# Patient Record
Sex: MALE | Race: WHITE | Employment: OTHER | URBAN - METROPOLITAN AREA
[De-identification: names, ages, dates, MRNs, and addresses within clinical notes are randomized per-mention and may not be internally consistent; named-entity substitution may affect disease eponyms.]

---

## 2018-03-19 ENCOUNTER — HOSPITAL ENCOUNTER (OUTPATIENT)
Dept: GENERAL RADIOLOGY | Age: 78
Discharge: HOME | End: 2018-03-21
Payer: MEDICARE

## 2018-03-19 ENCOUNTER — HOSPITAL ENCOUNTER (OUTPATIENT)
Age: 78
Discharge: HOME | End: 2018-03-21
Payer: MEDICARE

## 2018-03-19 DIAGNOSIS — R52 PAIN: ICD-10-CM

## 2018-03-19 PROCEDURE — 72220 X-RAY EXAM SACRUM TAILBONE: CPT

## 2018-03-19 PROCEDURE — 72100 X-RAY EXAM L-S SPINE 2/3 VWS: CPT

## 2018-05-19 ENCOUNTER — HOSPITAL ENCOUNTER (EMERGENCY)
Age: 78
Discharge: HOME | End: 2018-05-19
Payer: MEDICARE

## 2018-05-19 VITALS
RESPIRATION RATE: 20 BRPM | DIASTOLIC BLOOD PRESSURE: 73 MMHG | HEART RATE: 98 BPM | BODY MASS INDEX: 23.54 KG/M2 | HEIGHT: 67 IN | WEIGHT: 150 LBS | SYSTOLIC BLOOD PRESSURE: 134 MMHG | TEMPERATURE: 98.3 F | OXYGEN SATURATION: 98 %

## 2018-05-19 DIAGNOSIS — R21 RASH AND OTHER NONSPECIFIC SKIN ERUPTION: Primary | ICD-10-CM

## 2018-05-19 PROCEDURE — 99282 EMERGENCY DEPT VISIT SF MDM: CPT

## 2018-05-19 PROCEDURE — 6360000002 HC RX W HCPCS: Performed by: NURSE PRACTITIONER

## 2018-05-19 PROCEDURE — 6370000000 HC RX 637 (ALT 250 FOR IP): Performed by: NURSE PRACTITIONER

## 2018-05-19 PROCEDURE — 96372 THER/PROPH/DIAG INJ SC/IM: CPT

## 2018-05-19 RX ORDER — TRIAMCINOLONE ACETONIDE 40 MG/ML
40 INJECTION, SUSPENSION INTRA-ARTICULAR; INTRAMUSCULAR ONCE
Status: COMPLETED | OUTPATIENT
Start: 2018-05-19 | End: 2018-05-19

## 2018-05-19 RX ORDER — HYDROCODONE BITARTRATE AND ACETAMINOPHEN 10; 325 MG/1; MG/1
1 TABLET ORAL EVERY 6 HOURS PRN
Status: ON HOLD | COMMUNITY
End: 2018-10-24 | Stop reason: HOSPADM

## 2018-05-19 RX ORDER — LANOLIN ALCOHOL/MO/W.PET/CERES
2500 CREAM (GRAM) TOPICAL DAILY
COMMUNITY
End: 2018-08-30 | Stop reason: DRUGHIGH

## 2018-05-19 RX ORDER — FAMOTIDINE 20 MG/1
20 TABLET, FILM COATED ORAL ONCE
Status: COMPLETED | OUTPATIENT
Start: 2018-05-19 | End: 2018-05-19

## 2018-05-19 RX ORDER — HYDROXYZINE PAMOATE 50 MG/1
50 CAPSULE ORAL 3 TIMES DAILY PRN
Qty: 21 CAPSULE | Refills: 0 | Status: SHIPPED | OUTPATIENT
Start: 2018-05-19 | End: 2018-05-26

## 2018-05-19 RX ORDER — HYDROXYZINE HYDROCHLORIDE 50 MG/ML
50 INJECTION, SOLUTION INTRAMUSCULAR ONCE
Status: COMPLETED | OUTPATIENT
Start: 2018-05-19 | End: 2018-05-19

## 2018-05-19 RX ORDER — FAMOTIDINE 20 MG/1
20 TABLET, FILM COATED ORAL 2 TIMES DAILY
Qty: 14 TABLET | Refills: 0 | Status: SHIPPED | OUTPATIENT
Start: 2018-05-19 | End: 2018-08-30 | Stop reason: ALTCHOICE

## 2018-05-19 RX ORDER — DIPHENHYDRAMINE HCL 25 MG
25 TABLET ORAL ONCE
Status: COMPLETED | OUTPATIENT
Start: 2018-05-19 | End: 2018-05-19

## 2018-05-19 RX ORDER — PREDNISONE 10 MG/1
40 TABLET ORAL DAILY
Qty: 20 TABLET | Refills: 0 | Status: SHIPPED | OUTPATIENT
Start: 2018-05-19 | End: 2018-05-24

## 2018-05-19 RX ADMIN — FAMOTIDINE 20 MG: 20 TABLET, FILM COATED ORAL at 16:51

## 2018-05-19 RX ADMIN — DIPHENHYDRAMINE HCL 25 MG: 25 TABLET ORAL at 16:51

## 2018-05-19 RX ADMIN — TRIAMCINOLONE ACETONIDE 40 MG: 40 INJECTION, SUSPENSION INTRA-ARTICULAR; INTRAMUSCULAR at 16:52

## 2018-05-19 RX ADMIN — HYDROXYZINE HYDROCHLORIDE 50 MG: 50 INJECTION, SOLUTION INTRAMUSCULAR at 17:44

## 2018-05-19 ASSESSMENT — PAIN DESCRIPTION - FREQUENCY: FREQUENCY: CONTINUOUS

## 2018-05-19 ASSESSMENT — PAIN SCALES - GENERAL: PAINLEVEL_OUTOF10: 9

## 2018-05-19 ASSESSMENT — PAIN DESCRIPTION - PAIN TYPE: TYPE: ACUTE PAIN

## 2018-05-19 ASSESSMENT — PAIN DESCRIPTION - DESCRIPTORS: DESCRIPTORS: ACHING;THROBBING

## 2018-05-19 ASSESSMENT — PAIN DESCRIPTION - LOCATION: LOCATION: HAND

## 2018-05-19 ASSESSMENT — PAIN DESCRIPTION - ORIENTATION: ORIENTATION: RIGHT;LEFT

## 2018-06-06 ENCOUNTER — HOSPITAL ENCOUNTER (OUTPATIENT)
Dept: GENERAL RADIOLOGY | Age: 78
Discharge: HOME | End: 2018-06-08
Payer: MEDICARE

## 2018-06-06 ENCOUNTER — HOSPITAL ENCOUNTER (OUTPATIENT)
Dept: GENERAL RADIOLOGY | Age: 78
End: 2018-06-06
Payer: MEDICARE

## 2018-06-06 ENCOUNTER — HOSPITAL ENCOUNTER (OUTPATIENT)
Age: 78
Discharge: HOME | End: 2018-06-08
Payer: MEDICARE

## 2018-06-06 DIAGNOSIS — M54.2 CERVICALGIA: ICD-10-CM

## 2018-06-06 PROCEDURE — 72050 X-RAY EXAM NECK SPINE 4/5VWS: CPT

## 2018-06-13 ENCOUNTER — HOSPITAL ENCOUNTER (OUTPATIENT)
Dept: ULTRASOUND IMAGING | Age: 78
Discharge: HOME | End: 2018-06-13
Payer: MEDICARE

## 2018-06-13 ENCOUNTER — HOSPITAL ENCOUNTER (OUTPATIENT)
Dept: NON INVASIVE DIAGNOSTICS | Age: 78
Discharge: HOME | End: 2018-06-13
Payer: MEDICARE

## 2018-06-13 DIAGNOSIS — R42 DIZZINESS AND GIDDINESS: ICD-10-CM

## 2018-06-13 PROCEDURE — 93880 EXTRACRANIAL BILAT STUDY: CPT

## 2018-06-13 PROCEDURE — 93225 XTRNL ECG REC<48 HRS REC: CPT

## 2018-06-13 PROCEDURE — 93226 XTRNL ECG REC<48 HR SCAN A/R: CPT

## 2018-06-27 ENCOUNTER — HOSPITAL ENCOUNTER (OUTPATIENT)
Dept: MRI IMAGING | Age: 78
Discharge: HOME | End: 2018-06-29
Payer: MEDICARE

## 2018-06-27 DIAGNOSIS — R42 DIZZINESS: ICD-10-CM

## 2018-06-27 DIAGNOSIS — R41.3 MEMORY LOSS: ICD-10-CM

## 2018-06-27 DIAGNOSIS — F44.89 CHRONIC CONFUSIONAL STATE: ICD-10-CM

## 2018-06-27 PROCEDURE — 6360000004 HC RX CONTRAST MEDICATION: Performed by: RADIOLOGY

## 2018-06-27 PROCEDURE — 70553 MRI BRAIN STEM W/O & W/DYE: CPT

## 2018-06-27 PROCEDURE — A9577 INJ MULTIHANCE: HCPCS | Performed by: RADIOLOGY

## 2018-06-27 RX ADMIN — GADOBENATE DIMEGLUMINE 20 ML: 529 INJECTION, SOLUTION INTRAVENOUS at 14:50

## 2018-08-04 ENCOUNTER — APPOINTMENT (OUTPATIENT)
Dept: GENERAL RADIOLOGY | Age: 78
DRG: 163 | End: 2018-08-04
Payer: MEDICARE

## 2018-08-04 ENCOUNTER — HOSPITAL ENCOUNTER (INPATIENT)
Age: 78
LOS: 6 days | Discharge: NURSING, REHAB OR LONG-TERM CARE | DRG: 163 | End: 2018-08-10
Attending: EMERGENCY MEDICINE | Admitting: SURGERY
Payer: MEDICARE

## 2018-08-04 ENCOUNTER — APPOINTMENT (OUTPATIENT)
Dept: CT IMAGING | Age: 78
DRG: 163 | End: 2018-08-04
Payer: MEDICARE

## 2018-08-04 DIAGNOSIS — S01.01XA LACERATION OF SCALP, INITIAL ENCOUNTER: ICD-10-CM

## 2018-08-04 DIAGNOSIS — S27.2XXA TRAUMATIC HEMOPNEUMOTHORAX, INITIAL ENCOUNTER: ICD-10-CM
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
## 2018-08-04 DIAGNOSIS — S22.49XA CLOSED FRACTURE OF MULTIPLE RIBS, UNSPECIFIED LATERALITY, INITIAL ENCOUNTER: ICD-10-CM

## 2018-08-04 PROBLEM — T14.90XA TRAUMA: Status: ACTIVE | Noted: 2018-08-04

## 2018-08-04 LAB
% INHIBITION AA: 70.6 %
% INHIBITION ADP: 23 %
ABO/RH: NORMAL
ACETAMINOPHEN LEVEL: <5 MCG/ML (ref 10–30)
ALBUMIN SERPL-MCNC: 3.4 G/DL (ref 3.5–5.2)
ALP BLD-CCNC: 34 U/L (ref 40–129)
ALT SERPL-CCNC: 45 U/L (ref 0–40)
ANGLE (CLOT STRENGTH): 76.2 DEGREE (ref 59–74)
ANION GAP SERPL CALCULATED.3IONS-SCNC: 12 MMOL/L (ref 7–16)
ANTIBODY SCREEN: NORMAL
APTT: <20 SEC (ref 24.5–35.1)
AST SERPL-CCNC: 65 U/L (ref 0–39)
B.E.: -3.8 MMOL/L (ref -3–3)
BILIRUB SERPL-MCNC: 0.4 MG/DL (ref 0–1.2)
BUN BLDV-MCNC: 20 MG/DL (ref 8–23)
CALCIUM SERPL-MCNC: 8.8 MG/DL (ref 8.6–10.2)
CHLORIDE BLD-SCNC: 107 MMOL/L (ref 98–107)
CO2: 24 MMOL/L (ref 22–29)
COHB: 0.2 % (ref 0–1.5)
CREAT SERPL-MCNC: 1.7 MG/DL (ref 0.7–1.2)
CRITICAL: ABNORMAL
DATE ANALYZED: ABNORMAL
DATE OF COLLECTION: ABNORMAL
EPL-TEG: 0.1 % (ref 0–15)
ETHANOL: <10 MG/DL (ref 0–0.08)
G-TEG: 10.3 K D/SC (ref 4.5–11)
GFR AFRICAN AMERICAN: 47
GFR NON-AFRICAN AMERICAN: 39 ML/MIN/1.73
GLUCOSE BLD-MCNC: 199 MG/DL (ref 74–109)
HCO3: 23 MMOL/L (ref 22–26)
HCT VFR BLD CALC: 31.8 % (ref 37–54)
HEMOGLOBIN: 10.8 G/DL (ref 12.5–16.5)
HHB: 0.9 % (ref 0–5)
INR BLD: 1
K (CLOTTING TIME): 0.9 MIN (ref 1–3)
LAB: ABNORMAL
LACTIC ACID: 3.5 MMOL/L (ref 0.5–2.2)
LY30 (FIBRINOLYSIS): 0.1 % (ref 0–8)
Lab: 1542
MA (MAX AMPLITUDE): 67.3 MM (ref 50–70)
MA-AA: 25.6 MM
MA-ACTIVATED: 8.2 MM
MA-ADP: 53.7 MM
MA-TEG BASELINE: 67.3 MM
MCH RBC QN AUTO: 35.2 PG (ref 26–35)
MCHC RBC AUTO-ENTMCNC: 34 % (ref 32–34.5)
MCV RBC AUTO: 103.6 FL (ref 80–99.9)
METHB: 0.3 % (ref 0–1.5)
MODE: ABNORMAL
O2 CONTENT: 17.1 ML/DL
O2 SATURATION: 99.1 % (ref 92–98.5)
O2HB: 98.6 % (ref 94–97)
OPERATOR ID: 467
PATIENT TEMP: 37 C
PCO2: 48.9 MMHG (ref 35–45)
PDW BLD-RTO: 13.9 FL (ref 11.5–15)
PH BLOOD GAS: 7.29 (ref 7.35–7.45)
PLATELET # BLD: 144 E9/L (ref 130–450)
PMV BLD AUTO: 10.8 FL (ref 7–12)
PO2: 291.9 MMHG (ref 60–100)
POTASSIUM SERPL-SCNC: 5.34 MMOL/L (ref 3.3–5.1)
POTASSIUM SERPL-SCNC: 6 MMOL/L (ref 3.5–5)
PROTHROMBIN TIME: 11.9 SEC (ref 9.3–12.4)
R (REACTION TIME): 2.7 MIN (ref 5–10)
RBC # BLD: 3.07 E12/L (ref 3.8–5.8)
SALICYLATE, SERUM: <0.3 MG/DL (ref 0–30)
SODIUM BLD-SCNC: 143 MMOL/L (ref 132–146)
SOURCE, BLOOD GAS: ABNORMAL
THB: 11.8 G/DL (ref 11.5–16.5)
TIME ANALYZED: 1544
TOTAL PROTEIN: 5.9 G/DL (ref 6.4–8.3)
TRICYCLIC ANTIDEPRESSANTS SCREEN SERUM: NEGATIVE NG/ML
WBC # BLD: 11.3 E9/L (ref 4.5–11.5)

## 2018-08-04 PROCEDURE — 82805 BLOOD GASES W/O2 SATURATION: CPT

## 2018-08-04 PROCEDURE — G0480 DRUG TEST DEF 1-7 CLASSES: HCPCS

## 2018-08-04 PROCEDURE — 2000000000 HC ICU R&B

## 2018-08-04 PROCEDURE — 73560 X-RAY EXAM OF KNEE 1 OR 2: CPT

## 2018-08-04 PROCEDURE — 80048 BASIC METABOLIC PNL TOTAL CA: CPT

## 2018-08-04 PROCEDURE — 99223 1ST HOSP IP/OBS HIGH 75: CPT | Performed by: ORTHOPAEDIC SURGERY

## 2018-08-04 PROCEDURE — 99223 1ST HOSP IP/OBS HIGH 75: CPT | Performed by: SURGERY

## 2018-08-04 PROCEDURE — 36410 VNPNXR 3YR/> PHY/QHP DX/THER: CPT | Performed by: SURGERY

## 2018-08-04 PROCEDURE — 85027 COMPLETE CBC AUTOMATED: CPT

## 2018-08-04 PROCEDURE — 73630 X-RAY EXAM OF FOOT: CPT

## 2018-08-04 PROCEDURE — 83605 ASSAY OF LACTIC ACID: CPT

## 2018-08-04 PROCEDURE — 86901 BLOOD TYPING SEROLOGIC RH(D): CPT

## 2018-08-04 PROCEDURE — 85730 THROMBOPLASTIN TIME PARTIAL: CPT

## 2018-08-04 PROCEDURE — 87081 CULTURE SCREEN ONLY: CPT

## 2018-08-04 PROCEDURE — 99285 EMERGENCY DEPT VISIT HI MDM: CPT

## 2018-08-04 PROCEDURE — 6370000000 HC RX 637 (ALT 250 FOR IP): Performed by: STUDENT IN AN ORGANIZED HEALTH CARE EDUCATION/TRAINING PROGRAM

## 2018-08-04 PROCEDURE — 36600 WITHDRAWAL OF ARTERIAL BLOOD: CPT | Performed by: SURGERY

## 2018-08-04 PROCEDURE — 72170 X-RAY EXAM OF PELVIS: CPT

## 2018-08-04 PROCEDURE — 94150 VITAL CAPACITY TEST: CPT

## 2018-08-04 PROCEDURE — 32551 INSERTION OF CHEST TUBE: CPT

## 2018-08-04 PROCEDURE — 74177 CT ABD & PELVIS W/CONTRAST: CPT

## 2018-08-04 PROCEDURE — 72128 CT CHEST SPINE W/O DYE: CPT

## 2018-08-04 PROCEDURE — 0HQ0XZZ REPAIR SCALP SKIN, EXTERNAL APPROACH: ICD-10-PCS | Performed by: SURGERY

## 2018-08-04 PROCEDURE — 71260 CT THORAX DX C+: CPT

## 2018-08-04 PROCEDURE — 6360000004 HC RX CONTRAST MEDICATION: Performed by: RADIOLOGY

## 2018-08-04 PROCEDURE — 96374 THER/PROPH/DIAG INJ IV PUSH: CPT

## 2018-08-04 PROCEDURE — 71045 X-RAY EXAM CHEST 1 VIEW: CPT

## 2018-08-04 PROCEDURE — 2580000003 HC RX 258: Performed by: STUDENT IN AN ORGANIZED HEALTH CARE EDUCATION/TRAINING PROGRAM

## 2018-08-04 PROCEDURE — 73000 X-RAY EXAM OF COLLAR BONE: CPT

## 2018-08-04 PROCEDURE — 0W9B30Z DRAINAGE OF LEFT PLEURAL CAVITY WITH DRAINAGE DEVICE, PERCUTANEOUS APPROACH: ICD-10-PCS | Performed by: SURGERY

## 2018-08-04 PROCEDURE — 23500 CLTX CLAVICULAR FX W/O MNPJ: CPT | Performed by: ORTHOPAEDIC SURGERY

## 2018-08-04 PROCEDURE — 6360000002 HC RX W HCPCS: Performed by: STUDENT IN AN ORGANIZED HEALTH CARE EDUCATION/TRAINING PROGRAM

## 2018-08-04 PROCEDURE — 86900 BLOOD TYPING SEROLOGIC ABO: CPT

## 2018-08-04 PROCEDURE — 85610 PROTHROMBIN TIME: CPT

## 2018-08-04 PROCEDURE — 80307 DRUG TEST PRSMV CHEM ANLYZR: CPT

## 2018-08-04 PROCEDURE — G0390 TRAUMA RESPONS W/HOSP CRITI: HCPCS

## 2018-08-04 PROCEDURE — 72125 CT NECK SPINE W/O DYE: CPT

## 2018-08-04 PROCEDURE — 84132 ASSAY OF SERUM POTASSIUM: CPT

## 2018-08-04 PROCEDURE — 85576 BLOOD PLATELET AGGREGATION: CPT

## 2018-08-04 PROCEDURE — 80053 COMPREHEN METABOLIC PANEL: CPT

## 2018-08-04 PROCEDURE — 85384 FIBRINOGEN ACTIVITY: CPT

## 2018-08-04 PROCEDURE — 72131 CT LUMBAR SPINE W/O DYE: CPT

## 2018-08-04 PROCEDURE — 99406 BEHAV CHNG SMOKING 3-10 MIN: CPT | Performed by: ORTHOPAEDIC SURGERY

## 2018-08-04 PROCEDURE — 36415 COLL VENOUS BLD VENIPUNCTURE: CPT

## 2018-08-04 PROCEDURE — 70450 CT HEAD/BRAIN W/O DYE: CPT

## 2018-08-04 PROCEDURE — 28470 CLTX METATARSAL FX WO MNP EA: CPT | Performed by: ORTHOPAEDIC SURGERY

## 2018-08-04 PROCEDURE — 85347 COAGULATION TIME ACTIVATED: CPT

## 2018-08-04 PROCEDURE — 86850 RBC ANTIBODY SCREEN: CPT

## 2018-08-04 PROCEDURE — 2500000003 HC RX 250 WO HCPCS

## 2018-08-04 RX ORDER — MORPHINE SULFATE 2 MG/ML
2 INJECTION, SOLUTION INTRAMUSCULAR; INTRAVENOUS
Status: DISCONTINUED | OUTPATIENT
Start: 2018-08-04 | End: 2018-08-04

## 2018-08-04 RX ORDER — PROPOFOL 10 MG/ML
10 INJECTION, EMULSION INTRAVENOUS
Status: DISCONTINUED | OUTPATIENT
Start: 2018-08-04 | End: 2018-08-04

## 2018-08-04 RX ORDER — MORPHINE SULFATE 4 MG/ML
4 INJECTION, SOLUTION INTRAMUSCULAR; INTRAVENOUS
Status: DISCONTINUED | OUTPATIENT
Start: 2018-08-04 | End: 2018-08-04

## 2018-08-04 RX ORDER — SODIUM CHLORIDE 0.9 % (FLUSH) 0.9 %
10 SYRINGE (ML) INJECTION PRN
Status: DISCONTINUED | OUTPATIENT
Start: 2018-08-04 | End: 2018-08-10 | Stop reason: HOSPADM

## 2018-08-04 RX ORDER — OXYCODONE HYDROCHLORIDE 10 MG/1
10 TABLET ORAL EVERY 4 HOURS PRN
Status: DISCONTINUED | OUTPATIENT
Start: 2018-08-04 | End: 2018-08-10 | Stop reason: HOSPADM

## 2018-08-04 RX ORDER — KETOROLAC TROMETHAMINE 30 MG/ML
15 INJECTION, SOLUTION INTRAMUSCULAR; INTRAVENOUS EVERY 6 HOURS PRN
Status: DISCONTINUED | OUTPATIENT
Start: 2018-08-04 | End: 2018-08-04

## 2018-08-04 RX ORDER — LIDOCAINE HYDROCHLORIDE 10 MG/ML
INJECTION, SOLUTION INFILTRATION; PERINEURAL
Status: DISPENSED
Start: 2018-08-04 | End: 2018-08-05

## 2018-08-04 RX ORDER — METHOCARBAMOL 500 MG/1
500 TABLET, FILM COATED ORAL 4 TIMES DAILY
Status: DISCONTINUED | OUTPATIENT
Start: 2018-08-04 | End: 2018-08-10 | Stop reason: HOSPADM

## 2018-08-04 RX ORDER — OXYCODONE HYDROCHLORIDE 5 MG/1
5 TABLET ORAL EVERY 4 HOURS PRN
Status: DISCONTINUED | OUTPATIENT
Start: 2018-08-04 | End: 2018-08-10 | Stop reason: HOSPADM

## 2018-08-04 RX ORDER — LIDOCAINE HYDROCHLORIDE AND EPINEPHRINE 10; 10 MG/ML; UG/ML
INJECTION, SOLUTION INFILTRATION; PERINEURAL
Status: COMPLETED
Start: 2018-08-04 | End: 2018-08-04

## 2018-08-04 RX ORDER — LIDOCAINE 50 MG/G
1 PATCH TOPICAL DAILY
Status: DISCONTINUED | OUTPATIENT
Start: 2018-08-04 | End: 2018-08-10 | Stop reason: HOSPADM

## 2018-08-04 RX ORDER — ACETAMINOPHEN 650 MG
TABLET, EXTENDED RELEASE ORAL
Status: COMPLETED
Start: 2018-08-04 | End: 2018-08-04

## 2018-08-04 RX ORDER — DOCUSATE SODIUM 100 MG/1
100 CAPSULE, LIQUID FILLED ORAL 2 TIMES DAILY
Status: DISCONTINUED | OUTPATIENT
Start: 2018-08-04 | End: 2018-08-08

## 2018-08-04 RX ORDER — DIAPER,BRIEF,INFANT-TODD,DISP
EACH MISCELLANEOUS 2 TIMES DAILY
Status: DISCONTINUED | OUTPATIENT
Start: 2018-08-04 | End: 2018-08-10 | Stop reason: HOSPADM

## 2018-08-04 RX ORDER — FENTANYL CITRATE 50 UG/ML
50 INJECTION, SOLUTION INTRAMUSCULAR; INTRAVENOUS ONCE
Status: COMPLETED | OUTPATIENT
Start: 2018-08-04 | End: 2018-08-04

## 2018-08-04 RX ORDER — SODIUM CHLORIDE 9 MG/ML
INJECTION, SOLUTION INTRAVENOUS CONTINUOUS
Status: DISCONTINUED | OUTPATIENT
Start: 2018-08-04 | End: 2018-08-07

## 2018-08-04 RX ORDER — LIDOCAINE HYDROCHLORIDE AND EPINEPHRINE 10; 10 MG/ML; UG/ML
40 INJECTION, SOLUTION INFILTRATION; PERINEURAL ONCE
Status: COMPLETED | OUTPATIENT
Start: 2018-08-04 | End: 2018-08-04

## 2018-08-04 RX ORDER — SODIUM CHLORIDE 0.9 % (FLUSH) 0.9 %
10 SYRINGE (ML) INJECTION
Status: ACTIVE | OUTPATIENT
Start: 2018-08-04 | End: 2018-08-04

## 2018-08-04 RX ORDER — SODIUM CHLORIDE 0.9 % (FLUSH) 0.9 %
10 SYRINGE (ML) INJECTION EVERY 12 HOURS SCHEDULED
Status: DISCONTINUED | OUTPATIENT
Start: 2018-08-04 | End: 2018-08-10 | Stop reason: HOSPADM

## 2018-08-04 RX ORDER — ACETAMINOPHEN 325 MG/1
650 TABLET ORAL EVERY 4 HOURS
Status: DISCONTINUED | OUTPATIENT
Start: 2018-08-04 | End: 2018-08-10 | Stop reason: HOSPADM

## 2018-08-04 RX ORDER — ONDANSETRON 2 MG/ML
4 INJECTION INTRAMUSCULAR; INTRAVENOUS EVERY 6 HOURS PRN
Status: DISCONTINUED | OUTPATIENT
Start: 2018-08-04 | End: 2018-08-10 | Stop reason: HOSPADM

## 2018-08-04 RX ADMIN — SODIUM CHLORIDE: 9 INJECTION, SOLUTION INTRAVENOUS at 17:51

## 2018-08-04 RX ADMIN — FENTANYL CITRATE 50 MCG: 50 INJECTION INTRAMUSCULAR; INTRAVENOUS at 18:28

## 2018-08-04 RX ADMIN — MORPHINE SULFATE 2 MG: 2 INJECTION, SOLUTION INTRAMUSCULAR; INTRAVENOUS at 20:31

## 2018-08-04 RX ADMIN — METHOCARBAMOL 500 MG: 500 TABLET ORAL at 21:28

## 2018-08-04 RX ADMIN — LIDOCAINE HYDROCHLORIDE AND EPINEPHRINE 40 ML: 10; 10 INJECTION, SOLUTION INFILTRATION; PERINEURAL at 19:10

## 2018-08-04 RX ADMIN — Medication: at 19:11

## 2018-08-04 RX ADMIN — SODIUM CHLORIDE: 9 INJECTION, SOLUTION INTRAVENOUS at 16:43

## 2018-08-04 RX ADMIN — FENTANYL CITRATE 50 MCG: 50 INJECTION, SOLUTION INTRAMUSCULAR; INTRAVENOUS at 16:15

## 2018-08-04 RX ADMIN — ACETAMINOPHEN 650 MG: 325 TABLET, FILM COATED ORAL at 21:46

## 2018-08-04 RX ADMIN — IOPAMIDOL 110 ML: 755 INJECTION, SOLUTION INTRAVENOUS at 15:52

## 2018-08-04 RX ADMIN — MORPHINE SULFATE 4 MG: 4 INJECTION, SOLUTION INTRAMUSCULAR; INTRAVENOUS at 16:52

## 2018-08-04 RX ADMIN — BACITRACIN ZINC: 500 OINTMENT TOPICAL at 21:47

## 2018-08-04 RX ADMIN — OXYCODONE HYDROCHLORIDE 10 MG: 10 TABLET ORAL at 21:46

## 2018-08-04 RX ADMIN — Medication 10 ML: at 21:47

## 2018-08-04 RX ADMIN — LIDOCAINE HYDROCHLORIDE,EPINEPHRINE BITARTRATE 40 ML: 10; .01 INJECTION, SOLUTION INFILTRATION; PERINEURAL at 19:10

## 2018-08-04 ASSESSMENT — PAIN DESCRIPTION - PROGRESSION: CLINICAL_PROGRESSION: NOT CHANGED

## 2018-08-04 ASSESSMENT — PAIN SCALES - GENERAL
PAINLEVEL_OUTOF10: 6
PAINLEVEL_OUTOF10: 10
PAINLEVEL_OUTOF10: 9
PAINLEVEL_OUTOF10: 0
PAINLEVEL_OUTOF10: 10

## 2018-08-04 ASSESSMENT — PAIN DESCRIPTION - FREQUENCY
FREQUENCY: CONTINUOUS
FREQUENCY: CONTINUOUS

## 2018-08-04 ASSESSMENT — PAIN DESCRIPTION - ORIENTATION
ORIENTATION: LEFT
ORIENTATION: LEFT

## 2018-08-04 ASSESSMENT — PAIN DESCRIPTION - DESCRIPTORS
DESCRIPTORS: ACHING;CONSTANT;DISCOMFORT;SHARP
DESCRIPTORS: ACHING;CONSTANT;DISCOMFORT;SHARP

## 2018-08-04 ASSESSMENT — PAIN DESCRIPTION - ONSET
ONSET: ON-GOING
ONSET: ON-GOING

## 2018-08-04 ASSESSMENT — PAIN DESCRIPTION - PAIN TYPE
TYPE: ACUTE PAIN
TYPE: ACUTE PAIN

## 2018-08-04 ASSESSMENT — PAIN DESCRIPTION - LOCATION
LOCATION: CHEST;ARM;SHOULDER
LOCATION: BACK;SHOULDER

## 2018-08-04 NOTE — PROGRESS NOTES
Staple Note:  Left Scalp Laceration 10 cm  The wound was copiously irrigated with sterile saline . Surrounding hair was trimmed with clippers. Patient was anesthetized using local anesthetic 1% lidocaine with epi. With the use of 9 staples , the laceration was approximated. The patient tolerated the procedure well. Wound will be dressed with bacitracin and gauze by nursing.     Electronically signed by Radha Rosen DO on 8/4/2018 at 5:04 PM

## 2018-08-04 NOTE — ED NOTES
, pain on deep inhalation     Tevin Carnes, RN  08/04/18 0436
Abrasions to left shin, left elbow, left hand and forearm     Lon Guevara RN  08/04/18 7178
Admits to taking asa regimen daily, denies any use of other blood thinning medication.       Shell Steel RN  08/04/18 4743
Blood work being obtained by trauma personnel via left femoral.      Carol Lawson RN  08/04/18 1714
Chest and pelvis xr ordered and at bedside.       Nader Hobbs RN  08/04/18 7994
Ecchymosis to right thigh     Lon Guevara RN  08/04/18 9404
Left chest wall tenderness on exam.      Nissa Denise RN  08/04/18 4138
NRB mask removed.       Sada Burkett RN  08/04/18 4005
Pt c/o neck pain and left shoulder and left chest pain.       Fiordaliza Payne RN  08/04/18 7866
Pt transported to SICU accompanied by Trauma Resident and this RN.      Maty Lucas RN  08/04/18 8715
c-collar placed on arrival by trauma personnel.      Adrian Hackett RN  08/04/18 9806
Size Of Lesion In Cm (Optional): 0
Detail Level: Detailed
Body Location Override (Optional - Billing Will Still Be Based On Selected Body Map Location If Applicable): Scalp
Body Location Override (Optional - Billing Will Still Be Based On Selected Body Map Location If Applicable): Nose
Body Location Override (Optional - Billing Will Still Be Based On Selected Body Map Location If Applicable): Eye
Body Location Override (Optional - Billing Will Still Be Based On Selected Body Map Location If Applicable): Left mid superior forehead
Body Location Override (Optional - Billing Will Still Be Based On Selected Body Map Location If Applicable): Right posterior thigh
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
## 2018-08-05 ENCOUNTER — APPOINTMENT (OUTPATIENT)
Dept: GENERAL RADIOLOGY | Age: 78
DRG: 163 | End: 2018-08-05
Payer: MEDICARE

## 2018-08-05 ENCOUNTER — APPOINTMENT (OUTPATIENT)
Dept: CT IMAGING | Age: 78
DRG: 163 | End: 2018-08-05
Payer: MEDICARE

## 2018-08-05 PROBLEM — N28.1 BILATERAL RENAL CYSTS: Status: ACTIVE | Noted: 2018-08-05

## 2018-08-05 PROBLEM — E27.9 ADRENAL NODULE (HCC): Status: ACTIVE | Noted: 2018-08-05

## 2018-08-05 PROBLEM — E27.8 ADRENAL NODULE (HCC): Status: ACTIVE | Noted: 2018-08-05

## 2018-08-05 LAB
ALBUMIN SERPL-MCNC: 3 G/DL (ref 3.5–5.2)
ALP BLD-CCNC: 28 U/L (ref 40–129)
ALT SERPL-CCNC: 40 U/L (ref 0–40)
ANION GAP SERPL CALCULATED.3IONS-SCNC: 10 MMOL/L (ref 7–16)
ANION GAP SERPL CALCULATED.3IONS-SCNC: 12 MMOL/L (ref 7–16)
AST SERPL-CCNC: 63 U/L (ref 0–39)
BASOPHILS ABSOLUTE: 0.01 E9/L (ref 0–0.2)
BASOPHILS RELATIVE PERCENT: 0.1 % (ref 0–2)
BILIRUB SERPL-MCNC: 0.3 MG/DL (ref 0–1.2)
BUN BLDV-MCNC: 22 MG/DL (ref 8–23)
BUN BLDV-MCNC: 23 MG/DL (ref 8–23)
CALCIUM SERPL-MCNC: 8.4 MG/DL (ref 8.6–10.2)
CALCIUM SERPL-MCNC: 8.4 MG/DL (ref 8.6–10.2)
CHLORIDE BLD-SCNC: 108 MMOL/L (ref 98–107)
CHLORIDE BLD-SCNC: 109 MMOL/L (ref 98–107)
CO2: 24 MMOL/L (ref 22–29)
CO2: 25 MMOL/L (ref 22–29)
CREAT SERPL-MCNC: 1.4 MG/DL (ref 0.7–1.2)
CREAT SERPL-MCNC: 1.4 MG/DL (ref 0.7–1.2)
EOSINOPHILS ABSOLUTE: 0 E9/L (ref 0.05–0.5)
EOSINOPHILS RELATIVE PERCENT: 0 % (ref 0–6)
GFR AFRICAN AMERICAN: 59
GFR AFRICAN AMERICAN: 59
GFR NON-AFRICAN AMERICAN: 49 ML/MIN/1.73
GFR NON-AFRICAN AMERICAN: 49 ML/MIN/1.73
GLUCOSE BLD-MCNC: 154 MG/DL (ref 74–109)
GLUCOSE BLD-MCNC: 173 MG/DL (ref 74–109)
HCT VFR BLD CALC: 26.9 % (ref 37–54)
HEMOGLOBIN: 8.9 G/DL (ref 12.5–16.5)
IMMATURE GRANULOCYTES #: 0.04 E9/L
IMMATURE GRANULOCYTES %: 0.4 % (ref 0–5)
LACTIC ACID: 1.4 MMOL/L (ref 0.5–2.2)
LYMPHOCYTES ABSOLUTE: 0.63 E9/L (ref 1.5–4)
LYMPHOCYTES RELATIVE PERCENT: 7 % (ref 20–42)
MAGNESIUM: 1.6 MG/DL (ref 1.6–2.6)
MCH RBC QN AUTO: 34.9 PG (ref 26–35)
MCHC RBC AUTO-ENTMCNC: 33.1 % (ref 32–34.5)
MCV RBC AUTO: 105.5 FL (ref 80–99.9)
METER GLUCOSE: 140 MG/DL (ref 70–110)
METER GLUCOSE: 170 MG/DL (ref 70–110)
METER GLUCOSE: 188 MG/DL (ref 70–110)
METER GLUCOSE: 189 MG/DL (ref 70–110)
METER GLUCOSE: 219 MG/DL (ref 70–110)
MONOCYTES ABSOLUTE: 1.08 E9/L (ref 0.1–0.95)
MONOCYTES RELATIVE PERCENT: 12.1 % (ref 2–12)
NEUTROPHILS ABSOLUTE: 7.2 E9/L (ref 1.8–7.3)
NEUTROPHILS RELATIVE PERCENT: 80.4 % (ref 43–80)
PDW BLD-RTO: 14.1 FL (ref 11.5–15)
PHOSPHORUS: 4.2 MG/DL (ref 2.5–4.5)
PLATELET # BLD: 122 E9/L (ref 130–450)
PMV BLD AUTO: 10.7 FL (ref 7–12)
POTASSIUM REFLEX MAGNESIUM: 4.9 MMOL/L (ref 3.5–5)
POTASSIUM SERPL-SCNC: 5.2 MMOL/L (ref 3.5–5)
RBC # BLD: 2.55 E12/L (ref 3.8–5.8)
SODIUM BLD-SCNC: 143 MMOL/L (ref 132–146)
SODIUM BLD-SCNC: 145 MMOL/L (ref 132–146)
TOTAL PROTEIN: 5.4 G/DL (ref 6.4–8.3)
WBC # BLD: 9 E9/L (ref 4.5–11.5)

## 2018-08-05 PROCEDURE — 6370000000 HC RX 637 (ALT 250 FOR IP): Performed by: SURGERY

## 2018-08-05 PROCEDURE — 85025 COMPLETE CBC W/AUTO DIFF WBC: CPT

## 2018-08-05 PROCEDURE — 84100 ASSAY OF PHOSPHORUS: CPT

## 2018-08-05 PROCEDURE — 2580000003 HC RX 258: Performed by: STUDENT IN AN ORGANIZED HEALTH CARE EDUCATION/TRAINING PROGRAM

## 2018-08-05 PROCEDURE — 73700 CT LOWER EXTREMITY W/O DYE: CPT

## 2018-08-05 PROCEDURE — 94150 VITAL CAPACITY TEST: CPT

## 2018-08-05 PROCEDURE — 71045 X-RAY EXAM CHEST 1 VIEW: CPT

## 2018-08-05 PROCEDURE — 80053 COMPREHEN METABOLIC PANEL: CPT

## 2018-08-05 PROCEDURE — 82962 GLUCOSE BLOOD TEST: CPT

## 2018-08-05 PROCEDURE — 83735 ASSAY OF MAGNESIUM: CPT

## 2018-08-05 PROCEDURE — 6370000000 HC RX 637 (ALT 250 FOR IP): Performed by: STUDENT IN AN ORGANIZED HEALTH CARE EDUCATION/TRAINING PROGRAM

## 2018-08-05 PROCEDURE — 6360000002 HC RX W HCPCS: Performed by: STUDENT IN AN ORGANIZED HEALTH CARE EDUCATION/TRAINING PROGRAM

## 2018-08-05 PROCEDURE — 83605 ASSAY OF LACTIC ACID: CPT

## 2018-08-05 PROCEDURE — 36415 COLL VENOUS BLD VENIPUNCTURE: CPT

## 2018-08-05 PROCEDURE — 99291 CRITICAL CARE FIRST HOUR: CPT | Performed by: SURGERY

## 2018-08-05 PROCEDURE — 94640 AIRWAY INHALATION TREATMENT: CPT

## 2018-08-05 PROCEDURE — 94664 DEMO&/EVAL PT USE INHALER: CPT

## 2018-08-05 PROCEDURE — 2000000000 HC ICU R&B

## 2018-08-05 RX ORDER — FERROUS SULFATE 325(65) MG
325 TABLET ORAL
COMMUNITY
End: 2018-10-17

## 2018-08-05 RX ORDER — LATANOPROST 50 UG/ML
1 SOLUTION/ DROPS OPHTHALMIC NIGHTLY
COMMUNITY
End: 2020-06-01

## 2018-08-05 RX ORDER — SIMVASTATIN 20 MG
20 TABLET ORAL NIGHTLY
COMMUNITY
End: 2019-12-12 | Stop reason: SDUPTHER

## 2018-08-05 RX ORDER — ZOLPIDEM TARTRATE 5 MG/1
5 TABLET ORAL NIGHTLY PRN
COMMUNITY
End: 2019-05-24

## 2018-08-05 RX ORDER — OMEPRAZOLE 20 MG/1
20 CAPSULE, DELAYED RELEASE ORAL DAILY
COMMUNITY
End: 2018-08-30 | Stop reason: SDUPTHER

## 2018-08-05 RX ORDER — APRACLONIDINE HYDROCHLORIDE 5 MG/ML
1 SOLUTION/ DROPS OPHTHALMIC DAILY
COMMUNITY
End: 2018-08-30 | Stop reason: SDUPTHER

## 2018-08-05 RX ORDER — ASPIRIN 81 MG/1
81 TABLET ORAL DAILY
COMMUNITY

## 2018-08-05 RX ORDER — HYDROCODONE BITARTRATE AND ACETAMINOPHEN 5; 325 MG/1; MG/1
1 TABLET ORAL EVERY 6 HOURS PRN
Status: ON HOLD | COMMUNITY
End: 2018-08-10 | Stop reason: HOSPADM

## 2018-08-05 RX ORDER — CITALOPRAM 20 MG/1
40 TABLET ORAL DAILY
Status: DISCONTINUED | OUTPATIENT
Start: 2018-08-05 | End: 2018-08-10 | Stop reason: HOSPADM

## 2018-08-05 RX ORDER — IPRATROPIUM BROMIDE AND ALBUTEROL SULFATE 2.5; .5 MG/3ML; MG/3ML
1 SOLUTION RESPIRATORY (INHALATION)
Status: DISCONTINUED | OUTPATIENT
Start: 2018-08-05 | End: 2018-08-10 | Stop reason: HOSPADM

## 2018-08-05 RX ORDER — DEXTROSE MONOHYDRATE 25 G/50ML
12.5 INJECTION, SOLUTION INTRAVENOUS PRN
Status: DISCONTINUED | OUTPATIENT
Start: 2018-08-05 | End: 2018-08-09

## 2018-08-05 RX ORDER — LEVOTHYROXINE SODIUM 0.03 MG/1
25 TABLET ORAL DAILY
Status: DISCONTINUED | OUTPATIENT
Start: 2018-08-05 | End: 2018-08-10 | Stop reason: HOSPADM

## 2018-08-05 RX ORDER — DEXTROSE MONOHYDRATE 50 MG/ML
100 INJECTION, SOLUTION INTRAVENOUS PRN
Status: DISCONTINUED | OUTPATIENT
Start: 2018-08-05 | End: 2018-08-09

## 2018-08-05 RX ORDER — MELOXICAM 7.5 MG/1
7.5 TABLET ORAL 2 TIMES DAILY
COMMUNITY
End: 2018-08-30 | Stop reason: SDUPTHER

## 2018-08-05 RX ORDER — LEVOTHYROXINE SODIUM 0.03 MG/1
25 TABLET ORAL DAILY
COMMUNITY
End: 2018-08-30 | Stop reason: SDUPTHER

## 2018-08-05 RX ORDER — LATANOPROST 50 UG/ML
1 SOLUTION/ DROPS OPHTHALMIC NIGHTLY
Status: DISCONTINUED | OUTPATIENT
Start: 2018-08-06 | End: 2018-08-10 | Stop reason: HOSPADM

## 2018-08-05 RX ORDER — LANOLIN ALCOHOL/MO/W.PET/CERES
1000 CREAM (GRAM) TOPICAL DAILY
Status: DISCONTINUED | OUTPATIENT
Start: 2018-08-05 | End: 2018-08-10 | Stop reason: HOSPADM

## 2018-08-05 RX ORDER — PANTOPRAZOLE SODIUM 40 MG/1
40 TABLET, DELAYED RELEASE ORAL
Status: DISCONTINUED | OUTPATIENT
Start: 2018-08-05 | End: 2018-08-10 | Stop reason: HOSPADM

## 2018-08-05 RX ORDER — HEPARIN SODIUM 10000 [USP'U]/ML
5000 INJECTION, SOLUTION INTRAVENOUS; SUBCUTANEOUS EVERY 8 HOURS SCHEDULED
Status: DISCONTINUED | OUTPATIENT
Start: 2018-08-05 | End: 2018-08-10 | Stop reason: HOSPADM

## 2018-08-05 RX ORDER — NICOTINE POLACRILEX 4 MG
15 LOZENGE BUCCAL PRN
Status: DISCONTINUED | OUTPATIENT
Start: 2018-08-05 | End: 2018-08-09

## 2018-08-05 RX ORDER — APRACLONIDINE HYDROCHLORIDE 5 MG/ML
1 SOLUTION/ DROPS OPHTHALMIC DAILY
Status: DISCONTINUED | OUTPATIENT
Start: 2018-08-05 | End: 2018-08-09

## 2018-08-05 RX ORDER — SIMVASTATIN 20 MG
20 TABLET ORAL NIGHTLY
Status: DISCONTINUED | OUTPATIENT
Start: 2018-08-05 | End: 2018-08-10 | Stop reason: HOSPADM

## 2018-08-05 RX ORDER — CITALOPRAM 40 MG/1
40 TABLET ORAL DAILY
COMMUNITY
End: 2020-01-16 | Stop reason: SDUPTHER

## 2018-08-05 RX ORDER — LANOLIN ALCOHOL/MO/W.PET/CERES
1000 CREAM (GRAM) TOPICAL DAILY
COMMUNITY
End: 2019-10-16

## 2018-08-05 RX ADMIN — ACETAMINOPHEN 650 MG: 325 TABLET, FILM COATED ORAL at 05:32

## 2018-08-05 RX ADMIN — PANTOPRAZOLE SODIUM 40 MG: 40 TABLET, DELAYED RELEASE ORAL at 05:32

## 2018-08-05 RX ADMIN — DOCUSATE SODIUM 100 MG: 100 CAPSULE, LIQUID FILLED ORAL at 20:48

## 2018-08-05 RX ADMIN — IPRATROPIUM BROMIDE AND ALBUTEROL SULFATE 1 AMPULE: .5; 3 SOLUTION RESPIRATORY (INHALATION) at 11:59

## 2018-08-05 RX ADMIN — SODIUM CHLORIDE: 9 INJECTION, SOLUTION INTRAVENOUS at 05:31

## 2018-08-05 RX ADMIN — IPRATROPIUM BROMIDE AND ALBUTEROL SULFATE 1 AMPULE: .5; 3 SOLUTION RESPIRATORY (INHALATION) at 21:23

## 2018-08-05 RX ADMIN — SODIUM CHLORIDE: 9 INJECTION, SOLUTION INTRAVENOUS at 22:00

## 2018-08-05 RX ADMIN — IPRATROPIUM BROMIDE AND ALBUTEROL SULFATE 1 AMPULE: .5; 3 SOLUTION RESPIRATORY (INHALATION) at 15:42

## 2018-08-05 RX ADMIN — BACITRACIN ZINC: 500 OINTMENT TOPICAL at 08:52

## 2018-08-05 RX ADMIN — INSULIN LISPRO 2 UNITS: 100 INJECTION, SOLUTION INTRAVENOUS; SUBCUTANEOUS at 09:07

## 2018-08-05 RX ADMIN — DOCUSATE SODIUM 100 MG: 100 CAPSULE, LIQUID FILLED ORAL at 08:52

## 2018-08-05 RX ADMIN — ACETAMINOPHEN 650 MG: 325 TABLET, FILM COATED ORAL at 01:46

## 2018-08-05 RX ADMIN — SODIUM CHLORIDE: 9 INJECTION, SOLUTION INTRAVENOUS at 14:56

## 2018-08-05 RX ADMIN — METHOCARBAMOL 500 MG: 500 TABLET ORAL at 16:50

## 2018-08-05 RX ADMIN — ACETAMINOPHEN 650 MG: 325 TABLET, FILM COATED ORAL at 17:01

## 2018-08-05 RX ADMIN — BACITRACIN ZINC: 500 OINTMENT TOPICAL at 20:48

## 2018-08-05 RX ADMIN — INSULIN LISPRO 2 UNITS: 100 INJECTION, SOLUTION INTRAVENOUS; SUBCUTANEOUS at 12:17

## 2018-08-05 RX ADMIN — OXYCODONE HYDROCHLORIDE 10 MG: 10 TABLET ORAL at 01:47

## 2018-08-05 RX ADMIN — INSULIN LISPRO 1 UNITS: 100 INJECTION, SOLUTION INTRAVENOUS; SUBCUTANEOUS at 01:52

## 2018-08-05 RX ADMIN — ACETAMINOPHEN 650 MG: 325 TABLET, FILM COATED ORAL at 20:48

## 2018-08-05 RX ADMIN — ACETAMINOPHEN 650 MG: 325 TABLET, FILM COATED ORAL at 09:03

## 2018-08-05 RX ADMIN — Medication 10 ML: at 08:53

## 2018-08-05 RX ADMIN — SIMVASTATIN 20 MG: 20 TABLET, FILM COATED ORAL at 20:48

## 2018-08-05 RX ADMIN — OXYCODONE HYDROCHLORIDE 10 MG: 10 TABLET ORAL at 20:07

## 2018-08-05 RX ADMIN — LEVOTHYROXINE SODIUM 25 MCG: 25 TABLET ORAL at 05:31

## 2018-08-05 RX ADMIN — INSULIN LISPRO 1 UNITS: 100 INJECTION, SOLUTION INTRAVENOUS; SUBCUTANEOUS at 20:49

## 2018-08-05 RX ADMIN — INSULIN LISPRO 4 UNITS: 100 INJECTION, SOLUTION INTRAVENOUS; SUBCUTANEOUS at 17:01

## 2018-08-05 RX ADMIN — METHOCARBAMOL 500 MG: 500 TABLET ORAL at 08:52

## 2018-08-05 RX ADMIN — HEPARIN SODIUM 5000 UNITS: 10000 INJECTION INTRAVENOUS; SUBCUTANEOUS at 14:48

## 2018-08-05 RX ADMIN — METHOCARBAMOL 500 MG: 500 TABLET ORAL at 20:48

## 2018-08-05 RX ADMIN — VITAMIN D, TAB 1000IU (100/BT) 1000 UNITS: 25 TAB at 08:52

## 2018-08-05 RX ADMIN — Medication 1000 MCG: at 08:52

## 2018-08-05 RX ADMIN — OXYCODONE HYDROCHLORIDE 10 MG: 10 TABLET ORAL at 05:32

## 2018-08-05 RX ADMIN — HEPARIN SODIUM 5000 UNITS: 10000 INJECTION INTRAVENOUS; SUBCUTANEOUS at 20:49

## 2018-08-05 ASSESSMENT — PAIN SCALES - GENERAL
PAINLEVEL_OUTOF10: 0
PAINLEVEL_OUTOF10: 9
PAINLEVEL_OUTOF10: 0
PAINLEVEL_OUTOF10: 8
PAINLEVEL_OUTOF10: 5
PAINLEVEL_OUTOF10: 7
PAINLEVEL_OUTOF10: 9
PAINLEVEL_OUTOF10: 9
PAINLEVEL_OUTOF10: 3

## 2018-08-05 ASSESSMENT — PAIN DESCRIPTION - ONSET
ONSET: ON-GOING

## 2018-08-05 ASSESSMENT — PAIN DESCRIPTION - PAIN TYPE
TYPE: ACUTE PAIN

## 2018-08-05 ASSESSMENT — PAIN DESCRIPTION - FREQUENCY
FREQUENCY: CONTINUOUS

## 2018-08-05 ASSESSMENT — PAIN DESCRIPTION - PROGRESSION
CLINICAL_PROGRESSION: NOT CHANGED

## 2018-08-05 ASSESSMENT — PAIN DESCRIPTION - ORIENTATION
ORIENTATION: LEFT

## 2018-08-05 ASSESSMENT — PAIN DESCRIPTION - LOCATION
LOCATION: CHEST;SHOULDER;RIB CAGE
LOCATION: BACK;SHOULDER
LOCATION: CHEST

## 2018-08-05 ASSESSMENT — PAIN DESCRIPTION - DESCRIPTORS
DESCRIPTORS: ACHING;CONSTANT;DISCOMFORT;SHARP;SORE
DESCRIPTORS: ACHING;DISCOMFORT;TENDER;THROBBING
DESCRIPTORS: ACHING;CONSTANT;DISCOMFORT;SHARP
DESCRIPTORS: ACHING;SHARP;SHOOTING
DESCRIPTORS: DISCOMFORT;ACHING
DESCRIPTORS: ACHING;DISCOMFORT;SHARP;SHOOTING

## 2018-08-05 NOTE — CONSULTS
upper extremities. SKIN:  He does have a left chest ecchymosis. NEUROLOGIC:  Pupils are equal and reactive. Extraocular movements are  full. Vision is full to confrontation. Rest of the neurological  examination unremarkable. IMPRESSION:  1.  Multiple abrasions of the extremities and the chest.  2.  Scalp laceration which had been sutured. 3.  Multiple compression fractures of indeterminate age. RECOMMENDATIONS:  Recommend MRI scan of the thoracic and lumbar spine for  further evaluation if feasible. We will follow along.         Jorge Alberto Silveira MD    D: 08/05/2018 10:48:13       T: 08/05/2018 10:50:46     [email or web address]  Job#: [id number]     Doc#: [id number]    CC:
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
## 2018-08-05 NOTE — PLAN OF CARE
Problem: Falls - Risk of:  Goal: Absence of physical injury  Absence of physical injury   Outcome: Met This Shift      Problem: Pain:  Goal: Control of acute pain  Control of acute pain   Outcome: Ongoing      Problem: Breathing Pattern - Ineffective:  Goal: Ability to achieve and maintain a regular respiratory rate will improve  Ability to achieve and maintain a regular respiratory rate will improve   Outcome: Ongoing      Problem: Skin Integrity:  Goal: Will show no infection signs and symptoms  Will show no infection signs and symptoms   Outcome: Met This Shift

## 2018-08-05 NOTE — PROGRESS NOTES
[facility]  Surgical Intensive Care Unit  Daily Progress Note        MECHANISM OF INJURY:  Merwyn Bombard into [facility]    INJURIES:  Patient Active Problem List   Diagnosis    Trauma    Scalp laceration    Concussion with no loss of consciousness    Multiple closed fractures of ribs of left side    Traumatic hemopneumothorax, initial encounter    Bilateral renal cysts    bilateral adrenal nodules       SURGICAL/INTERVENTIONAL PROCEDURES:   8/4--admitted to ICU, left CT placed  8/5--CT to waterseal      OVERNIGHT EVENTS:   No issues overnight      PHYSICAL EXAM:  CONSTITUTIONAL:  awake, alert, cooperative, no apparent distress, and appears stated age  EYES:  Lids and lashes normal, pupils equal, round and reactive to light, extra ocular muscles intact, sclera clear, conjunctiva normal  LUNGS:  CTAB, CT--no air leak, serosanguinous drainage  CARDIOVASCULAR:  RRR  ABDOMEN:  Soft, NT, ND  MUSCULOSKELETAL:  PERALES x 4  NEUROLOGIC:  GCS 15      PROBLEMS/PLANS:    NEUROLOGIC:  PROBLEMS:  1. Concussion without LOC  2. Thoracic spine fractures  PLAN:  1. NSG c/s  2. MRI T-spine    SEDATION/ANALGESIA:  Robaxin, lidoderm, tylenol  PRN narcotics    CARDIOVASCULAR:  PROBLEMS:  1. No active issues   PLAN:  1. hemodynamic monitoring -  noninvasive -  continue    PULMONARY:  PROBLEMS:  1. pneumothorax  PLAN:  1. CXR  2. bronchopulmonary hygiene -  continue  3. bronchodilators -  continue  4. CT to waterseal  5. Plan for VATS with rib plating 8/6    RENAL/FLUID/ELECTROLYTE:  PROBLEMS:  1. Acute kidney injury -  prerenal  PLAN:  1. woods catheter -  continue  2. avoid nephrotoxins  3. replace electrolytes    GI/NUTRITION:  PROBLEMS:  1. No active issues   PLAN:  1. enteral nutrition -  continue    ID:  PROBLEMS:  1. No active issues   PLAN:  1. No active issues     HEMATOLOGIC:  PROBLEMS:  1. acute blood loss anemia  PLAN:  1. monitor    ENDOCRINE:  PROBLEMS:  1. hypothyroidism  PLAN:  1.  Resume home

## 2018-08-05 NOTE — PROGRESS NOTES
aspect   SKIN/EXTREMITIES: superficial abrasions to left forearm; laceration to left scalp with staples; no rashes; , no edema/clubbing, warm/dry, good capillary refill       ASSESSMENT / PLAN:   · Neuro:  · Acute pain secondary to multiple rib fractures, clavicle fracture - continue tylenol scheduled and PRN oxycodone, lidoderm patch - caution with narcotics given age  · CV:   · No issues  · Resp:   · Pneumothorax s/p Chest tube - continue to monitor CXR and output, airleak - No airleak on waterseal ~140cc output overnight  · O2 as needed  · SMI, Peep valve  · GI:  · No issues, No BMs  · Renal:  · CKD - unsure of baseline - improving  · Hyperkalemia - improved after IVF  · Endo:  · DM - continue SSI  · ID:  · No issues  · MSK:  · Ribs 2-11 fractures on left  · Compression fracture T spine - neurosurg following - MRI ordered of T&L spine  · Left clavicle fracture, 5th metatarsal frx  · Ortho following, sling, NWB  · VATS, rib plating this afternoon  · Heme:  · Macrocytic blood loss anemia - 7.0 down from 10.8 on admission - transfuse x1 unit PRBCs  · Continue to monitor H/H    Pain/Analgesia: Tylenol q4h; oxycodone 10mg q4h PRN; lidoderm  Bowel regimen: Colace  DVT proph: None  GI proph: None  Seizure proph: nNne  Glucose protocol: MD BRITO  Mouth/eye care: None  Ruano: none  CVC sites: none  Ancillary consults: Orthopedics  Family Update: As available  CODE Status: FC    Dispo: Continue SICU monitoring      Electronically signed by Srinivas Hernandez D.O. PGY-3   8/5/2018  5:43 AM

## 2018-08-06 ENCOUNTER — APPOINTMENT (OUTPATIENT)
Dept: GENERAL RADIOLOGY | Age: 78
DRG: 163 | End: 2018-08-06
Payer: MEDICARE

## 2018-08-06 ENCOUNTER — ANESTHESIA (OUTPATIENT)
Dept: OPERATING ROOM | Age: 78
DRG: 163 | End: 2018-08-06
Payer: MEDICARE

## 2018-08-06 ENCOUNTER — ANESTHESIA EVENT (OUTPATIENT)
Dept: OPERATING ROOM | Age: 78
DRG: 163 | End: 2018-08-06
Payer: MEDICARE

## 2018-08-06 VITALS
OXYGEN SATURATION: 98 % | DIASTOLIC BLOOD PRESSURE: 81 MMHG | RESPIRATION RATE: 6 BRPM | SYSTOLIC BLOOD PRESSURE: 136 MMHG

## 2018-08-06 LAB
ALBUMIN SERPL-MCNC: 3 G/DL (ref 3.5–5.2)
ALP BLD-CCNC: 24 U/L (ref 40–129)
ALT SERPL-CCNC: 31 U/L (ref 0–40)
ANION GAP SERPL CALCULATED.3IONS-SCNC: 9 MMOL/L (ref 7–16)
AST SERPL-CCNC: 48 U/L (ref 0–39)
BASOPHILS ABSOLUTE: 0.02 E9/L (ref 0–0.2)
BASOPHILS RELATIVE PERCENT: 0.3 % (ref 0–2)
BILIRUB SERPL-MCNC: 0.3 MG/DL (ref 0–1.2)
BUN BLDV-MCNC: 23 MG/DL (ref 8–23)
CALCIUM SERPL-MCNC: 8 MG/DL (ref 8.6–10.2)
CHLORIDE BLD-SCNC: 107 MMOL/L (ref 98–107)
CO2: 26 MMOL/L (ref 22–29)
CREAT SERPL-MCNC: 1.2 MG/DL (ref 0.7–1.2)
EOSINOPHILS ABSOLUTE: 0.03 E9/L (ref 0.05–0.5)
EOSINOPHILS RELATIVE PERCENT: 0.4 % (ref 0–6)
GFR AFRICAN AMERICAN: >60
GFR NON-AFRICAN AMERICAN: 58 ML/MIN/1.73
GLUCOSE BLD-MCNC: 123 MG/DL (ref 74–109)
HCT VFR BLD CALC: 22 % (ref 37–54)
HEMOGLOBIN: 7 G/DL (ref 12.5–16.5)
IMMATURE GRANULOCYTES #: 0.04 E9/L
IMMATURE GRANULOCYTES %: 0.5 % (ref 0–5)
LYMPHOCYTES ABSOLUTE: 1.31 E9/L (ref 1.5–4)
LYMPHOCYTES RELATIVE PERCENT: 17.6 % (ref 20–42)
MAGNESIUM: 1.6 MG/DL (ref 1.6–2.6)
MCH RBC QN AUTO: 33.8 PG (ref 26–35)
MCHC RBC AUTO-ENTMCNC: 31.8 % (ref 32–34.5)
MCV RBC AUTO: 106.3 FL (ref 80–99.9)
METER GLUCOSE: 122 MG/DL (ref 70–110)
METER GLUCOSE: 140 MG/DL (ref 70–110)
METER GLUCOSE: 143 MG/DL (ref 70–110)
MONOCYTES ABSOLUTE: 0.79 E9/L (ref 0.1–0.95)
MONOCYTES RELATIVE PERCENT: 10.6 % (ref 2–12)
MRSA CULTURE ONLY: NORMAL
NEUTROPHILS ABSOLUTE: 5.27 E9/L (ref 1.8–7.3)
NEUTROPHILS RELATIVE PERCENT: 70.6 % (ref 43–80)
PDW BLD-RTO: 14.1 FL (ref 11.5–15)
PHOSPHORUS: 2.5 MG/DL (ref 2.5–4.5)
PLATELET # BLD: 93 E9/L (ref 130–450)
PLATELET CONFIRMATION: NORMAL
PMV BLD AUTO: 10.7 FL (ref 7–12)
POTASSIUM REFLEX MAGNESIUM: 4.8 MMOL/L (ref 3.5–5)
RBC # BLD: 2.07 E12/L (ref 3.8–5.8)
SODIUM BLD-SCNC: 142 MMOL/L (ref 132–146)
TOTAL PROTEIN: 5 G/DL (ref 6.4–8.3)
WBC # BLD: 7.5 E9/L (ref 4.5–11.5)

## 2018-08-06 PROCEDURE — 3700000000 HC ANESTHESIA ATTENDED CARE: Performed by: SURGERY

## 2018-08-06 PROCEDURE — 2720000010 HC SURG SUPPLY STERILE: Performed by: SURGERY

## 2018-08-06 PROCEDURE — 99291 CRITICAL CARE FIRST HOUR: CPT | Performed by: SURGERY

## 2018-08-06 PROCEDURE — 71045 X-RAY EXAM CHEST 1 VIEW: CPT

## 2018-08-06 PROCEDURE — 74018 RADEX ABDOMEN 1 VIEW: CPT

## 2018-08-06 PROCEDURE — 2709999900 HC NON-CHARGEABLE SUPPLY: Performed by: SURGERY

## 2018-08-06 PROCEDURE — 6370000000 HC RX 637 (ALT 250 FOR IP): Performed by: STUDENT IN AN ORGANIZED HEALTH CARE EDUCATION/TRAINING PROGRAM

## 2018-08-06 PROCEDURE — 6360000002 HC RX W HCPCS: Performed by: STUDENT IN AN ORGANIZED HEALTH CARE EDUCATION/TRAINING PROGRAM

## 2018-08-06 PROCEDURE — 86923 COMPATIBILITY TEST ELECTRIC: CPT

## 2018-08-06 PROCEDURE — 3600000013 HC SURGERY LEVEL 3 ADDTL 15MIN: Performed by: SURGERY

## 2018-08-06 PROCEDURE — 94640 AIRWAY INHALATION TREATMENT: CPT

## 2018-08-06 PROCEDURE — 90715 TDAP VACCINE 7 YRS/> IM: CPT | Performed by: STUDENT IN AN ORGANIZED HEALTH CARE EDUCATION/TRAINING PROGRAM

## 2018-08-06 PROCEDURE — 2000000000 HC ICU R&B

## 2018-08-06 PROCEDURE — 7100000000 HC PACU RECOVERY - FIRST 15 MIN

## 2018-08-06 PROCEDURE — 21812 TREATMENT OF RIB FRACTURE: CPT | Performed by: SURGERY

## 2018-08-06 PROCEDURE — 90471 IMMUNIZATION ADMIN: CPT | Performed by: STUDENT IN AN ORGANIZED HEALTH CARE EDUCATION/TRAINING PROGRAM

## 2018-08-06 PROCEDURE — 39541 REPAIR OF DIAPHRAGM HERNIA: CPT | Performed by: SURGERY

## 2018-08-06 PROCEDURE — 82962 GLUCOSE BLOOD TEST: CPT

## 2018-08-06 PROCEDURE — P9016 RBC LEUKOCYTES REDUCED: HCPCS

## 2018-08-06 PROCEDURE — 6370000000 HC RX 637 (ALT 250 FOR IP): Performed by: SURGERY

## 2018-08-06 PROCEDURE — 85025 COMPLETE CBC W/AUTO DIFF WBC: CPT

## 2018-08-06 PROCEDURE — 2580000003 HC RX 258: Performed by: STUDENT IN AN ORGANIZED HEALTH CARE EDUCATION/TRAINING PROGRAM

## 2018-08-06 PROCEDURE — 36430 TRANSFUSION BLD/BLD COMPNT: CPT

## 2018-08-06 PROCEDURE — 3600000003 HC SURGERY LEVEL 3 BASE: Performed by: SURGERY

## 2018-08-06 PROCEDURE — 2580000003 HC RX 258: Performed by: NURSE ANESTHETIST, CERTIFIED REGISTERED

## 2018-08-06 PROCEDURE — 2500000003 HC RX 250 WO HCPCS: Performed by: NURSE ANESTHETIST, CERTIFIED REGISTERED

## 2018-08-06 PROCEDURE — 80053 COMPREHEN METABOLIC PANEL: CPT

## 2018-08-06 PROCEDURE — 3700000001 HC ADD 15 MINUTES (ANESTHESIA): Performed by: SURGERY

## 2018-08-06 PROCEDURE — 83735 ASSAY OF MAGNESIUM: CPT

## 2018-08-06 PROCEDURE — C1713 ANCHOR/SCREW BN/BN,TIS/BN: HCPCS | Performed by: SURGERY

## 2018-08-06 PROCEDURE — 36415 COLL VENOUS BLD VENIPUNCTURE: CPT

## 2018-08-06 PROCEDURE — 0PH204Z INSERTION OF INTERNAL FIXATION DEVICE INTO 3 OR MORE RIBS, OPEN APPROACH: ICD-10-PCS | Performed by: SURGERY

## 2018-08-06 PROCEDURE — 2580000003 HC RX 258: Performed by: EMERGENCY MEDICINE

## 2018-08-06 PROCEDURE — 84100 ASSAY OF PHOSPHORUS: CPT

## 2018-08-06 PROCEDURE — 6360000002 HC RX W HCPCS: Performed by: NURSE ANESTHETIST, CERTIFIED REGISTERED

## 2018-08-06 PROCEDURE — 36620 INSERTION CATHETER ARTERY: CPT

## 2018-08-06 PROCEDURE — 7100000001 HC PACU RECOVERY - ADDTL 15 MIN

## 2018-08-06 PROCEDURE — 0BQT4ZZ REPAIR DIAPHRAGM, PERCUTANEOUS ENDOSCOPIC APPROACH: ICD-10-PCS | Performed by: SURGERY

## 2018-08-06 PROCEDURE — 2700000000 HC OXYGEN THERAPY PER DAY

## 2018-08-06 DEVICE — IMPLANTABLE DEVICE: Type: IMPLANTABLE DEVICE | Site: CHEST  WALL | Status: FUNCTIONAL

## 2018-08-06 RX ORDER — NEOSTIGMINE METHYLSULFATE 1 MG/ML
INJECTION, SOLUTION INTRAVENOUS PRN
Status: DISCONTINUED | OUTPATIENT
Start: 2018-08-06 | End: 2018-08-06 | Stop reason: SDUPTHER

## 2018-08-06 RX ORDER — 0.9 % SODIUM CHLORIDE 0.9 %
250 INTRAVENOUS SOLUTION INTRAVENOUS ONCE
Status: COMPLETED | OUTPATIENT
Start: 2018-08-06 | End: 2018-08-06

## 2018-08-06 RX ORDER — CEFAZOLIN SODIUM 1 G/3ML
INJECTION, POWDER, FOR SOLUTION INTRAMUSCULAR; INTRAVENOUS PRN
Status: DISCONTINUED | OUTPATIENT
Start: 2018-08-06 | End: 2018-08-06 | Stop reason: SDUPTHER

## 2018-08-06 RX ORDER — FENTANYL CITRATE 50 UG/ML
INJECTION, SOLUTION INTRAMUSCULAR; INTRAVENOUS PRN
Status: DISCONTINUED | OUTPATIENT
Start: 2018-08-06 | End: 2018-08-06 | Stop reason: SDUPTHER

## 2018-08-06 RX ORDER — GLYCOPYRROLATE 1 MG/5 ML
SYRINGE (ML) INTRAVENOUS PRN
Status: DISCONTINUED | OUTPATIENT
Start: 2018-08-06 | End: 2018-08-06 | Stop reason: SDUPTHER

## 2018-08-06 RX ORDER — PROPOFOL 10 MG/ML
INJECTION, EMULSION INTRAVENOUS PRN
Status: DISCONTINUED | OUTPATIENT
Start: 2018-08-06 | End: 2018-08-06 | Stop reason: SDUPTHER

## 2018-08-06 RX ORDER — SODIUM CHLORIDE 9 MG/ML
INJECTION, SOLUTION INTRAVENOUS CONTINUOUS PRN
Status: DISCONTINUED | OUTPATIENT
Start: 2018-08-06 | End: 2018-08-06 | Stop reason: SDUPTHER

## 2018-08-06 RX ORDER — LIDOCAINE HYDROCHLORIDE 20 MG/ML
INJECTION, SOLUTION INFILTRATION; PERINEURAL PRN
Status: DISCONTINUED | OUTPATIENT
Start: 2018-08-06 | End: 2018-08-06 | Stop reason: SDUPTHER

## 2018-08-06 RX ORDER — ROCURONIUM BROMIDE 10 MG/ML
INJECTION, SOLUTION INTRAVENOUS PRN
Status: DISCONTINUED | OUTPATIENT
Start: 2018-08-06 | End: 2018-08-06 | Stop reason: SDUPTHER

## 2018-08-06 RX ORDER — ONDANSETRON 2 MG/ML
INJECTION INTRAMUSCULAR; INTRAVENOUS PRN
Status: DISCONTINUED | OUTPATIENT
Start: 2018-08-06 | End: 2018-08-06 | Stop reason: SDUPTHER

## 2018-08-06 RX ORDER — DEXAMETHASONE SODIUM PHOSPHATE 10 MG/ML
INJECTION, SOLUTION INTRAMUSCULAR; INTRAVENOUS PRN
Status: DISCONTINUED | OUTPATIENT
Start: 2018-08-06 | End: 2018-08-06 | Stop reason: SDUPTHER

## 2018-08-06 RX ADMIN — ACETAMINOPHEN 650 MG: 325 TABLET, FILM COATED ORAL at 09:16

## 2018-08-06 RX ADMIN — FENTANYL CITRATE 50 MCG: 50 INJECTION, SOLUTION INTRAMUSCULAR; INTRAVENOUS at 19:20

## 2018-08-06 RX ADMIN — CEFAZOLIN 2000 MG: 1 INJECTION, POWDER, FOR SOLUTION INTRAVENOUS at 18:34

## 2018-08-06 RX ADMIN — SODIUM CHLORIDE: 9 INJECTION, SOLUTION INTRAVENOUS at 21:52

## 2018-08-06 RX ADMIN — OXYCODONE HYDROCHLORIDE 10 MG: 10 TABLET ORAL at 00:57

## 2018-08-06 RX ADMIN — OXYCODONE HYDROCHLORIDE 10 MG: 10 TABLET ORAL at 04:50

## 2018-08-06 RX ADMIN — Medication 1000 MCG: at 08:06

## 2018-08-06 RX ADMIN — PANTOPRAZOLE SODIUM 40 MG: 40 TABLET, DELAYED RELEASE ORAL at 06:38

## 2018-08-06 RX ADMIN — LEVOTHYROXINE SODIUM 25 MCG: 25 TABLET ORAL at 06:38

## 2018-08-06 RX ADMIN — HYDROMORPHONE HYDROCHLORIDE 0.5 MG: 1 INJECTION, SOLUTION INTRAMUSCULAR; INTRAVENOUS; SUBCUTANEOUS at 13:46

## 2018-08-06 RX ADMIN — ROCURONIUM BROMIDE 10 MG: 10 INJECTION INTRAVENOUS at 20:45

## 2018-08-06 RX ADMIN — LATANOPROST 1 DROP: 50 SOLUTION OPHTHALMIC at 23:22

## 2018-08-06 RX ADMIN — BACITRACIN ZINC: 500 OINTMENT TOPICAL at 23:19

## 2018-08-06 RX ADMIN — IPRATROPIUM BROMIDE AND ALBUTEROL SULFATE 1 AMPULE: .5; 3 SOLUTION RESPIRATORY (INHALATION) at 07:49

## 2018-08-06 RX ADMIN — Medication 10 ML: at 23:21

## 2018-08-06 RX ADMIN — ROCURONIUM BROMIDE 10 MG: 10 INJECTION INTRAVENOUS at 19:56

## 2018-08-06 RX ADMIN — DOCUSATE SODIUM 100 MG: 100 CAPSULE, LIQUID FILLED ORAL at 09:16

## 2018-08-06 RX ADMIN — HYDROMORPHONE HYDROCHLORIDE 0.5 MG: 1 INJECTION, SOLUTION INTRAMUSCULAR; INTRAVENOUS; SUBCUTANEOUS at 23:01

## 2018-08-06 RX ADMIN — METHOCARBAMOL 500 MG: 500 TABLET ORAL at 08:06

## 2018-08-06 RX ADMIN — ROCURONIUM BROMIDE 10 MG: 10 INJECTION INTRAVENOUS at 21:36

## 2018-08-06 RX ADMIN — VITAMIN D, TAB 1000IU (100/BT) 1000 UNITS: 25 TAB at 08:06

## 2018-08-06 RX ADMIN — PROPOFOL 150 MG: 10 INJECTION, EMULSION INTRAVENOUS at 18:07

## 2018-08-06 RX ADMIN — FENTANYL CITRATE 50 MCG: 50 INJECTION, SOLUTION INTRAMUSCULAR; INTRAVENOUS at 22:20

## 2018-08-06 RX ADMIN — LIDOCAINE HYDROCHLORIDE 40 MG: 20 INJECTION, SOLUTION INFILTRATION; PERINEURAL at 18:07

## 2018-08-06 RX ADMIN — FENTANYL CITRATE 50 MCG: 50 INJECTION, SOLUTION INTRAMUSCULAR; INTRAVENOUS at 18:55

## 2018-08-06 RX ADMIN — ONDANSETRON HYDROCHLORIDE 4 MG: 2 INJECTION, SOLUTION INTRAMUSCULAR; INTRAVENOUS at 21:43

## 2018-08-06 RX ADMIN — Medication 10 ML: at 13:46

## 2018-08-06 RX ADMIN — SODIUM CHLORIDE: 9 INJECTION, SOLUTION INTRAVENOUS at 18:46

## 2018-08-06 RX ADMIN — TETANUS TOXOID, REDUCED DIPHTHERIA TOXOID AND ACELLULAR PERTUSSIS VACCINE, ADSORBED 0.5 ML: 5; 2.5; 8; 8; 2.5 SUSPENSION INTRAMUSCULAR at 13:51

## 2018-08-06 RX ADMIN — FENTANYL CITRATE 50 MCG: 50 INJECTION, SOLUTION INTRAMUSCULAR; INTRAVENOUS at 20:26

## 2018-08-06 RX ADMIN — IPRATROPIUM BROMIDE AND ALBUTEROL SULFATE 1 AMPULE: .5; 3 SOLUTION RESPIRATORY (INHALATION) at 11:15

## 2018-08-06 RX ADMIN — Medication 0.6 MG: at 21:59

## 2018-08-06 RX ADMIN — ROCURONIUM BROMIDE 50 MG: 10 INJECTION INTRAVENOUS at 18:07

## 2018-08-06 RX ADMIN — SODIUM CHLORIDE 250 ML: 9 INJECTION, SOLUTION INTRAVENOUS at 10:32

## 2018-08-06 RX ADMIN — FENTANYL CITRATE 100 MCG: 50 INJECTION, SOLUTION INTRAMUSCULAR; INTRAVENOUS at 18:07

## 2018-08-06 RX ADMIN — CITALOPRAM 40 MG: 20 TABLET, FILM COATED ORAL at 08:06

## 2018-08-06 RX ADMIN — ROCURONIUM BROMIDE 20 MG: 10 INJECTION INTRAVENOUS at 18:55

## 2018-08-06 RX ADMIN — Medication 3 MG: at 21:59

## 2018-08-06 RX ADMIN — FENTANYL CITRATE 50 MCG: 50 INJECTION, SOLUTION INTRAMUSCULAR; INTRAVENOUS at 20:03

## 2018-08-06 RX ADMIN — ACETAMINOPHEN 650 MG: 325 TABLET, FILM COATED ORAL at 05:38

## 2018-08-06 RX ADMIN — CEFAZOLIN 2000 MG: 1 INJECTION, POWDER, FOR SOLUTION INTRAVENOUS at 22:27

## 2018-08-06 RX ADMIN — BACITRACIN ZINC: 500 OINTMENT TOPICAL at 08:06

## 2018-08-06 RX ADMIN — SODIUM CHLORIDE: 9 INJECTION, SOLUTION INTRAVENOUS at 18:05

## 2018-08-06 RX ADMIN — DEXAMETHASONE SODIUM PHOSPHATE 10 MG: 10 INJECTION, SOLUTION INTRAMUSCULAR; INTRAVENOUS at 18:45

## 2018-08-06 RX ADMIN — SODIUM CHLORIDE: 9 INJECTION, SOLUTION INTRAVENOUS at 08:05

## 2018-08-06 ASSESSMENT — PAIN DESCRIPTION - DESCRIPTORS
DESCRIPTORS: DISCOMFORT
DESCRIPTORS: DULL
DESCRIPTORS: ACHING;SORE;TENDER;THROBBING
DESCRIPTORS: ACHING;SHARP;SHOOTING
DESCRIPTORS: ACHING;DISCOMFORT

## 2018-08-06 ASSESSMENT — PULMONARY FUNCTION TESTS
PIF_VALUE: 30
PIF_VALUE: 1
PIF_VALUE: 32
PIF_VALUE: 3
PIF_VALUE: 29
PIF_VALUE: 27
PIF_VALUE: 30
PIF_VALUE: 35
PIF_VALUE: 21
PIF_VALUE: 21
PIF_VALUE: 30
PIF_VALUE: 32
PIF_VALUE: 4
PIF_VALUE: 27
PIF_VALUE: 30
PIF_VALUE: 31
PIF_VALUE: 30
PIF_VALUE: 27
PIF_VALUE: 32
PIF_VALUE: 30
PIF_VALUE: 27
PIF_VALUE: 30
PIF_VALUE: 31
PIF_VALUE: 22
PIF_VALUE: 30
PIF_VALUE: 27
PIF_VALUE: 32
PIF_VALUE: 27
PIF_VALUE: 27
PIF_VALUE: 4
PIF_VALUE: 26
PIF_VALUE: 30
PIF_VALUE: 18
PIF_VALUE: 2
PIF_VALUE: 30
PIF_VALUE: 27
PIF_VALUE: 6
PIF_VALUE: 30
PIF_VALUE: 27
PIF_VALUE: 30
PIF_VALUE: 27
PIF_VALUE: 27
PIF_VALUE: 3
PIF_VALUE: 30
PIF_VALUE: 32
PIF_VALUE: 0
PIF_VALUE: 32
PIF_VALUE: 27
PIF_VALUE: 7
PIF_VALUE: 27
PIF_VALUE: 22
PIF_VALUE: 30
PIF_VALUE: 27
PIF_VALUE: 30
PIF_VALUE: 30
PIF_VALUE: 27
PIF_VALUE: 3
PIF_VALUE: 32
PIF_VALUE: 31
PIF_VALUE: 8
PIF_VALUE: 31
PIF_VALUE: 30
PIF_VALUE: 3
PIF_VALUE: 27
PIF_VALUE: 30
PIF_VALUE: 26
PIF_VALUE: 27
PIF_VALUE: 27
PIF_VALUE: 30
PIF_VALUE: 6
PIF_VALUE: 30
PIF_VALUE: 27
PIF_VALUE: 30
PIF_VALUE: 30
PIF_VALUE: 27
PIF_VALUE: 30
PIF_VALUE: 27
PIF_VALUE: 30
PIF_VALUE: 31
PIF_VALUE: 4
PIF_VALUE: 27
PIF_VALUE: 27
PIF_VALUE: 30
PIF_VALUE: 30
PIF_VALUE: 27
PIF_VALUE: 30
PIF_VALUE: 30
PIF_VALUE: 26
PIF_VALUE: 24
PIF_VALUE: 27
PIF_VALUE: 30
PIF_VALUE: 6
PIF_VALUE: 27
PIF_VALUE: 25
PIF_VALUE: 4
PIF_VALUE: 3
PIF_VALUE: 32
PIF_VALUE: 3
PIF_VALUE: 30
PIF_VALUE: 30
PIF_VALUE: 32
PIF_VALUE: 32
PIF_VALUE: 31
PIF_VALUE: 30
PIF_VALUE: 31
PIF_VALUE: 27
PIF_VALUE: 28
PIF_VALUE: 30
PIF_VALUE: 30
PIF_VALUE: 21
PIF_VALUE: 32
PIF_VALUE: 27
PIF_VALUE: 17
PIF_VALUE: 30
PIF_VALUE: 27
PIF_VALUE: 27
PIF_VALUE: 31
PIF_VALUE: 30
PIF_VALUE: 30
PIF_VALUE: 27
PIF_VALUE: 30
PIF_VALUE: 27
PIF_VALUE: 32
PIF_VALUE: 32
PIF_VALUE: 30
PIF_VALUE: 24
PIF_VALUE: 32
PIF_VALUE: 30
PIF_VALUE: 27
PIF_VALUE: 32
PIF_VALUE: 1
PIF_VALUE: 30
PIF_VALUE: 30
PIF_VALUE: 4
PIF_VALUE: 30
PIF_VALUE: 32
PIF_VALUE: 3
PIF_VALUE: 30
PIF_VALUE: 2
PIF_VALUE: 13
PIF_VALUE: 31
PIF_VALUE: 32
PIF_VALUE: 27
PIF_VALUE: 27
PIF_VALUE: 30
PIF_VALUE: 30
PIF_VALUE: 27
PIF_VALUE: 10
PIF_VALUE: 27
PIF_VALUE: 30
PIF_VALUE: 32
PIF_VALUE: 30
PIF_VALUE: 9
PIF_VALUE: 32
PIF_VALUE: 20
PIF_VALUE: 30
PIF_VALUE: 27
PIF_VALUE: 30
PIF_VALUE: 18
PIF_VALUE: 30
PIF_VALUE: 28
PIF_VALUE: 6
PIF_VALUE: 32
PIF_VALUE: 4
PIF_VALUE: 19
PIF_VALUE: 4
PIF_VALUE: 30
PIF_VALUE: 32
PIF_VALUE: 27
PIF_VALUE: 32
PIF_VALUE: 30
PIF_VALUE: 30
PIF_VALUE: 0
PIF_VALUE: 30
PIF_VALUE: 27
PIF_VALUE: 30
PIF_VALUE: 24
PIF_VALUE: 3
PIF_VALUE: 32
PIF_VALUE: 18
PIF_VALUE: 27
PIF_VALUE: 30
PIF_VALUE: 31
PIF_VALUE: 20
PIF_VALUE: 3
PIF_VALUE: 30
PIF_VALUE: 21
PIF_VALUE: 30
PIF_VALUE: 32
PIF_VALUE: 32
PIF_VALUE: 33
PIF_VALUE: 30
PIF_VALUE: 30
PIF_VALUE: 27
PIF_VALUE: 18
PIF_VALUE: 27
PIF_VALUE: 32
PIF_VALUE: 27
PIF_VALUE: 22
PIF_VALUE: 30
PIF_VALUE: 27
PIF_VALUE: 35
PIF_VALUE: 3
PIF_VALUE: 30
PIF_VALUE: 33
PIF_VALUE: 30
PIF_VALUE: 7
PIF_VALUE: 32
PIF_VALUE: 30
PIF_VALUE: 26
PIF_VALUE: 10
PIF_VALUE: 5
PIF_VALUE: 30
PIF_VALUE: 27
PIF_VALUE: 27
PIF_VALUE: 7
PIF_VALUE: 27
PIF_VALUE: 30
PIF_VALUE: 27
PIF_VALUE: 19
PIF_VALUE: 27
PIF_VALUE: 30
PIF_VALUE: 30
PIF_VALUE: 27
PIF_VALUE: 30

## 2018-08-06 ASSESSMENT — PAIN DESCRIPTION - ORIENTATION
ORIENTATION: LEFT

## 2018-08-06 ASSESSMENT — PAIN DESCRIPTION - PROGRESSION
CLINICAL_PROGRESSION: NOT CHANGED

## 2018-08-06 ASSESSMENT — PAIN SCALES - GENERAL
PAINLEVEL_OUTOF10: 0
PAINLEVEL_OUTOF10: 0
PAINLEVEL_OUTOF10: 9
PAINLEVEL_OUTOF10: 8
PAINLEVEL_OUTOF10: 2
PAINLEVEL_OUTOF10: 2
PAINLEVEL_OUTOF10: 9
PAINLEVEL_OUTOF10: 4
PAINLEVEL_OUTOF10: 9

## 2018-08-06 ASSESSMENT — PAIN DESCRIPTION - FREQUENCY
FREQUENCY: INTERMITTENT
FREQUENCY: CONTINUOUS

## 2018-08-06 ASSESSMENT — PAIN DESCRIPTION - PAIN TYPE
TYPE: ACUTE PAIN

## 2018-08-06 ASSESSMENT — PAIN DESCRIPTION - LOCATION
LOCATION: SHOULDER
LOCATION: CHEST;RIB CAGE
LOCATION: RIB CAGE;SHOULDER
LOCATION: SHOULDER
LOCATION: BACK;RIB CAGE

## 2018-08-06 ASSESSMENT — PAIN DESCRIPTION - ONSET
ONSET: ON-GOING
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
## 2018-08-06 NOTE — ANESTHESIA PROCEDURE NOTES
Arterial Line:    An arterial line was placed using surface landmarks, in the OR for the following indication(s): continuous blood pressure monitoring and blood sampling needed. A 20 gauge (size), 1 and 3/4 inch (length), Angiocath (type) catheter was placed, Seldinger technique not used, into the right radial artery, secured by tape and Tegaderm. Anesthesia type: General    Events:  patient tolerated procedure well with no complications. Anesthesiologist: Taylor Donald  Resident/CRNA: OSWALDO [address] [place] [address]  Other anesthesia staff: Sara Billingsley  Performed:  Other anesthesia staff   Preanesthetic Checklist  Completed: patient identified, site marked, surgical consent, pre-op evaluation, timeout performed, IV checked, risks and benefits discussed, monitors and equipment checked, anesthesia consent given, oxygen available and patient being monitored
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
## 2018-08-06 NOTE — PROGRESS NOTES
Patient transferred off unit with surgical staff via bed. Family in room and told where to wait.    Electronically signed by Dilip Remy RN on 8/6/2018 at 6:05 PM

## 2018-08-06 NOTE — CONSULTS
external solution, , ,   bacitracin zinc ointment, , Topical, BID  propofol 1000 MG/100ML injection, 10 mcg/kg/min, Intravenous, Titrated  lidocaine 1 % injection, , ,   lidocaine-EPINEPHrine 1 percent-1:[id number] injection 40 mL, 40 mL, Intradermal, Once  Allergies:  Sulfa antibiotics     Social History:   TOBACCO:   reports that he has been smoking. His smokeless tobacco use includes Chew. ETOH:   reports that he does not drink alcohol. DRUGS:   reports that he does not use drugs. ACTIVITIES OF DAILY LIVING:    OCCUPATION:    Family History:   History reviewed. No pertinent family history. General ROS: negative  Cardiovascular ROS: no new chest pain or dyspnea on exertion  Respiratory ROS: no new cough, shortness of breath, or wheezing  Gastrointestinal ROS: no abdominal pain, change in bowel habits, or black or bloody stools  Neurological ROS: no TIA or stroke symptoms  Musculoskeletal ROS: Left shoulder, left knee, left foot pain     PHYSICAL EXAM:    VITALS:  BP (!) 79/60   Pulse 75   Temp 97.4 °F (36.3 °C) (Axillary)   Resp 14   Wt 150 lb (68 kg)   SpO2 100%   CONSTITUTIONAL:  awake, alert, cooperative, no apparent distress, and appears stated, age    HEENT: Head is normocephalic, atraumatic. PERRLA.      SKIN: Clean, dry, intact. There is not any cellulitis or cutaneous lesions noted in the lower extremities     PULMONARY: breathing is regular and unlabored, no acute distress     CV: The bilateral lower extremities are warm and well-perfused with brisk capillary refill. 2+ pulses LE bilateral.      PSYCHIATRY: Pleasant mood, appropriate behavior, follows commands     NEURO: Sensation is intact distally with light touch with no alteration. Motor exam of the lower extremities show quadriceps, hamstrings, foot dorsiflexion and plantarflexion grossly intact 5/5.       MUSCULOSKELETAL:  LUE  Skin intact, no tenting at the clavicle  Tenderness palpation of the clavicle  Sensation intact to light touch to narrowing and hypertrophic flaring of the L4-5 articulation. Bowel gas and rectal colonic content are noted in the central pelvis. The bladder appears to be empty. No evidence of acute femoral or pelvic girdle fracture on this limited study. Degenerative changes of the spine and hypertrophic change of the left greater trochanter are documented incidentally. Xr Clavicle Left    Result Date: 2018  Patient MRN:  [id number] : 1940 Age: [de-identified] years Gender: Male Order Date:  2018 6:00 PM EXAM: XR CLAVICLE LEFT NUMBER OF IMAGES:  2 views INDICATION: Left clavicular fracture COMPARISON: None FINDINGS: There is a mid shaft left clavicular fracture, currently maintained in near-axial alignment, with fracture surfaces offset, the lateral fragment being slightly inferior in position to the medial fragment fracture surface by nearly the entire diameter of the clavicle. Margins of fracture surfaces remain in apposition. Medial and lateral articulations are in alignment. Numerous chest wall fractures and subcutaneous emphysema are noted on the left below the clavicle. No focal soft tissue abnormalities are identified. Midshaft clavicular fracture with slight offset of the fracture surfaces, and the lateral fragment being slightly more inferior. Numerous left-sided rib fractures with subcutaneous emphysema are noted at the lower margin of the study. Xr Knee Left (1-2 Views)    Result Date: 2018  Patient MRN:  [id number] : 1940 Age: [de-identified] years Gender: Male Order Date:  2018 6:15 PM EXAM: XR KNEE LEFT (1-2 VIEWS) INDICATION:  pain pain COMPARISON: None NUMBER OF IMAGES:  3 FINDINGS:  3 views of the left knee were obtained. There is no evidence of fracture. The knee joint compartments are normally maintained. There is chondrocalcinosis in the medial and lateral compartments. There are tiny patellofemoral osteophytes. The bones appear well mineralized for age. No significant joint effusion is seen. Arteriosclerotic vascular calcifications are seen. CONCLUSION:  1. No acute osseous abnormality identified. 2. Chondrocalcinosis involving the tibiofemoral joint space compartments. Xr Foot Left (min 3 Views)    Result Date: 2018  Patient MRN:  [id number] : 1940 Age: [address] [place] [address] years Gender: Male Order Date:  2018 6:15 PM EXAM: XR FOOT LEFT (MIN 3 VIEWS) INDICATION:  pain pain COMPARISON: None NUMBER OF IMAGES:  3 FINDINGS:  3 views of the left foot were obtained demonstrating a mildly displaced oblique fracture involving the distal shaft/neck of the fifth metatarsal. There is no significant angulation. No other fractures are seen. There is flattening of the third metatarsal head and broadening of the base of the proximal phalanx with associated narrowing of the joint space and mild articular surface irregularity. This suggests Freiberg's infraction. There is narrowing of the interphalangeal joints of the digits. The bones appear mildly osteopenic. There is a small plantar calcaneal spur. There is small vessel calcification in the lower leg and ankle. CONCLUSION:  1. Mildly displaced oblique fracture of the distal fifth metatarsal without significant angulation. 2. 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 3. Small plantar calcaneal spur. 4. Mild osteopenia. Ct Foot Left Wo Contrast    Result Date: 2018  Patient MRN:  [id number] : 1940 Age: [address] [place] [address] years Gender: Male Order Date:  2018 1:30 AM EXAM: CT FOOT LEFT WO CONTRAST NUMBER OF IMAGES:  7 views INDICATION:  fracture COMPARISON: None There is a calcaneal spur present. There is a fracture of the fifth metatarsal. There is mild displacement. Fracture appears to be of distal aspect fifth metatarsal There are severe degenerative changes noted Otherwise the bones appear to be in anatomic alignment. No foreign body is identified.      Fracture of the fifth metatarsal         IMPRESSION:  left clavicle fracture  Fracture distal 5th metatarsal fracture of Left foot. PLAN:  Nonweightbearing left upper extremity  Maintenance  of sling  Pain control per SICU  Splint immobilization to left foot. No acute orthopedic intervention at this time  Ok to follow-up in office when discharged    Patient and family educated about the healing rates with this fracture. Patient does smoke and does have secondhand smoke exposure. In this discussion of approximately 5 minutes, patient was counseled about decrease in smoking exposure regards to fracture healing and overall good health. Patient seems reluctant but is willing to listen to the discussion in detail. He states that he will try to cut down as much as possible and states that he will try to quit completely by follow up visit.
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
## 2018-08-07 ENCOUNTER — APPOINTMENT (OUTPATIENT)
Dept: GENERAL RADIOLOGY | Age: 78
DRG: 163 | End: 2018-08-07
Payer: MEDICARE

## 2018-08-07 LAB
ALBUMIN SERPL-MCNC: 2.9 G/DL (ref 3.5–5.2)
ALP BLD-CCNC: 29 U/L (ref 40–129)
ALT SERPL-CCNC: 28 U/L (ref 0–40)
ANION GAP SERPL CALCULATED.3IONS-SCNC: 12 MMOL/L (ref 7–16)
ANISOCYTOSIS: ABNORMAL
AST SERPL-CCNC: 50 U/L (ref 0–39)
BASOPHILIC STIPPLING: ABNORMAL
BASOPHILS ABSOLUTE: 0.01 E9/L (ref 0–0.2)
BASOPHILS RELATIVE PERCENT: 0.1 % (ref 0–2)
BILIRUB SERPL-MCNC: 0.3 MG/DL (ref 0–1.2)
BUN BLDV-MCNC: 18 MG/DL (ref 8–23)
CALCIUM SERPL-MCNC: 8 MG/DL (ref 8.6–10.2)
CHLORIDE BLD-SCNC: 101 MMOL/L (ref 98–107)
CO2: 24 MMOL/L (ref 22–29)
CREAT SERPL-MCNC: 0.8 MG/DL (ref 0.7–1.2)
EOSINOPHILS ABSOLUTE: 0 E9/L (ref 0.05–0.5)
EOSINOPHILS RELATIVE PERCENT: 0 % (ref 0–6)
GFR AFRICAN AMERICAN: >60
GFR NON-AFRICAN AMERICAN: >60 ML/MIN/1.73
GLUCOSE BLD-MCNC: 159 MG/DL (ref 74–109)
HCT VFR BLD CALC: 25.5 % (ref 37–54)
HEMOGLOBIN: 8.8 G/DL (ref 12.5–16.5)
IMMATURE GRANULOCYTES #: 0.16 E9/L
IMMATURE GRANULOCYTES %: 1.9 % (ref 0–5)
LYMPHOCYTES ABSOLUTE: 0.49 E9/L (ref 1.5–4)
LYMPHOCYTES RELATIVE PERCENT: 5.8 % (ref 20–42)
MAGNESIUM: 1.5 MG/DL (ref 1.6–2.6)
MCH RBC QN AUTO: 33.8 PG (ref 26–35)
MCHC RBC AUTO-ENTMCNC: 34.5 % (ref 32–34.5)
MCV RBC AUTO: 98.1 FL (ref 80–99.9)
METER GLUCOSE: 139 MG/DL (ref 70–110)
METER GLUCOSE: 169 MG/DL (ref 70–110)
METER GLUCOSE: 172 MG/DL (ref 70–110)
METER GLUCOSE: 180 MG/DL (ref 70–110)
MONOCYTES ABSOLUTE: 0.86 E9/L (ref 0.1–0.95)
MONOCYTES RELATIVE PERCENT: 10.1 % (ref 2–12)
NEUTROPHILS ABSOLUTE: 7 E9/L (ref 1.8–7.3)
NEUTROPHILS RELATIVE PERCENT: 82.1 % (ref 43–80)
PDW BLD-RTO: 16 FL (ref 11.5–15)
PHOSPHORUS: 2.6 MG/DL (ref 2.5–4.5)
PLATELET # BLD: 103 E9/L (ref 130–450)
PMV BLD AUTO: 10.4 FL (ref 7–12)
POTASSIUM REFLEX MAGNESIUM: 4.9 MMOL/L (ref 3.5–5)
RBC # BLD: 2.6 E12/L (ref 3.8–5.8)
SODIUM BLD-SCNC: 137 MMOL/L (ref 132–146)
TOTAL PROTEIN: 5.5 G/DL (ref 6.4–8.3)
WBC # BLD: 8.5 E9/L (ref 4.5–11.5)

## 2018-08-07 PROCEDURE — 2700000000 HC OXYGEN THERAPY PER DAY

## 2018-08-07 PROCEDURE — 94150 VITAL CAPACITY TEST: CPT

## 2018-08-07 PROCEDURE — 2000000000 HC ICU R&B

## 2018-08-07 PROCEDURE — 2580000003 HC RX 258: Performed by: STUDENT IN AN ORGANIZED HEALTH CARE EDUCATION/TRAINING PROGRAM

## 2018-08-07 PROCEDURE — 6360000002 HC RX W HCPCS: Performed by: STUDENT IN AN ORGANIZED HEALTH CARE EDUCATION/TRAINING PROGRAM

## 2018-08-07 PROCEDURE — 71045 X-RAY EXAM CHEST 1 VIEW: CPT

## 2018-08-07 PROCEDURE — 94640 AIRWAY INHALATION TREATMENT: CPT

## 2018-08-07 PROCEDURE — 82962 GLUCOSE BLOOD TEST: CPT

## 2018-08-07 PROCEDURE — 6370000000 HC RX 637 (ALT 250 FOR IP): Performed by: STUDENT IN AN ORGANIZED HEALTH CARE EDUCATION/TRAINING PROGRAM

## 2018-08-07 PROCEDURE — 36592 COLLECT BLOOD FROM PICC: CPT

## 2018-08-07 PROCEDURE — 84100 ASSAY OF PHOSPHORUS: CPT

## 2018-08-07 PROCEDURE — 80053 COMPREHEN METABOLIC PANEL: CPT

## 2018-08-07 PROCEDURE — 85025 COMPLETE CBC W/AUTO DIFF WBC: CPT

## 2018-08-07 PROCEDURE — 99291 CRITICAL CARE FIRST HOUR: CPT | Performed by: SURGERY

## 2018-08-07 PROCEDURE — 6370000000 HC RX 637 (ALT 250 FOR IP): Performed by: SURGERY

## 2018-08-07 PROCEDURE — 36415 COLL VENOUS BLD VENIPUNCTURE: CPT

## 2018-08-07 PROCEDURE — 83735 ASSAY OF MAGNESIUM: CPT

## 2018-08-07 RX ORDER — LANOLIN ALCOHOL/MO/W.PET/CERES
400 CREAM (GRAM) TOPICAL ONCE
Status: COMPLETED | OUTPATIENT
Start: 2018-08-07 | End: 2018-08-07

## 2018-08-07 RX ORDER — TAMSULOSIN HYDROCHLORIDE 0.4 MG/1
0.4 CAPSULE ORAL DAILY
Status: DISCONTINUED | OUTPATIENT
Start: 2018-08-07 | End: 2018-08-10 | Stop reason: HOSPADM

## 2018-08-07 RX ORDER — MAGNESIUM SULFATE IN WATER 40 MG/ML
4 INJECTION, SOLUTION INTRAVENOUS ONCE
Status: COMPLETED | OUTPATIENT
Start: 2018-08-07 | End: 2018-08-07

## 2018-08-07 RX ADMIN — PANTOPRAZOLE SODIUM 40 MG: 40 TABLET, DELAYED RELEASE ORAL at 06:00

## 2018-08-07 RX ADMIN — IPRATROPIUM BROMIDE AND ALBUTEROL SULFATE 1 AMPULE: .5; 3 SOLUTION RESPIRATORY (INHALATION) at 17:20

## 2018-08-07 RX ADMIN — MAGNESIUM SULFATE HEPTAHYDRATE 4 G: 40 INJECTION, SOLUTION INTRAVENOUS at 13:45

## 2018-08-07 RX ADMIN — CEFAZOLIN 2 G: 10 INJECTION, POWDER, FOR SOLUTION INTRAVENOUS; PARENTERAL at 13:39

## 2018-08-07 RX ADMIN — CEFAZOLIN 2 G: 10 INJECTION, POWDER, FOR SOLUTION INTRAVENOUS; PARENTERAL at 06:02

## 2018-08-07 RX ADMIN — Medication 1000 MCG: at 08:50

## 2018-08-07 RX ADMIN — ACETAMINOPHEN 650 MG: 325 TABLET, FILM COATED ORAL at 20:29

## 2018-08-07 RX ADMIN — ACETAMINOPHEN 650 MG: 325 TABLET, FILM COATED ORAL at 08:52

## 2018-08-07 RX ADMIN — Medication 10 ML: at 08:50

## 2018-08-07 RX ADMIN — HYDROMORPHONE HYDROCHLORIDE 0.5 MG: 1 INJECTION, SOLUTION INTRAMUSCULAR; INTRAVENOUS; SUBCUTANEOUS at 04:51

## 2018-08-07 RX ADMIN — SIMVASTATIN 20 MG: 20 TABLET, FILM COATED ORAL at 20:29

## 2018-08-07 RX ADMIN — Medication 400 MG: at 12:03

## 2018-08-07 RX ADMIN — OXYCODONE HYDROCHLORIDE 10 MG: 10 TABLET ORAL at 20:29

## 2018-08-07 RX ADMIN — VITAMIN D, TAB 1000IU (100/BT) 1000 UNITS: 25 TAB at 08:50

## 2018-08-07 RX ADMIN — CITALOPRAM 40 MG: 20 TABLET, FILM COATED ORAL at 08:50

## 2018-08-07 RX ADMIN — TAMSULOSIN HYDROCHLORIDE 0.4 MG: 0.4 CAPSULE ORAL at 12:02

## 2018-08-07 RX ADMIN — IPRATROPIUM BROMIDE AND ALBUTEROL SULFATE 1 AMPULE: .5; 3 SOLUTION RESPIRATORY (INHALATION) at 06:45

## 2018-08-07 RX ADMIN — DOCUSATE SODIUM 100 MG: 100 CAPSULE, LIQUID FILLED ORAL at 20:29

## 2018-08-07 RX ADMIN — INSULIN LISPRO 2 UNITS: 100 INJECTION, SOLUTION INTRAVENOUS; SUBCUTANEOUS at 13:45

## 2018-08-07 RX ADMIN — BACITRACIN ZINC: 500 OINTMENT TOPICAL at 08:50

## 2018-08-07 RX ADMIN — METHOCARBAMOL 500 MG: 500 TABLET ORAL at 20:29

## 2018-08-07 RX ADMIN — OXYCODONE HYDROCHLORIDE 10 MG: 10 TABLET ORAL at 16:17

## 2018-08-07 RX ADMIN — LEVOTHYROXINE SODIUM 25 MCG: 25 TABLET ORAL at 06:01

## 2018-08-07 RX ADMIN — METHOCARBAMOL 500 MG: 500 TABLET ORAL at 08:50

## 2018-08-07 RX ADMIN — IPRATROPIUM BROMIDE AND ALBUTEROL SULFATE 1 AMPULE: .5; 3 SOLUTION RESPIRATORY (INHALATION) at 21:48

## 2018-08-07 RX ADMIN — IPRATROPIUM BROMIDE AND ALBUTEROL SULFATE 1 AMPULE: .5; 3 SOLUTION RESPIRATORY (INHALATION) at 10:45

## 2018-08-07 RX ADMIN — INSULIN LISPRO 1 UNITS: 100 INJECTION, SOLUTION INTRAVENOUS; SUBCUTANEOUS at 20:38

## 2018-08-07 RX ADMIN — ACETAMINOPHEN 650 MG: 325 TABLET, FILM COATED ORAL at 12:07

## 2018-08-07 RX ADMIN — DOCUSATE SODIUM 100 MG: 100 CAPSULE, LIQUID FILLED ORAL at 08:52

## 2018-08-07 RX ADMIN — METHOCARBAMOL 500 MG: 500 TABLET ORAL at 12:07

## 2018-08-07 RX ADMIN — Medication 10 ML: at 20:29

## 2018-08-07 RX ADMIN — HEPARIN SODIUM 5000 UNITS: 10000 INJECTION INTRAVENOUS; SUBCUTANEOUS at 06:02

## 2018-08-07 RX ADMIN — LATANOPROST 1 DROP: 50 SOLUTION OPHTHALMIC at 20:29

## 2018-08-07 RX ADMIN — ACETAMINOPHEN 650 MG: 325 TABLET, FILM COATED ORAL at 17:17

## 2018-08-07 RX ADMIN — METHOCARBAMOL 500 MG: 500 TABLET ORAL at 17:18

## 2018-08-07 RX ADMIN — HEPARIN SODIUM 5000 UNITS: 10000 INJECTION INTRAVENOUS; SUBCUTANEOUS at 21:38

## 2018-08-07 RX ADMIN — BACITRACIN ZINC: 500 OINTMENT TOPICAL at 20:29

## 2018-08-07 RX ADMIN — INSULIN LISPRO 2 UNITS: 100 INJECTION, SOLUTION INTRAVENOUS; SUBCUTANEOUS at 17:45

## 2018-08-07 RX ADMIN — SODIUM CHLORIDE: 9 INJECTION, SOLUTION INTRAVENOUS at 08:21

## 2018-08-07 RX ADMIN — HEPARIN SODIUM 5000 UNITS: 10000 INJECTION INTRAVENOUS; SUBCUTANEOUS at 13:45

## 2018-08-07 ASSESSMENT — PAIN SCALES - GENERAL
PAINLEVEL_OUTOF10: 10
PAINLEVEL_OUTOF10: 0
PAINLEVEL_OUTOF10: 10
PAINLEVEL_OUTOF10: 8
PAINLEVEL_OUTOF10: 6
PAINLEVEL_OUTOF10: 0
PAINLEVEL_OUTOF10: 0

## 2018-08-07 ASSESSMENT — PAIN DESCRIPTION - LOCATION
LOCATION: BACK
LOCATION: BACK

## 2018-08-07 ASSESSMENT — PAIN DESCRIPTION - PAIN TYPE
TYPE: ACUTE PAIN;SURGICAL PAIN
TYPE: ACUTE PAIN;SURGICAL PAIN

## 2018-08-07 ASSESSMENT — PAIN DESCRIPTION - DESCRIPTORS: DESCRIPTORS: ACHING;DISCOMFORT;TENDER;THROBBING

## 2018-08-07 ASSESSMENT — PAIN DESCRIPTION - ONSET: ONSET: ON-GOING

## 2018-08-07 ASSESSMENT — PAIN DESCRIPTION - ORIENTATION
ORIENTATION: LEFT;MID
ORIENTATION: LEFT

## 2018-08-07 ASSESSMENT — PAIN DESCRIPTION - FREQUENCY
FREQUENCY: CONTINUOUS
FREQUENCY: INTERMITTENT

## 2018-08-07 ASSESSMENT — PAIN DESCRIPTION - PROGRESSION: CLINICAL_PROGRESSION: NOT CHANGED

## 2018-08-07 NOTE — PROGRESS NOTES
MRI DELAYED, PT HAD RECENT METAL PUT IN BODY FROM SURGERY, OUR DR'S RECOMMENDATION IS TO WAIT 6 TO 8 WEEKS BEFORE AN MRI CAN BE DONE, NURSE NARGIS WAS NOTIFIED

## 2018-08-07 NOTE — PROGRESS NOTES
MRI called to set up time for scan, unable to give me a time currently and they will call me back.   Electronically signed by Lucille Curtis RN on 8/7/2018 at 11:28 AM
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
## 2018-08-07 NOTE — PROGRESS NOTES
rate, regular rhythm, no murmur/gallop/rub  PULMONARY: ecchymosis left anterior chest; CT left chest with clean dressings and drains; no rhonchi/rales/wheezes, no use of accessory muscles  ABDOMEN: soft, nontender, nondistended, nontympanic, no masses, no organomegaly, normal bowel sounds   MUSCULOSKELETAL: moves all extremities purposefully, 5/5 strength; ecchymosis to left foot, especially on medial and dorsal aspect   SKIN/EXTREMITIES: superficial abrasions to left forearm; laceration to left scalp with staples; no rashes; , no edema/clubbing, warm/dry, good capillary refill; ecchymosis to left foot      ASSESSMENT / PLAN:   · Neuro:  · Acute pain secondary to multiple rib fractures, clavicle fracture - continue tylenol scheduled and PRN oxycodone, dilaudid, lidoderm patch - caution with narcotics given age  · CV:   · No issues  · Arterial line placed 08/06 in right radial  · Resp:   · Pneumothorax s/p VATS - continue to monitor CXR and chest tube drainage  · O2 as needed  · SMI, Peep valve  · GI:  · No issues, No BMs  · Resume diet  · Renal:  · CKD - unsure of baseline - improving  · Hyperkalemia - improved after IVF  · Endo:  · DM - continue SSI medium dose  · ID:  · No issues  · MSK:  · Ribs 2-11 fractures on left - s/p rib plating ribs 5-9 - x2 jolynn drains  · Compression fracture T spine - neurosurg following - MRI ordered of T spine  · PT/OT consult  · Left clavicle fracture, 5th metatarsal frx  · Ortho following, sling, NWB  · Heme:  · Macrocytic blood loss anemia - improved from 7.0 to 8.8  · Continue to monitor H/H - transfuse, as needed    Pain/Analgesia: Tylenol q4h; oxycodone 10mg q4h PRN; dilaudid 0.5mg q4h PRN; lidoderm patch  Bowel regimen: Colace  DVT proph: Heparin  GI proph: Protonix  Seizure proph: none  Glucose protocol: MD BRITO  Mouth/eye care: None  Ruano: none  CVC sites: none  Ancillary consults: Orthopedics  Family Update: As available  CODE Status: FC    Dispo: Continue SICU

## 2018-08-07 NOTE — PROGRESS NOTES
Physical Therapy    Facility/Department: [address] [facility]  Initial Assessment    NAME: Emily Duran  : 1940  MRN: [id number]    Date of Service: 2018  PT order received and chart reviewed. Will hold initial evaluation this morning due to thoracic spine fx. Will await neurosurgery recommendations.     Patito Obregon, PT, DPT  [id number]

## 2018-08-07 NOTE — OP NOTE
Patient's Name/Date of Birth: Salas Erwin / 1940 (82 y.o.)    Date: August 6, 2018     Pre-operative Diagnosis: left rib fractures--multiple with displacement  Post-operative Diagnosis: Diaphragm injury x 2; multiple rib fractures with flail segment    Procedure: left video assisted thoracoscopy, repair of diaphragm injury x 2, rib plating x 5  Anesthesia: DIETER  Surgeon: Dr. Yoli Loya  Assistant: LEONARD Henry MD; CHUCHO Gallardo    Estimated Blood Loss: 644 ml  Complications: none   Specimens: were not obtained   IVF:  1700cc  Drains:  24F anterior--diaphragm, posterior--apex    PROCEDURE: Patient was brought into the OR and sedated on his ICU bed. Once adequate anesthesia was achieved and double lumen ETT placed, the patient was moved onto the OR table. He was then placed in the right lateral decubitus position with all bony prominences well padded. The original chest tube removed. The patient was then prepped and draped in the standard surgical fashion. The previous chest tube site was used to access the chest with 12mm port. The left lung was dropped and insufflation placed in the chest.  An addition 12mm and 5mm ports were placed. Old lung adhesions were noted, but none new. A diaphragm injury was noted. This was closed with the endostitch, using nonabsorbable stitch in an interrupted fashion. The abdomen remained soft. The pleural cavity was then copiously irrigated. Two 24F Felipe drains were placed, one through the 5mm port and an additional through a new stab incision, just anterior. The Felipe drains were secured with 2-0 Nylon. A transverse incision was then made on the chest wall. Skin flaps raised. The latissimus dorsi was then  from the serratus. The serratus was then lifted from the chest wall. Multiple severely displaced rib fractures were then visualized. We started with rib #7, using 75mm plates on ribs 5-9.   A combination of 10,8 and 6mm screws were needed. Ribs 5 and 9 were the most difficult to plate due to positioning. The serratus was then approximated over the ribs using 0 Vicryl in a running fashion. The latissimus dorsi was allowed to fall back into position. The chest wall was then closed in layers using 2-0, 3-0 and 4-0 Vicryl, irrigated after closure of each layer. The previous chest tube site was closed with 2-0 Vicryl in a figure of eight fashion and the skin closed with 4-0 Vicryl. The wounds were covered with Skin Afix and drain sponges placed around the drains. The sponge, instrument and needle counts were correct, however it was noted an endostitch was missing. We noted it was not in the chest as all endostitch was removed after each stitch. A chest x-ray was performed in the OR. Patient was extubated and sent to ICU. Family was updated.           Dena Craven MD

## 2018-08-07 NOTE — PLAN OF CARE
Problem: Falls - Risk of:  Goal: Will remain free from falls  Will remain free from falls   Outcome: Met This Shift      Problem: Pain:  Goal: Control of acute pain  Control of acute pain   Outcome: Met This Shift      Problem: Skin Integrity:  Goal: Will show no infection signs and symptoms  Will show no infection signs and symptoms   Outcome: Met This Shift

## 2018-08-08 ENCOUNTER — APPOINTMENT (OUTPATIENT)
Dept: GENERAL RADIOLOGY | Age: 78
DRG: 163 | End: 2018-08-08
Payer: MEDICARE

## 2018-08-08 LAB
ALBUMIN SERPL-MCNC: 3 G/DL (ref 3.5–5.2)
ALP BLD-CCNC: 30 U/L (ref 40–129)
ALT SERPL-CCNC: 19 U/L (ref 0–40)
ANION GAP SERPL CALCULATED.3IONS-SCNC: 9 MMOL/L (ref 7–16)
AST SERPL-CCNC: 44 U/L (ref 0–39)
BASOPHILS ABSOLUTE: 0.01 E9/L (ref 0–0.2)
BASOPHILS RELATIVE PERCENT: 0.1 % (ref 0–2)
BILIRUB SERPL-MCNC: 0.5 MG/DL (ref 0–1.2)
BLOOD BANK DISPENSE STATUS: NORMAL
BLOOD BANK PRODUCT CODE: NORMAL
BPU ID: NORMAL
BUN BLDV-MCNC: 25 MG/DL (ref 8–23)
CALCIUM SERPL-MCNC: 8.8 MG/DL (ref 8.6–10.2)
CHLORIDE BLD-SCNC: 99 MMOL/L (ref 98–107)
CO2: 27 MMOL/L (ref 22–29)
CREAT SERPL-MCNC: 1.1 MG/DL (ref 0.7–1.2)
DESCRIPTION BLOOD BANK: NORMAL
EOSINOPHILS ABSOLUTE: 0.09 E9/L (ref 0.05–0.5)
EOSINOPHILS RELATIVE PERCENT: 1.1 % (ref 0–6)
GFR AFRICAN AMERICAN: >60
GFR NON-AFRICAN AMERICAN: >60 ML/MIN/1.73
GLUCOSE BLD-MCNC: 118 MG/DL (ref 74–109)
HCT VFR BLD CALC: 24.5 % (ref 37–54)
HEMOGLOBIN: 8.1 G/DL (ref 12.5–16.5)
IMMATURE GRANULOCYTES #: 0.09 E9/L
IMMATURE GRANULOCYTES %: 1.1 % (ref 0–5)
LYMPHOCYTES ABSOLUTE: 1.57 E9/L (ref 1.5–4)
LYMPHOCYTES RELATIVE PERCENT: 19.1 % (ref 20–42)
MAGNESIUM: 2.2 MG/DL (ref 1.6–2.6)
MCH RBC QN AUTO: 33.1 PG (ref 26–35)
MCHC RBC AUTO-ENTMCNC: 33.1 % (ref 32–34.5)
MCV RBC AUTO: 100 FL (ref 80–99.9)
METER GLUCOSE: 122 MG/DL (ref 70–110)
METER GLUCOSE: 130 MG/DL (ref 70–110)
METER GLUCOSE: 149 MG/DL (ref 70–110)
METER GLUCOSE: 152 MG/DL (ref 70–110)
MONOCYTES ABSOLUTE: 0.93 E9/L (ref 0.1–0.95)
MONOCYTES RELATIVE PERCENT: 11.3 % (ref 2–12)
NEUTROPHILS ABSOLUTE: 5.55 E9/L (ref 1.8–7.3)
NEUTROPHILS RELATIVE PERCENT: 67.3 % (ref 43–80)
PDW BLD-RTO: 15.5 FL (ref 11.5–15)
PHOSPHORUS: 2.2 MG/DL (ref 2.5–4.5)
PLATELET # BLD: 120 E9/L (ref 130–450)
PMV BLD AUTO: 10.8 FL (ref 7–12)
POTASSIUM REFLEX MAGNESIUM: 4.5 MMOL/L (ref 3.5–5)
RBC # BLD: 2.45 E12/L (ref 3.8–5.8)
SODIUM BLD-SCNC: 135 MMOL/L (ref 132–146)
TOTAL PROTEIN: 5.8 G/DL (ref 6.4–8.3)
WBC # BLD: 8.2 E9/L (ref 4.5–11.5)

## 2018-08-08 PROCEDURE — 6370000000 HC RX 637 (ALT 250 FOR IP): Performed by: STUDENT IN AN ORGANIZED HEALTH CARE EDUCATION/TRAINING PROGRAM

## 2018-08-08 PROCEDURE — 97166 OT EVAL MOD COMPLEX 45 MIN: CPT

## 2018-08-08 PROCEDURE — 94640 AIRWAY INHALATION TREATMENT: CPT

## 2018-08-08 PROCEDURE — 97530 THERAPEUTIC ACTIVITIES: CPT

## 2018-08-08 PROCEDURE — 2000000000 HC ICU R&B

## 2018-08-08 PROCEDURE — G8988 SELF CARE GOAL STATUS: HCPCS

## 2018-08-08 PROCEDURE — 94150 VITAL CAPACITY TEST: CPT

## 2018-08-08 PROCEDURE — 2700000000 HC OXYGEN THERAPY PER DAY

## 2018-08-08 PROCEDURE — G8987 SELF CARE CURRENT STATUS: HCPCS

## 2018-08-08 PROCEDURE — 99233 SBSQ HOSP IP/OBS HIGH 50: CPT | Performed by: SURGERY

## 2018-08-08 PROCEDURE — 82962 GLUCOSE BLOOD TEST: CPT

## 2018-08-08 PROCEDURE — 85025 COMPLETE CBC W/AUTO DIFF WBC: CPT

## 2018-08-08 PROCEDURE — 80053 COMPREHEN METABOLIC PANEL: CPT

## 2018-08-08 PROCEDURE — 6370000000 HC RX 637 (ALT 250 FOR IP): Performed by: SURGERY

## 2018-08-08 PROCEDURE — 2580000003 HC RX 258: Performed by: STUDENT IN AN ORGANIZED HEALTH CARE EDUCATION/TRAINING PROGRAM

## 2018-08-08 PROCEDURE — G8981 BODY POS CURRENT STATUS: HCPCS

## 2018-08-08 PROCEDURE — 84100 ASSAY OF PHOSPHORUS: CPT

## 2018-08-08 PROCEDURE — 71045 X-RAY EXAM CHEST 1 VIEW: CPT

## 2018-08-08 PROCEDURE — 97162 PT EVAL MOD COMPLEX 30 MIN: CPT

## 2018-08-08 PROCEDURE — 83735 ASSAY OF MAGNESIUM: CPT

## 2018-08-08 PROCEDURE — 36415 COLL VENOUS BLD VENIPUNCTURE: CPT

## 2018-08-08 PROCEDURE — 6360000002 HC RX W HCPCS: Performed by: STUDENT IN AN ORGANIZED HEALTH CARE EDUCATION/TRAINING PROGRAM

## 2018-08-08 PROCEDURE — 97535 SELF CARE MNGMENT TRAINING: CPT

## 2018-08-08 PROCEDURE — G8982 BODY POS GOAL STATUS: HCPCS

## 2018-08-08 RX ORDER — SENNA PLUS 8.6 MG/1
1 TABLET ORAL NIGHTLY
Status: DISCONTINUED | OUTPATIENT
Start: 2018-08-08 | End: 2018-08-09

## 2018-08-08 RX ORDER — DOCUSATE SODIUM 100 MG/1
200 CAPSULE, LIQUID FILLED ORAL 2 TIMES DAILY
Status: DISCONTINUED | OUTPATIENT
Start: 2018-08-08 | End: 2018-08-10 | Stop reason: HOSPADM

## 2018-08-08 RX ORDER — POLYETHYLENE GLYCOL 3350 17 G/17G
17 POWDER, FOR SOLUTION ORAL DAILY
Status: DISCONTINUED | OUTPATIENT
Start: 2018-08-08 | End: 2018-08-09

## 2018-08-08 RX ADMIN — METHOCARBAMOL 500 MG: 500 TABLET ORAL at 16:33

## 2018-08-08 RX ADMIN — DOCUSATE SODIUM 200 MG: 100 CAPSULE, LIQUID FILLED ORAL at 20:05

## 2018-08-08 RX ADMIN — BACITRACIN ZINC: 500 OINTMENT TOPICAL at 20:04

## 2018-08-08 RX ADMIN — BACITRACIN ZINC: 500 OINTMENT TOPICAL at 08:17

## 2018-08-08 RX ADMIN — Medication 10 ML: at 20:05

## 2018-08-08 RX ADMIN — HEPARIN SODIUM 5000 UNITS: 10000 INJECTION INTRAVENOUS; SUBCUTANEOUS at 22:23

## 2018-08-08 RX ADMIN — POTASSIUM & SODIUM PHOSPHATES POWDER PACK 280-160-250 MG 250 MG: 280-160-250 PACK at 11:40

## 2018-08-08 RX ADMIN — METHOCARBAMOL 500 MG: 500 TABLET ORAL at 08:18

## 2018-08-08 RX ADMIN — OXYCODONE HYDROCHLORIDE 10 MG: 10 TABLET ORAL at 00:27

## 2018-08-08 RX ADMIN — SIMVASTATIN 20 MG: 20 TABLET, FILM COATED ORAL at 20:05

## 2018-08-08 RX ADMIN — LATANOPROST 1 DROP: 50 SOLUTION OPHTHALMIC at 20:05

## 2018-08-08 RX ADMIN — ACETAMINOPHEN 650 MG: 325 TABLET, FILM COATED ORAL at 00:27

## 2018-08-08 RX ADMIN — IPRATROPIUM BROMIDE AND ALBUTEROL SULFATE 1 AMPULE: .5; 3 SOLUTION RESPIRATORY (INHALATION) at 05:54

## 2018-08-08 RX ADMIN — ACETAMINOPHEN 650 MG: 325 TABLET, FILM COATED ORAL at 08:17

## 2018-08-08 RX ADMIN — DOCUSATE SODIUM 100 MG: 100 CAPSULE, LIQUID FILLED ORAL at 08:17

## 2018-08-08 RX ADMIN — IPRATROPIUM BROMIDE AND ALBUTEROL SULFATE 1 AMPULE: .5; 3 SOLUTION RESPIRATORY (INHALATION) at 14:54

## 2018-08-08 RX ADMIN — VITAMIN D, TAB 1000IU (100/BT) 1000 UNITS: 25 TAB at 08:18

## 2018-08-08 RX ADMIN — METHOCARBAMOL 500 MG: 500 TABLET ORAL at 13:30

## 2018-08-08 RX ADMIN — LEVOTHYROXINE SODIUM 25 MCG: 25 TABLET ORAL at 05:43

## 2018-08-08 RX ADMIN — METHOCARBAMOL 500 MG: 500 TABLET ORAL at 20:05

## 2018-08-08 RX ADMIN — TAMSULOSIN HYDROCHLORIDE 0.4 MG: 0.4 CAPSULE ORAL at 08:18

## 2018-08-08 RX ADMIN — CITALOPRAM 40 MG: 20 TABLET, FILM COATED ORAL at 08:18

## 2018-08-08 RX ADMIN — POTASSIUM & SODIUM PHOSPHATES POWDER PACK 280-160-250 MG 250 MG: 280-160-250 PACK at 16:44

## 2018-08-08 RX ADMIN — INSULIN LISPRO 3 UNITS: 100 INJECTION, SOLUTION INTRAVENOUS; SUBCUTANEOUS at 16:55

## 2018-08-08 RX ADMIN — HEPARIN SODIUM 5000 UNITS: 10000 INJECTION INTRAVENOUS; SUBCUTANEOUS at 05:44

## 2018-08-08 RX ADMIN — IPRATROPIUM BROMIDE AND ALBUTEROL SULFATE 1 AMPULE: .5; 3 SOLUTION RESPIRATORY (INHALATION) at 10:54

## 2018-08-08 RX ADMIN — POLYETHYLENE GLYCOL 3350 17 G: 17 POWDER, FOR SOLUTION ORAL at 11:40

## 2018-08-08 RX ADMIN — PANTOPRAZOLE SODIUM 40 MG: 40 TABLET, DELAYED RELEASE ORAL at 05:43

## 2018-08-08 RX ADMIN — INSULIN LISPRO 2 UNITS: 100 INJECTION, SOLUTION INTRAVENOUS; SUBCUTANEOUS at 11:41

## 2018-08-08 RX ADMIN — ACETAMINOPHEN 650 MG: 325 TABLET, FILM COATED ORAL at 13:30

## 2018-08-08 RX ADMIN — POTASSIUM & SODIUM PHOSPHATES POWDER PACK 280-160-250 MG 250 MG: 280-160-250 PACK at 20:05

## 2018-08-08 RX ADMIN — ACETAMINOPHEN 650 MG: 325 TABLET, FILM COATED ORAL at 20:05

## 2018-08-08 RX ADMIN — OXYCODONE HYDROCHLORIDE 10 MG: 10 TABLET ORAL at 20:05

## 2018-08-08 RX ADMIN — Medication 1000 MCG: at 08:18

## 2018-08-08 RX ADMIN — HEPARIN SODIUM 5000 UNITS: 10000 INJECTION INTRAVENOUS; SUBCUTANEOUS at 14:27

## 2018-08-08 RX ADMIN — OXYCODONE HYDROCHLORIDE 10 MG: 10 TABLET ORAL at 05:43

## 2018-08-08 RX ADMIN — SENNOSIDES 8.6 MG: 8.6 TABLET, FILM COATED ORAL at 20:05

## 2018-08-08 RX ADMIN — IPRATROPIUM BROMIDE AND ALBUTEROL SULFATE 1 AMPULE: .5; 3 SOLUTION RESPIRATORY (INHALATION) at 19:15

## 2018-08-08 RX ADMIN — ACETAMINOPHEN 650 MG: 325 TABLET, FILM COATED ORAL at 05:44

## 2018-08-08 RX ADMIN — Medication 10 ML: at 08:18

## 2018-08-08 ASSESSMENT — PAIN SCALES - GENERAL
PAINLEVEL_OUTOF10: 8
PAINLEVEL_OUTOF10: 8
PAINLEVEL_OUTOF10: 9
PAINLEVEL_OUTOF10: 0
PAINLEVEL_OUTOF10: 4
PAINLEVEL_OUTOF10: 0

## 2018-08-08 ASSESSMENT — PAIN DESCRIPTION - PROGRESSION: CLINICAL_PROGRESSION: NOT CHANGED

## 2018-08-08 ASSESSMENT — PAIN DESCRIPTION - PAIN TYPE: TYPE: ACUTE PAIN;SURGICAL PAIN

## 2018-08-08 ASSESSMENT — PAIN DESCRIPTION - ORIENTATION: ORIENTATION: LEFT;MID

## 2018-08-08 ASSESSMENT — PAIN DESCRIPTION - ONSET: ONSET: ON-GOING

## 2018-08-08 ASSESSMENT — PAIN DESCRIPTION - DESCRIPTORS: DESCRIPTORS: ACHING;CONSTANT;DISCOMFORT

## 2018-08-08 ASSESSMENT — PAIN DESCRIPTION - FREQUENCY: FREQUENCY: CONTINUOUS

## 2018-08-08 ASSESSMENT — PAIN DESCRIPTION - LOCATION: LOCATION: BACK

## 2018-08-08 NOTE — PROGRESS NOTES
Physical Therapy    Facility/Department: [address]  Initial Assessment    NAME: Annalise Geller  : 1940  MRN: [id number]    Date of Service: 2018  Evaluating Therapist: Raza Hinson, PT, DPT    ROOM #: [id number]  DIAGNOSIS: Trauma  PRECAUTIONS: Falls, NWB LUE s/p L clavicle fx, \"protected WB\" LLE in boot s/p L 5th metatarsal fx, L rib fxs, O2, Chest tube, thoracic compression fxs, Spinal precautions  PROCEDURES:  L VATS, rib plating x 5, repair of diaphragm injury x 2  **VERBAL ORDER PER DR MCFARLANE TO MOBILIZE OOB**    Social:  Pt lives with wife in a 2 floor plan, 1st floor setup with ramped entry. Prior to admission pt walked with no device and was Independent. Pt is a sanchez. Initial Evaluation  Date: 18 Treatment  Date:     Short Term/ Long Term   Goals   AM-PAC 6 Clicks 65/96     Does pt have pain? 5/10 rib pain     Bed Mobility  Rolling: ModA  Supine to sit: ModA  Sit to supine: ModA  Scooting: ModA to EOB, Anastasia along EOB  SBA   Transfers Sit to stand: NT  Stand to sit: NT  Stand pivot: NT  SBA   Ambulation   NT  >100 feet with SBA with AAD if needed   Stair negotiation: ascended and descended NT  >4 steps with 1 rail with SBA   BLE ROM WNL     BLE strength Grossly 4/5  Increase by 1/3 MMT grade   Balance Sitting: Anastasia dynamic  Standing: NT  Sitting: Independent  Standing: SBA with AAD if needed     Vitals:  Heart Rate at rest 88 bpm Heart Rate post session 90 bpm   SpO2 at rest 95% SpO2 post session 96%   Blood Pressure at rest 110/65 mmHg Blood Pressure post session 126/68 mmHg     Pt is alert and oriented x 3  Sensation: WNL  Edema: WNL    ASSESSMENT  Pt displays functional ability as noted in the objective portion of this evaluation. Comments/Treatment:  Pt supine in bed upon arrival, agreeable to initial evaluation with OT collaboration. Pt's wife present for session and provided social history. Pt educated on WB statuses prior to activity.   Boot not present in
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
## 2018-08-08 NOTE — PROGRESS NOTES
DISPOSITION-Continue ICU Care       Updated wife at bedside  Molly Quiroz MD, FACS  8/8/2018  11:17 AM
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
## 2018-08-08 NOTE — CARE COORDINATION
Pod #2. L Ct's to H20 seal. Noted pt having some ICU delirium. Efforts in place to keep pt awake during day hours and sleep at night to help with delirium. Awaiting ok from neurosurgery to advance activity. Will need therapy evals when appropriate to determine transition of care needs.

## 2018-08-09 ENCOUNTER — APPOINTMENT (OUTPATIENT)
Dept: GENERAL RADIOLOGY | Age: 78
DRG: 163 | End: 2018-08-09
Payer: MEDICARE

## 2018-08-09 LAB
ALBUMIN SERPL-MCNC: 2.7 G/DL (ref 3.5–5.2)
ALP BLD-CCNC: 28 U/L (ref 40–129)
ALT SERPL-CCNC: 14 U/L (ref 0–40)
ANION GAP SERPL CALCULATED.3IONS-SCNC: 12 MMOL/L (ref 7–16)
AST SERPL-CCNC: 30 U/L (ref 0–39)
B.E.: 6.5 MMOL/L (ref -3–3)
BASOPHILS ABSOLUTE: 0.03 E9/L (ref 0–0.2)
BASOPHILS RELATIVE PERCENT: 0.4 % (ref 0–2)
BILIRUB SERPL-MCNC: 0.6 MG/DL (ref 0–1.2)
BUN BLDV-MCNC: 27 MG/DL (ref 8–23)
CALCIUM SERPL-MCNC: 8.7 MG/DL (ref 8.6–10.2)
CHLORIDE BLD-SCNC: 97 MMOL/L (ref 98–107)
CO2: 28 MMOL/L (ref 22–29)
COHB: 1.9 % (ref 0–1.5)
CREAT SERPL-MCNC: 1 MG/DL (ref 0.7–1.2)
CRITICAL: ABNORMAL
DATE ANALYZED: ABNORMAL
DATE OF COLLECTION: ABNORMAL
EOSINOPHILS ABSOLUTE: 0.37 E9/L (ref 0.05–0.5)
EOSINOPHILS RELATIVE PERCENT: 5 % (ref 0–6)
GFR AFRICAN AMERICAN: >60
GFR NON-AFRICAN AMERICAN: >60 ML/MIN/1.73
GLUCOSE BLD-MCNC: 144 MG/DL (ref 74–109)
HCO3: 30.8 MMOL/L (ref 22–26)
HCT VFR BLD CALC: 22.9 % (ref 37–54)
HEMOGLOBIN: 7.8 G/DL (ref 12.5–16.5)
HHB: 5.3 % (ref 0–5)
IMMATURE GRANULOCYTES #: 0.16 E9/L
IMMATURE GRANULOCYTES %: 2.1 % (ref 0–5)
LAB: ABNORMAL
LYMPHOCYTES ABSOLUTE: 1.67 E9/L (ref 1.5–4)
LYMPHOCYTES RELATIVE PERCENT: 22.4 % (ref 20–42)
Lab: 1631
MAGNESIUM: 1.8 MG/DL (ref 1.6–2.6)
MCH RBC QN AUTO: 34.1 PG (ref 26–35)
MCHC RBC AUTO-ENTMCNC: 34.1 % (ref 32–34.5)
MCV RBC AUTO: 100 FL (ref 80–99.9)
METER GLUCOSE: 195 MG/DL (ref 70–110)
METHB: 0.3 % (ref 0–1.5)
MODE: ABNORMAL
MONOCYTES ABSOLUTE: 0.96 E9/L (ref 0.1–0.95)
MONOCYTES RELATIVE PERCENT: 12.9 % (ref 2–12)
NEUTROPHILS ABSOLUTE: 4.28 E9/L (ref 1.8–7.3)
NEUTROPHILS RELATIVE PERCENT: 57.2 % (ref 43–80)
O2 SATURATION: 94.6 % (ref 92–98.5)
O2HB: 92.5 % (ref 94–97)
OPERATOR ID: ABNORMAL
PATIENT TEMP: 37 C
PCO2: 43.5 MMHG (ref 35–45)
PDW BLD-RTO: 15.6 FL (ref 11.5–15)
PH BLOOD GAS: 7.47 (ref 7.35–7.45)
PHOSPHORUS: 3.3 MG/DL (ref 2.5–4.5)
PLATELET # BLD: 136 E9/L (ref 130–450)
PMV BLD AUTO: 10.2 FL (ref 7–12)
PO2: 73.1 MMHG (ref 60–100)
POTASSIUM REFLEX MAGNESIUM: 4.6 MMOL/L (ref 3.5–5)
RBC # BLD: 2.29 E12/L (ref 3.8–5.8)
SODIUM BLD-SCNC: 137 MMOL/L (ref 132–146)
SOURCE, BLOOD GAS: ABNORMAL
THB: 8.1 G/DL (ref 11.5–16.5)
TIME ANALYZED: 1634
TOTAL PROTEIN: 5.5 G/DL (ref 6.4–8.3)
WBC # BLD: 7.5 E9/L (ref 4.5–11.5)

## 2018-08-09 PROCEDURE — 6370000000 HC RX 637 (ALT 250 FOR IP): Performed by: STUDENT IN AN ORGANIZED HEALTH CARE EDUCATION/TRAINING PROGRAM

## 2018-08-09 PROCEDURE — 2580000003 HC RX 258: Performed by: STUDENT IN AN ORGANIZED HEALTH CARE EDUCATION/TRAINING PROGRAM

## 2018-08-09 PROCEDURE — 2060000000 HC ICU INTERMEDIATE R&B

## 2018-08-09 PROCEDURE — 71045 X-RAY EXAM CHEST 1 VIEW: CPT

## 2018-08-09 PROCEDURE — 84100 ASSAY OF PHOSPHORUS: CPT

## 2018-08-09 PROCEDURE — 6370000000 HC RX 637 (ALT 250 FOR IP): Performed by: SURGERY

## 2018-08-09 PROCEDURE — 97530 THERAPEUTIC ACTIVITIES: CPT

## 2018-08-09 PROCEDURE — 94150 VITAL CAPACITY TEST: CPT

## 2018-08-09 PROCEDURE — 6360000002 HC RX W HCPCS: Performed by: STUDENT IN AN ORGANIZED HEALTH CARE EDUCATION/TRAINING PROGRAM

## 2018-08-09 PROCEDURE — 82805 BLOOD GASES W/O2 SATURATION: CPT

## 2018-08-09 PROCEDURE — 2700000000 HC OXYGEN THERAPY PER DAY

## 2018-08-09 PROCEDURE — 36415 COLL VENOUS BLD VENIPUNCTURE: CPT

## 2018-08-09 PROCEDURE — 82962 GLUCOSE BLOOD TEST: CPT

## 2018-08-09 PROCEDURE — 97112 NEUROMUSCULAR REEDUCATION: CPT

## 2018-08-09 PROCEDURE — 83735 ASSAY OF MAGNESIUM: CPT

## 2018-08-09 PROCEDURE — 85025 COMPLETE CBC W/AUTO DIFF WBC: CPT

## 2018-08-09 PROCEDURE — 97535 SELF CARE MNGMENT TRAINING: CPT

## 2018-08-09 PROCEDURE — 99233 SBSQ HOSP IP/OBS HIGH 50: CPT | Performed by: SURGERY

## 2018-08-09 PROCEDURE — 94640 AIRWAY INHALATION TREATMENT: CPT

## 2018-08-09 PROCEDURE — 80053 COMPREHEN METABOLIC PANEL: CPT

## 2018-08-09 RX ORDER — 0.9 % SODIUM CHLORIDE 0.9 %
1000 INTRAVENOUS SOLUTION INTRAVENOUS ONCE
Status: COMPLETED | OUTPATIENT
Start: 2018-08-09 | End: 2018-08-09

## 2018-08-09 RX ORDER — POLYETHYLENE GLYCOL 3350 17 G/17G
34 POWDER, FOR SOLUTION ORAL DAILY
Status: DISCONTINUED | OUTPATIENT
Start: 2018-08-09 | End: 2018-08-10 | Stop reason: HOSPADM

## 2018-08-09 RX ORDER — LANOLIN ALCOHOL/MO/W.PET/CERES
3 CREAM (GRAM) TOPICAL NIGHTLY PRN
Status: DISCONTINUED | OUTPATIENT
Start: 2018-08-09 | End: 2018-08-10 | Stop reason: HOSPADM

## 2018-08-09 RX ORDER — SENNA PLUS 8.6 MG/1
1 TABLET ORAL 2 TIMES DAILY
Status: DISCONTINUED | OUTPATIENT
Start: 2018-08-09 | End: 2018-08-10 | Stop reason: HOSPADM

## 2018-08-09 RX ORDER — 0.9 % SODIUM CHLORIDE 0.9 %
500 INTRAVENOUS SOLUTION INTRAVENOUS ONCE
Status: DISCONTINUED | OUTPATIENT
Start: 2018-08-09 | End: 2018-08-09

## 2018-08-09 RX ADMIN — ACETAMINOPHEN 650 MG: 325 TABLET, FILM COATED ORAL at 08:41

## 2018-08-09 RX ADMIN — OXYCODONE HYDROCHLORIDE 5 MG: 10 TABLET ORAL at 21:14

## 2018-08-09 RX ADMIN — POTASSIUM & SODIUM PHOSPHATES POWDER PACK 280-160-250 MG 250 MG: 280-160-250 PACK at 08:22

## 2018-08-09 RX ADMIN — Medication 10 ML: at 08:44

## 2018-08-09 RX ADMIN — ACETAMINOPHEN 650 MG: 325 TABLET, FILM COATED ORAL at 00:20

## 2018-08-09 RX ADMIN — Medication 10 ML: at 21:20

## 2018-08-09 RX ADMIN — IPRATROPIUM BROMIDE AND ALBUTEROL SULFATE 1 AMPULE: .5; 3 SOLUTION RESPIRATORY (INHALATION) at 15:52

## 2018-08-09 RX ADMIN — METHOCARBAMOL 500 MG: 500 TABLET ORAL at 12:25

## 2018-08-09 RX ADMIN — HEPARIN SODIUM 5000 UNITS: 10000 INJECTION INTRAVENOUS; SUBCUTANEOUS at 14:30

## 2018-08-09 RX ADMIN — HEPARIN SODIUM 5000 UNITS: 10000 INJECTION INTRAVENOUS; SUBCUTANEOUS at 05:50

## 2018-08-09 RX ADMIN — ACETAMINOPHEN 650 MG: 325 TABLET, FILM COATED ORAL at 12:25

## 2018-08-09 RX ADMIN — SODIUM CHLORIDE 1000 ML: 9 INJECTION, SOLUTION INTRAVENOUS at 16:39

## 2018-08-09 RX ADMIN — POLYETHYLENE GLYCOL 3350 34 G: 17 POWDER, FOR SOLUTION ORAL at 08:22

## 2018-08-09 RX ADMIN — IPRATROPIUM BROMIDE AND ALBUTEROL SULFATE 1 AMPULE: .5; 3 SOLUTION RESPIRATORY (INHALATION) at 09:12

## 2018-08-09 RX ADMIN — DOCUSATE SODIUM 200 MG: 100 CAPSULE, LIQUID FILLED ORAL at 21:15

## 2018-08-09 RX ADMIN — HEPARIN SODIUM 5000 UNITS: 10000 INJECTION INTRAVENOUS; SUBCUTANEOUS at 21:15

## 2018-08-09 RX ADMIN — TAMSULOSIN HYDROCHLORIDE 0.4 MG: 0.4 CAPSULE ORAL at 08:22

## 2018-08-09 RX ADMIN — VITAMIN D, TAB 1000IU (100/BT) 1000 UNITS: 25 TAB at 08:22

## 2018-08-09 RX ADMIN — IPRATROPIUM BROMIDE AND ALBUTEROL SULFATE 1 AMPULE: .5; 3 SOLUTION RESPIRATORY (INHALATION) at 11:20

## 2018-08-09 RX ADMIN — Medication 1000 MCG: at 08:21

## 2018-08-09 RX ADMIN — BACITRACIN ZINC: 500 OINTMENT TOPICAL at 08:21

## 2018-08-09 RX ADMIN — METHOCARBAMOL 500 MG: 500 TABLET ORAL at 21:15

## 2018-08-09 RX ADMIN — LATANOPROST 1 DROP: 50 SOLUTION OPHTHALMIC at 22:55

## 2018-08-09 RX ADMIN — ACETAMINOPHEN 650 MG: 325 TABLET, FILM COATED ORAL at 05:49

## 2018-08-09 RX ADMIN — OXYCODONE HYDROCHLORIDE 10 MG: 10 TABLET ORAL at 00:20

## 2018-08-09 RX ADMIN — SENNOSIDES 8.6 MG: 8.6 TABLET, FILM COATED ORAL at 21:15

## 2018-08-09 RX ADMIN — DOCUSATE SODIUM 200 MG: 100 CAPSULE, LIQUID FILLED ORAL at 08:22

## 2018-08-09 RX ADMIN — LEVOTHYROXINE SODIUM 25 MCG: 25 TABLET ORAL at 05:49

## 2018-08-09 RX ADMIN — IPRATROPIUM BROMIDE AND ALBUTEROL SULFATE 1 AMPULE: .5; 3 SOLUTION RESPIRATORY (INHALATION) at 22:18

## 2018-08-09 RX ADMIN — CITALOPRAM 40 MG: 20 TABLET, FILM COATED ORAL at 08:22

## 2018-08-09 RX ADMIN — SIMVASTATIN 20 MG: 20 TABLET, FILM COATED ORAL at 21:15

## 2018-08-09 RX ADMIN — METHOCARBAMOL 500 MG: 500 TABLET ORAL at 08:22

## 2018-08-09 RX ADMIN — ACETAMINOPHEN 650 MG: 325 TABLET, FILM COATED ORAL at 18:03

## 2018-08-09 RX ADMIN — OXYCODONE HYDROCHLORIDE 10 MG: 10 TABLET ORAL at 05:48

## 2018-08-09 RX ADMIN — SENNOSIDES 8.6 MG: 8.6 TABLET, FILM COATED ORAL at 08:22

## 2018-08-09 RX ADMIN — BACITRACIN ZINC: 500 OINTMENT TOPICAL at 21:15

## 2018-08-09 RX ADMIN — PANTOPRAZOLE SODIUM 40 MG: 40 TABLET, DELAYED RELEASE ORAL at 05:48

## 2018-08-09 ASSESSMENT — PAIN SCALES - GENERAL
PAINLEVEL_OUTOF10: 9
PAINLEVEL_OUTOF10: 0
PAINLEVEL_OUTOF10: 10
PAINLEVEL_OUTOF10: 5
PAINLEVEL_OUTOF10: 0

## 2018-08-09 ASSESSMENT — PAIN DESCRIPTION - LOCATION
LOCATION: BACK;RIB CAGE
LOCATION: BACK;RIB CAGE
LOCATION: BACK;RIB CAGE;SHOULDER

## 2018-08-09 ASSESSMENT — PAIN DESCRIPTION - PAIN TYPE
TYPE: ACUTE PAIN;SURGICAL PAIN
TYPE: ACUTE PAIN;SURGICAL PAIN
TYPE: ACUTE PAIN

## 2018-08-09 ASSESSMENT — PAIN DESCRIPTION - FREQUENCY
FREQUENCY: CONTINUOUS

## 2018-08-09 ASSESSMENT — PAIN DESCRIPTION - ONSET
ONSET: ON-GOING

## 2018-08-09 ASSESSMENT — PAIN DESCRIPTION - ORIENTATION
ORIENTATION: LEFT;MID
ORIENTATION: LEFT
ORIENTATION: LEFT

## 2018-08-09 ASSESSMENT — PAIN DESCRIPTION - PROGRESSION
CLINICAL_PROGRESSION: NOT CHANGED

## 2018-08-09 ASSESSMENT — PAIN DESCRIPTION - DESCRIPTORS
DESCRIPTORS: ACHING;CONSTANT;DISCOMFORT
DESCRIPTORS: ACHING;CONSTANT;DISCOMFORT
DESCRIPTORS: ACHING;DISCOMFORT
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
## 2018-08-09 NOTE — PLAN OF CARE
Problem: Falls - Risk of:  Goal: Will remain free from falls  Will remain free from falls   Outcome: Met This Shift    Goal: Absence of physical injury  Absence of physical injury   Outcome: Met This Shift      Problem: Risk for Impaired Skin Integrity  Goal: Tissue integrity - skin and mucous membranes  Structural intactness and normal physiological function of skin and  mucous membranes.    Outcome: Met This Shift      Problem: Pain:  Goal: Pain level will decrease  Pain level will decrease   Outcome: Met This Shift    Goal: Control of acute pain  Control of acute pain   Outcome: Not Met This Shift      Problem: Breathing Pattern - Ineffective:  Goal: Ability to achieve and maintain a regular respiratory rate will improve  Ability to achieve and maintain a regular respiratory rate will improve   Outcome: Not Met This Shift      Problem: Skin Integrity:  Goal: Will show no infection signs and symptoms  Will show no infection signs and symptoms   Outcome: Met This Shift    Goal: Absence of new skin breakdown  Absence of new skin breakdown   Outcome: Met This Shift

## 2018-08-09 NOTE — PROGRESS NOTES
proph: Heparin  GI proph: Protonix  Glucose protocol: MD BRITO  Mouth/eye care: None  Ancillary consults: Orthopedics  Family Update: As available  CODE Status: FC    Dispo: Potential transfer to [facility] floor     Electronically signed by Edson Yoo MD on 8/9/2018 at 6:12 AM

## 2018-08-09 NOTE — CARE COORDINATION
Met with wife and son in pt's room. Pt will likely need rehab prior to returning home. For acute rehab they would like [facility], for RASHAD they would like [facility] in [place] as 1 st choice , [facility] on [address] As 2nd choice.

## 2018-08-09 NOTE — CARE COORDINATION
SOCIAL WORK AND DISCHARGE PLANNING: the cm , talib, from [facility], spoke with wife and she wants [facility] for aru and [facility]lyn hdez and [facility] of the [facility] for wilner. I made a referral to all of them. Pt still in sicu. precert is not needed. n17 is in [facility].  Adolfo Pop  8/9/2018

## 2018-08-09 NOTE — PROGRESS NOTES
Left jolynn drain removed at beside after maximal inspiration with breath held. No drainage. No evidence of infection. No audible air leak. No complications. Pt stable without any changes in vital signs. Xerform over gauze dressing placed.     Rosales Cardoso MD  8/9/2018  2:47 PM

## 2018-08-10 ENCOUNTER — APPOINTMENT (OUTPATIENT)
Dept: GENERAL RADIOLOGY | Age: 78
DRG: 163 | End: 2018-08-10
Payer: MEDICARE

## 2018-08-10 VITALS
OXYGEN SATURATION: 93 % | DIASTOLIC BLOOD PRESSURE: 59 MMHG | SYSTOLIC BLOOD PRESSURE: 126 MMHG | TEMPERATURE: 98.2 F | RESPIRATION RATE: 21 BRPM | HEIGHT: 68 IN | HEART RATE: 95 BPM | BODY MASS INDEX: 24.02 KG/M2 | WEIGHT: 158.5 LBS

## 2018-08-10 LAB
ALBUMIN SERPL-MCNC: 2.9 G/DL (ref 3.5–5.2)
ALP BLD-CCNC: 28 U/L (ref 40–129)
ALT SERPL-CCNC: 12 U/L (ref 0–40)
ANION GAP SERPL CALCULATED.3IONS-SCNC: 13 MMOL/L (ref 7–16)
AST SERPL-CCNC: 21 U/L (ref 0–39)
BASOPHILS ABSOLUTE: 0.04 E9/L (ref 0–0.2)
BASOPHILS RELATIVE PERCENT: 0.6 % (ref 0–2)
BILIRUB SERPL-MCNC: 0.6 MG/DL (ref 0–1.2)
BUN BLDV-MCNC: 31 MG/DL (ref 8–23)
CALCIUM SERPL-MCNC: 9 MG/DL (ref 8.6–10.2)
CHLORIDE BLD-SCNC: 99 MMOL/L (ref 98–107)
CO2: 27 MMOL/L (ref 22–29)
CREAT SERPL-MCNC: 1 MG/DL (ref 0.7–1.2)
EOSINOPHILS ABSOLUTE: 0.38 E9/L (ref 0.05–0.5)
EOSINOPHILS RELATIVE PERCENT: 5.7 % (ref 0–6)
GFR AFRICAN AMERICAN: >60
GFR NON-AFRICAN AMERICAN: >60 ML/MIN/1.73
GLUCOSE BLD-MCNC: 138 MG/DL (ref 74–109)
HCT VFR BLD CALC: 23.8 % (ref 37–54)
HEMOGLOBIN: 8 G/DL (ref 12.5–16.5)
IMMATURE GRANULOCYTES #: 0.26 E9/L
IMMATURE GRANULOCYTES %: 3.9 % (ref 0–5)
LYMPHOCYTES ABSOLUTE: 1.17 E9/L (ref 1.5–4)
LYMPHOCYTES RELATIVE PERCENT: 17.5 % (ref 20–42)
MAGNESIUM: 1.7 MG/DL (ref 1.6–2.6)
MCH RBC QN AUTO: 34.2 PG (ref 26–35)
MCHC RBC AUTO-ENTMCNC: 33.6 % (ref 32–34.5)
MCV RBC AUTO: 101.7 FL (ref 80–99.9)
MONOCYTES ABSOLUTE: 0.84 E9/L (ref 0.1–0.95)
MONOCYTES RELATIVE PERCENT: 12.6 % (ref 2–12)
NEUTROPHILS ABSOLUTE: 3.99 E9/L (ref 1.8–7.3)
NEUTROPHILS RELATIVE PERCENT: 59.7 % (ref 43–80)
PDW BLD-RTO: 15.7 FL (ref 11.5–15)
PHOSPHORUS: 3.9 MG/DL (ref 2.5–4.5)
PLATELET # BLD: 165 E9/L (ref 130–450)
PMV BLD AUTO: 10.4 FL (ref 7–12)
POTASSIUM REFLEX MAGNESIUM: 4.5 MMOL/L (ref 3.5–5)
RBC # BLD: 2.34 E12/L (ref 3.8–5.8)
SODIUM BLD-SCNC: 139 MMOL/L (ref 132–146)
TOTAL PROTEIN: 5.5 G/DL (ref 6.4–8.3)
WBC # BLD: 6.7 E9/L (ref 4.5–11.5)

## 2018-08-10 PROCEDURE — 94640 AIRWAY INHALATION TREATMENT: CPT

## 2018-08-10 PROCEDURE — 97535 SELF CARE MNGMENT TRAINING: CPT

## 2018-08-10 PROCEDURE — 84100 ASSAY OF PHOSPHORUS: CPT

## 2018-08-10 PROCEDURE — 6370000000 HC RX 637 (ALT 250 FOR IP): Performed by: STUDENT IN AN ORGANIZED HEALTH CARE EDUCATION/TRAINING PROGRAM

## 2018-08-10 PROCEDURE — 2700000000 HC OXYGEN THERAPY PER DAY

## 2018-08-10 PROCEDURE — 83735 ASSAY OF MAGNESIUM: CPT

## 2018-08-10 PROCEDURE — 99024 POSTOP FOLLOW-UP VISIT: CPT | Performed by: SURGERY

## 2018-08-10 PROCEDURE — 6360000002 HC RX W HCPCS: Performed by: STUDENT IN AN ORGANIZED HEALTH CARE EDUCATION/TRAINING PROGRAM

## 2018-08-10 PROCEDURE — 71045 X-RAY EXAM CHEST 1 VIEW: CPT

## 2018-08-10 PROCEDURE — 36415 COLL VENOUS BLD VENIPUNCTURE: CPT

## 2018-08-10 PROCEDURE — 2580000003 HC RX 258: Performed by: STUDENT IN AN ORGANIZED HEALTH CARE EDUCATION/TRAINING PROGRAM

## 2018-08-10 PROCEDURE — 85025 COMPLETE CBC W/AUTO DIFF WBC: CPT

## 2018-08-10 PROCEDURE — 97112 NEUROMUSCULAR REEDUCATION: CPT

## 2018-08-10 PROCEDURE — 6370000000 HC RX 637 (ALT 250 FOR IP): Performed by: SURGERY

## 2018-08-10 PROCEDURE — 80053 COMPREHEN METABOLIC PANEL: CPT

## 2018-08-10 RX ORDER — SENNA PLUS 8.6 MG/1
1 TABLET ORAL 2 TIMES DAILY
Qty: 60 TABLET | Refills: 0 | DISCHARGE
Start: 2018-08-10 | End: 2018-08-30 | Stop reason: ALTCHOICE

## 2018-08-10 RX ORDER — BISACODYL 10 MG
10 SUPPOSITORY, RECTAL RECTAL DAILY
Qty: 30 SUPPOSITORY | Refills: 0 | DISCHARGE
Start: 2018-08-10 | End: 2018-08-30 | Stop reason: ALTCHOICE

## 2018-08-10 RX ORDER — ACETAMINOPHEN 325 MG/1
650 TABLET ORAL EVERY 4 HOURS
Qty: 120 TABLET | Refills: 3 | Status: ON HOLD | DISCHARGE
Start: 2018-08-10 | End: 2018-10-24 | Stop reason: HOSPADM

## 2018-08-10 RX ORDER — OXYCODONE HYDROCHLORIDE 10 MG/1
10 TABLET ORAL EVERY 6 HOURS PRN
Qty: 35 TABLET | Refills: 0 | Status: SHIPPED | OUTPATIENT
Start: 2018-08-10 | End: 2018-08-17

## 2018-08-10 RX ORDER — TAMSULOSIN HYDROCHLORIDE 0.4 MG/1
0.4 CAPSULE ORAL DAILY
Qty: 30 CAPSULE | Refills: 3 | DISCHARGE
Start: 2018-08-11 | End: 2018-08-30 | Stop reason: ALTCHOICE

## 2018-08-10 RX ORDER — PSEUDOEPHEDRINE HCL 30 MG
200 TABLET ORAL 2 TIMES DAILY
DISCHARGE
Start: 2018-08-10 | End: 2018-08-30 | Stop reason: ALTCHOICE

## 2018-08-10 RX ORDER — LIDOCAINE 50 MG/G
1 PATCH TOPICAL DAILY
Qty: 30 PATCH | Refills: 0 | DISCHARGE
Start: 2018-08-11 | End: 2018-08-30 | Stop reason: ALTCHOICE

## 2018-08-10 RX ORDER — BISACODYL 10 MG
10 SUPPOSITORY, RECTAL RECTAL DAILY
Status: DISCONTINUED | OUTPATIENT
Start: 2018-08-10 | End: 2018-08-10 | Stop reason: HOSPADM

## 2018-08-10 RX ORDER — METHOCARBAMOL 500 MG/1
500 TABLET, FILM COATED ORAL 4 TIMES DAILY
Qty: 40 TABLET | Refills: 0 | DISCHARGE
Start: 2018-08-10 | End: 2018-08-20

## 2018-08-10 RX ORDER — LANOLIN ALCOHOL/MO/W.PET/CERES
3 CREAM (GRAM) TOPICAL NIGHTLY PRN
Refills: 3 | DISCHARGE
Start: 2018-08-10 | End: 2018-10-17

## 2018-08-10 RX ORDER — IPRATROPIUM BROMIDE AND ALBUTEROL SULFATE 2.5; .5 MG/3ML; MG/3ML
3 SOLUTION RESPIRATORY (INHALATION)
Qty: 360 ML | DISCHARGE
Start: 2018-08-10 | End: 2018-08-30 | Stop reason: ALTCHOICE

## 2018-08-10 RX ORDER — POLYETHYLENE GLYCOL 3350 17 G/17G
34 POWDER, FOR SOLUTION ORAL DAILY
Qty: 527 G | Refills: 1 | DISCHARGE
Start: 2018-08-11 | End: 2018-08-30 | Stop reason: ALTCHOICE

## 2018-08-10 RX ADMIN — DOCUSATE SODIUM 200 MG: 100 CAPSULE, LIQUID FILLED ORAL at 09:56

## 2018-08-10 RX ADMIN — ACETAMINOPHEN 650 MG: 325 TABLET, FILM COATED ORAL at 09:56

## 2018-08-10 RX ADMIN — HEPARIN SODIUM 5000 UNITS: 10000 INJECTION INTRAVENOUS; SUBCUTANEOUS at 14:10

## 2018-08-10 RX ADMIN — POLYETHYLENE GLYCOL 3350 34 G: 17 POWDER, FOR SOLUTION ORAL at 09:55

## 2018-08-10 RX ADMIN — Medication 10 ML: at 11:28

## 2018-08-10 RX ADMIN — VITAMIN D, TAB 1000IU (100/BT) 1000 UNITS: 25 TAB at 11:27

## 2018-08-10 RX ADMIN — HEPARIN SODIUM 5000 UNITS: 10000 INJECTION INTRAVENOUS; SUBCUTANEOUS at 05:40

## 2018-08-10 RX ADMIN — BACITRACIN ZINC: 500 OINTMENT TOPICAL at 09:55

## 2018-08-10 RX ADMIN — SENNOSIDES 8.6 MG: 8.6 TABLET, FILM COATED ORAL at 09:56

## 2018-08-10 RX ADMIN — Medication 1000 MCG: at 11:27

## 2018-08-10 RX ADMIN — CITALOPRAM 40 MG: 20 TABLET, FILM COATED ORAL at 11:27

## 2018-08-10 RX ADMIN — OXYCODONE HYDROCHLORIDE 10 MG: 10 TABLET ORAL at 05:40

## 2018-08-10 RX ADMIN — PANTOPRAZOLE SODIUM 40 MG: 40 TABLET, DELAYED RELEASE ORAL at 05:58

## 2018-08-10 RX ADMIN — IPRATROPIUM BROMIDE AND ALBUTEROL SULFATE 1 AMPULE: .5; 3 SOLUTION RESPIRATORY (INHALATION) at 13:40

## 2018-08-10 RX ADMIN — IPRATROPIUM BROMIDE AND ALBUTEROL SULFATE 1 AMPULE: .5; 3 SOLUTION RESPIRATORY (INHALATION) at 10:25

## 2018-08-10 RX ADMIN — TAMSULOSIN HYDROCHLORIDE 0.4 MG: 0.4 CAPSULE ORAL at 11:27

## 2018-08-10 RX ADMIN — IPRATROPIUM BROMIDE AND ALBUTEROL SULFATE 1 AMPULE: .5; 3 SOLUTION RESPIRATORY (INHALATION) at 16:08

## 2018-08-10 RX ADMIN — LEVOTHYROXINE SODIUM 25 MCG: 25 TABLET ORAL at 05:59

## 2018-08-10 RX ADMIN — METHOCARBAMOL 500 MG: 500 TABLET ORAL at 11:28

## 2018-08-10 ASSESSMENT — PAIN SCALES - GENERAL
PAINLEVEL_OUTOF10: 7
PAINLEVEL_OUTOF10: 2
PAINLEVEL_OUTOF10: 0
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
## 2018-08-10 NOTE — PROGRESS NOTES
(5) Increase functional transfers/bathroom mobility to Min A using AD/DME as needed for balance/energy conservation & G safety  GOAL (6) Increase L UE ROM/strength/coordination skills to [facility] for ADLS within ortho precautions    Functional Assessment:    Initial Status 8/8 Current Status 8/10   Feeding  Min A  Setup; Pt able to complete self feeding while sitting up in the chair with setup. Grooming  Min A SBA; Min verbal prompting to wash face and brush dentures while sitting up in the chair with setup. Upper Body Dressing Mod A  Mod A; To salbador/doff gown with increased time and verbal prompting for proper completion due to L scapular fx.      Lower Body Dressing Max A  Max A; To salbador/doff socks while in supine. Bathing Max A  N/T     Toileting  Dep Dep;    Per last treatment.      Bed Mobility  Rolling: Mod A  Supine to Sit: Mod A  Sit to Supine: Mod A  Sit/Scoot to HOB: Min A with min cues to maintain precautions  Sup <> Sit: Mod A  Rolling: Mod A;    Pt requires Mod A to log roll to Right side and Mod vebal to perform transfer from supine to sitting position. Pt sat up on the EOB for 2 minutes to increase tolerance and balance for maximize safety. Functional Transfers NT; awaiting boot   Sit <> Stand: Mod A;    Mod A with Mod verbal prompting to increase knowledge of proper WB status to transfer from sit to standing position with body assist.     Functional Mobility NT  SPT: Mod A;    Mod A with Mod verbal prompting for proper hand placement to pivot from EOB to chair.          Sit balance: Sup; supported, SBA; unsupported. Stand balance: Mod A with body assist  Endurance/Activity tolerance: fair+ with light to moderate activity; pt left sitting up in the chair. Comments: Upon arrival pt in supine with HOB elevated. At end of session pt left in chair with pillow to support RUE, all lines/ tubes intact, tray table in front of him, and call light within reach.      · Pt has made
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
## 2018-08-29 ENCOUNTER — TELEPHONE (OUTPATIENT)
Dept: SURGERY | Age: 78
End: 2018-08-29

## 2018-08-30 ENCOUNTER — OFFICE VISIT (OUTPATIENT)
Dept: SURGERY | Age: 78
End: 2018-08-30

## 2018-08-30 VITALS
RESPIRATION RATE: 16 BRPM | BODY MASS INDEX: 23.54 KG/M2 | WEIGHT: 150 LBS | DIASTOLIC BLOOD PRESSURE: 62 MMHG | HEART RATE: 90 BPM | TEMPERATURE: 98.3 F | SYSTOLIC BLOOD PRESSURE: 124 MMHG | OXYGEN SATURATION: 97 % | HEIGHT: 67 IN

## 2018-08-30 DIAGNOSIS — J94.2 HEMOTHORAX ON LEFT: ICD-10-CM

## 2018-08-30 DIAGNOSIS — S06.0X1A CONCUSSION WITH LOSS OF CONSCIOUSNESS OF 30 MINUTES OR LESS, INITIAL ENCOUNTER: Primary | ICD-10-CM

## 2018-08-30 DIAGNOSIS — S27.809A INJURY OF DIAPHRAGM, INITIAL ENCOUNTER: ICD-10-CM

## 2018-08-30 DIAGNOSIS — S22.42XA CLOSED FRACTURE OF MULTIPLE RIBS OF LEFT SIDE, INITIAL ENCOUNTER: ICD-10-CM

## 2018-08-30 PROCEDURE — 99024 POSTOP FOLLOW-UP VISIT: CPT | Performed by: SURGERY
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
## 2018-09-20 ENCOUNTER — OFFICE VISIT (OUTPATIENT)
Dept: SURGERY | Age: 78
End: 2018-09-20
Payer: MEDICARE

## 2018-09-20 VITALS
TEMPERATURE: 98.1 F | BODY MASS INDEX: 23.96 KG/M2 | SYSTOLIC BLOOD PRESSURE: 138 MMHG | DIASTOLIC BLOOD PRESSURE: 76 MMHG | WEIGHT: 153 LBS | RESPIRATION RATE: 16 BRPM | OXYGEN SATURATION: 96 % | HEART RATE: 85 BPM

## 2018-09-20 DIAGNOSIS — H91.92 HEARING LOSS OF LEFT EAR, UNSPECIFIED HEARING LOSS TYPE: Primary | ICD-10-CM

## 2018-09-20 PROCEDURE — 1123F ACP DISCUSS/DSCN MKR DOCD: CPT | Performed by: SURGERY

## 2018-09-20 PROCEDURE — 99213 OFFICE O/P EST LOW 20 MIN: CPT | Performed by: SURGERY

## 2018-09-20 PROCEDURE — 4040F PNEUMOC VAC/ADMIN/RCVD: CPT | Performed by: SURGERY

## 2018-09-20 PROCEDURE — 1101F PT FALLS ASSESS-DOCD LE1/YR: CPT | Performed by: SURGERY

## 2018-09-20 PROCEDURE — G8420 CALC BMI NORM PARAMETERS: HCPCS | Performed by: SURGERY

## 2018-09-20 PROCEDURE — G8427 DOCREV CUR MEDS BY ELIG CLIN: HCPCS | Performed by: SURGERY

## 2018-09-20 PROCEDURE — 4004F PT TOBACCO SCREEN RCVD TLK: CPT | Performed by: SURGERY
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
## 2018-10-02 ENCOUNTER — HOSPITAL ENCOUNTER (OUTPATIENT)
Age: 78
Discharge: HOME | End: 2018-10-04
Payer: MEDICARE

## 2018-10-02 ENCOUNTER — HOSPITAL ENCOUNTER (OUTPATIENT)
Dept: GENERAL RADIOLOGY | Age: 78
Discharge: HOME | End: 2018-10-04
Payer: MEDICARE

## 2018-10-02 ENCOUNTER — HOSPITAL ENCOUNTER (OUTPATIENT)
Dept: GENERAL RADIOLOGY | Age: 78
End: 2018-10-02
Payer: MEDICARE

## 2018-10-02 DIAGNOSIS — S22.49XS CLOSED FRACTURE OF MULTIPLE RIBS, UNSPECIFIED LATERALITY, SEQUELA: ICD-10-CM

## 2018-10-02 PROCEDURE — 71111 X-RAY EXAM RIBS/CHEST4/> VWS: CPT

## 2018-10-15 RX ORDER — SODIUM CHLORIDE 9 MG/ML
INJECTION, SOLUTION INTRAVENOUS CONTINUOUS
Status: CANCELLED | OUTPATIENT
Start: 2018-10-23

## 2018-10-17 ENCOUNTER — HOSPITAL ENCOUNTER (OUTPATIENT)
Dept: PREADMISSION TESTING | Age: 78
Discharge: HOME | End: 2018-10-17
Payer: MEDICARE

## 2018-10-17 VITALS
RESPIRATION RATE: 20 BRPM | HEART RATE: 89 BPM | WEIGHT: 152.56 LBS | HEIGHT: 67 IN | TEMPERATURE: 98.1 F | DIASTOLIC BLOOD PRESSURE: 82 MMHG | BODY MASS INDEX: 23.94 KG/M2 | OXYGEN SATURATION: 96 % | SYSTOLIC BLOOD PRESSURE: 146 MMHG

## 2018-10-17 DIAGNOSIS — S42.022A DISPLACED FRACTURE OF SHAFT OF LEFT CLAVICLE, INITIAL ENCOUNTER FOR CLOSED FRACTURE: Primary | ICD-10-CM

## 2018-10-17 LAB
ANION GAP SERPL CALCULATED.3IONS-SCNC: 15 MMOL/L (ref 7–16)
BUN BLDV-MCNC: 16 MG/DL (ref 8–23)
CALCIUM SERPL-MCNC: 9.7 MG/DL (ref 8.6–10.2)
CHLORIDE BLD-SCNC: 99 MMOL/L (ref 98–107)
CO2: 24 MMOL/L (ref 22–29)
CREAT SERPL-MCNC: 0.9 MG/DL (ref 0.7–1.2)
EKG ATRIAL RATE: 84 BPM
EKG P AXIS: 59 DEGREES
EKG P-R INTERVAL: 178 MS
EKG Q-T INTERVAL: 380 MS
EKG QRS DURATION: 88 MS
EKG QTC CALCULATION (BAZETT): 449 MS
EKG R AXIS: 15 DEGREES
EKG T AXIS: 86 DEGREES
EKG VENTRICULAR RATE: 84 BPM
GFR AFRICAN AMERICAN: >60
GFR NON-AFRICAN AMERICAN: >60 ML/MIN/1.73
GLUCOSE BLD-MCNC: 78 MG/DL (ref 74–109)
HCT VFR BLD CALC: 36.5 % (ref 37–54)
HEMOGLOBIN: 12.2 G/DL (ref 12.5–16.5)
MCH RBC QN AUTO: 33.6 PG (ref 26–35)
MCHC RBC AUTO-ENTMCNC: 33.4 % (ref 32–34.5)
MCV RBC AUTO: 100.6 FL (ref 80–99.9)
PDW BLD-RTO: 13.4 FL (ref 11.5–15)
PLATELET # BLD: 196 E9/L (ref 130–450)
PMV BLD AUTO: 10.3 FL (ref 7–12)
POTASSIUM REFLEX MAGNESIUM: 4.5 MMOL/L (ref 3.5–5)
RBC # BLD: 3.63 E12/L (ref 3.8–5.8)
SODIUM BLD-SCNC: 138 MMOL/L (ref 132–146)
WBC # BLD: 7.4 E9/L (ref 4.5–11.5)

## 2018-10-17 PROCEDURE — 36415 COLL VENOUS BLD VENIPUNCTURE: CPT

## 2018-10-17 PROCEDURE — 93005 ELECTROCARDIOGRAM TRACING: CPT | Performed by: ANESTHESIOLOGY

## 2018-10-17 PROCEDURE — 85027 COMPLETE CBC AUTOMATED: CPT

## 2018-10-17 PROCEDURE — 80048 BASIC METABOLIC PNL TOTAL CA: CPT

## 2018-10-17 RX ORDER — MECLIZINE HYDROCHLORIDE 25 MG/1
25 TABLET ORAL 3 TIMES DAILY PRN
COMMUNITY
End: 2021-10-28 | Stop reason: SDUPTHER

## 2018-10-17 ASSESSMENT — PAIN DESCRIPTION - ONSET: ONSET: ON-GOING

## 2018-10-17 ASSESSMENT — PAIN DESCRIPTION - LOCATION: LOCATION: SHOULDER;RIB CAGE

## 2018-10-17 ASSESSMENT — PAIN DESCRIPTION - PAIN TYPE: TYPE: ACUTE PAIN

## 2018-10-17 ASSESSMENT — PAIN DESCRIPTION - DESCRIPTORS: DESCRIPTORS: STABBING;SHARP

## 2018-10-17 ASSESSMENT — PAIN DESCRIPTION - ORIENTATION: ORIENTATION: LEFT

## 2018-10-17 ASSESSMENT — PAIN SCALES - GENERAL: PAINLEVEL_OUTOF10: 8

## 2018-10-22 ENCOUNTER — OFFICE VISIT (OUTPATIENT)
Dept: ENT CLINIC | Age: 78
End: 2018-10-22
Payer: MEDICARE

## 2018-10-22 ENCOUNTER — PROCEDURE VISIT (OUTPATIENT)
Dept: AUDIOLOGY | Age: 78
End: 2018-10-22
Payer: MEDICARE

## 2018-10-22 VITALS
WEIGHT: 157.5 LBS | SYSTOLIC BLOOD PRESSURE: 132 MMHG | HEART RATE: 76 BPM | DIASTOLIC BLOOD PRESSURE: 82 MMHG | BODY MASS INDEX: 24.67 KG/M2

## 2018-10-22 DIAGNOSIS — H90.3 SENSORINEURAL HEARING LOSS (SNHL) OF BOTH EARS: ICD-10-CM

## 2018-10-22 DIAGNOSIS — H69.83 DYSFUNCTION OF BOTH EUSTACHIAN TUBES: Primary | ICD-10-CM

## 2018-10-22 DIAGNOSIS — H90.A32 MIXED CONDUCTIVE AND SENSORINEURAL HEARING LOSS OF LEFT EAR WITH RESTRICTED HEARING OF RIGHT EAR: ICD-10-CM

## 2018-10-22 DIAGNOSIS — H90.A21 SENSORINEURAL HEARING LOSS (SNHL) OF RIGHT EAR WITH RESTRICTED HEARING OF LEFT EAR: ICD-10-CM

## 2018-10-22 PROCEDURE — 99213 OFFICE O/P EST LOW 20 MIN: CPT | Performed by: OTOLARYNGOLOGY

## 2018-10-22 PROCEDURE — 1101F PT FALLS ASSESS-DOCD LE1/YR: CPT | Performed by: OTOLARYNGOLOGY

## 2018-10-22 PROCEDURE — 4004F PT TOBACCO SCREEN RCVD TLK: CPT | Performed by: OTOLARYNGOLOGY

## 2018-10-22 PROCEDURE — 92557 COMPREHENSIVE HEARING TEST: CPT | Performed by: AUDIOLOGIST

## 2018-10-22 PROCEDURE — G8428 CUR MEDS NOT DOCUMENT: HCPCS | Performed by: OTOLARYNGOLOGY

## 2018-10-22 PROCEDURE — 1123F ACP DISCUSS/DSCN MKR DOCD: CPT | Performed by: OTOLARYNGOLOGY

## 2018-10-22 PROCEDURE — 4040F PNEUMOC VAC/ADMIN/RCVD: CPT | Performed by: OTOLARYNGOLOGY

## 2018-10-22 PROCEDURE — G8484 FLU IMMUNIZE NO ADMIN: HCPCS | Performed by: OTOLARYNGOLOGY

## 2018-10-22 PROCEDURE — G8420 CALC BMI NORM PARAMETERS: HCPCS | Performed by: OTOLARYNGOLOGY

## 2018-10-22 PROCEDURE — 92567 TYMPANOMETRY: CPT | Performed by: AUDIOLOGIST

## 2018-10-22 RX ORDER — FERROUS SULFATE 325(65) MG
325 TABLET ORAL
COMMUNITY
End: 2021-10-28

## 2018-10-22 RX ORDER — FLUTICASONE PROPIONATE 50 MCG
2 SPRAY, SUSPENSION (ML) NASAL DAILY
Qty: 1 BOTTLE | Refills: 1 | Status: SHIPPED | OUTPATIENT
Start: 2018-10-22 | End: 2019-08-27

## 2018-10-22 ASSESSMENT — ENCOUNTER SYMPTOMS
NAUSEA: 0
EYES NEGATIVE: 1
COUGH: 0
GASTROINTESTINAL NEGATIVE: 1
VOMITING: 0
RHINORRHEA: 0
RESPIRATORY NEGATIVE: 1
SHORTNESS OF BREATH: 0

## 2018-10-22 NOTE — PROGRESS NOTES
normal. Thought content normal.   Vitals reviewed. Hearing Eval            DIAGNOSIS:    ICD-10-CM    1. Dysfunction of both eustachian tubes H69.83 fluticasone (FLONASE) 50 MCG/ACT nasal spray   2. Mixed conductive and sensorineural hearing loss of left ear with restricted hearing of right ear H90. A32 fluticasone (FLONASE) 50 MCG/ACT nasal spray   3. Sensorineural hearing loss (SNHL) of right ear with restricted hearing of left ear H90. A21      IMPRESSION/PLAN:  Start flonase daily to help with left ear pressure. Would recommend hearing aids- patient to discuss with audiology. Recommend hearing protection with all loud noise exposure. The plan was explained and patient is without any further questions. Follow up prn, or if any new or worsening symptoms call the office to be seen sooner or report to the emergency department. If any new or worsening symptoms call the office to be seen sooner, or report to the emergency department, if needed. This note was done using voice recognition software. There may be accidental errors in grammar or spelling. DOUG [address] Dr., D.O., Ms..  Otolaryngology Facial Plastic Surgery  : [facility] Otolaryngology/Facial Plastic Surgery Residency    Associate Clinical Professor: WILLSI Chauhan  [facility]

## 2018-10-22 NOTE — PATIENT INSTRUCTIONS
If any new or worsening symptoms call the office to be seen sooner, or report to the emergency department, if needed.      Hearing protection with all loud noises

## 2018-10-23 ENCOUNTER — ANESTHESIA EVENT (OUTPATIENT)
Dept: OPERATING ROOM | Age: 78
End: 2018-10-23
Payer: MEDICARE

## 2018-10-23 ENCOUNTER — ANESTHESIA (OUTPATIENT)
Dept: OPERATING ROOM | Age: 78
End: 2018-10-23
Payer: MEDICARE

## 2018-10-23 ENCOUNTER — HOSPITAL ENCOUNTER (OUTPATIENT)
Dept: GENERAL RADIOLOGY | Age: 78
Discharge: HOME | End: 2018-10-25
Payer: MEDICARE

## 2018-10-23 ENCOUNTER — APPOINTMENT (OUTPATIENT)
Dept: GENERAL RADIOLOGY | Age: 78
End: 2018-10-23
Attending: ORTHOPAEDIC SURGERY
Payer: MEDICARE

## 2018-10-23 ENCOUNTER — HOSPITAL ENCOUNTER (OUTPATIENT)
Age: 78
Discharge: HOME | End: 2018-10-24
Attending: ORTHOPAEDIC SURGERY | Admitting: ORTHOPAEDIC SURGERY
Payer: MEDICARE

## 2018-10-23 VITALS
RESPIRATION RATE: 1 BRPM | DIASTOLIC BLOOD PRESSURE: 89 MMHG | SYSTOLIC BLOOD PRESSURE: 158 MMHG | OXYGEN SATURATION: 93 %

## 2018-10-23 DIAGNOSIS — S42.002A CLOSED NONDISPLACED FRACTURE OF LEFT CLAVICLE, UNSPECIFIED PART OF CLAVICLE, INITIAL ENCOUNTER: ICD-10-CM

## 2018-10-23 DIAGNOSIS — Z98.890 HISTORY OF SURGERY: Primary | ICD-10-CM

## 2018-10-23 LAB
METER GLUCOSE: 85 MG/DL (ref 70–110)
METER GLUCOSE: 90 MG/DL (ref 70–110)

## 2018-10-23 PROCEDURE — 3600000014 HC SURGERY LEVEL 4 ADDTL 15MIN: Performed by: ORTHOPAEDIC SURGERY

## 2018-10-23 PROCEDURE — 96360 HYDRATION IV INFUSION INIT: CPT

## 2018-10-23 PROCEDURE — 6370000000 HC RX 637 (ALT 250 FOR IP): Performed by: ORTHOPAEDIC SURGERY

## 2018-10-23 PROCEDURE — 2500000003 HC RX 250 WO HCPCS: Performed by: ORTHOPAEDIC SURGERY

## 2018-10-23 PROCEDURE — 96374 THER/PROPH/DIAG INJ IV PUSH: CPT

## 2018-10-23 PROCEDURE — 2500000003 HC RX 250 WO HCPCS: Performed by: NURSE ANESTHETIST, CERTIFIED REGISTERED

## 2018-10-23 PROCEDURE — 6360000002 HC RX W HCPCS: Performed by: ORTHOPAEDIC SURGERY

## 2018-10-23 PROCEDURE — 71045 X-RAY EXAM CHEST 1 VIEW: CPT

## 2018-10-23 PROCEDURE — 6360000002 HC RX W HCPCS: Performed by: STUDENT IN AN ORGANIZED HEALTH CARE EDUCATION/TRAINING PROGRAM

## 2018-10-23 PROCEDURE — 6360000002 HC RX W HCPCS: Performed by: ANESTHESIOLOGY

## 2018-10-23 PROCEDURE — 6370000000 HC RX 637 (ALT 250 FOR IP): Performed by: STUDENT IN AN ORGANIZED HEALTH CARE EDUCATION/TRAINING PROGRAM

## 2018-10-23 PROCEDURE — 3209999900 FLUORO FOR SURGICAL PROCEDURES

## 2018-10-23 PROCEDURE — 2720000010 HC SURG SUPPLY STERILE: Performed by: ORTHOPAEDIC SURGERY

## 2018-10-23 PROCEDURE — 3700000001 HC ADD 15 MINUTES (ANESTHESIA): Performed by: ORTHOPAEDIC SURGERY

## 2018-10-23 PROCEDURE — 3700000000 HC ANESTHESIA ATTENDED CARE: Performed by: ORTHOPAEDIC SURGERY

## 2018-10-23 PROCEDURE — 6360000002 HC RX W HCPCS: Performed by: NURSE ANESTHETIST, CERTIFIED REGISTERED

## 2018-10-23 PROCEDURE — 2580000003 HC RX 258: Performed by: NURSE ANESTHETIST, CERTIFIED REGISTERED

## 2018-10-23 PROCEDURE — 2580000003 HC RX 258: Performed by: ORTHOPAEDIC SURGERY

## 2018-10-23 PROCEDURE — 82962 GLUCOSE BLOOD TEST: CPT

## 2018-10-23 PROCEDURE — 2580000003 HC RX 258: Performed by: ANESTHESIOLOGY

## 2018-10-23 PROCEDURE — 96361 HYDRATE IV INFUSION ADD-ON: CPT

## 2018-10-23 PROCEDURE — C1713 ANCHOR/SCREW BN/BN,TIS/BN: HCPCS | Performed by: ORTHOPAEDIC SURGERY

## 2018-10-23 PROCEDURE — 3600000004 HC SURGERY LEVEL 4 BASE: Performed by: ORTHOPAEDIC SURGERY

## 2018-10-23 PROCEDURE — 2709999900 HC NON-CHARGEABLE SUPPLY: Performed by: ORTHOPAEDIC SURGERY

## 2018-10-23 PROCEDURE — 7100000001 HC PACU RECOVERY - ADDTL 15 MIN: Performed by: ORTHOPAEDIC SURGERY

## 2018-10-23 PROCEDURE — 7100000000 HC PACU RECOVERY - FIRST 15 MIN: Performed by: ORTHOPAEDIC SURGERY

## 2018-10-23 DEVICE — SCREW BNE L14MM DIA3.5MM CORT S STL ST NONCANNULATED LOK: Type: IMPLANTABLE DEVICE | Site: CLAVICLE | Status: FUNCTIONAL

## 2018-10-23 DEVICE — IMPLANTABLE DEVICE: Type: IMPLANTABLE DEVICE | Site: CLAVICLE | Status: FUNCTIONAL

## 2018-10-23 DEVICE — SCREW BNE L16MM DIA3.5MM CORT S STL ST NONCANNULATED LOK: Type: IMPLANTABLE DEVICE | Status: FUNCTIONAL

## 2018-10-23 RX ORDER — ROCURONIUM BROMIDE 10 MG/ML
INJECTION, SOLUTION INTRAVENOUS PRN
Status: DISCONTINUED | OUTPATIENT
Start: 2018-10-23 | End: 2018-10-23 | Stop reason: SDUPTHER

## 2018-10-23 RX ORDER — SODIUM CHLORIDE 9 MG/ML
INJECTION, SOLUTION INTRAVENOUS CONTINUOUS
Status: DISCONTINUED | OUTPATIENT
Start: 2018-10-23 | End: 2018-10-23

## 2018-10-23 RX ORDER — FERROUS SULFATE 325(65) MG
325 TABLET ORAL
Status: DISCONTINUED | OUTPATIENT
Start: 2018-10-24 | End: 2018-10-24 | Stop reason: HOSPADM

## 2018-10-23 RX ORDER — BUPIVACAINE HYDROCHLORIDE 5 MG/ML
INJECTION, SOLUTION PERINEURAL PRN
Status: DISCONTINUED | OUTPATIENT
Start: 2018-10-23 | End: 2018-10-23 | Stop reason: HOSPADM

## 2018-10-23 RX ORDER — ONDANSETRON 2 MG/ML
INJECTION INTRAMUSCULAR; INTRAVENOUS PRN
Status: DISCONTINUED | OUTPATIENT
Start: 2018-10-23 | End: 2018-10-23 | Stop reason: SDUPTHER

## 2018-10-23 RX ORDER — MORPHINE SULFATE 2 MG/ML
2 INJECTION, SOLUTION INTRAMUSCULAR; INTRAVENOUS
Status: DISCONTINUED | OUTPATIENT
Start: 2018-10-23 | End: 2018-10-24 | Stop reason: HOSPADM

## 2018-10-23 RX ORDER — GLYCOPYRROLATE 0.2 MG/ML
INJECTION INTRAMUSCULAR; INTRAVENOUS PRN
Status: DISCONTINUED | OUTPATIENT
Start: 2018-10-23 | End: 2018-10-23 | Stop reason: SDUPTHER

## 2018-10-23 RX ORDER — FLUTICASONE PROPIONATE 50 MCG
2 SPRAY, SUSPENSION (ML) NASAL DAILY
Status: DISCONTINUED | OUTPATIENT
Start: 2018-10-23 | End: 2018-10-24 | Stop reason: HOSPADM

## 2018-10-23 RX ORDER — SIMVASTATIN 20 MG
20 TABLET ORAL NIGHTLY
Status: DISCONTINUED | OUTPATIENT
Start: 2018-10-23 | End: 2018-10-24 | Stop reason: HOSPADM

## 2018-10-23 RX ORDER — FENTANYL CITRATE 50 UG/ML
INJECTION, SOLUTION INTRAMUSCULAR; INTRAVENOUS
Status: DISPENSED
Start: 2018-10-23 | End: 2018-10-24

## 2018-10-23 RX ORDER — HYDROCODONE BITARTRATE AND ACETAMINOPHEN 5; 325 MG/1; MG/1
1 TABLET ORAL EVERY 4 HOURS PRN
Status: DISCONTINUED | OUTPATIENT
Start: 2018-10-23 | End: 2018-10-24 | Stop reason: HOSPADM

## 2018-10-23 RX ORDER — LEVOTHYROXINE SODIUM 0.03 MG/1
25 TABLET ORAL DAILY
Status: DISCONTINUED | OUTPATIENT
Start: 2018-10-23 | End: 2018-10-24 | Stop reason: HOSPADM

## 2018-10-23 RX ORDER — FENTANYL CITRATE 50 UG/ML
50 INJECTION, SOLUTION INTRAMUSCULAR; INTRAVENOUS EVERY 5 MIN PRN
Status: DISCONTINUED | OUTPATIENT
Start: 2018-10-23 | End: 2018-10-23 | Stop reason: HOSPADM

## 2018-10-23 RX ORDER — SODIUM CHLORIDE 0.9 % (FLUSH) 0.9 %
10 SYRINGE (ML) INJECTION EVERY 12 HOURS SCHEDULED
Status: DISCONTINUED | OUTPATIENT
Start: 2018-10-23 | End: 2018-10-24 | Stop reason: HOSPADM

## 2018-10-23 RX ORDER — SODIUM CHLORIDE 450 MG/100ML
INJECTION, SOLUTION INTRAVENOUS CONTINUOUS
Status: DISCONTINUED | OUTPATIENT
Start: 2018-10-23 | End: 2018-10-24 | Stop reason: HOSPADM

## 2018-10-23 RX ORDER — MORPHINE SULFATE 2 MG/ML
1 INJECTION, SOLUTION INTRAMUSCULAR; INTRAVENOUS EVERY 5 MIN PRN
Status: DISCONTINUED | OUTPATIENT
Start: 2018-10-23 | End: 2018-10-23 | Stop reason: HOSPADM

## 2018-10-23 RX ORDER — CITALOPRAM 20 MG/1
40 TABLET ORAL DAILY
Status: DISCONTINUED | OUTPATIENT
Start: 2018-10-23 | End: 2018-10-24 | Stop reason: HOSPADM

## 2018-10-23 RX ORDER — ZOLPIDEM TARTRATE 5 MG/1
5 TABLET ORAL NIGHTLY PRN
Status: DISCONTINUED | OUTPATIENT
Start: 2018-10-23 | End: 2018-10-24 | Stop reason: HOSPADM

## 2018-10-23 RX ORDER — DOCUSATE SODIUM 100 MG/1
100 CAPSULE, LIQUID FILLED ORAL 2 TIMES DAILY
Status: DISCONTINUED | OUTPATIENT
Start: 2018-10-23 | End: 2018-10-24 | Stop reason: HOSPADM

## 2018-10-23 RX ORDER — MELOXICAM 7.5 MG/1
7.5 TABLET ORAL DAILY
Status: DISCONTINUED | OUTPATIENT
Start: 2018-10-23 | End: 2018-10-23 | Stop reason: SDUPTHER

## 2018-10-23 RX ORDER — SODIUM CHLORIDE 0.9 % (FLUSH) 0.9 %
10 SYRINGE (ML) INJECTION PRN
Status: DISCONTINUED | OUTPATIENT
Start: 2018-10-23 | End: 2018-10-24 | Stop reason: HOSPADM

## 2018-10-23 RX ORDER — EPHEDRINE SULFATE 50 MG/ML
INJECTION INTRAVENOUS PRN
Status: DISCONTINUED | OUTPATIENT
Start: 2018-10-23 | End: 2018-10-23 | Stop reason: SDUPTHER

## 2018-10-23 RX ORDER — PROPOFOL 10 MG/ML
INJECTION, EMULSION INTRAVENOUS PRN
Status: DISCONTINUED | OUTPATIENT
Start: 2018-10-23 | End: 2018-10-23 | Stop reason: SDUPTHER

## 2018-10-23 RX ORDER — ALBUTEROL SULFATE 2.5 MG/3ML
2.5 SOLUTION RESPIRATORY (INHALATION) EVERY 6 HOURS PRN
Status: DISCONTINUED | OUTPATIENT
Start: 2018-10-23 | End: 2018-10-24 | Stop reason: HOSPADM

## 2018-10-23 RX ORDER — SODIUM CHLORIDE 9 MG/ML
INJECTION, SOLUTION INTRAVENOUS CONTINUOUS PRN
Status: DISCONTINUED | OUTPATIENT
Start: 2018-10-23 | End: 2018-10-23 | Stop reason: SDUPTHER

## 2018-10-23 RX ORDER — MORPHINE SULFATE 2 MG/ML
2 INJECTION, SOLUTION INTRAMUSCULAR; INTRAVENOUS EVERY 5 MIN PRN
Status: DISCONTINUED | OUTPATIENT
Start: 2018-10-23 | End: 2018-10-23 | Stop reason: HOSPADM

## 2018-10-23 RX ORDER — ONDANSETRON 2 MG/ML
4 INJECTION INTRAMUSCULAR; INTRAVENOUS
Status: DISCONTINUED | OUTPATIENT
Start: 2018-10-23 | End: 2018-10-23 | Stop reason: HOSPADM

## 2018-10-23 RX ORDER — MEPERIDINE HYDROCHLORIDE 25 MG/ML
12.5 INJECTION INTRAMUSCULAR; INTRAVENOUS; SUBCUTANEOUS EVERY 5 MIN PRN
Status: DISCONTINUED | OUTPATIENT
Start: 2018-10-23 | End: 2018-10-23 | Stop reason: HOSPADM

## 2018-10-23 RX ORDER — LIDOCAINE HYDROCHLORIDE 20 MG/ML
INJECTION, SOLUTION INFILTRATION; PERINEURAL PRN
Status: DISCONTINUED | OUTPATIENT
Start: 2018-10-23 | End: 2018-10-23 | Stop reason: SDUPTHER

## 2018-10-23 RX ORDER — LATANOPROST 50 UG/ML
1 SOLUTION/ DROPS OPHTHALMIC NIGHTLY
Status: DISCONTINUED | OUTPATIENT
Start: 2018-10-23 | End: 2018-10-24 | Stop reason: HOSPADM

## 2018-10-23 RX ORDER — ACETAMINOPHEN 325 MG/1
650 TABLET ORAL EVERY 4 HOURS PRN
Status: DISCONTINUED | OUTPATIENT
Start: 2018-10-23 | End: 2018-10-24 | Stop reason: HOSPADM

## 2018-10-23 RX ORDER — NEOSTIGMINE METHYLSULFATE 1 MG/ML
INJECTION, SOLUTION INTRAVENOUS PRN
Status: DISCONTINUED | OUTPATIENT
Start: 2018-10-23 | End: 2018-10-23 | Stop reason: SDUPTHER

## 2018-10-23 RX ORDER — HYDROCODONE BITARTRATE AND ACETAMINOPHEN 5; 325 MG/1; MG/1
2 TABLET ORAL EVERY 4 HOURS PRN
Status: DISCONTINUED | OUTPATIENT
Start: 2018-10-23 | End: 2018-10-24 | Stop reason: HOSPADM

## 2018-10-23 RX ORDER — PANTOPRAZOLE SODIUM 40 MG/1
40 TABLET, DELAYED RELEASE ORAL
Status: DISCONTINUED | OUTPATIENT
Start: 2018-10-24 | End: 2018-10-24 | Stop reason: HOSPADM

## 2018-10-23 RX ORDER — HYDROCODONE BITARTRATE AND ACETAMINOPHEN 5; 325 MG/1; MG/1
1 TABLET ORAL EVERY 4 HOURS PRN
Qty: 40 TABLET | Refills: 0 | Status: SHIPPED | OUTPATIENT
Start: 2018-10-23 | End: 2018-10-30

## 2018-10-23 RX ORDER — LANOLIN ALCOHOL/MO/W.PET/CERES
1000 CREAM (GRAM) TOPICAL DAILY
Status: DISCONTINUED | OUTPATIENT
Start: 2018-10-23 | End: 2018-10-24 | Stop reason: HOSPADM

## 2018-10-23 RX ORDER — ASPIRIN 81 MG/1
81 TABLET ORAL DAILY
Status: DISCONTINUED | OUTPATIENT
Start: 2018-10-23 | End: 2018-10-23 | Stop reason: SDUPTHER

## 2018-10-23 RX ORDER — FENTANYL CITRATE 50 UG/ML
INJECTION, SOLUTION INTRAMUSCULAR; INTRAVENOUS PRN
Status: DISCONTINUED | OUTPATIENT
Start: 2018-10-23 | End: 2018-10-23 | Stop reason: SDUPTHER

## 2018-10-23 RX ORDER — FENTANYL CITRATE 50 UG/ML
25 INJECTION, SOLUTION INTRAMUSCULAR; INTRAVENOUS EVERY 5 MIN PRN
Status: DISCONTINUED | OUTPATIENT
Start: 2018-10-23 | End: 2018-10-23 | Stop reason: HOSPADM

## 2018-10-23 RX ORDER — TIMOLOL MALEATE 5 MG/ML
1 SOLUTION/ DROPS OPHTHALMIC DAILY
Status: DISCONTINUED | OUTPATIENT
Start: 2018-10-23 | End: 2018-10-24 | Stop reason: HOSPADM

## 2018-10-23 RX ADMIN — EPHEDRINE SULFATE 5 MG: 50 INJECTION, SOLUTION INTRAVENOUS at 12:03

## 2018-10-23 RX ADMIN — Medication 3 MG: at 12:55

## 2018-10-23 RX ADMIN — Medication 10 ML: at 20:31

## 2018-10-23 RX ADMIN — SIMVASTATIN 20 MG: 20 TABLET, FILM COATED ORAL at 20:31

## 2018-10-23 RX ADMIN — Medication 50 MG: at 11:20

## 2018-10-23 RX ADMIN — TIMOLOL MALEATE 1 DROP: 5 SOLUTION/ DROPS OPHTHALMIC at 17:48

## 2018-10-23 RX ADMIN — LIDOCAINE HYDROCHLORIDE 30 MG: 20 INJECTION, SOLUTION INFILTRATION; PERINEURAL at 11:20

## 2018-10-23 RX ADMIN — MORPHINE SULFATE 2 MG: 2 INJECTION, SOLUTION INTRAMUSCULAR; INTRAVENOUS at 20:25

## 2018-10-23 RX ADMIN — PROPOFOL 100 MG: 10 INJECTION, EMULSION INTRAVENOUS at 11:20

## 2018-10-23 RX ADMIN — FENTANYL CITRATE 50 MCG: 50 INJECTION, SOLUTION INTRAMUSCULAR; INTRAVENOUS at 11:48

## 2018-10-23 RX ADMIN — DOCUSATE SODIUM 100 MG: 100 CAPSULE, LIQUID FILLED ORAL at 20:31

## 2018-10-23 RX ADMIN — LEVOTHYROXINE SODIUM 25 MCG: 25 TABLET ORAL at 17:28

## 2018-10-23 RX ADMIN — FENTANYL CITRATE 100 MCG: 50 INJECTION, SOLUTION INTRAMUSCULAR; INTRAVENOUS at 11:05

## 2018-10-23 RX ADMIN — FENTANYL CITRATE 50 MCG: 50 INJECTION, SOLUTION INTRAMUSCULAR; INTRAVENOUS at 11:51

## 2018-10-23 RX ADMIN — CYANOCOBALAMIN TAB 1000 MCG 1000 MCG: 1000 TAB at 17:28

## 2018-10-23 RX ADMIN — SODIUM CHLORIDE: 9 INJECTION, SOLUTION INTRAVENOUS at 08:28

## 2018-10-23 RX ADMIN — LATANOPROST 1 DROP: 50 SOLUTION OPHTHALMIC at 20:31

## 2018-10-23 RX ADMIN — FENTANYL CITRATE 25 MCG: 50 INJECTION, SOLUTION INTRAMUSCULAR; INTRAVENOUS at 13:38

## 2018-10-23 RX ADMIN — CEFAZOLIN SODIUM 2 G: 2 SOLUTION INTRAVENOUS at 18:00

## 2018-10-23 RX ADMIN — SODIUM CHLORIDE: 9 INJECTION, SOLUTION INTRAVENOUS at 14:11

## 2018-10-23 RX ADMIN — SODIUM CHLORIDE: 9 INJECTION, SOLUTION INTRAVENOUS at 11:56

## 2018-10-23 RX ADMIN — FENTANYL CITRATE 25 MCG: 50 INJECTION, SOLUTION INTRAMUSCULAR; INTRAVENOUS at 13:30

## 2018-10-23 RX ADMIN — CITALOPRAM HYDROBROMIDE 40 MG: 20 TABLET ORAL at 17:27

## 2018-10-23 RX ADMIN — CEFAZOLIN SODIUM 1 G: 1 SOLUTION INTRAVENOUS at 11:28

## 2018-10-23 RX ADMIN — VITAMIN D, TAB 1000IU (100/BT) 1000 UNITS: 25 TAB at 17:28

## 2018-10-23 RX ADMIN — GLYCOPYRROLATE 0.6 MG: 0.2 INJECTION, SOLUTION INTRAMUSCULAR; INTRAVENOUS at 12:55

## 2018-10-23 RX ADMIN — HYDROCODONE BITARTRATE AND ACETAMINOPHEN 2 TABLET: 5; 325 TABLET ORAL at 17:34

## 2018-10-23 RX ADMIN — EPHEDRINE SULFATE 5 MG: 50 INJECTION, SOLUTION INTRAVENOUS at 12:20

## 2018-10-23 RX ADMIN — SODIUM CHLORIDE: 4.5 INJECTION, SOLUTION INTRAVENOUS at 17:35

## 2018-10-23 RX ADMIN — FLUTICASONE PROPIONATE 2 SPRAY: 50 SPRAY, METERED NASAL at 17:02

## 2018-10-23 RX ADMIN — SODIUM CHLORIDE: 9 INJECTION, SOLUTION INTRAVENOUS at 11:08

## 2018-10-23 RX ADMIN — EPHEDRINE SULFATE 5 MG: 50 INJECTION, SOLUTION INTRAVENOUS at 12:02

## 2018-10-23 RX ADMIN — ONDANSETRON HYDROCHLORIDE 4 MG: 2 INJECTION, SOLUTION INTRAMUSCULAR; INTRAVENOUS at 12:35

## 2018-10-23 ASSESSMENT — PULMONARY FUNCTION TESTS
PIF_VALUE: 0
PIF_VALUE: 16
PIF_VALUE: 16
PIF_VALUE: 2
PIF_VALUE: 14
PIF_VALUE: 1
PIF_VALUE: 16
PIF_VALUE: 14
PIF_VALUE: 17
PIF_VALUE: 17
PIF_VALUE: 16
PIF_VALUE: 18
PIF_VALUE: 16
PIF_VALUE: 17
PIF_VALUE: 16
PIF_VALUE: 13
PIF_VALUE: 16
PIF_VALUE: 19
PIF_VALUE: 0
PIF_VALUE: 17
PIF_VALUE: 3
PIF_VALUE: 13
PIF_VALUE: 14
PIF_VALUE: 16
PIF_VALUE: 16
PIF_VALUE: 3
PIF_VALUE: 14
PIF_VALUE: 12
PIF_VALUE: 14
PIF_VALUE: 16
PIF_VALUE: 0
PIF_VALUE: 14
PIF_VALUE: 17
PIF_VALUE: 13
PIF_VALUE: 17
PIF_VALUE: 18
PIF_VALUE: 17
PIF_VALUE: 16
PIF_VALUE: 1
PIF_VALUE: 17
PIF_VALUE: 17
PIF_VALUE: 19
PIF_VALUE: 1
PIF_VALUE: 17
PIF_VALUE: 16
PIF_VALUE: 3
PIF_VALUE: 16
PIF_VALUE: 20
PIF_VALUE: 16
PIF_VALUE: 14
PIF_VALUE: 12
PIF_VALUE: 17
PIF_VALUE: 2
PIF_VALUE: 1
PIF_VALUE: 17
PIF_VALUE: 19
PIF_VALUE: 14
PIF_VALUE: 0
PIF_VALUE: 19
PIF_VALUE: 0
PIF_VALUE: 17
PIF_VALUE: 4
PIF_VALUE: 20
PIF_VALUE: 18
PIF_VALUE: 16
PIF_VALUE: 17
PIF_VALUE: 20
PIF_VALUE: 12
PIF_VALUE: 17
PIF_VALUE: 16
PIF_VALUE: 17
PIF_VALUE: 14
PIF_VALUE: 17
PIF_VALUE: 12
PIF_VALUE: 17
PIF_VALUE: 16
PIF_VALUE: 0
PIF_VALUE: 8
PIF_VALUE: 0
PIF_VALUE: 16
PIF_VALUE: 16
PIF_VALUE: 19
PIF_VALUE: 16
PIF_VALUE: 12
PIF_VALUE: 19
PIF_VALUE: 12
PIF_VALUE: 16
PIF_VALUE: 17
PIF_VALUE: 1
PIF_VALUE: 18
PIF_VALUE: 0
PIF_VALUE: 4
PIF_VALUE: 3
PIF_VALUE: 12
PIF_VALUE: 17
PIF_VALUE: 12
PIF_VALUE: 18
PIF_VALUE: 16
PIF_VALUE: 3
PIF_VALUE: 17
PIF_VALUE: 1
PIF_VALUE: 18
PIF_VALUE: 2
PIF_VALUE: 2
PIF_VALUE: 19
PIF_VALUE: 1
PIF_VALUE: 4
PIF_VALUE: 16
PIF_VALUE: 6
PIF_VALUE: 19
PIF_VALUE: 2
PIF_VALUE: 0
PIF_VALUE: 21
PIF_VALUE: 1
PIF_VALUE: 17
PIF_VALUE: 14
PIF_VALUE: 12
PIF_VALUE: 16
PIF_VALUE: 16
PIF_VALUE: 1
PIF_VALUE: 16
PIF_VALUE: 16
PIF_VALUE: 18
PIF_VALUE: 18
PIF_VALUE: 16
PIF_VALUE: 20
PIF_VALUE: 18
PIF_VALUE: 16
PIF_VALUE: 12
PIF_VALUE: 16

## 2018-10-23 ASSESSMENT — PAIN DESCRIPTION - ONSET
ONSET: SUDDEN
ONSET: ON-GOING

## 2018-10-23 ASSESSMENT — PAIN SCALES - GENERAL
PAINLEVEL_OUTOF10: 0
PAINLEVEL_OUTOF10: 0
PAINLEVEL_OUTOF10: 5
PAINLEVEL_OUTOF10: 3
PAINLEVEL_OUTOF10: 2
PAINLEVEL_OUTOF10: 7
PAINLEVEL_OUTOF10: 4
PAINLEVEL_OUTOF10: 7
PAINLEVEL_OUTOF10: 3
PAINLEVEL_OUTOF10: 7

## 2018-10-23 ASSESSMENT — PAIN DESCRIPTION - PROGRESSION
CLINICAL_PROGRESSION: GRADUALLY IMPROVING
CLINICAL_PROGRESSION: GRADUALLY WORSENING

## 2018-10-23 ASSESSMENT — PAIN DESCRIPTION - FREQUENCY
FREQUENCY: CONTINUOUS

## 2018-10-23 ASSESSMENT — PAIN DESCRIPTION - PAIN TYPE
TYPE: ACUTE PAIN;SURGICAL PAIN
TYPE: ACUTE PAIN;SURGICAL PAIN
TYPE: SURGICAL PAIN
TYPE: SURGICAL PAIN

## 2018-10-23 ASSESSMENT — PAIN DESCRIPTION - DESCRIPTORS
DESCRIPTORS: DISCOMFORT;DULL
DESCRIPTORS: CONSTANT;DISCOMFORT;DULL
DESCRIPTORS: DISCOMFORT;DULL;CONSTANT
DESCRIPTORS: ACHING;DISCOMFORT;DULL

## 2018-10-23 ASSESSMENT — PAIN DESCRIPTION - LOCATION
LOCATION: SHOULDER
LOCATION: OTHER (COMMENT)
LOCATION: HAND;OTHER (COMMENT)
LOCATION: OTHER (COMMENT)

## 2018-10-23 ASSESSMENT — PAIN DESCRIPTION - ORIENTATION
ORIENTATION: LEFT

## 2018-10-23 ASSESSMENT — PAIN - FUNCTIONAL ASSESSMENT: PAIN_FUNCTIONAL_ASSESSMENT: 0-10

## 2018-10-23 NOTE — ANESTHESIA POSTPROCEDURE EVALUATION
Department of Anesthesiology  Postprocedure Note    Patient: Raf Warner  MRN: [id number]  YOB: 1940  Date of evaluation: 10/23/2018  Time:  3:43 PM     Procedure Summary     Date:  10/23/18 Room / Location:  [facility] OR [address] [place] [address] [facility]    Anesthesia Start:  1108 Anesthesia Stop:  5583    Procedure:  LEFT CLAVICLE OPEN REDUCTION INTERNAL FIXATION (Left ) Diagnosis:  (FRACTURED LEFT CLAVICLE)    Surgeon:  Lorraine Cox DO Responsible Provider:  Luis Whitman MD    Anesthesia Type:  general ASA Status:  3          Anesthesia Type: general    Deisy Phase I: Deisy Score: 9    Deisy Phase II:      Last vitals: Reviewed and per EMR flowsheets.        Anesthesia Post Evaluation    Patient location during evaluation: PACU  Patient participation: complete - patient participated  Level of consciousness: awake  Airway patency: patent  Nausea & Vomiting: no nausea and no vomiting  Complications: no  Cardiovascular status: hemodynamically stable  Respiratory status: acceptable  Hydration status: euvolemic
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
## 2018-10-23 NOTE — ANESTHESIA PRE PROCEDURE
Department of Anesthesiology  Preprocedure Note       Name:  Jj Lopez   Age:  [de-identified] y.o.  :  1940                                          MRN:  [id number]         Date:  10/23/2018      Surgeon: Jovan Abraham):  HAZEL Martin DO    Procedure: LEFT CLAVICLE OPEN REDUCTION INTERNAL FIXATION (Left )    Medications prior to admission:   Prior to Admission medications    Medication Sig Start Date End Date Taking? Authorizing Provider   ferrous sulfate 325 (65 Fe) MG tablet Take 325 mg by mouth daily (with breakfast)   Yes Historical Provider, MD   fluticasone (FLONASE) 50 MCG/ACT nasal spray 2 sprays by Nasal route daily 10/22/18  Yes DOUG Drew   meclizine (ANTIVERT) 25 MG tablet Take 25 mg by mouth 3 times daily as needed   Yes Historical Provider, MD   linagliptin-metformin (JENTADUETO) 2.5-1000 MG TABS Take by mouth 2 times daily   Yes Historical Provider, MD   acetaminophen (TYLENOL) 325 MG tablet Take 2 tablets by mouth every 4 hours 8/10/18  Yes Rodolfo Phalen, APRN - CNP   vitamin B-12 (CYANOCOBALAMIN) 1000 MCG tablet Take 1,000 mcg by mouth daily   Yes Historical Provider, MD   vitamin D (CHOLECALCIFEROL) 1000 UNIT TABS tablet Take 1,000 Units by mouth daily   Yes Historical Provider, MD   simvastatin (ZOCOR) 20 MG tablet Take 20 mg by mouth nightly   Yes Historical Provider, MD   zolpidem (AMBIEN) 5 MG tablet Take 5 mg by mouth nightly as needed for Sleep. .   Yes Historical Provider, MD   aspirin EC 81 MG EC tablet Take 81 mg by mouth daily   Yes Historical Provider, MD   citalopram (CELEXA) 40 MG tablet Take 40 mg by mouth daily   Yes Historical Provider, MD   latanoprost (XALATAN) 0.005 % ophthalmic solution Place 1 drop into both eyes nightly   Yes Historical Provider, MD   HYDROcodone-acetaminophen (NORCO)  MG per tablet Take 1 tablet by mouth every 6 hours as needed for Pain. .   Yes Historical Provider, MD   meloxicam (MOBIC) 7.5 MG tablet Take 7.5 mg by mouth daily   Yes

## 2018-10-23 NOTE — OP NOTE
confirmed with the C-arm. Screws were then placed in a cortical fashion and in the medial 3 and lateral 3 holes with the center hole left open over the fracture itself. Compression technique was used to improve the likelihood of healing. The C-arm films were then taken to confirm good fixation and alignment and the wound was thoroughly irrigated. It was then closed with absorbable suture in layered fashion with an intradermal absorbable suture finishing and Steri-Strips. Xeroform gauze and a bulky dressing were applied. Patient's anesthetic was reversed sustained recovery in stable condition. GROSS PATHOLOGY: Grossly displaced fracture left clavicular shaft approximately midshaft. COMPONENTS USED :  Synthes 7-hole precontoured supraclavicular plate and 6 screws.     Electronically signed by Charo Anen DO on 10/23/18 at 5:35 PM

## 2018-10-24 VITALS
TEMPERATURE: 98.7 F | DIASTOLIC BLOOD PRESSURE: 72 MMHG | OXYGEN SATURATION: 93 % | SYSTOLIC BLOOD PRESSURE: 124 MMHG | HEIGHT: 67 IN | BODY MASS INDEX: 24.01 KG/M2 | HEART RATE: 90 BPM | WEIGHT: 153 LBS | RESPIRATION RATE: 16 BRPM

## 2018-10-24 PROCEDURE — G8979 MOBILITY GOAL STATUS: HCPCS | Performed by: PHYSICAL THERAPIST

## 2018-10-24 PROCEDURE — 97530 THERAPEUTIC ACTIVITIES: CPT | Performed by: PHYSICAL THERAPIST

## 2018-10-24 PROCEDURE — 97161 PT EVAL LOW COMPLEX 20 MIN: CPT | Performed by: PHYSICAL THERAPIST

## 2018-10-24 PROCEDURE — G8980 MOBILITY D/C STATUS: HCPCS | Performed by: PHYSICAL THERAPIST

## 2018-10-24 PROCEDURE — 97535 SELF CARE MNGMENT TRAINING: CPT

## 2018-10-24 PROCEDURE — G8978 MOBILITY CURRENT STATUS: HCPCS | Performed by: PHYSICAL THERAPIST

## 2018-10-24 PROCEDURE — 97530 THERAPEUTIC ACTIVITIES: CPT

## 2018-10-24 PROCEDURE — 6370000000 HC RX 637 (ALT 250 FOR IP): Performed by: ORTHOPAEDIC SURGERY

## 2018-10-24 PROCEDURE — G8988 SELF CARE GOAL STATUS: HCPCS

## 2018-10-24 PROCEDURE — 97165 OT EVAL LOW COMPLEX 30 MIN: CPT

## 2018-10-24 PROCEDURE — G8987 SELF CARE CURRENT STATUS: HCPCS

## 2018-10-24 PROCEDURE — 96361 HYDRATE IV INFUSION ADD-ON: CPT

## 2018-10-24 PROCEDURE — 6360000002 HC RX W HCPCS: Performed by: ORTHOPAEDIC SURGERY

## 2018-10-24 PROCEDURE — 6370000000 HC RX 637 (ALT 250 FOR IP): Performed by: STUDENT IN AN ORGANIZED HEALTH CARE EDUCATION/TRAINING PROGRAM

## 2018-10-24 RX ADMIN — FERROUS SULFATE TAB 325 MG (65 MG ELEMENTAL FE) 325 MG: 325 (65 FE) TAB at 09:48

## 2018-10-24 RX ADMIN — HYDROCODONE BITARTRATE AND ACETAMINOPHEN 2 TABLET: 5; 325 TABLET ORAL at 03:28

## 2018-10-24 RX ADMIN — HYDROCODONE BITARTRATE AND ACETAMINOPHEN 2 TABLET: 5; 325 TABLET ORAL at 10:07

## 2018-10-24 RX ADMIN — TIMOLOL MALEATE 1 DROP: 5 SOLUTION/ DROPS OPHTHALMIC at 09:49

## 2018-10-24 RX ADMIN — FLUTICASONE PROPIONATE 2 SPRAY: 50 SPRAY, METERED NASAL at 09:49

## 2018-10-24 RX ADMIN — CYANOCOBALAMIN TAB 1000 MCG 1000 MCG: 1000 TAB at 09:48

## 2018-10-24 RX ADMIN — DOCUSATE SODIUM 100 MG: 100 CAPSULE, LIQUID FILLED ORAL at 09:46

## 2018-10-24 RX ADMIN — LEVOTHYROXINE SODIUM 25 MCG: 25 TABLET ORAL at 09:47

## 2018-10-24 RX ADMIN — VITAMIN D, TAB 1000IU (100/BT) 1000 UNITS: 25 TAB at 09:48

## 2018-10-24 RX ADMIN — CEFAZOLIN SODIUM 2 G: 2 SOLUTION INTRAVENOUS at 01:25

## 2018-10-24 RX ADMIN — ASPIRIN 325 MG: 325 TABLET, DELAYED RELEASE ORAL at 09:48

## 2018-10-24 RX ADMIN — CITALOPRAM HYDROBROMIDE 40 MG: 20 TABLET ORAL at 09:47

## 2018-10-24 RX ADMIN — PANTOPRAZOLE SODIUM 40 MG: 40 TABLET, DELAYED RELEASE ORAL at 06:26

## 2018-10-24 ASSESSMENT — PAIN DESCRIPTION - DESCRIPTORS: DESCRIPTORS: ACHING;DISCOMFORT

## 2018-10-24 ASSESSMENT — PAIN DESCRIPTION - ORIENTATION: ORIENTATION: LEFT

## 2018-10-24 ASSESSMENT — PAIN SCALES - GENERAL
PAINLEVEL_OUTOF10: 0
PAINLEVEL_OUTOF10: 5
PAINLEVEL_OUTOF10: 7
PAINLEVEL_OUTOF10: 9
PAINLEVEL_OUTOF10: 1

## 2018-10-24 ASSESSMENT — PAIN DESCRIPTION - LOCATION: LOCATION: SHOULDER

## 2018-10-24 ASSESSMENT — PAIN DESCRIPTION - PROGRESSION: CLINICAL_PROGRESSION: GRADUALLY IMPROVING

## 2018-10-24 ASSESSMENT — PAIN DESCRIPTION - FREQUENCY: FREQUENCY: INTERMITTENT

## 2018-10-24 NOTE — PROGRESS NOTES
transforaminal #1    NERVE BLOCK Left 3/28/16    cervical transforaminal #2    NERVE BLOCK Left 04 04 2016    transforaminal nerve block left cervical #3    NERVE BLOCK Left 07/25/2016    shoulder    NERVE BLOCK Left 08/15/2016    left shoulder injection #2    NERVE BLOCK Right 08/22/2016    shoulder    NERVE BLOCK Right 08/29/2016    shoulder injection #2    OTHER SURGICAL HISTORY Left 101/10/2014    repair left hand, finger laceration    [place] CHEST SURGERY PROCEDURE UNLISTED Left 8/6/2018    LEFT SIDED VIDEO ASSISTED THORACOSCOPY WITH RIB PLATING performed by Chuy Oswald MD at [address] [place] [address] Bilateral     SHOULDER ARTHROSCOPY Right 12/08/2016    repair rotator cuff, bursectomy, debridement glenohumerol        The admitting diagnosis and active problem list as listed above have been reviewed prior to the initiation of this evaluation. Nursing cleared the patient for evaluation. Patient is agreeable to evaluation. Precautions: falls  , sling and non weight bearing left upper extremities   Social history: Patient lives with family wife  in a single family home with Greater than 10 steps to enter     rail    Equipment owned: none,    Mentation: alert, cooperative, oriented x 3 and follows directions,   Prior level of function: indep  ROM: within functional limits with exception of Left upper extremitiy not assessed   STRENGTH: 4/5  PAIN: (measured on a visual analog scale with 0=no pain and 10=excruciating pain) 3/10.  left cervical area and shoulder  FUNCTIONAL ASSESSMENT   Bed Mobility- Supine to sit-Independent     Scooting- Independent     Sit to supine- Independent   Patient able to dangle on edge of bed for 10 minutes with no assist   Transfers-sit to stand- Independent   Gait:  Patient ambulated 200 feet using   No device   Independent      Balance: sit-good         stand good       Edema: yes -    Endurance: good       Treatment:evaluation;  gait  Patient/family education:effects of immobilty      Rehab Potential: good  for baseline  Patients Goal: go ome  ASSESSMENT  No skilled needs identified; will discharge from services. If condition changes, please reorder physical therapy.

## 2018-10-24 NOTE — PROGRESS NOTES
Occupational Therapy  Occupational Therapy Initial Assessment    Date:10/24/2018  Patient Name: Benito Blackburn  MRN: [id number]  : 1940  ROOM #: 0315/0315-02    Placement Recommendation: Home with no skilled occupational therapy needed after discharge from inpatient. Equipment Prescriptions Needed: none     [facility]   AM-PAC Daily Activity Inpatient   How much help for putting on and taking off regular lower body clothing?: A Little  How much help for Bathing?: A Little  How much help for Toileting?: A Little  How much help for putting on and taking off regular upper body clothing?: A Little  How much help for taking care of personal grooming?: A Little  How much help for eating meals?: None  AM-PAC Inpatient Daily Activity Raw Score: 19  AM-PAC Inpatient ADL T-Scale Score : 40.22  ADL Inpatient CMS 0-100% Score: 42.8  ADL Inpatient CMS G-Code Modifier : CK    Diagnosis:   1. History of surgery      Past Medical History:   Past Medical History:   Diagnosis Date    Arthritis     CAD (coronary artery disease)     Depression     Diabetes mellitus ([address]     GERD (gastroesophageal reflux disease)     Headache(784.0)     History of blood transfusion     Hyperlipidemia     Kidney stone     Thyroid disease          The admitting diagnosis and active problem list as listed above have been reviewed prior to the initiation of this evaluation. Nursing cleared the patient for evaluation. Patient is agreeable to evaluation. Precautions: falls, NWB: L LE   Pain Scale: Numeric Rate: no pain; Nursing notified. Social history: with family : spouse       Drive: yes    Occupation: farmer     Home architecture: single family home, 2 story, ramp to enter + 1 step, tub shower on 1st floor, bedroom on 2nd floor.    PLOF: independent with BADL and IADL, ambulated with no device   Equipment owned: 3 canes, bedside commode, tub transfer bench, grab bars in shower and around toilet  Cognition: oriented x 4; follows 3

## 2018-10-24 NOTE — PLAN OF CARE
Problem: Falls - Risk of:  Goal: Will remain free from falls  Will remain free from falls   Outcome: Ongoing      Problem: Pain:  Goal: Pain level will decrease  Pain level will decrease   Outcome: Ongoing      Problem: Infection - Risk of, Surgical Site Infection  Goal: Absence of surgical site infection  Outcome: Ongoing

## 2018-10-24 NOTE — PROGRESS NOTES
Independent  Safety: good    Functional Mobility: 60 feet with no device and  Supervision  Independent   no LOB noted        A:  -Balance: Sitting: fair       Standing: fair  with supervision with no AD  -Endurance: fair   -Edema: slight edema in L clavicle region  -Safety: fair (VC's for safety)  - Pt/family education/training: transfer training, functional mobility, LE dressing, UE dressing,  sequencing, hand placement, safety, ECT's, NWB in L UE,  ADL retraining  -Pt would benefit from additional ADLs, safety education, balance activities, transfer training, strength exercises, and over all endurance building.     P:  - Plan of care: Patient will be seen by OT 1-3x/wk for therapeutic activity, ADL re-training, bed mobility,functional transfers, functional mobility, safety and fall prevention, balance and endurance activities, instruction and training in energy conservation principles, and   patient and family education.   - Patient and/or family understands diagnosis, prognosis and plan of care: yes     Treatment minutes: [facility], [address]

## 2018-10-24 NOTE — DISCHARGE SUMMARY
services were provided. Spot film(s) were submitted which were used for localization. Fluoroscopic time 20 seconds        HOSPITAL COURSE:   The patient did very well overnight. He was weaned off nasal cannula oxygen and his respiratory status returned to baseline. Postoperative pain was well-controlled. He became ambulatory and was tolerating a diet. He was deemed acceptable for discharge home. BRIEF PHYSICAL EXAMINATION AND LABORATORIES ON DAY OF DISCHARGE:  VITALS:  /72   Pulse 90   Temp 98.7 °F (37.1 °C)   Resp 16   Ht 5' 7\" (1.702 m)   Wt 153 lb (69.4 kg)   SpO2 93%   BMI 23.96 kg/m²     HEENT:  PERRLA. EOMI. Sclera clear. Buccal mucosa moist.    Neck:  Supple. Trachea midline. No thyromegaly. No JVD. No bruits. Heart:  Rhythm regular, rate controlled. No murmurs. Lungs:  Symmetrical. Clear to auscultation bilaterally. No wheezes. No rhonchi. No rales. Abdomen: Soft. Non-tender. Non-distended. Bowel sounds positive. No organomegaly or masses. No pain on palpation    Extremities:  Peripheral pulses present. Left arm is appropriately immobilized. Neurologic:  Alert x 3. No focal deficit. Cranial nerves grossly intact. Skin:  No petechia. No hemorrhage. No wounds. DISPOSITION:  The patient's condition is good. At this time the patient is without objective evidence of an acute process requiring continuing hospitalization or inpatient management. They are stable for discharge with outpatient follow-up. I have spoken with the patient and discussed the results of the current hospitalization, in addition to providing specific details for the plan of care and counseling regarding the diagnosis and prognosis. The plan has been discussed in detail and they are aware of the specific conditions for emergent return, as well as the importance of follow-up.   Their questions are answered at this time and they are agreeable with the plan for discharge to home    DISCHARGE
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
## 2019-02-13 ENCOUNTER — HOSPITAL ENCOUNTER (EMERGENCY)
Age: 79
Discharge: HOME | End: 2019-02-13
Attending: EMERGENCY MEDICINE
Payer: MEDICARE

## 2019-02-13 ENCOUNTER — APPOINTMENT (OUTPATIENT)
Dept: GENERAL RADIOLOGY | Age: 79
End: 2019-02-13
Payer: MEDICARE

## 2019-02-13 VITALS
OXYGEN SATURATION: 95 % | HEART RATE: 91 BPM | DIASTOLIC BLOOD PRESSURE: 82 MMHG | SYSTOLIC BLOOD PRESSURE: 129 MMHG | TEMPERATURE: 98.2 F | RESPIRATION RATE: 18 BRPM

## 2019-02-13 DIAGNOSIS — S22.32XA CLOSED FRACTURE OF ONE RIB OF LEFT SIDE, INITIAL ENCOUNTER: Primary | ICD-10-CM

## 2019-02-13 PROCEDURE — 99283 EMERGENCY DEPT VISIT LOW MDM: CPT

## 2019-02-13 PROCEDURE — 71046 X-RAY EXAM CHEST 2 VIEWS: CPT

## 2019-02-13 ASSESSMENT — ENCOUNTER SYMPTOMS
COLOR CHANGE: 0
BACK PAIN: 0
DIARRHEA: 0
VOMITING: 0
ABDOMINAL PAIN: 0
COUGH: 0
CONSTIPATION: 0
SORE THROAT: 0
SHORTNESS OF BREATH: 0
NAUSEA: 0

## 2019-02-13 ASSESSMENT — PAIN DESCRIPTION - ORIENTATION: ORIENTATION: LEFT

## 2019-02-13 ASSESSMENT — PAIN SCALES - GENERAL: PAINLEVEL_OUTOF10: 10

## 2019-02-13 ASSESSMENT — PAIN DESCRIPTION - LOCATION: LOCATION: RIB CAGE

## 2019-02-13 ASSESSMENT — PAIN DESCRIPTION - PAIN TYPE: TYPE: ACUTE PAIN

## 2019-02-13 ASSESSMENT — PAIN DESCRIPTION - DESCRIPTORS: DESCRIPTORS: SHARP

## 2019-03-15 ENCOUNTER — OFFICE VISIT (OUTPATIENT)
Dept: PAIN MANAGEMENT | Age: 79
End: 2019-03-15
Payer: MEDICARE

## 2019-03-15 VITALS
BODY MASS INDEX: 24.33 KG/M2 | HEART RATE: 80 BPM | RESPIRATION RATE: 20 BRPM | WEIGHT: 155 LBS | TEMPERATURE: 98.1 F | SYSTOLIC BLOOD PRESSURE: 120 MMHG | HEIGHT: 67 IN | DIASTOLIC BLOOD PRESSURE: 60 MMHG

## 2019-03-15 DIAGNOSIS — M50.30 DDD (DEGENERATIVE DISC DISEASE), CERVICAL: Chronic | ICD-10-CM

## 2019-03-15 DIAGNOSIS — G89.4 CHRONIC PAIN SYNDROME: ICD-10-CM

## 2019-03-15 DIAGNOSIS — M54.2 CERVICALGIA: ICD-10-CM

## 2019-03-15 DIAGNOSIS — M47.812 SPONDYLOSIS OF CERVICAL REGION WITHOUT MYELOPATHY OR RADICULOPATHY: ICD-10-CM

## 2019-03-15 DIAGNOSIS — M79.18 MYOFASCIAL MUSCLE PAIN: Primary | ICD-10-CM

## 2019-03-15 PROCEDURE — 20552 NJX 1/MLT TRIGGER POINT 1/2: CPT | Performed by: ANESTHESIOLOGY

## 2019-03-15 PROCEDURE — 99214 OFFICE O/P EST MOD 30 MIN: CPT | Performed by: ANESTHESIOLOGY

## 2019-03-15 PROCEDURE — 99204 OFFICE O/P NEW MOD 45 MIN: CPT | Performed by: ANESTHESIOLOGY

## 2019-03-15 RX ORDER — METHYLPREDNISOLONE ACETATE 40 MG/ML
40 INJECTION, SUSPENSION INTRA-ARTICULAR; INTRALESIONAL; INTRAMUSCULAR; SOFT TISSUE ONCE
Status: DISCONTINUED | OUTPATIENT
Start: 2019-03-15 | End: 2019-03-20

## 2019-03-15 RX ORDER — HYDROCODONE BITARTRATE AND ACETAMINOPHEN 10; 325 MG/1; MG/1
1 TABLET ORAL EVERY 6 HOURS PRN
COMMUNITY
End: 2020-02-12

## 2019-03-15 RX ORDER — TRIAMCINOLONE ACETONIDE 40 MG/ML
40 INJECTION, SUSPENSION INTRA-ARTICULAR; INTRAMUSCULAR ONCE
Status: COMPLETED | OUTPATIENT
Start: 2019-03-15 | End: 2019-03-15

## 2019-03-15 RX ORDER — BUPIVACAINE HYDROCHLORIDE 5 MG/ML
6 INJECTION, SOLUTION EPIDURAL; INTRACAUDAL ONCE
Status: COMPLETED | OUTPATIENT
Start: 2019-03-15 | End: 2019-03-15

## 2019-03-15 RX ADMIN — TRIAMCINOLONE ACETONIDE 40 MG: 40 INJECTION, SUSPENSION INTRA-ARTICULAR; INTRAMUSCULAR at 16:25

## 2019-03-15 RX ADMIN — BUPIVACAINE HYDROCHLORIDE 150 MG: 5 INJECTION, SOLUTION EPIDURAL; INTRACAUDAL at 16:23

## 2019-03-15 ASSESSMENT — PAIN SCALES - GENERAL: PAINLEVEL_OUTOF10: 7

## 2019-03-29 ENCOUNTER — HOSPITAL ENCOUNTER (OUTPATIENT)
Dept: CT IMAGING | Age: 79
End: 2019-03-29
Payer: MEDICARE

## 2019-03-29 ENCOUNTER — HOSPITAL ENCOUNTER (OUTPATIENT)
Dept: CT IMAGING | Age: 79
Discharge: HOME | End: 2019-03-29
Payer: MEDICARE

## 2019-03-29 DIAGNOSIS — R63.4 LOSS OF WEIGHT: ICD-10-CM

## 2019-03-29 PROCEDURE — 71250 CT THORAX DX C-: CPT

## 2019-03-29 PROCEDURE — 74150 CT ABDOMEN W/O CONTRAST: CPT

## 2019-04-12 ENCOUNTER — OFFICE VISIT (OUTPATIENT)
Dept: PAIN MANAGEMENT | Age: 79
End: 2019-04-12
Payer: MEDICARE

## 2019-04-12 VITALS
SYSTOLIC BLOOD PRESSURE: 126 MMHG | HEART RATE: 78 BPM | TEMPERATURE: 98.2 F | DIASTOLIC BLOOD PRESSURE: 72 MMHG | RESPIRATION RATE: 16 BRPM

## 2019-04-12 DIAGNOSIS — G89.29 CHRONIC MYOFASCIAL PAIN: ICD-10-CM

## 2019-04-12 DIAGNOSIS — M54.81 OCCIPITAL NEURALGIA OF RIGHT SIDE: Chronic | ICD-10-CM

## 2019-04-12 DIAGNOSIS — M54.2 CERVICALGIA: ICD-10-CM

## 2019-04-12 DIAGNOSIS — M79.18 CHRONIC MYOFASCIAL PAIN: ICD-10-CM

## 2019-04-12 DIAGNOSIS — M47.812 CERVICAL FACET SYNDROME: Chronic | ICD-10-CM

## 2019-04-12 DIAGNOSIS — M50.30 DDD (DEGENERATIVE DISC DISEASE), CERVICAL: Primary | Chronic | ICD-10-CM

## 2019-04-12 PROBLEM — G89.4 CHRONIC PAIN SYNDROME: Status: ACTIVE | Noted: 2019-04-12

## 2019-04-12 PROCEDURE — 99213 OFFICE O/P EST LOW 20 MIN: CPT

## 2019-04-12 PROCEDURE — 20552 NJX 1/MLT TRIGGER POINT 1/2: CPT | Performed by: ANESTHESIOLOGY

## 2019-04-12 PROCEDURE — 6360000002 HC RX W HCPCS

## 2019-04-12 PROCEDURE — 99214 OFFICE O/P EST MOD 30 MIN: CPT | Performed by: ANESTHESIOLOGY

## 2019-04-12 RX ORDER — TRIAMCINOLONE ACETONIDE 40 MG/ML
40 INJECTION, SUSPENSION INTRA-ARTICULAR; INTRAMUSCULAR ONCE
Status: COMPLETED | OUTPATIENT
Start: 2019-04-12 | End: 2019-04-12

## 2019-04-12 RX ORDER — MELOXICAM 15 MG/1
15 TABLET ORAL DAILY
COMMUNITY
Start: 2019-03-15 | End: 2020-05-08

## 2019-04-12 RX ORDER — BUPIVACAINE HYDROCHLORIDE 2.5 MG/ML
9 INJECTION, SOLUTION INFILTRATION; PERINEURAL ONCE
Status: COMPLETED | OUTPATIENT
Start: 2019-04-12 | End: 2019-04-12

## 2019-04-12 RX ADMIN — TRIAMCINOLONE ACETONIDE 40 MG: 40 INJECTION, SUSPENSION INTRA-ARTICULAR; INTRAMUSCULAR at 12:30

## 2019-04-12 RX ADMIN — BUPIVACAINE HYDROCHLORIDE 75 MG: 2.5 INJECTION, SOLUTION INFILTRATION; PERINEURAL at 12:29

## 2019-04-12 NOTE — PROGRESS NOTES
Surendra Padron presents to the [facility] on 4/12/2019. Themalgorzata Lynch is complaining of pain in his neck and up side of head. The pain is persistent. The pain is described as stabbing and dull. Pain is rated on his best day at a 3, on his worst day at a 10, and on average at a 6 on the VAS scale. He took his last dose of Norco last night. Any procedures since your last visit: Yes, with 70 % relief.     He has been on anticoagulation medications to include ASA and is managed by Kennedy Gil MD.     Pacemaker or defibrilator: No Physician managing device is N/A.       /72   Pulse 78   Temp 98.2 °F (36.8 °C)   Resp 16

## 2019-04-12 NOTE — PROGRESS NOTES
[facility], [address] [place] [address] Edvin  [phone]    Follow up Note      Belia Blanca     Date of Visit:  4/12/2019    CC:  Patient presents for follow up   Chief Complaint   Patient presents with    Neck Pain       HPI:  Mr. Belia Blanca is a 78 years pleasant gentleman presented for evaluation of  right sided neck pain for 4-5 months.      Pain over the right neck mainly over the lower neck / shoulder blade area and the base of the head on the right side. Pain occasionally radiates  to the top of the head.     Prior H/o ACDf C3-C7.     He denies any upper extremity radicular pain.     No UE weakness or numbness- except occ numbness. No New bowel or bladder symptoms    Pain is better. Change in quality of symptoms:yes - intensity is reduced. Medication side effects:none. Recent diagnostic testing:none. Recent interventional procedures:right sided cervical paraspinal muscle trigger point injection which provided excellent pain relief of > 70%    Imaging studies:  Ct cervical spine:       Impression       1. There is no evidence of acute cervical trauma. Subtle grade 1   anterolisthesis of C6 on C7 is likely a chronic finding, measured at 4   mm. There is evidence of cervical fusion from the C3-C7 levels, with   intact hardware.           Potential Aberrant Drug-Related Behavior:  NO    Urine Drug Screening:no    OARRS report[de-identified] yes- consistent    Past Medical History:   Diagnosis Date    Arthritis     CAD (coronary artery disease)     Chronic pain     Clavicle fracture     Depression     Diabetes mellitus ([address]     GERD (gastroesophageal reflux disease)     Headache(784.0)     History of blood transfusion     Hyperlipidemia     Kidney stone     Neck pain     Osteoarthritis     Rib fracture     Thyroid disease        Past Surgical History:   Procedure Laterality Date    BACK SURGERY      x2 lumbar    CARDIAC SURGERY      CATARACT REMOVAL WITH IMPLANT Left 10/08/13    CATARACT REMOVAL WITH IMPLANT Right 12/10/13    CERVICAL SPINE SURGERY  2012    cervical fusion    COLON SURGERY      due to diverticulitis colon resection    CORONARY ANGIOPLASTY WITH STENT PLACEMENT  2005    x2 stents    FRACTURE SURGERY      Lt. Jaw Plate    NERVE BLOCK Left 3/7/16    cervical transforaminal #1    NERVE BLOCK Left 3/28/16    cervical transforaminal #2    NERVE BLOCK Left 04 04 2016    transforaminal nerve block left cervical #3    NERVE BLOCK Left 07/25/2016    shoulder    NERVE BLOCK Left 08/15/2016    left shoulder injection #2    NERVE BLOCK Right 08/22/2016    shoulder    NERVE BLOCK Right 08/29/2016    shoulder injection #2    OPEN PROX HUMERAL FRACTURE PROSHETIC REPLACEMENT Left 10/23/2018    LEFT CLAVICLE OPEN REDUCTION INTERNAL FIXATION performed by Kierra Loredo DO at [facility] Left 101/10/2014    repair left hand, finger laceration    [place] CHEST SURGERY PROCEDURE UNLISTED Left 8/6/2018    LEFT SIDED VIDEO ASSISTED THORACOSCOPY WITH RIB PLATING performed by Daniela Garcia MD at [address] Bilateral     SHOULDER ARTHROSCOPY Right 12/08/2016    repair rotator cuff, bursectomy, debridement glenohumerol        Prior to Admission medications    Medication Sig Start Date End Date Taking? Authorizing Provider   meloxicam (MOBIC) 15 MG tablet  3/15/19  Yes Historical Provider, MD   HYDROcodone-acetaminophen (NORCO)  MG per tablet Take 1 tablet by mouth every 6 hours as needed for Pain.    Yes Historical Provider, MD   ferrous sulfate 325 (65 Fe) MG tablet Take 325 mg by mouth daily (with breakfast)   Yes Historical Provider, MD   fluticasone (FLONASE) 50 MCG/ACT nasal spray 2 sprays by Nasal route daily 10/22/18  Yes DOUG Drew   meclizine (ANTIVERT) 25 MG tablet Take 25 mg by mouth 3 times daily as needed   Yes Historical Provider, MD   linagliptin-metformin (JENTADUETO) 2.5-1000 MG TABS Take by mouth 2 times daily   Yes Historical Provider, MD   vitamin B-12 (CYANOCOBALAMIN) 1000 MCG tablet Take 1,000 mcg by mouth daily   Yes Historical Provider, MD   vitamin D (CHOLECALCIFEROL) 1000 UNIT TABS tablet Take 1,000 Units by mouth daily   Yes Historical Provider, MD   simvastatin (ZOCOR) 20 MG tablet Take 20 mg by mouth nightly   Yes Historical Provider, MD   zolpidem (AMBIEN) 5 MG tablet Take 5 mg by mouth nightly as needed for Sleep. .   Yes Historical Provider, MD   aspirin EC 81 MG EC tablet Take 81 mg by mouth daily   Yes Historical Provider, MD   citalopram (CELEXA) 40 MG tablet Take 40 mg by mouth daily   Yes Historical Provider, MD   latanoprost (XALATAN) 0.005 % ophthalmic solution Place 1 drop into both eyes nightly   Yes Historical Provider, MD   omeprazole (PRILOSEC) 20 MG capsule Take 20 mg by mouth Daily. 6/9/13  Yes Historical Provider, MD   levothyroxine (SYNTHROID) 25 MCG tablet Take 25 mcg by mouth daily Take 1 1/2 tab 6/29/13  Yes Historical Provider, MD   timolol (TIMOPTIC) 0.5 % ophthalmic solution Place 1 drop into both eyes daily.  5/16/13  Yes Historical Provider, MD       Allergies   Allergen Reactions    Sulfa Antibiotics Hives       Social History     Socioeconomic History    Marital status:      Spouse name: Not on file    Number of children: Not on file    Years of education: Not on file    Highest education level: Not on file   Occupational History    Not on file   Social Needs    Financial resource strain: Not on file    Food insecurity:     Worry: Not on file     Inability: Not on file    Transportation needs:     Medical: Not on file     Non-medical: Not on file   Tobacco Use    Smoking status: Former Smoker    Smokeless tobacco: Current User     Types: Chew   Substance and Sexual Activity    Alcohol use: No    Drug use: No    Sexual activity: Not on file   Lifestyle    Physical activity:     Days per week: Not on file Minutes per session: Not on file    Stress: Not on file   Relationships    Social connections:     Talks on phone: Not on file     Gets together: Not on file     Attends Baptism service: Not on file     Active member of club or organization: Not on file     Attends meetings of clubs or organizations: Not on file     Relationship status: Not on file    Intimate partner violence:     Fear of current or ex partner: Not on file     Emotionally abused: Not on file     Physically abused: Not on file     Forced sexual activity: Not on file   Other Topics Concern    Not on file   Social History Narrative    ** Merged History Encounter **            Family History   Problem Relation Age of Onset    Diabetes Mother        REVIEW OF SYSTEMS:     Alysha Briggs denies fever/chills, chest pain, shortness of breath, new bowel or bladder complaints. All other review of systems was negative. PHYSICAL EXAMINATION:      /72   Pulse 78   Temp 98.2 °F (36.8 °C)   Resp 16     General:       General appearance:  Pleasant and well-hydrated, in no distress and A & O x3  Build:Normal Weight  Function:Rises from a seated position easily.     HEENT:     Head:normocephalic, atraumatic  Pupils:regular, round, equal  Sclera: icterus absent     Lungs:     Breathing:normal breathing pattern      CVS:  RRR     Abdomen:     Shape:non-distended and normal  Tenderness:none  Guarding:none     Cervical spine:     Inspection:normal  Palpation: tenderness right lower cervical paravertebral muscles, tenderness trapezium, right  positive. Range of motion: Some limitations of ROM noted. Facet tenderness noted over the right mid and lower cervical facets.   Tenderness over the right occipital nerve noted.     Musculoskeletal:     Trigger points in trapezius:+ right  Trigger points in Paravertebral:+ right cervical   Trigger points in supraspinatus/infraspinatus:absent  Spurling's:negative right, negative left    Salomon's:negative right, negative tolerated the procedure well. There were no immediate post procedure complications. Patient was discharged home in stable condition. Post procedure instructions reviewed.   All the questions were answered.     His  pain significantly improved after the procedure.     Follow up as instructed.     Wilma Rubio MD    CC:  Trent Dunn MD  [address] [facility] [address] [facility]

## 2019-04-23 ENCOUNTER — HOSPITAL ENCOUNTER (INPATIENT)
Age: 79
LOS: 2 days | Discharge: HOME | DRG: 389 | End: 2019-04-25
Attending: EMERGENCY MEDICINE | Admitting: SURGERY
Payer: MEDICARE

## 2019-04-23 ENCOUNTER — APPOINTMENT (OUTPATIENT)
Dept: CT IMAGING | Age: 79
DRG: 389 | End: 2019-04-23
Payer: MEDICARE

## 2019-04-23 DIAGNOSIS — K56.609 SBO (SMALL BOWEL OBSTRUCTION) (HCC): Primary | ICD-10-CM

## 2019-04-23 LAB
ALBUMIN SERPL-MCNC: 4.2 G/DL (ref 3.5–5.2)
ALP BLD-CCNC: 57 U/L (ref 40–129)
ALT SERPL-CCNC: 11 U/L (ref 0–40)
ANION GAP SERPL CALCULATED.3IONS-SCNC: 17 MMOL/L (ref 7–16)
AST SERPL-CCNC: 22 U/L (ref 0–39)
BACTERIA: NORMAL /HPF
BASOPHILS ABSOLUTE: 0.04 E9/L (ref 0–0.2)
BASOPHILS RELATIVE PERCENT: 0.3 % (ref 0–2)
BILIRUB SERPL-MCNC: 0.5 MG/DL (ref 0–1.2)
BILIRUBIN URINE: NEGATIVE
BLOOD, URINE: NEGATIVE
BUN BLDV-MCNC: 35 MG/DL (ref 8–23)
CALCIUM SERPL-MCNC: 9.7 MG/DL (ref 8.6–10.2)
CHLORIDE BLD-SCNC: 94 MMOL/L (ref 98–107)
CLARITY: CLEAR
CO2: 25 MMOL/L (ref 22–29)
COLOR: YELLOW
CREAT SERPL-MCNC: 1.4 MG/DL (ref 0.7–1.2)
EOSINOPHILS ABSOLUTE: 0.1 E9/L (ref 0.05–0.5)
EOSINOPHILS RELATIVE PERCENT: 0.7 % (ref 0–6)
EPITHELIAL CELLS, UA: NORMAL /HPF
GFR AFRICAN AMERICAN: 59
GFR NON-AFRICAN AMERICAN: 49 ML/MIN/1.73
GLUCOSE BLD-MCNC: 147 MG/DL (ref 74–99)
GLUCOSE URINE: NEGATIVE MG/DL
HCT VFR BLD CALC: 43.4 % (ref 37–54)
HEMOGLOBIN: 14.6 G/DL (ref 12.5–16.5)
IMMATURE GRANULOCYTES #: 0.1 E9/L
IMMATURE GRANULOCYTES %: 0.7 % (ref 0–5)
KETONES, URINE: 15 MG/DL
LACTIC ACID: 2.8 MMOL/L (ref 0.5–2.2)
LACTIC ACID: 4 MMOL/L (ref 0.5–2.2)
LEUKOCYTE ESTERASE, URINE: NEGATIVE
LIPASE: 117 U/L (ref 13–60)
LYMPHOCYTES ABSOLUTE: 1.52 E9/L (ref 1.5–4)
LYMPHOCYTES RELATIVE PERCENT: 10.5 % (ref 20–42)
MCH RBC QN AUTO: 33.6 PG (ref 26–35)
MCHC RBC AUTO-ENTMCNC: 33.6 % (ref 32–34.5)
MCV RBC AUTO: 100 FL (ref 80–99.9)
MONOCYTES ABSOLUTE: 1.45 E9/L (ref 0.1–0.95)
MONOCYTES RELATIVE PERCENT: 10.1 % (ref 2–12)
NEUTROPHILS ABSOLUTE: 11.21 E9/L (ref 1.8–7.3)
NEUTROPHILS RELATIVE PERCENT: 77.7 % (ref 43–80)
NITRITE, URINE: NEGATIVE
PDW BLD-RTO: 13.1 FL (ref 11.5–15)
PH UA: 5.5 (ref 5–9)
PLATELET # BLD: 223 E9/L (ref 130–450)
PMV BLD AUTO: 10.4 FL (ref 7–12)
POTASSIUM SERPL-SCNC: 5 MMOL/L (ref 3.5–5)
PROTEIN UA: ABNORMAL MG/DL
RBC # BLD: 4.34 E12/L (ref 3.8–5.8)
RBC UA: NORMAL /HPF (ref 0–2)
SODIUM BLD-SCNC: 136 MMOL/L (ref 132–146)
SPECIFIC GRAVITY UA: >=1.03 (ref 1–1.03)
TOTAL PROTEIN: 7.1 G/DL (ref 6.4–8.3)
TROPONIN: <0.01 NG/ML (ref 0–0.03)
UROBILINOGEN, URINE: 0.2 E.U./DL
WBC # BLD: 14.4 E9/L (ref 4.5–11.5)
WBC UA: NORMAL /HPF (ref 0–5)

## 2019-04-23 PROCEDURE — 6360000002 HC RX W HCPCS: Performed by: SURGERY

## 2019-04-23 PROCEDURE — 1200000000 HC SEMI PRIVATE

## 2019-04-23 PROCEDURE — 83690 ASSAY OF LIPASE: CPT

## 2019-04-23 PROCEDURE — 2500000003 HC RX 250 WO HCPCS: Performed by: NURSE PRACTITIONER

## 2019-04-23 PROCEDURE — 99285 EMERGENCY DEPT VISIT HI MDM: CPT

## 2019-04-23 PROCEDURE — 2580000003 HC RX 258: Performed by: SURGERY

## 2019-04-23 PROCEDURE — 84484 ASSAY OF TROPONIN QUANT: CPT

## 2019-04-23 PROCEDURE — 6360000002 HC RX W HCPCS: Performed by: NURSE PRACTITIONER

## 2019-04-23 PROCEDURE — 81001 URINALYSIS AUTO W/SCOPE: CPT

## 2019-04-23 PROCEDURE — 96375 TX/PRO/DX INJ NEW DRUG ADDON: CPT

## 2019-04-23 PROCEDURE — 93005 ELECTROCARDIOGRAM TRACING: CPT

## 2019-04-23 PROCEDURE — 36415 COLL VENOUS BLD VENIPUNCTURE: CPT

## 2019-04-23 PROCEDURE — 85025 COMPLETE CBC W/AUTO DIFF WBC: CPT

## 2019-04-23 PROCEDURE — 80053 COMPREHEN METABOLIC PANEL: CPT

## 2019-04-23 PROCEDURE — 96374 THER/PROPH/DIAG INJ IV PUSH: CPT

## 2019-04-23 PROCEDURE — 83605 ASSAY OF LACTIC ACID: CPT

## 2019-04-23 PROCEDURE — 6360000004 HC RX CONTRAST MEDICATION: Performed by: RADIOLOGY

## 2019-04-23 PROCEDURE — 74177 CT ABD & PELVIS W/CONTRAST: CPT

## 2019-04-23 PROCEDURE — 2580000003 HC RX 258: Performed by: NURSE PRACTITIONER

## 2019-04-23 RX ORDER — ONDANSETRON 2 MG/ML
4 INJECTION INTRAMUSCULAR; INTRAVENOUS ONCE
Status: COMPLETED | OUTPATIENT
Start: 2019-04-23 | End: 2019-04-23

## 2019-04-23 RX ORDER — ONDANSETRON 2 MG/ML
4 INJECTION INTRAMUSCULAR; INTRAVENOUS EVERY 6 HOURS PRN
Status: DISCONTINUED | OUTPATIENT
Start: 2019-04-23 | End: 2019-04-25 | Stop reason: HOSPADM

## 2019-04-23 RX ORDER — MORPHINE SULFATE 2 MG/ML
2 INJECTION, SOLUTION INTRAMUSCULAR; INTRAVENOUS
Status: DISCONTINUED | OUTPATIENT
Start: 2019-04-23 | End: 2019-04-25 | Stop reason: HOSPADM

## 2019-04-23 RX ORDER — 0.9 % SODIUM CHLORIDE 0.9 %
1000 INTRAVENOUS SOLUTION INTRAVENOUS ONCE
Status: COMPLETED | OUTPATIENT
Start: 2019-04-23 | End: 2019-04-23

## 2019-04-23 RX ORDER — ACETAMINOPHEN 325 MG/1
650 TABLET ORAL EVERY 4 HOURS PRN
Status: DISCONTINUED | OUTPATIENT
Start: 2019-04-23 | End: 2019-04-25 | Stop reason: HOSPADM

## 2019-04-23 RX ORDER — SODIUM CHLORIDE 0.9 % (FLUSH) 0.9 %
10 SYRINGE (ML) INJECTION PRN
Status: DISCONTINUED | OUTPATIENT
Start: 2019-04-23 | End: 2019-04-25 | Stop reason: HOSPADM

## 2019-04-23 RX ORDER — SODIUM CHLORIDE 0.9 % (FLUSH) 0.9 %
10 SYRINGE (ML) INJECTION EVERY 12 HOURS SCHEDULED
Status: DISCONTINUED | OUTPATIENT
Start: 2019-04-23 | End: 2019-04-25 | Stop reason: HOSPADM

## 2019-04-23 RX ORDER — MORPHINE SULFATE 4 MG/ML
4 INJECTION, SOLUTION INTRAMUSCULAR; INTRAVENOUS
Status: DISCONTINUED | OUTPATIENT
Start: 2019-04-23 | End: 2019-04-25 | Stop reason: HOSPADM

## 2019-04-23 RX ORDER — PROCHLORPERAZINE MALEATE 5 MG/1
10 TABLET ORAL ONCE
Status: DISCONTINUED | OUTPATIENT
Start: 2019-04-23 | End: 2019-04-23

## 2019-04-23 RX ORDER — SODIUM CHLORIDE, SODIUM LACTATE, POTASSIUM CHLORIDE, CALCIUM CHLORIDE 600; 310; 30; 20 MG/100ML; MG/100ML; MG/100ML; MG/100ML
INJECTION, SOLUTION INTRAVENOUS CONTINUOUS
Status: DISCONTINUED | OUTPATIENT
Start: 2019-04-23 | End: 2019-04-25 | Stop reason: HOSPADM

## 2019-04-23 RX ORDER — IBUPROFEN 400 MG/1
400 TABLET ORAL ONCE
Status: DISCONTINUED | OUTPATIENT
Start: 2019-04-23 | End: 2019-04-23

## 2019-04-23 RX ADMIN — SODIUM CHLORIDE, POTASSIUM CHLORIDE, SODIUM LACTATE AND CALCIUM CHLORIDE: 600; 310; 30; 20 INJECTION, SOLUTION INTRAVENOUS at 21:34

## 2019-04-23 RX ADMIN — ONDANSETRON 4 MG: 2 INJECTION INTRAMUSCULAR; INTRAVENOUS at 16:56

## 2019-04-23 RX ADMIN — FAMOTIDINE 20 MG: 10 INJECTION, SOLUTION INTRAVENOUS at 16:57

## 2019-04-23 RX ADMIN — IOPAMIDOL 80 ML: 755 INJECTION, SOLUTION INTRAVENOUS at 18:21

## 2019-04-23 RX ADMIN — IOHEXOL 50 ML: 240 INJECTION, SOLUTION INTRATHECAL; INTRAVASCULAR; INTRAVENOUS; ORAL at 18:20

## 2019-04-23 RX ADMIN — SODIUM CHLORIDE 1000 ML: 9 INJECTION, SOLUTION INTRAVENOUS at 16:55

## 2019-04-23 RX ADMIN — ONDANSETRON 4 MG: 2 INJECTION INTRAMUSCULAR; INTRAVENOUS at 21:33

## 2019-04-23 ASSESSMENT — PAIN DESCRIPTION - PAIN TYPE
TYPE: ACUTE PAIN
TYPE: CHRONIC PAIN

## 2019-04-23 ASSESSMENT — PAIN DESCRIPTION - DESCRIPTORS
DESCRIPTORS: ACHING;DISCOMFORT
DESCRIPTORS: ACHING;DISCOMFORT

## 2019-04-23 ASSESSMENT — PAIN SCALES - GENERAL
PAINLEVEL_OUTOF10: 8
PAINLEVEL_OUTOF10: 3
PAINLEVEL_OUTOF10: 7

## 2019-04-23 ASSESSMENT — PAIN DESCRIPTION - LOCATION
LOCATION: BACK
LOCATION: ABDOMEN;LEG

## 2019-04-23 ASSESSMENT — PAIN - FUNCTIONAL ASSESSMENT: PAIN_FUNCTIONAL_ASSESSMENT: PREVENTS OR INTERFERES WITH MANY ACTIVE NOT PASSIVE ACTIVITIES

## 2019-04-23 ASSESSMENT — PAIN DESCRIPTION - ONSET
ONSET: ON-GOING
ONSET: ON-GOING

## 2019-04-23 ASSESSMENT — PAIN DESCRIPTION - FREQUENCY
FREQUENCY: CONTINUOUS
FREQUENCY: INTERMITTENT

## 2019-04-23 ASSESSMENT — PAIN DESCRIPTION - PROGRESSION: CLINICAL_PROGRESSION: GRADUALLY WORSENING

## 2019-04-23 NOTE — ED PROVIDER NOTES
transforaminal nerve block left cervical #3    NERVE BLOCK Left 07/25/2016    shoulder    NERVE BLOCK Left 08/15/2016    left shoulder injection #2    NERVE BLOCK Right 08/22/2016    shoulder    NERVE BLOCK Right 08/29/2016    shoulder injection #2    OPEN PROX HUMERAL FRACTURE PROSHETIC REPLACEMENT Left 10/23/2018    LEFT CLAVICLE OPEN REDUCTION INTERNAL FIXATION performed by Nicole Barnett DO at [facility] Left 101/10/2014    repair left hand, finger laceration    [place] CHEST SURGERY PROCEDURE UNLISTED Left 8/6/2018    LEFT SIDED VIDEO ASSISTED THORACOSCOPY WITH RIB PLATING performed by Doris Bonds MD at [address] Bilateral     SHOULDER ARTHROSCOPY Right 12/08/2016    repair rotator cuff, bursectomy, debridement glenohumerol    Social History:  reports that he has quit smoking. His smokeless tobacco use includes chew. He reports that he does not drink alcohol or use drugs. Family History: family history includes Diabetes in his mother. Allergies: Sulfa antibiotics    Physical Exam           ED Triage Vitals [04/23/19 1620]   BP Temp Temp Source Pulse Resp SpO2 Height Weight   122/70 98 °F (36.7 °C) Oral 112 18 97 % 5' 7\" (1.702 m) 144 lb (65.3 kg)      Oxygen Saturation Interpretation: Normal.    · General Appearance/Constitutional:  Alert, development consistent with age. · HEENT:  NC/NT. PERRLA. Airway patent. · Neck:  Supple. No lymphadenopathy. · Respiratory: Lungs Clear to auscultation and breath sounds equal.  · CV:  Regular rate and rhythm. · GI:  General Appearance: normal.         Bowel sounds: normal bowel sounds. Distension:  None. Tenderness: No abdominal tenderness. Liver: non-tender. Spleen:  non-palpable and non-tender. Pulsatile Mass: absent. Hernia:  no inguinal or femoral hernias noted. · Back: CVA Tenderness: No.  · Integument:  Normal turgor. Warm, dry, without visible rash, unless noted elsewhere. · Neurological:  Orientation age-appropriate. Motor functions intact.     Lab / Imaging Results   (All laboratory and radiology results have been personally reviewed by myself)  Labs:  Results for orders placed or performed during the hospital encounter of 04/23/19   Comprehensive Metabolic Panel   Result Value Ref Range    Sodium 136 132 - 146 mmol/L    Potassium 5.0 3.5 - 5.0 mmol/L    Chloride 94 (L) 98 - 107 mmol/L    CO2 25 22 - 29 mmol/L    Anion Gap 17 (H) 7 - 16 mmol/L    Glucose 147 (H) 74 - 99 mg/dL    BUN 35 (H) 8 - 23 mg/dL    CREATININE 1.4 (H) 0.7 - 1.2 mg/dL    GFR Non-African American 49 >=60 mL/min/1.73    GFR African American 59     Calcium 9.7 8.6 - 10.2 mg/dL    Total Protein 7.1 6.4 - 8.3 g/dL    Alb 4.2 3.5 - 5.2 g/dL    Total Bilirubin 0.5 0.0 - 1.2 mg/dL    Alkaline Phosphatase 57 40 - 129 U/L    ALT 11 0 - 40 U/L    AST 22 0 - 39 U/L   CBC Auto Differential   Result Value Ref Range    WBC 14.4 (H) 4.5 - 11.5 E9/L    RBC 4.34 3.80 - 5.80 E12/L    Hemoglobin 14.6 12.5 - 16.5 g/dL    Hematocrit 43.4 37.0 - 54.0 %    .0 (H) 80.0 - 99.9 fL    MCH 33.6 26.0 - 35.0 pg    MCHC 33.6 32.0 - 34.5 %    RDW 13.1 11.5 - 15.0 fL    Platelets 100 595 - 444 E9/L    MPV 10.4 7.0 - 12.0 fL    Neutrophils % 77.7 43.0 - 80.0 %    Immature Granulocytes % 0.7 0.0 - 5.0 %    Lymphocytes % 10.5 (L) 20.0 - 42.0 %    Monocytes % 10.1 2.0 - 12.0 %    Eosinophils % 0.7 0.0 - 6.0 %    Basophils % 0.3 0.0 - 2.0 %    Neutrophils # 11.21 (H) 1.80 - 7.30 E9/L    Immature Granulocytes # 0.10 E9/L    Lymphocytes # 1.52 1.50 - 4.00 E9/L    Monocytes # 1.45 (H) 0.10 - 0.95 E9/L    Eosinophils # 0.10 0.05 - 0.50 E9/L    Basophils # 0.04 0.00 - 0.20 E9/L   Lipase   Result Value Ref Range    Lipase 117 (H) 13 - 60 U/L   Urinalysis   Result Value Ref Range    Color, UA Yellow Straw/Yellow    Clarity, UA Clear Clear    Glucose, Ur Negative Negative mg/dL    Bilirubin Urine Negative Negative    Ketones, Urine 15 (A) Negative mg/dL    Specific Gravity, UA >=1.030 1.005 - 1.030    Blood, Urine Negative Negative    pH, UA 5.5 5.0 - 9.0    Protein, UA TRACE Negative mg/dL    Urobilinogen, Urine 0.2 <2.0 E.U./dL    Nitrite, Urine Negative Negative    Leukocyte Esterase, Urine Negative Negative   Troponin   Result Value Ref Range    Troponin <0.01 0.00 - 0.03 ng/mL   Lactic acid, plasma   Result Value Ref Range    Lactic Acid 4.0 (H) 0.5 - 2.2 mmol/L   Microscopic Urinalysis   Result Value Ref Range    WBC, UA 1-3 0 - 5 /HPF    RBC, UA 0-1 0 - 2 /HPF    Epi Cells RARE /HPF    Bacteria, UA NONE /HPF   EKG 12 Lead   Result Value Ref Range    Ventricular Rate 100 BPM    Atrial Rate 100 BPM    P-R Interval 162 ms    QRS Duration 82 ms    Q-T Interval 322 ms    QTc Calculation (Bazett) 415 ms    P Axis 48 degrees    R Axis -4 degrees    T Axis 127 degrees     Imaging: All Radiology results interpreted by Radiologist unless otherwise noted. CT ABDOMEN PELVIS W IV CONTRAST Additional Contrast? Oral   Final Result   1. Dilated loops of bowel noted with transitional narrowing in the   pelvis consistent with a small bowel obstruction. EKG #1:  Interpreted by emergency department physician unless otherwise noted. Time:  1651    Rate: 100  Rhythm: Sinus. Interpretation: normal sinus rhythm, unchanged from previous tracings. ED Course / Medical Decision Making     Medications   0.9 % sodium chloride bolus (1,000 mLs Intravenous New Bag 4/23/19 1655)   ondansetron (ZOFRAN) injection 4 mg (4 mg Intravenous Given 4/23/19 1656)   famotidine (PEPCID) injection 20 mg (20 mg Intravenous Given 4/23/19 1657)   iopamidol (ISOVUE-370) 76 % injection 80 mL (80 mLs Intravenous Given 4/23/19 1821)   iohexol (OMNIPAQUE 240) injection 50 mL (50 mLs Oral Given 4/23/19 1820)        Re-Evaluations:  4/23/19      Time: 4594    Patients symptoms are improving.     Consultations:             IP CONSULT TO GENERAL SURGERY  1900: Spoke with Dr Deandre Mehta and he will admit the patient for further treatment and observation. Procedures:   none    MDM:  Patient presented with abdominal pain, nausea, vomiting. He is found to have a small bowel obstruction. Gen. surgery was consulted and the patient be admitted for further treatment and observation. Counseling:   I have spoken with the spouse and patient and discussed todays results, in addition to providing specific details for the plan of care and counseling regarding the diagnosis and prognosis and are agreeable with the plan. Assessment      1. SBO (small bowel obstruction) ([facility] [address]      This patient's ED course included: a personal history and physicial examination  This patient has remained hemodynamically stable during their ED course. Plan   Admit to med/surg floor. Patient condition is stable. New Medications     New Prescriptions    No medications on file     Electronically signed by VIKRAM Dumont Chi, CNP   DD: 4/23/19  **This report was transcribed using voice recognition software. Every effort was made to ensure accuracy; however, inadvertent computerized transcription errors may be present.   END OF PROVIDER NOTE     VIKRAM Erickson CNP  04/23/19 7022

## 2019-04-23 NOTE — ED NOTES
Pt. C/o abdominal pain since Sunday, pt. also c/o hiccups since last night.      Bryant Hernandez, MICA  04/23/19 5444

## 2019-04-24 LAB
ALBUMIN SERPL-MCNC: 3.5 G/DL (ref 3.5–5.2)
ALP BLD-CCNC: 49 U/L (ref 40–129)
ALT SERPL-CCNC: 9 U/L (ref 0–40)
ANION GAP SERPL CALCULATED.3IONS-SCNC: 12 MMOL/L (ref 7–16)
AST SERPL-CCNC: 14 U/L (ref 0–39)
BILIRUB SERPL-MCNC: 0.5 MG/DL (ref 0–1.2)
BUN BLDV-MCNC: 28 MG/DL (ref 8–23)
CALCIUM SERPL-MCNC: 8.9 MG/DL (ref 8.6–10.2)
CHLORIDE BLD-SCNC: 101 MMOL/L (ref 98–107)
CO2: 27 MMOL/L (ref 22–29)
CREAT SERPL-MCNC: 1.2 MG/DL (ref 0.7–1.2)
GFR AFRICAN AMERICAN: >60
GFR NON-AFRICAN AMERICAN: 58 ML/MIN/1.73
GLUCOSE BLD-MCNC: 97 MG/DL (ref 74–99)
HCT VFR BLD CALC: 37.7 % (ref 37–54)
HEMOGLOBIN: 12.6 G/DL (ref 12.5–16.5)
MCH RBC QN AUTO: 33.5 PG (ref 26–35)
MCHC RBC AUTO-ENTMCNC: 33.4 % (ref 32–34.5)
MCV RBC AUTO: 100.3 FL (ref 80–99.9)
PDW BLD-RTO: 13.2 FL (ref 11.5–15)
PLATELET # BLD: 175 E9/L (ref 130–450)
PMV BLD AUTO: 10.1 FL (ref 7–12)
POTASSIUM REFLEX MAGNESIUM: 4.2 MMOL/L (ref 3.5–5)
RBC # BLD: 3.76 E12/L (ref 3.8–5.8)
SODIUM BLD-SCNC: 140 MMOL/L (ref 132–146)
TOTAL PROTEIN: 6 G/DL (ref 6.4–8.3)
WBC # BLD: 8.9 E9/L (ref 4.5–11.5)

## 2019-04-24 PROCEDURE — 1200000000 HC SEMI PRIVATE

## 2019-04-24 PROCEDURE — 85027 COMPLETE CBC AUTOMATED: CPT

## 2019-04-24 PROCEDURE — 97530 THERAPEUTIC ACTIVITIES: CPT

## 2019-04-24 PROCEDURE — 6370000000 HC RX 637 (ALT 250 FOR IP): Performed by: FAMILY MEDICINE

## 2019-04-24 PROCEDURE — 99223 1ST HOSP IP/OBS HIGH 75: CPT | Performed by: SURGERY

## 2019-04-24 PROCEDURE — 36415 COLL VENOUS BLD VENIPUNCTURE: CPT

## 2019-04-24 PROCEDURE — 80053 COMPREHEN METABOLIC PANEL: CPT

## 2019-04-24 PROCEDURE — 6360000002 HC RX W HCPCS: Performed by: SURGERY

## 2019-04-24 PROCEDURE — 97165 OT EVAL LOW COMPLEX 30 MIN: CPT

## 2019-04-24 PROCEDURE — 97116 GAIT TRAINING THERAPY: CPT | Performed by: PHYSICAL THERAPIST

## 2019-04-24 PROCEDURE — 2580000003 HC RX 258: Performed by: SURGERY

## 2019-04-24 PROCEDURE — 97161 PT EVAL LOW COMPLEX 20 MIN: CPT | Performed by: PHYSICAL THERAPIST

## 2019-04-24 RX ORDER — ASPIRIN 81 MG/1
81 TABLET ORAL DAILY
Status: DISCONTINUED | OUTPATIENT
Start: 2019-04-24 | End: 2019-04-25 | Stop reason: HOSPADM

## 2019-04-24 RX ORDER — TIMOLOL MALEATE 5 MG/ML
1 SOLUTION/ DROPS OPHTHALMIC DAILY
Status: DISCONTINUED | OUTPATIENT
Start: 2019-04-24 | End: 2019-04-25 | Stop reason: HOSPADM

## 2019-04-24 RX ORDER — FLUTICASONE PROPIONATE 50 MCG
2 SPRAY, SUSPENSION (ML) NASAL DAILY
Status: DISCONTINUED | OUTPATIENT
Start: 2019-04-24 | End: 2019-04-25 | Stop reason: HOSPADM

## 2019-04-24 RX ORDER — LATANOPROST 50 UG/ML
1 SOLUTION/ DROPS OPHTHALMIC NIGHTLY
Status: DISCONTINUED | OUTPATIENT
Start: 2019-04-24 | End: 2019-04-25 | Stop reason: HOSPADM

## 2019-04-24 RX ORDER — LEVOTHYROXINE SODIUM 0.03 MG/1
25 TABLET ORAL DAILY
Status: DISCONTINUED | OUTPATIENT
Start: 2019-04-24 | End: 2019-04-25 | Stop reason: HOSPADM

## 2019-04-24 RX ORDER — CITALOPRAM 20 MG/1
40 TABLET ORAL DAILY
Status: DISCONTINUED | OUTPATIENT
Start: 2019-04-24 | End: 2019-04-25 | Stop reason: HOSPADM

## 2019-04-24 RX ADMIN — LEVOTHYROXINE SODIUM 25 MCG: 25 TABLET ORAL at 10:25

## 2019-04-24 RX ADMIN — FLUTICASONE PROPIONATE 2 SPRAY: 50 SPRAY, METERED NASAL at 14:35

## 2019-04-24 RX ADMIN — CITALOPRAM HYDROBROMIDE 40 MG: 20 TABLET ORAL at 10:25

## 2019-04-24 RX ADMIN — ENOXAPARIN SODIUM 40 MG: 40 INJECTION SUBCUTANEOUS at 10:26

## 2019-04-24 RX ADMIN — LATANOPROST 1 DROP: 50 SOLUTION OPHTHALMIC at 20:18

## 2019-04-24 RX ADMIN — SODIUM CHLORIDE, POTASSIUM CHLORIDE, SODIUM LACTATE AND CALCIUM CHLORIDE: 600; 310; 30; 20 INJECTION, SOLUTION INTRAVENOUS at 09:18

## 2019-04-24 RX ADMIN — ASPIRIN 81 MG: 81 TABLET, COATED ORAL at 10:26

## 2019-04-24 ASSESSMENT — PAIN SCALES - GENERAL
PAINLEVEL_OUTOF10: 0

## 2019-04-24 NOTE — PROGRESS NOTES
HPI:  Kristan Ruiz presented through [facility] emergency department yesterday for the evaluation of intractable abdominal pain. Workup in the emergency department revealed partial small bowel obstruction. Since presenting to the 3rd floor, he has been passing flatus and had a bowel movement. He states that his abdominal pain has improved. The surgical team will evaluate in diet will be advanced at their discretion. Otherwise, the patient believes he has returned to his baseline. Patient voiced no new complaints since hospital admission. Questions, answers, and tests reviewed. ROS:  Cardiovascular:   Denies any chest pain, irregular heartbeats, or palpitations. Respiratory:   Denies shortness of breath, coughing, sputum production, hemoptysis, or wheezing. Gastrointestinal:   No further abdominal pain. No nausea or vomiting. He is now passing flatus and had a bowel movement. Extremities:   Denies any lower extremity swelling or edema. Neurology:    Denies any headache or focal neurological deficits. No weakness or paresthesia. Derm:    Denies any rashes, ulcers, or excoriations. Denies bruising. Genitourinary:    Denies any urgency, frequency, hematuria. Voiding without difficulty. Physical Exam:    Vitals:    04/24/19 0823   BP: (!) 94/55   Pulse: 84   Resp: 16   Temp: 99.1 °F (37.3 °C)   SpO2: 94%       HEENT:  PERRLA. EOMI. Sclera clear. Buccal mucosa moist.    Neck:  Supple. Trachea midline. No thyromegaly. No JVD. No bruits. Heart:  Rhythm regular, rate controlled. Systolic murmur. Lungs:  Symmetrical. Clear to auscultation bilaterally. No wheezes. No rhonchi. No rales. Abdomen: Soft. Mildly tender to palpation. Bowel sounds are active. Extremities:  Peripheral pulses present. No peripheral edema. No ulcers. Neurologic:  Alert x 3. No focal deficit. Cranial nerves grossly intact. Skin:  No petechia. No hemorrhage. No wounds.     CBC with Differential:    Lab Results Component Value Date    WBC 8.9 04/24/2019    RBC 3.76 04/24/2019    HGB 12.6 04/24/2019    HCT 37.7 04/24/2019     04/24/2019    .3 04/24/2019    MCH 33.5 04/24/2019    MCHC 33.4 04/24/2019    RDW 13.2 04/24/2019    LYMPHOPCT 10.5 04/23/2019    MONOPCT 10.1 04/23/2019    BASOPCT 0.3 04/23/2019    MONOSABS 1.45 04/23/2019    LYMPHSABS 1.52 04/23/2019    EOSABS 0.10 04/23/2019    BASOSABS 0.04 04/23/2019     BMP:    Lab Results   Component Value Date     04/24/2019    K 4.2 04/24/2019     04/24/2019    CO2 27 04/24/2019    BUN 28 04/24/2019    LABALBU 3.5 04/24/2019    CREATININE 1.2 04/24/2019    CALCIUM 8.9 04/24/2019    GFRAA >60 04/24/2019    LABGLOM 58 04/24/2019    GLUCOSE 97 04/24/2019       Other Data:      Intake/Output Summary (Last 24 hours) at 4/24/2019 0939  Last data filed at 4/24/2019 0728  Gross per 24 hour   Intake 0 ml   Output 875 ml   Net -875 ml         Scheduled Medications:   aspirin EC  81 mg Oral Daily    citalopram  40 mg Oral Daily    fluticasone  2 spray Each Nare Daily    latanoprost  1 drop Both Eyes Nightly    levothyroxine  25 mcg Oral Daily    timolol  1 drop Both Eyes Daily    sodium chloride flush  10 mL Intravenous 2 times per day    enoxaparin  40 mg Subcutaneous Daily         Infusion Medications:   lactated ringers 75 mL/hr at 04/24/19 0918       Assessment:   1. Partial small bowel obstruction with resolution  2. Chronic kidney disease stage III  3. Hypothyroidism  4. Hyperlipidemia  5. Gastroesophageal reflux disease  6. Non-insulin-dependent diabetes mellitus type II  7. Depression  8. Chronic pain  9. Coronary artery disease    Plan:   The patient is now passing flatus and did have a bowel movement. Diet will be advanced at the discretion of the surgical team. We will maintain IV fluid resuscitation. Lab work and vital signs are otherwise approaching his baseline. I have encouraged him to become more ambulatory.  With ongoing improvement

## 2019-04-24 NOTE — PROGRESS NOTES
wheeled walker, cane, tub transfer bench, grab bars   Cognition: oriented x 4; follows 3 step directions. good  Problem solving skills   good  Memory    good  Sequencing  Communication: intact   Visual perceptual skills: intact     Glasses: no   Edema: no     Sensation: intact   Hand Dominance:  Left     X  Right     Left Right Comment   Passive range of motion [facility]     Active range of motion [facility]     Muscle Grade 4/5 4/5    /pinch Strength Intact  Intact     [] Malnutrition indicators have been identified and nursing has been notified to ensure a dietitian consult is ordered. Function Assessment    Status  Goal Comment   Feeding:  Independent   Independent      Grooming:  Independent Independent      UE dressing:  Supervision  Independent     LE dressing:  Supervision  Independent     Bathing:  Supervision  Independent     Bed Mobility:     Supervision  for supine to sit,   Supervision  for scooting,  Supervision  for sit to supine  Independent     Functional Transfers:    Supervision for sit to stand transfer from EOB  Independent  Safety: good    Functional Mobility: 350 feet with no device and Supervision Independent       Pt/family participated in establishment of goals. Balance:   Sitting: good   Standing: good with no device    Endurance: good       Assessment of Current Deficits:   [x]Functional mobility  []ROM  [x]Strength   []Cognition   []Safety Awareness    [x]ADLs [x]IADLs   [x]Endurance  []Balance    []Vision  []Sensation     []Gross Motor Coordination       []Fine Motor Coordination     · Low Complexity  · History: Brief history including review of medical records relating to the problem  · Exam: 1-3 performance Deficits  · Assistance/Modification: No assistance or modifications required to perform tasks. No comorbities affecting occupational performance.     Patients Goal: return home   Treatment: OT eval, bed mobility, sitting balance at EOB for 5 minutes with no assist, transfer training with verbal cues for hand placement, static standing balance with no device, functional mobility with no device, pt ascended and descended steps with PT, OT present for line management, pt returned to room and was returned to supine at end of eval. All needs within reach. Spouse present. Rehab Potential: good   Plan of care: Patient will be seen by OT 1-3 times a week for therapeutic activity, ADL re-training, bed mobility, functional transfers, functional mobility, safety and fall prevention, balance and endurance activities, instruction in energy conservation principles, and patient/family education. Patient and/or family understands diagnosis, prognosis, education and plan of care. Pt/family verbalized understanding.      Time Code treatment minutes: Maay OTR/L #[id number]

## 2019-04-24 NOTE — PROGRESS NOTES
Physical Therapy    [id number]/0338-02    PHYSICAL THERAPY INITIAL EVALUATION  Tentative placement recommendation: home   Equipment prescriptions needed:   none  Plan of care: Patient will be seen  2-3 / week for therapeutic exercise, functional retraining, endurance activities, balance exercises, family and patient education. AM-PAC Basic Mobility        AM-[facility] Mobility Inpatient   How much difficulty turning over in bed?: None  How much difficulty sitting down on / standing up from a chair with arms?: None  How much difficulty moving from lying on back to sitting on side of bed?: None  How much help from another person moving to and from a bed to a chair?: None  How much help from another person needed to walk in hospital room?: None  How much help from another person for climbing 3-5 steps with a railing?: A Little  AM-[facility] Inpatient Mobility Raw Score : 23  AM-[facility]AC Inpatient T-Scale Score : 56.93  Mobility Inpatient CMS 0-100% Score: 11.2  Mobility Inpatient CMS G-Code Modifier : CI    Order: evaluate and treat  Diagnosis:    1.  SBO (small bowel obstruction) (HCC)       Past medical history:       Diagnosis Date    Arthritis     CAD (coronary artery disease)     Cancer ([address]     Chronic pain     Clavicle fracture     Depression     Diabetes mellitus ([address]     GERD (gastroesophageal reflux disease)     Headache(784.0)     History of blood transfusion     Hyperlipidemia     Kidney stone     Neck pain     Osteoarthritis     Rib fracture     Thyroid disease    ;      Procedure Laterality Date    ABDOMEN SURGERY      BACK SURGERY      x2 lumbar    CARDIAC SURGERY      CATARACT REMOVAL WITH IMPLANT Left 10/08/13    CATARACT REMOVAL WITH IMPLANT Right 12/10/13    CERVICAL SPINE SURGERY  2012    cervical fusion    COLON SURGERY      due to diverticulitis colon resection    CORONARY ANGIOPLASTY WITH STENT PLACEMENT  2005    x2 stents    DILATATION, ESOPHAGUS      EYE SURGERY      FRACTURE SURGERY      Lt. Jaw Plate    JOINT REPLACEMENT      NERVE BLOCK Left 3/7/16    cervical transforaminal #1    NERVE BLOCK Left 3/28/16    cervical transforaminal #2    NERVE BLOCK Left 04 04 2016    transforaminal nerve block left cervical #3    NERVE BLOCK Left 07/25/2016    shoulder    NERVE BLOCK Left 08/15/2016    left shoulder injection #2    NERVE BLOCK Right 08/22/2016    shoulder    NERVE BLOCK Right 08/29/2016    shoulder injection #2    OPEN PROX HUMERAL FRACTURE PROSHETIC REPLACEMENT Left 10/23/2018    LEFT CLAVICLE OPEN REDUCTION INTERNAL FIXATION performed by Lashell Sarabia DO at [facility] Left 101/10/2014    repair left hand, finger laceration    [place] CHEST SURGERY PROCEDURE UNLISTED Left 8/6/2018    LEFT SIDED VIDEO ASSISTED THORACOSCOPY WITH RIB PLATING performed by Amanda Sunshine MD at [address] Bilateral     SHOULDER ARTHROSCOPY Right 12/08/2016    repair rotator cuff, bursectomy, debridement glenohumerol        The admitting diagnosis and active problem list as listed above have been reviewed prior to the initiation of this evaluation. Nursing cleared the patient for evaluation. Patient is agreeable to evaluation.     Precautions: falls  ,   Social history: Patient lives with family wife  in a single family home with 3 step to enter    10 steps to bedroom     Equipment owned: grab bars , tub bench ,  wheeled walker, cane  Mentation: alert, cooperative, oriented x 3 and follows directions,   Prior level of function: independent   ROM: within functional limits    STRENGTH: 4-/5  PAIN: (measured on a visual analog scale with 0=no pain and 10=excruciating pain) 3/10. abdomen  FUNCTIONAL ASSESSMENT   Bed Mobility- Supine to sit-Independent     Scooting- Independent     Sit to supine- Independent   Patient able to dangle on edge of bed for 10 minutes with no assist   Transfers-sit to stand- Independent   Gait:  Patient ambulated 250 feet using   No device     Independent    Up and down 5 steps with one rail independent   Balance: sit-good         stand good       Edema: no  Endurance: good       Treatment:evaluation;  Gait/steps  Patient/family education:effects of immobilty      Rehab Potential: good  for baseline  Patients Goal: None stated   ASSESSMENT  Patient exhibits decreased   strength, endurance,  balance, coordination impairing functional mobility. Goals to be met in 2 days. Bed mobility-Independent  Transfers-Independent  Ambulation-Independent for 100 feet without device  Stairs-up and down 10  step(s) using 1 rail(s) with Supervision   Comments:       Increase strength in affected muscle groups by 1/3 grade  Increase balance to allow for improvement towards functional goals. Increase endurance to allow for improvement towards functional goals.

## 2019-04-24 NOTE — PROGRESS NOTES
P Quality Flow/Interdisciplinary Rounds Progress Note        Quality Flow Rounds held on April 24, 2019    Disciplines Attending:  Bedside Nurse, ,  and Nursing Unit Leadership    Paula Hall was admitted on 4/23/2019  4:27 PM    Anticipated Discharge Date:  Expected Discharge Date: 04/27/19    Disposition:    Ozzy Score:  Ozzy Scale Score: 20    Readmission Risk              Risk of Unplanned Readmission:        10           Discussed patient goal for the day, patient clinical progression, and barriers to discharge.   The following Goal(s) of the Day/Commitment(s) have been identified:  Possible chance of starting on liquids today since he has moved his bowels      Ericka Nielson  April 24, 2019

## 2019-04-24 NOTE — PLAN OF CARE
Problem: Falls - Risk of:  Goal: Will remain free from falls  Outcome: Met This Shift  Goal: Absence of physical injury  Outcome: Met This Shift     Problem: Pain:  Goal: Pain level will decrease  Outcome: Met This Shift  Goal: Control of acute pain  Outcome: Met This Shift  Goal: Control of chronic pain  Outcome: Met This Shift

## 2019-04-24 NOTE — H&P
General Surgery History and Physical    Patient's Name/Date of Birth: Yokasta Johns / 1940    Date: April 24, 2019     Surgeon: Balbir Mendoza M.D.    PCP: Teresa Rosario MD     Chief Complaint: diffuse abdominal pain    HPI:   Yokasta Johns is a [de-identified] y.o. male who presents for evaluation of diffuse abdominal pain for weeks and worse yesterday and then had several episodes of emesis yesterday which then prompted him to come to ED. Timing is constant, radiation to mid abdomen, alleviated by nothing and started months ago and severity is 3-7/10      Past Medical History:   Diagnosis Date    Arthritis     CAD (coronary artery disease)     Cancer ([address]     Chronic pain     Clavicle fracture     Depression     Diabetes mellitus ([address]     GERD (gastroesophageal reflux disease)     Headache(784.0)     History of blood transfusion     Hyperlipidemia     Kidney stone     Neck pain     Osteoarthritis     Rib fracture     Thyroid disease        Past Surgical History:   Procedure Laterality Date    ABDOMEN SURGERY      BACK SURGERY      x2 lumbar    CARDIAC SURGERY      CATARACT REMOVAL WITH IMPLANT Left 10/08/13    CATARACT REMOVAL WITH IMPLANT Right 12/10/13    CERVICAL SPINE SURGERY  2012    cervical fusion    COLON SURGERY      due to diverticulitis colon resection    CORONARY ANGIOPLASTY WITH STENT PLACEMENT  2005    x2 stents    DILATATION, ESOPHAGUS      EYE SURGERY      FRACTURE SURGERY      Lt. Jaw Plate    JOINT REPLACEMENT      NERVE BLOCK Left 3/7/16    cervical transforaminal #1    NERVE BLOCK Left 3/28/16    cervical transforaminal #2    NERVE BLOCK Left 04 04 2016    transforaminal nerve block left cervical #3    NERVE BLOCK Left 07/25/2016    shoulder    NERVE BLOCK Left 08/15/2016    left shoulder injection #2    NERVE BLOCK Right 08/22/2016    shoulder    NERVE BLOCK Right 08/29/2016    shoulder injection #2    OPEN PROX HUMERAL FRACTURE PROSHETIC REPLACEMENT Left 10/23/2018    LEFT CLAVICLE OPEN REDUCTION INTERNAL FIXATION performed by Jt Martinez DO at [address] Left 101/10/2014    repair left hand, finger laceration    [place] CHEST SURGERY PROCEDURE UNLISTED Left 8/6/2018    LEFT SIDED VIDEO ASSISTED THORACOSCOPY WITH RIB PLATING performed by Den Andesr MD at [facility] Bilateral     SHOULDER ARTHROSCOPY Right 12/08/2016    repair rotator cuff, bursectomy, debridement glenohumerol        Current Facility-Administered Medications   Medication Dose Route Frequency Provider Last Rate Last Dose    aspirin EC tablet 81 mg  81 mg Oral Daily Nikunj Cisneros, DO        citalopram (CELEXA) tablet 40 mg  40 mg Oral Daily Nikunj Cisneros, DO        diclofenac (FLECTOR) 1.3 % patch 1 patch  1 patch Transdermal BID PRN Nikunj Cisneros, DO        fluticasone (FLONASE) 50 MCG/ACT nasal spray 2 spray  2 spray Each Nare Daily Nikunj Cisneros, DO        latanoprost (XALATAN) 0.005 % ophthalmic solution 1 drop  1 drop Both Eyes Nightly Nikunj Cisneros, DO        levothyroxine (SYNTHROID) tablet 25 mcg  25 mcg Oral Daily Nikunj Cisneros, DO        timolol (TIMOPTIC) 0.5 % ophthalmic solution 1 drop  1 drop Both Eyes Daily Nikunj Cisneros, DO        sodium chloride flush 0.9 % injection 10 mL  10 mL Intravenous 2 times per day Justus Estrada MD        sodium chloride flush 0.9 % injection 10 mL  10 mL Intravenous PRN Justus Estrada MD        acetaminophen (TYLENOL) tablet 650 mg  650 mg Oral Q4H PRN Justus Estrada MD        [facility]ansetron [facility]) injection 4 mg  4 mg Intravenous Q6H PRN Justus Estrada MD   4 mg at 04/23/19 2133    enoxaparin (LOVENOX) injection 40 mg  40 mg Subcutaneous Daily Justus Estrada MD        lactated ringers infusion   Intravenous Continuous Justus Estrada MD 75 mL/hr at 04/24/19 0918      morphine (PF) injection 2 mg  2 mg Intravenous Q2H PRN Tillie Habermann, MD        Or    morphine injection 4 mg  4 mg Intravenous Q2H PRN Tillie Habermann, MD           Allergies   Allergen Reactions    Sulfa Antibiotics Hives       The patient has a family history that is negative for severe cardiovascular or respiratory issues, negative for reaction to anesthesia.     Social History     Socioeconomic History    Marital status:      Spouse name: Not on file    Number of children: Not on file    Years of education: Not on file    Highest education level: Not on file   Occupational History    Not on file   Social Needs    Financial resource strain: Not on file    Food insecurity:     Worry: Not on file     Inability: Not on file    Transportation needs:     Medical: Not on file     Non-medical: Not on file   Tobacco Use    Smoking status: Former Smoker    Smokeless tobacco: Current User     Types: Chew   Substance and Sexual Activity    Alcohol use: No    Drug use: No    Sexual activity: Not on file   Lifestyle    Physical activity:     Days per week: Not on file     Minutes per session: Not on file    Stress: Not on file   Relationships    Social connections:     Talks on phone: Not on file     Gets together: Not on file     Attends Hindu service: Not on file     Active member of club or organization: Not on file     Attends meetings of clubs or organizations: Not on file     Relationship status: Not on file    Intimate partner violence:     Fear of current or ex partner: Not on file     Emotionally abused: Not on file     Physically abused: Not on file     Forced sexual activity: Not on file   Other Topics Concern    Not on file   Social History Narrative    ** Merged History Encounter **                Review of Systems  Review of Systems -  General ROS: negative for - chills, fatigue or malaise  ENT ROS: negative for - hearing change, nasal congestion or nasal discharge  Allergy and Immunology ROS: negative for - hives, itchy/watery eyes or nasal congestion  Hematological and Lymphatic ROS: negative for - blood clots, blood transfusions, bruising or fatigue  Endocrine ROS: negative for - malaise/lethargy, mood swings, palpitations or polydipsia/polyuria  Respiratory ROS: negative for - sputum changes, stridor, tachypnea or wheezing  Cardiovascular ROS: negative for - irregular heartbeat, loss of consciousness, murmur or orthopnea  Gastrointestinal ROS: negative for - constipation, diarrhea, gas/bloating, heartburn or hematemesis  Genito-Urinary ROS: negative for -  genital discharge, genital ulcers or hematuria  Musculoskeletal ROS: negative for - gait disturbance, muscle pain or muscular weakness    Physical exam:   BP (!) 94/55   Pulse 84   Temp 99.1 °F (37.3 °C) (Oral)   Resp 16   Ht 5' 7\" (1.702 m)   Wt 144 lb (65.3 kg)   SpO2 94%   BMI 22.55 kg/m²   General appearance:  NAD  Head: NCAT, PERRLA, EOMI, red conjunctiva  Neck: supple, no masses  Lungs: CTAB, equal chest rise bilateral  Heart: Reg rate  Abdomen: soft, nontender, nondistended  Skin; no lesions  Gu: no cva tenderness  Extremities: extremities normal, atraumatic, no cyanosis or edema      Radiology:  CT abdomen/pelvis: sbo    Assessment:  [de-identified] y.o. male with psbo vs enteritis    Plan:  Diet advance and d/c if tolerates.     Bronwyn Blue MD  9:55 AM  4/24/2019

## 2019-04-24 NOTE — CONSULTS
ESOPHAGUS      EYE SURGERY      FRACTURE SURGERY      Lt. Jaw Plate    JOINT REPLACEMENT      NERVE BLOCK Left 3/7/16    cervical transforaminal #1    NERVE BLOCK Left 3/28/16    cervical transforaminal #2    NERVE BLOCK Left 04 04 2016    transforaminal nerve block left cervical #3    NERVE BLOCK Left 07/25/2016    shoulder    NERVE BLOCK Left 08/15/2016    left shoulder injection #2    NERVE BLOCK Right 08/22/2016    shoulder    NERVE BLOCK Right 08/29/2016    shoulder injection #2    OPEN PROX HUMERAL FRACTURE PROSHETIC REPLACEMENT Left 10/23/2018    LEFT CLAVICLE OPEN REDUCTION INTERNAL FIXATION performed by Ramakrishna Peace DO at [address] Left 101/10/2014    repair left hand, finger laceration    [place] CHEST SURGERY PROCEDURE UNLISTED Left 8/6/2018    LEFT SIDED VIDEO ASSISTED THORACOSCOPY WITH RIB PLATING performed by Kavon Chambers MD at [facility] Bilateral     SHOULDER ARTHROSCOPY Right 12/08/2016    repair rotator cuff, bursectomy, debridement glenohumerol        Medications Prior to Admission:    Prior to Admission medications    Medication Sig Start Date End Date Taking? Authorizing Provider   meloxicam (MOBIC) 15 MG tablet  3/15/19  Yes Historical Provider, MD   diclofenac (FLECTOR) 1.3 % patch Place 1 patch onto the skin 2 times daily as needed for Pain (as needed for pain) 4/12/19  Yes Marybeth Callahan MD   HYDROcodone-acetaminophen (NORCO)  MG per tablet Take 1 tablet by mouth every 6 hours as needed for Pain.    Yes Historical Provider, MD   ferrous sulfate 325 (65 Fe) MG tablet Take 325 mg by mouth daily (with breakfast)   Yes Historical Provider, MD   fluticasone (FLONASE) 50 MCG/ACT nasal spray 2 sprays by Nasal route daily 10/22/18  Yes DOUG Drew   meclizine (ANTIVERT) 25 MG tablet Take 25 mg by mouth 3 times daily as needed   Yes Historical Provider, MD   linagliptin-metformin (JENTADUETO) 2.5-1000 MG TABS Take color, texture, turgor normal. No rashes or lesions. Head/face:  NCAT  Eyes:  No gross abnormalities. and PERRL  Lungs:  Normal expansion. Clear to auscultation. No rales, rhonchi, or wheezing. Heart:  Heart sounds are normal.  Regular rate and rhythm without murmur, gallop or rub. Abdomen:  Soft, mild midline tenderness, no guarding  Extremities: Extremities warm to touch, pink, with no edema. LABS:  CBC  Recent Labs     04/23/19  1649   WBC 14.4*   HGB 14.6   HCT 43.4        BMP  Recent Labs     04/23/19  1649      K 5.0   CL 94*   CO2 25   BUN 35*   CREATININE 1.4*   CALCIUM 9.7     Liver Function  Recent Labs     04/23/19  1649   LIPASE 117*   BILITOT 0.5   AST 22   ALT 11   ALKPHOS 57   PROT 7.1   LABALBU 4.2     No results for input(s): LACTATE in the last 72 hours. No results for input(s): INR, PTT in the last 72 hours. Invalid input(s): PT      RADIOLOGY  Ct Abdomen Wo Contrast Additional Contrast? None    Result Date: 3/29/2019  Reading location:  [facility][note 3] HISTORY: 59-year-old male with history of weight loss. TECHNIQUE: Serial axial images of the abdomen were obtained without the use of IV or oral contrast. Reformatted sagittal and coronal images were obtained. One or more of the following dose reduction techniques were used: Automated dose exposure. Adjustment of the mA and/or kV according to patient size Use of iterative reconstruction techniques FINDINGS: The liver is normal in attenuation. The spleen, pancreas and adrenal glands are unremarkable. Bilateral renal cortical thinning is noted. There is no suspicious solid renal mass. Note is made of a 3.2 x 3.6 cm simple left renal cyst. The visualized ureters are unremarkable. There is no renal calculus. The gallbladder and stomach are normally distended. There is no large or small bowel obstruction. There is no intra-abdominal abscess, free air or inflammatory process. The visualized abdominal aorta is normal in caliber.  There is no also obtained. Automated dose control was used for this exam. CONTRAST: 80 mL Isovue-370 intravenous contrast was administered. Oral contrast administered. FINDINGS: SUPPORT DEVICES: None LOWER THORAX: Limited evaluation of the lower chest demonstrates clear lung bases bilaterally. SOLID ORGANS: The liver, spleen, pancreas, and adrenal glands are normal. BILIARY SYSTEM: No evidence of biliary ductal dilatation. GENITOURINARY: Kidneys are atrophic without hydronephrosis. No stones are appreciated. The bladder is unremarkable. Pelvic structures are unremarkable. GASTROINTESTINAL: Dilated loops of small bowel identified in the lower abdomen with transitional narrowing to decompress loops in the pelvis. There is contrast filling the loops proximal to the area of narrowing with fecalized stool compatible with obstruction. No fluid seen in the pelvis or mesentery. APPENDIX: Not well-visualized. FLUID COLLECTIONS: None. LYMPH NODES: No adenopathy by size criteria. VASCULAR STRUCTURES: Visualized vascular structures appear normal. ABDOMINAL WALL: Normal with no herniations. OSSEOUS AND SOFT TISSUE STRUCTURES: Bone windows demonstrate no suspicious osteolytic or osteoblastic lesions. No fracture identified. 1. Dilated loops of bowel noted with transitional narrowing in the pelvis consistent with a small bowel obstruction.          ASSESSMENT/PLAN:  [de-identified] y.o. male acute abdominal pain with nausea and vomiting and PSBO  Thyroid disease  Hyperlipidemia  GERD  Diabetes mellitus type II  Depression  Chronic pain  CAD  LALITO  Lactic acidosis        Admitted by general surgery  Morning lab work  Home medications reviewed and  Activity as tolerated  Diet and pain management per surgery  Nausea control  Electronically signed by Barbra Garcia DO on 4/24/2019 at 12:09 AM

## 2019-04-25 VITALS
TEMPERATURE: 98.3 F | WEIGHT: 144 LBS | SYSTOLIC BLOOD PRESSURE: 119 MMHG | BODY MASS INDEX: 22.6 KG/M2 | DIASTOLIC BLOOD PRESSURE: 71 MMHG | HEIGHT: 67 IN | OXYGEN SATURATION: 93 % | RESPIRATION RATE: 16 BRPM | HEART RATE: 76 BPM

## 2019-04-25 LAB
ANION GAP SERPL CALCULATED.3IONS-SCNC: 7 MMOL/L (ref 7–16)
BASOPHILS ABSOLUTE: 0.04 E9/L (ref 0–0.2)
BASOPHILS RELATIVE PERCENT: 0.6 % (ref 0–2)
BUN BLDV-MCNC: 26 MG/DL (ref 8–23)
CALCIUM SERPL-MCNC: 9.2 MG/DL (ref 8.6–10.2)
CHLORIDE BLD-SCNC: 102 MMOL/L (ref 98–107)
CO2: 29 MMOL/L (ref 22–29)
CREAT SERPL-MCNC: 1.1 MG/DL (ref 0.7–1.2)
EOSINOPHILS ABSOLUTE: 0.2 E9/L (ref 0.05–0.5)
EOSINOPHILS RELATIVE PERCENT: 3.2 % (ref 0–6)
GFR AFRICAN AMERICAN: >60
GFR NON-AFRICAN AMERICAN: >60 ML/MIN/1.73
GLUCOSE BLD-MCNC: 119 MG/DL (ref 74–99)
HCT VFR BLD CALC: 35.4 % (ref 37–54)
HEMOGLOBIN: 12 G/DL (ref 12.5–16.5)
IMMATURE GRANULOCYTES #: 0.04 E9/L
IMMATURE GRANULOCYTES %: 0.6 % (ref 0–5)
LYMPHOCYTES ABSOLUTE: 2.25 E9/L (ref 1.5–4)
LYMPHOCYTES RELATIVE PERCENT: 35.8 % (ref 20–42)
MCH RBC QN AUTO: 34.5 PG (ref 26–35)
MCHC RBC AUTO-ENTMCNC: 33.9 % (ref 32–34.5)
MCV RBC AUTO: 101.7 FL (ref 80–99.9)
MONOCYTES ABSOLUTE: 0.74 E9/L (ref 0.1–0.95)
MONOCYTES RELATIVE PERCENT: 11.8 % (ref 2–12)
NEUTROPHILS ABSOLUTE: 3.02 E9/L (ref 1.8–7.3)
NEUTROPHILS RELATIVE PERCENT: 48 % (ref 43–80)
PDW BLD-RTO: 13.3 FL (ref 11.5–15)
PLATELET # BLD: 155 E9/L (ref 130–450)
PMV BLD AUTO: 10.4 FL (ref 7–12)
POTASSIUM SERPL-SCNC: 4.1 MMOL/L (ref 3.5–5)
RBC # BLD: 3.48 E12/L (ref 3.8–5.8)
SODIUM BLD-SCNC: 138 MMOL/L (ref 132–146)
TSH SERPL DL<=0.05 MIU/L-ACNC: 1 UIU/ML (ref 0.27–4.2)
WBC # BLD: 6.3 E9/L (ref 4.5–11.5)

## 2019-04-25 PROCEDURE — 6360000002 HC RX W HCPCS: Performed by: SURGERY

## 2019-04-25 PROCEDURE — 80048 BASIC METABOLIC PNL TOTAL CA: CPT

## 2019-04-25 PROCEDURE — 84443 ASSAY THYROID STIM HORMONE: CPT

## 2019-04-25 PROCEDURE — 85025 COMPLETE CBC W/AUTO DIFF WBC: CPT

## 2019-04-25 PROCEDURE — 99239 HOSP IP/OBS DSCHRG MGMT >30: CPT | Performed by: SURGERY

## 2019-04-25 PROCEDURE — 6370000000 HC RX 637 (ALT 250 FOR IP): Performed by: FAMILY MEDICINE

## 2019-04-25 PROCEDURE — 36415 COLL VENOUS BLD VENIPUNCTURE: CPT

## 2019-04-25 RX ADMIN — CITALOPRAM HYDROBROMIDE 40 MG: 20 TABLET ORAL at 09:19

## 2019-04-25 RX ADMIN — FLUTICASONE PROPIONATE 2 SPRAY: 50 SPRAY, METERED NASAL at 09:23

## 2019-04-25 RX ADMIN — ASPIRIN 81 MG: 81 TABLET, COATED ORAL at 09:19

## 2019-04-25 RX ADMIN — ENOXAPARIN SODIUM 40 MG: 40 INJECTION SUBCUTANEOUS at 09:19

## 2019-04-25 RX ADMIN — LEVOTHYROXINE SODIUM 25 MCG: 25 TABLET ORAL at 06:14

## 2019-04-25 RX ADMIN — TIMOLOL MALEATE 1 DROP: 5 SOLUTION OPHTHALMIC at 09:24

## 2019-04-25 NOTE — PROGRESS NOTES
HPI:  Dominique Litten is now having adequate bowel movements and is tolerating a diet without complication. His abdominal pain has entirely resolved at this point. He has essentially returned to his baseline and will be acceptable for discharge home today. Patient voiced no new complaints since hospital admission. Questions, answers, and tests reviewed. ROS:  Cardiovascular:   Denies any chest pain, irregular heartbeats, or palpitations. Respiratory:   Denies shortness of breath, coughing, sputum production, hemoptysis, or wheezing. Gastrointestinal:   Abdominal pain has resolved. He is now passing flatus and having adequate bowel movements. Extremities:   Denies any lower extremity swelling or edema. Neurology:    Denies any headache or focal neurological deficits. No weakness or paresthesia. Derm:    Denies any rashes, ulcers, or excoriations. Denies bruising. Genitourinary:    Denies any urgency, frequency, hematuria. Voiding without difficulty. Physical Exam:    Vitals:    04/25/19 0812   BP: 119/71   Pulse: 76   Resp: 16   Temp: 98.3 °F (36.8 °C)   SpO2: 93%       HEENT:  PERRLA. EOMI. Sclera clear. Buccal mucosa moist.    Neck:  Supple. Trachea midline. No thyromegaly. No JVD. No bruits. Heart:  Rhythm regular, rate controlled. Systolic murmur. Lungs:  Symmetrical. Clear to auscultation bilaterally. No wheezes. No rhonchi. No rales. Abdomen: Soft. Mildly tender to palpation. Bowel sounds are active. Extremities:  Peripheral pulses present. No peripheral edema. No ulcers. Neurologic:  Alert x 3. No focal deficit. Cranial nerves grossly intact. Skin:  No petechia. No hemorrhage. No wounds.     CBC with Differential:    Lab Results   Component Value Date    WBC 6.3 04/25/2019    RBC 3.48 04/25/2019    HGB 12.0 04/25/2019    HCT 35.4 04/25/2019     04/25/2019    .7 04/25/2019    MCH 34.5 04/25/2019    MCHC 33.9 04/25/2019    RDW 13.3 04/25/2019    LYMPHOPCT 35.8 04/25/2019    MONOPCT 11.8 04/25/2019    BASOPCT 0.6 04/25/2019    MONOSABS 0.74 04/25/2019    LYMPHSABS 2.25 04/25/2019    EOSABS 0.20 04/25/2019    BASOSABS 0.04 04/25/2019     BMP:    Lab Results   Component Value Date     04/25/2019    K 4.1 04/25/2019    K 4.2 04/24/2019     04/25/2019    CO2 29 04/25/2019    BUN 26 04/25/2019    LABALBU 3.5 04/24/2019    CREATININE 1.1 04/25/2019    CALCIUM 9.2 04/25/2019    GFRAA >60 04/25/2019    LABGLOM >60 04/25/2019    GLUCOSE 119 04/25/2019       Other Data:      Intake/Output Summary (Last 24 hours) at 4/25/2019 0818  Last data filed at 4/25/2019 6794  Gross per 24 hour   Intake 2880 ml   Output 2800 ml   Net 80 ml         Scheduled Medications:   aspirin EC  81 mg Oral Daily    citalopram  40 mg Oral Daily    fluticasone  2 spray Each Nare Daily    latanoprost  1 drop Both Eyes Nightly    levothyroxine  25 mcg Oral Daily    timolol  1 drop Both Eyes Daily    sodium chloride flush  10 mL Intravenous 2 times per day    enoxaparin  40 mg Subcutaneous Daily         Infusion Medications:   lactated ringers 75 mL/hr at 04/24/19 0918       Assessment:   1. Partial small bowel obstruction with resolution  2. Chronic kidney disease stage III  3. Hypothyroidism  4. Hyperlipidemia  5. Gastroesophageal reflux disease  6. Non-insulin-dependent diabetes mellitus type II  7. Depression  8. Chronic pain  9. Coronary artery disease    Plan:   The patient has tolerated advancement in his diet. He is ambulatory and having adequate bowel movements. Her medications will be resumed and the patient is acceptable for discharge home from an internal medicine standpoint.     Marbin Perez D.O.  8:18 AM  4/25/2019

## 2019-04-25 NOTE — DISCHARGE SUMMARY
[facility]. [facility] Course: Had uncomplicated treatment of sbo vs ileus that was treated nonoperatively  and was discharged tolerating a general diet, having good bowel function, ambulating independently and with adequate analgesia. Discharge Exam: /71   Pulse 76   Temp 98.3 °F (36.8 °C) (Oral)   Resp 16   Ht 5' 7\" (1.702 m)   Wt 144 lb (65.3 kg)   SpO2 93%   BMI 22.55 kg/m²     General appearance: alert, appears stated age and cooperative  Head: Normocephalic, without obvious abnormality, atraumatic  Neck: no adenopathy, no carotid bruit, no JVD, supple, symmetrical, trachea midline and thyroid not enlarged, symmetric, no tenderness/mass/nodules  Lungs: clear to auscultation bilaterally  Heart: regular rate and rhythm, S1, S2 normal, no murmur, click, rub or gallop  Abdomen: soft, non-tender; bowel sounds normal; no masses,  no organomegaly and incisions clean and dry  Extremities: extremities normal, atraumatic, no cyanosis or edema      Disposition: home    Patient Instructions: Activity: activity as tolerated  Diet: regular diet  Wound Care: keep wound clean and dry    Follow-up with Dr. Andria Milligan as needed.     Signed:  Cherise Garg  4/25/2019  11:24 AM

## 2019-04-25 NOTE — PLAN OF CARE
Problem: Falls - Risk of:  Goal: Will remain free from falls  Outcome: Completed  Goal: Absence of physical injury  Outcome: Completed     Problem: Pain:  Goal: Pain level will decrease  Outcome: Completed  Goal: Control of acute pain  Outcome: Completed  Goal: Control of chronic pain  Outcome: Completed

## 2019-04-27 LAB
EKG ATRIAL RATE: 100 BPM
EKG P AXIS: 48 DEGREES
EKG P-R INTERVAL: 162 MS
EKG Q-T INTERVAL: 322 MS
EKG QRS DURATION: 82 MS
EKG QTC CALCULATION (BAZETT): 415 MS
EKG R AXIS: -4 DEGREES
EKG T AXIS: 127 DEGREES
EKG VENTRICULAR RATE: 100 BPM

## 2019-05-24 ENCOUNTER — OFFICE VISIT (OUTPATIENT)
Dept: PAIN MANAGEMENT | Age: 79
End: 2019-05-24
Payer: MEDICARE

## 2019-05-24 VITALS
WEIGHT: 158 LBS | HEART RATE: 78 BPM | SYSTOLIC BLOOD PRESSURE: 120 MMHG | OXYGEN SATURATION: 95 % | HEIGHT: 67 IN | TEMPERATURE: 98.5 F | DIASTOLIC BLOOD PRESSURE: 60 MMHG | RESPIRATION RATE: 18 BRPM | BODY MASS INDEX: 24.8 KG/M2

## 2019-05-24 DIAGNOSIS — M54.2 CERVICALGIA: Primary | ICD-10-CM

## 2019-05-24 DIAGNOSIS — M79.18 CHRONIC MYOFASCIAL PAIN: ICD-10-CM

## 2019-05-24 DIAGNOSIS — G89.29 CHRONIC MYOFASCIAL PAIN: ICD-10-CM

## 2019-05-24 DIAGNOSIS — M50.30 DDD (DEGENERATIVE DISC DISEASE), CERVICAL: Chronic | ICD-10-CM

## 2019-05-24 DIAGNOSIS — M43.02 CERVICAL SPONDYLOLYSIS: ICD-10-CM

## 2019-05-24 DIAGNOSIS — M54.81 OCCIPITAL NEURALGIA OF RIGHT SIDE: Chronic | ICD-10-CM

## 2019-05-24 PROCEDURE — 20552 NJX 1/MLT TRIGGER POINT 1/2: CPT | Performed by: ANESTHESIOLOGY

## 2019-05-24 PROCEDURE — 64405 NJX AA&/STRD GR OCPL NRV: CPT | Performed by: ANESTHESIOLOGY

## 2019-05-24 PROCEDURE — 6360000002 HC RX W HCPCS

## 2019-05-24 PROCEDURE — 99213 OFFICE O/P EST LOW 20 MIN: CPT | Performed by: ANESTHESIOLOGY

## 2019-05-24 PROCEDURE — 99214 OFFICE O/P EST MOD 30 MIN: CPT | Performed by: ANESTHESIOLOGY

## 2019-05-24 RX ORDER — TRIAMCINOLONE ACETONIDE 40 MG/ML
20 INJECTION, SUSPENSION INTRA-ARTICULAR; INTRAMUSCULAR ONCE
Status: COMPLETED | OUTPATIENT
Start: 2019-05-24 | End: 2019-05-24

## 2019-05-24 RX ORDER — BUPIVACAINE HYDROCHLORIDE 2.5 MG/ML
3 INJECTION, SOLUTION EPIDURAL; INFILTRATION; INTRACAUDAL ONCE
Status: COMPLETED | OUTPATIENT
Start: 2019-05-24 | End: 2019-05-24

## 2019-05-24 RX ADMIN — BUPIVACAINE HYDROCHLORIDE 75 MG: 2.5 INJECTION, SOLUTION EPIDURAL; INFILTRATION; INTRACAUDAL at 14:55

## 2019-05-24 RX ADMIN — TRIAMCINOLONE ACETONIDE 40 MG: 40 INJECTION, SUSPENSION INTRA-ARTICULAR; INTRAMUSCULAR at 14:56

## 2019-05-24 NOTE — PROGRESS NOTES
stone     Neck pain     Osteoarthritis     Rib fracture     Thyroid disease        Past Surgical History:   Procedure Laterality Date    ABDOMEN SURGERY      BACK SURGERY      x2 lumbar    CARDIAC SURGERY      CATARACT REMOVAL WITH IMPLANT Left 10/08/13    CATARACT REMOVAL WITH IMPLANT Right 12/10/13    CERVICAL SPINE SURGERY  2012    cervical fusion    COLON SURGERY      due to diverticulitis colon resection    CORONARY ANGIOPLASTY WITH STENT PLACEMENT  2005    x2 stents    DILATATION, ESOPHAGUS      EYE SURGERY      FRACTURE SURGERY      Lt. Jaw Plate    JOINT REPLACEMENT      NERVE BLOCK Left 3/7/16    cervical transforaminal #1    NERVE BLOCK Left 3/28/16    cervical transforaminal #2    NERVE BLOCK Left 04 04 2016    transforaminal nerve block left cervical #3    NERVE BLOCK Left 07/25/2016    shoulder    NERVE BLOCK Left 08/15/2016    left shoulder injection #2    NERVE BLOCK Right 08/22/2016    shoulder    NERVE BLOCK Right 08/29/2016    shoulder injection #2    OPEN PROX HUMERAL FRACTURE PROSHETIC REPLACEMENT Left 10/23/2018    LEFT CLAVICLE OPEN REDUCTION INTERNAL FIXATION performed by Karin Morgan DO at [address] Left 101/10/2014    repair left hand, finger laceration    AR CHEST SURGERY PROCEDURE UNLISTED Left 8/6/2018    LEFT SIDED VIDEO ASSISTED THORACOSCOPY WITH RIB PLATING performed by Mickey Hodge MD at [address] Bilateral     SHOULDER ARTHROSCOPY Right 12/08/2016    repair rotator cuff, bursectomy, debridement glenohumerol        Prior to Admission medications    Medication Sig Start Date End Date Taking?  Authorizing Provider   meloxicam (MOBIC) 15 MG tablet  3/15/19  Yes Historical Provider, MD   diclofenac (FLECTOR) 1.3 % patch Place 1 patch onto the skin 2 times daily as needed for Pain (as needed for pain) 4/12/19  Yes Adwoa Ibarra MD   HYDROcodone-acetaminophen (NORCO)  MG per tablet Take 1 tablet by mouth every 6 hours as needed for Pain. Yes Historical Provider, MD   ferrous sulfate 325 (65 Fe) MG tablet Take 325 mg by mouth daily (with breakfast)   Yes Historical Provider, MD   fluticasone (FLONASE) 50 MCG/ACT nasal spray 2 sprays by Nasal route daily 10/22/18  Yes DOUG Drew   meclizine (ANTIVERT) 25 MG tablet Take 25 mg by mouth 3 times daily as needed   Yes Historical Provider, MD   linagliptin-metformin (JENTADUETO) 2.5-1000 MG TABS Take by mouth 2 times daily   Yes Historical Provider, MD   vitamin B-12 (CYANOCOBALAMIN) 1000 MCG tablet Take 1,000 mcg by mouth daily   Yes Historical Provider, MD   vitamin D (CHOLECALCIFEROL) 1000 UNIT TABS tablet Take 1,000 Units by mouth daily   Yes Historical Provider, MD   simvastatin (ZOCOR) 20 MG tablet Take 20 mg by mouth nightly   Yes Historical Provider, MD   aspirin EC 81 MG EC tablet Take 81 mg by mouth daily   Yes Historical Provider, MD   citalopram (CELEXA) 40 MG tablet Take 40 mg by mouth daily   Yes Historical Provider, MD   latanoprost (XALATAN) 0.005 % ophthalmic solution Place 1 drop into both eyes nightly   Yes Historical Provider, MD   omeprazole (PRILOSEC) 20 MG capsule Take 20 mg by mouth Daily.  6/9/13  Yes Historical Provider, MD   levothyroxine (SYNTHROID) 25 MCG tablet Take 25 mcg by mouth daily Take 1 1/2 tab 6/29/13  Yes Historical Provider, MD       Allergies   Allergen Reactions    Sulfa Antibiotics Hives       Social History     Socioeconomic History    Marital status:      Spouse name: Not on file    Number of children: Not on file    Years of education: Not on file    Highest education level: Not on file   Occupational History    Not on file   Social Needs    Financial resource strain: Not on file    Food insecurity:     Worry: Not on file     Inability: Not on file    Transportation needs:     Medical: Not on file     Non-medical: Not on file   Tobacco Use    Smoking status: Former Smoker    Smokeless tobacco: Current User     Types: Chew   Substance and Sexual Activity    Alcohol use: No    Drug use: No    Sexual activity: Not on file   Lifestyle    Physical activity:     Days per week: Not on file     Minutes per session: Not on file    Stress: Not on file   Relationships    Social connections:     Talks on phone: Not on file     Gets together: Not on file     Attends [employer] witness service: Not on file     Active member of club or organization: Not on file     Attends meetings of clubs or organizations: Not on file     Relationship status: Not on file    Intimate partner violence:     Fear of current or ex partner: Not on file     Emotionally abused: Not on file     Physically abused: Not on file     Forced sexual activity: Not on file   Other Topics Concern    Not on file   Social History Narrative    ** Merged History Encounter **            Family History   Problem Relation Age of Onset    Diabetes Mother        REVIEW OF SYSTEMS:     Trace Brown denies fever/chills, chest pain, shortness of breath, new bowel or bladder complaints. All other review of systems was negative. PHYSICAL EXAMINATION:      /60   Pulse 78   Temp 98.5 °F (36.9 °C)   Resp 18   Ht 5' 7\" (1.702 m)   Wt 158 lb (71.7 kg)   SpO2 95%   BMI 24.75 kg/m²     General:       General appearance:  Pleasant and well-hydrated, in no distress and A & O x3  Build:Normal Weight  Function:Rises from a seated position easily.     HEENT:     Head:normocephalic, atraumatic  Pupils:regular, round, equal  Sclera: icterus absent     Lungs:     Breathing:normal breathing pattern      CVS:  RRR     Abdomen:     Shape:non-distended and normal  Tenderness:none  Guarding:none     Cervical spine:     Inspection:normal  Palpation: tenderness right lower cervical paravertebral muscles, tenderness trapezium, right  positive. Range of motion: Some limitations of ROM noted. Facet tenderness noted over the right mid and lower cervical facets.   Tenderness over the right occipital nerve noted.     Musculoskeletal:     Trigger points in trapezius:+ right  Trigger points in Paravertebral:+ right lower cervical   Trigger points in supraspinatus/infraspinatus:absent  Spurling's:negative right, negative left    Salomon's:negative right, negative left   Right occipital nerve tenderness +     Extremities:     Tremors:None bilaterally upper and lower  Range of motion:Generally normal shoulders  Intact:Yes  Varicose veins:absent   Pulses:present Lt radial  Cyanosis:none  Edema:none x all 4 extremities     Neurological:     Sensory: normal to light touch      Motor:   No focal deficits, generalized weakness noted. Reflexes:       B/l equal     Gait:normal     Dermatology:     Skin:no rashes or lesions noted      Assessment/Plan:   Diagnosis Orders   1. Cervicalgia     2. Chronic myofascial pain     3. DDD (degenerative disc disease), cervical     4. Occipital neuralgia of right side     5. Cervical spondylolysis         78 yrs pleasant gentleman with proro H/o Cervical ACDF C3-7 having right sided neck pain with features of myofacial pain and also facet pain. He also has tenderness over the occipital nerve.     Excellent pain relief from the trigger point injection during the last visit. Will repeat Right cervical paraspinal / trapezius trigger points. He also has features of right occipital neuralgia- will do right occipital nerve block today. He has reduced his medication use. He has finished PT and continues HEP. If neck pain persists, consider upper cervical facet MBNB. Discussed in  Brief    He will reduce the Norco as pain improved. He apparently has reduced it significantly and takes only prn for severe pain. Counseling regarding the importance of regular exercise program and appropriate medication usage done. He was instructed to reduce the medication to the lowest minimal and to take only on as necessary basis for severe pain.     F/u in 4-6 weeks. Alisia Perez MD       PROCEDURE NOTES:     PROCEDURE No 1: Trigger point injection of the right lower cervical paraspinal muscles and trapezius muscles     Informed consent: Risks, benefits, and alternatives of the procedure explained and patient agreed to proceed.     Time out done     Procedure details:  Sitting position  Skin was prepped and draped in the usual sterile manner. Using a #25 G 1.5 inch needle, 4 trigger point were injected and a solution of  0.25 % Bupivacaine 4 cc and 20 mg of kenalog was injected . PROCEDURE #2:  Right OCCIPITAL NERVE BLOCK:  The skin over the right occipital area was prepped with a sterile manner using ChloraPrep. The occipital protuberance was identified and the occipital location lateral on the right side was identified. A 25-gauge 1.5 inch needle was introduced towards the right occipital nerve area and a solution of 0.25% bupivacaine 3 mL and 20 mg of Kenalog was injected after negative aspiration. Patient tolerated the procedure well. There were no immediate post procedure complications. Patient was discharged home in stable condition. Post procedure instructions reviewed.   All the questions were answered.     His  pain significantly improved after the procedure.     Follow up in 3 months.     Alisia Perez MD    CC:  Buster Saunders MD  [facility] [address] [address]

## 2019-05-24 NOTE — PROGRESS NOTES
Camryn Lou presents to the [address] [address] on 5/24/2019. Mando Anaya is complaining of pain in the right side of his neck that travels into his right scapula and up into his skull. The pain is constant. The pain is described as aching, stabbing, dull and sharp. Pain is rated on his best day at a 3, on his worst day at a 9, and on average at a 6 on the VAS scale. He took his last dose of Norco last night he took 1/2 tab. Any procedures since your last visit: Yes, with 95 % relief that lasted a few days but has returned again    He has been on anticoagulation medications to include ASA and is managed by PCP and Dr. Armen Keenan.      Pacemaker or defibrilator: No Physician managing device is NA.       /60   Pulse 78   Temp 98.5 °F (36.9 °C)   Resp 18   Ht 5' 7\" (1.702 m)   Wt 158 lb (71.7 kg)   SpO2 95%   BMI 24.75 kg/m²

## 2019-08-27 ENCOUNTER — OFFICE VISIT (OUTPATIENT)
Dept: PAIN MANAGEMENT | Age: 79
End: 2019-08-27
Payer: MEDICARE

## 2019-08-27 ENCOUNTER — PREP FOR PROCEDURE (OUTPATIENT)
Dept: PAIN MANAGEMENT | Age: 79
End: 2019-08-27

## 2019-08-27 VITALS
HEIGHT: 67 IN | RESPIRATION RATE: 18 BRPM | OXYGEN SATURATION: 95 % | WEIGHT: 155 LBS | HEART RATE: 80 BPM | BODY MASS INDEX: 24.33 KG/M2 | TEMPERATURE: 98.9 F | DIASTOLIC BLOOD PRESSURE: 72 MMHG | SYSTOLIC BLOOD PRESSURE: 108 MMHG

## 2019-08-27 DIAGNOSIS — G89.29 CHRONIC MYOFASCIAL PAIN: Primary | ICD-10-CM

## 2019-08-27 DIAGNOSIS — M47.817 LUMBOSACRAL SPONDYLOSIS WITHOUT MYELOPATHY: ICD-10-CM

## 2019-08-27 DIAGNOSIS — M79.18 CHRONIC MYOFASCIAL PAIN: Primary | ICD-10-CM

## 2019-08-27 DIAGNOSIS — M96.1 LUMBAR POST-LAMINECTOMY SYNDROME: Chronic | ICD-10-CM

## 2019-08-27 DIAGNOSIS — M47.816 FACET SYNDROME, LUMBAR: Chronic | ICD-10-CM

## 2019-08-27 DIAGNOSIS — M47.817 LUMBOSACRAL SPONDYLOSIS WITHOUT MYELOPATHY: Primary | ICD-10-CM

## 2019-08-27 DIAGNOSIS — M43.02 CERVICAL SPONDYLOLYSIS: Chronic | ICD-10-CM

## 2019-08-27 DIAGNOSIS — M54.2 CERVICALGIA: ICD-10-CM

## 2019-08-27 DIAGNOSIS — M48.061 SPINAL STENOSIS OF LUMBAR REGION, UNSPECIFIED WHETHER NEUROGENIC CLAUDICATION PRESENT: ICD-10-CM

## 2019-08-27 DIAGNOSIS — M50.30 DDD (DEGENERATIVE DISC DISEASE), CERVICAL: ICD-10-CM

## 2019-08-27 PROCEDURE — 99215 OFFICE O/P EST HI 40 MIN: CPT | Performed by: ANESTHESIOLOGY

## 2019-08-27 PROCEDURE — 6360000002 HC RX W HCPCS

## 2019-08-27 PROCEDURE — 76942 ECHO GUIDE FOR BIOPSY: CPT | Performed by: ANESTHESIOLOGY

## 2019-08-27 PROCEDURE — 20552 NJX 1/MLT TRIGGER POINT 1/2: CPT | Performed by: ANESTHESIOLOGY

## 2019-08-27 PROCEDURE — 99213 OFFICE O/P EST LOW 20 MIN: CPT | Performed by: ANESTHESIOLOGY

## 2019-08-27 RX ORDER — TRIAMCINOLONE ACETONIDE 40 MG/ML
40 INJECTION, SUSPENSION INTRA-ARTICULAR; INTRAMUSCULAR ONCE
Status: COMPLETED | OUTPATIENT
Start: 2019-08-27 | End: 2019-08-27

## 2019-08-27 RX ORDER — BUPIVACAINE HYDROCHLORIDE 2.5 MG/ML
4 INJECTION, SOLUTION EPIDURAL; INFILTRATION; INTRACAUDAL ONCE
Status: COMPLETED | OUTPATIENT
Start: 2019-08-27 | End: 2019-08-27

## 2019-08-27 RX ADMIN — BUPIVACAINE HYDROCHLORIDE 25 MG: 2.5 INJECTION, SOLUTION EPIDURAL; INFILTRATION; INTRACAUDAL at 12:45

## 2019-08-27 RX ADMIN — TRIAMCINOLONE ACETONIDE 40 MG: 40 INJECTION, SUSPENSION INTRA-ARTICULAR; INTRAMUSCULAR at 12:46

## 2019-08-27 NOTE — PROGRESS NOTES
Elke Villalobos presents to the [facility] on 8/27/2019. Wilmot Heimlich is complaining of pain in his low back and still some pain in his neck, right more than the left. The pain is intermittent. The pain is described as aching and dull. Pain is rated on his best day at a 4, on his worst day at a 8, and on average at a 5 on the VAS scale. He took his last dose of Norco yesterday evening. Any procedures since your last visit: No, with  % relief. States that he continues to feel the benefits of the    PROCEDURE #2:  Right OCCIPITAL NERVE BLOCK:    He has been on anticoagulation medications to include ASA and is managed by PCP. Pacemaker or defibrilator: No Physician managing device is . /72   Pulse 80   Temp 98.9 °F (37.2 °C)   Resp 18   Ht 5' 7\" (1.702 m)   Wt 155 lb (70.3 kg)   SpO2 95%   BMI 24.28 kg/m²      No LMP for male patient.

## 2019-08-27 NOTE — PROGRESS NOTES
levoconvex curvature with normal lumbar lordosis. Vertebral bodies maintain normal height. There is trace retrolisthesis   of L2 on L3 and L3 on L4. There is trace anterolisthesis of L4 on L5. There is multilevel intervertebral disc space narrowing with   hypertrophic endplate changes, most pronounced at L4-L5. No acute   fractures identified. There is lower lumbar facet arthrosis.       Sacrum and coccyx:   Overlying stool obscures the and coccyx on the frontal projections. No   acute displaced sacral or coccygeal fractures identified. Bilateral   sacroiliac joints and the pubic symphysis are maintained. There is   lower lumbar spondylosis.         Impression       Lumbar spine: Moderate multilevel lumbar spondylosis.       Sacrum and coccyx:   Unremarkable sacrum and coccyx radiographs. CT Lumbar Spine: 8/2018: Findings: There are bilateral pars defects noted at L4. There is a   spondylolisthesis of L4 on L5. There is wedging of T12 which may be physiologic   Changes seen throughout the discs are abnormal. There are findings to   suggest  degenerative disc disease.       Changes seen throughout the bone marrow are abnormal. Findings are   compatible with degenerative disc disease       Changes within the facets are abnormal. Findings are compatible with   degenerative facet disease.           At L5-S1: There is a mild broad-based disc herniation or bony   spurring. There is a laminectomy defect. There is facet hypertrophy.    There is mild narrowing of the neural foramina       At L4-5: There is a moderate broad-based disc herniation there is   moderate stenosis       At L3-4: There is a large broad-based disc herniation, there is severe   canal stenosis.       At L2-3: There is a mild broad-based disc herniation, there is mild   canal stenosis       At L1-2: There is a mild broad-based disc herniation, there is mild   canal stenosis               Impression   Findings compatible with Marquita James MD at [address] Bilateral     SHOULDER ARTHROSCOPY Right 12/08/2016    repair rotator cuff, bursectomy, debridement glenohumerol        Prior to Admission medications    Medication Sig Start Date End Date Taking? Authorizing Provider   meloxicam (MOBIC) 15 MG tablet  3/15/19  Yes Historical Provider, MD   diclofenac (FLECTOR) 1.3 % patch Place 1 patch onto the skin 2 times daily as needed for Pain (as needed for pain) 4/12/19  Yes Raymond Castano MD   HYDROcodone-acetaminophen (NORCO)  MG per tablet Take 1 tablet by mouth every 6 hours as needed for Pain. 1/2 tab bid prn is how he is taking it   Yes Historical Provider, MD   ferrous sulfate 325 (65 Fe) MG tablet Take 325 mg by mouth daily (with breakfast)   Yes Historical Provider, MD   meclizine (ANTIVERT) 25 MG tablet Take 25 mg by mouth 3 times daily as needed   Yes Historical Provider, MD   vitamin B-12 (CYANOCOBALAMIN) 1000 MCG tablet Take 1,000 mcg by mouth daily   Yes Historical Provider, MD   vitamin D (CHOLECALCIFEROL) 1000 UNIT TABS tablet Take 1,000 Units by mouth daily   Yes Historical Provider, MD   simvastatin (ZOCOR) 20 MG tablet Take 20 mg by mouth nightly   Yes Historical Provider, MD   aspirin EC 81 MG EC tablet Take 81 mg by mouth daily   Yes Historical Provider, MD   citalopram (CELEXA) 40 MG tablet Take 40 mg by mouth daily   Yes Historical Provider, MD   latanoprost (XALATAN) 0.005 % ophthalmic solution Place 1 drop into both eyes nightly   Yes Historical Provider, MD   omeprazole (PRILOSEC) 20 MG capsule Take 20 mg by mouth Daily.  6/9/13  Yes Historical Provider, MD   levothyroxine (SYNTHROID) 25 MCG tablet Take 25 mcg by mouth daily Take 1 1/2 tab 6/29/13  Yes Historical Provider, MD       Allergies   Allergen Reactions    Sulfa Antibiotics Hives       Social History     Socioeconomic History    Marital status:      Spouse name: Not on file    Number of children: Not on file    which had helped significantly.     Excellent pain relief from the trigger point injection during the prior visit. Will repeat Right cervical paraspinal / trapezius trigger points. He has reduced his pain medication use. He has finished PT and continues HEP. If neck pain persists, consider upper cervical facet MBNB. Discussed in  Brief. He also informed about pain over the low back. Has h/o prior lumbar spine surgery in the 1980's. Pain is mainly axial in nature with clinical features fo facet pain. Lumbar facet loading + bilaterally. X ray and CT scan findings of the Lumbar spine reviewed and discussed. He has failed conservative treatment with meds/ PT. Will do bilateral lumbar facet MBNB at L3, L4 MB & L5 DR. GONZALEZ discussed and patient agreed. If short term relief, will do RFA. Her is on ASA-81 mg. H/o CAD s/p stent. Considering this, he can continue ASA for the lumbar facet procedure. We will revisit this if we plan to do caudal LUCILA in future. He will reduce the Norco as pain improved. He apparently has reduced it significantly and takes only prn for severe pain. Discussed in details about the issues with chronic opioid use. [place] reviewed today - 8/27/2019- consistent. Counseling regarding the importance of regular exercise program and appropriate medication usage done. He was instructed to reduce the medication to the lowest minimal and to take only on as necessary basis for severe pain. F/u in 4-6 weeks. Isabelle Gutierrez MD       PROCEDURE NOTES:     PROCEDURE : ultrasound guided Trigger point injection of the right lower cervical paraspinal muscles and trapezius muscles.   Will use Ultrasound to prevent subcutaneous needle placement or advancing the needle too far in to avoid injury to the pleura.     Informed consent: Risks, benefits, and alternatives of the procedure explained and patient agreed to proceed.     Time out done     Procedure details:  Sitting position  Skin was prepped and draped in the usual sterile manner. Using a #25 G 1.5 inch needle, 4 trigger point were injected and a solution of  0.25 % Bupivacaine 4 cc and 20 mg of kenalog was injected . Patient tolerated the procedure well. There were no immediate post procedure complications. Patient was discharged home in stable condition. Post procedure instructions reviewed.   All the questions were answered.     His  pain significantly improved after the procedure.     Follow up in 3 months.     Eddi Orona MD    CC:  Wolm Hodgkin, MD  [address] [place] [address] [address] [address]

## 2019-09-10 ENCOUNTER — HOSPITAL ENCOUNTER (OUTPATIENT)
Age: 79
Setting detail: OUTPATIENT SURGERY
Discharge: HOME | End: 2019-09-10
Attending: ANESTHESIOLOGY | Admitting: ANESTHESIOLOGY
Payer: MEDICARE

## 2019-09-10 ENCOUNTER — HOSPITAL ENCOUNTER (OUTPATIENT)
Dept: OPERATING ROOM | Age: 79
Setting detail: OUTPATIENT SURGERY
Discharge: HOME | End: 2019-09-10
Attending: ANESTHESIOLOGY
Payer: MEDICARE

## 2019-09-10 VITALS
DIASTOLIC BLOOD PRESSURE: 76 MMHG | RESPIRATION RATE: 14 BRPM | HEART RATE: 71 BPM | OXYGEN SATURATION: 97 % | SYSTOLIC BLOOD PRESSURE: 154 MMHG

## 2019-09-10 DIAGNOSIS — M47.896 OTHER OSTEOARTHRITIS OF SPINE, LUMBAR REGION: ICD-10-CM

## 2019-09-10 PROCEDURE — 2500000003 HC RX 250 WO HCPCS: Performed by: ANESTHESIOLOGY

## 2019-09-10 PROCEDURE — 2709999900 HC NON-CHARGEABLE SUPPLY: Performed by: ANESTHESIOLOGY

## 2019-09-10 PROCEDURE — 3600000005 HC SURGERY LEVEL 5 BASE: Performed by: ANESTHESIOLOGY

## 2019-09-10 PROCEDURE — 7100000010 HC PHASE II RECOVERY - FIRST 15 MIN: Performed by: ANESTHESIOLOGY

## 2019-09-10 PROCEDURE — 7100000011 HC PHASE II RECOVERY - ADDTL 15 MIN: Performed by: ANESTHESIOLOGY

## 2019-09-10 PROCEDURE — 6360000002 HC RX W HCPCS: Performed by: ANESTHESIOLOGY

## 2019-09-10 PROCEDURE — 64493 INJ PARAVERT F JNT L/S 1 LEV: CPT | Performed by: ANESTHESIOLOGY

## 2019-09-10 PROCEDURE — 64494 INJ PARAVERT F JNT L/S 2 LEV: CPT | Performed by: ANESTHESIOLOGY

## 2019-09-10 PROCEDURE — 3209999900 FLUORO FOR SURGICAL PROCEDURES

## 2019-09-10 RX ORDER — LIDOCAINE HYDROCHLORIDE 5 MG/ML
INJECTION, SOLUTION INFILTRATION; INTRAVENOUS PRN
Status: DISCONTINUED | OUTPATIENT
Start: 2019-09-10 | End: 2019-09-10 | Stop reason: ALTCHOICE

## 2019-09-10 ASSESSMENT — PAIN DESCRIPTION - DESCRIPTORS: DESCRIPTORS: ACHING;DISCOMFORT

## 2019-09-10 ASSESSMENT — PAIN - FUNCTIONAL ASSESSMENT: PAIN_FUNCTIONAL_ASSESSMENT: 0-10

## 2019-09-10 ASSESSMENT — PAIN SCALES - GENERAL
PAINLEVEL_OUTOF10: 0
PAINLEVEL_OUTOF10: 0

## 2019-09-10 NOTE — H&P
[facility]  [address] [employer], [address] [place] [address]  [phone]    Procedure History & Physical      Belen Couch     HPI:    Patient  is here for Lumbar facte MBNB for low back pain. Labs/imaging studies reviewed   All question and concerns addressed including R/B/A associated with the procedure    Past Medical History:   Diagnosis Date    Arthritis     CAD (coronary artery disease)     Cancer ([facility] [facility]     Chronic pain     Clavicle fracture     Depression     Diabetes mellitus ([facility]     GERD (gastroesophageal reflux disease)     Headache(784.0)     History of blood transfusion     Hyperlipidemia     Kidney stone     Neck pain     Osteoarthritis     Rib fracture     Thyroid disease        Past Surgical History:   Procedure Laterality Date    ABDOMEN SURGERY      BACK SURGERY      x2 lumbar    CARDIAC SURGERY      CATARACT REMOVAL WITH IMPLANT Left 10/08/13    CATARACT REMOVAL WITH IMPLANT Right 12/10/13    CERVICAL SPINE SURGERY  2012    cervical fusion    COLON SURGERY      due to diverticulitis colon resection    CORONARY ANGIOPLASTY WITH STENT PLACEMENT  2005    x2 stents    DILATATION, ESOPHAGUS      EYE SURGERY      FRACTURE SURGERY      Lt. Jaw Plate    JOINT REPLACEMENT      NERVE BLOCK Left 3/7/16    cervical transforaminal #1    NERVE BLOCK Left 3/28/16    cervical transforaminal #2    NERVE BLOCK Left 04 04 2016    transforaminal nerve block left cervical #3    NERVE BLOCK Left 07/25/2016    shoulder    NERVE BLOCK Left 08/15/2016    left shoulder injection #2    NERVE BLOCK Right 08/22/2016    shoulder    NERVE BLOCK Right 08/29/2016    shoulder injection #2    OPEN PROX HUMERAL FRACTURE PROSHETIC REPLACEMENT Left 10/23/2018    LEFT CLAVICLE OPEN REDUCTION INTERNAL FIXATION performed by Greg Ryder DO at [facility] Left 101/10/2014    repair left hand, finger laceration    DE CHEST SURGERY PROCEDURE UNLISTED Left 8/6/2018    LEFT SIDED VIDEO ASSISTED THORACOSCOPY WITH RIB PLATING performed by Danette Bowen MD at [address] Bilateral     SHOULDER ARTHROSCOPY Right 12/08/2016    repair rotator cuff, bursectomy, debridement glenohumerol        Prior to Admission medications    Medication Sig Start Date End Date Taking? Authorizing Provider   meloxicam (MOBIC) 15 MG tablet  3/15/19  Yes Historical Provider, MD   diclofenac (FLECTOR) 1.3 % patch Place 1 patch onto the skin 2 times daily as needed for Pain (as needed for pain) 4/12/19  Yes Murray Vaughn MD   HYDROcodone-acetaminophen (NORCO)  MG per tablet Take 1 tablet by mouth every 6 hours as needed for Pain. 1/2 tab bid prn is how he is taking it   Yes Historical Provider, MD   meclizine (ANTIVERT) 25 MG tablet Take 25 mg by mouth 3 times daily as needed   Yes Historical Provider, MD   simvastatin (ZOCOR) 20 MG tablet Take 20 mg by mouth nightly   Yes Historical Provider, MD   aspirin EC 81 MG EC tablet Take 81 mg by mouth daily   Yes Historical Provider, MD   citalopram (CELEXA) 40 MG tablet Take 40 mg by mouth daily   Yes Historical Provider, MD   ferrous sulfate 325 (65 Fe) MG tablet Take 325 mg by mouth daily (with breakfast)    Historical Provider, MD   vitamin B-12 (CYANOCOBALAMIN) 1000 MCG tablet Take 1,000 mcg by mouth daily    Historical Provider, MD   vitamin D (CHOLECALCIFEROL) 1000 UNIT TABS tablet Take 1,000 Units by mouth daily    Historical Provider, MD   latanoprost (XALATAN) 0.005 % ophthalmic solution Place 1 drop into both eyes nightly    Historical Provider, MD   omeprazole (PRILOSEC) 20 MG capsule Take 20 mg by mouth Daily.  6/9/13   Historical Provider, MD   levothyroxine (SYNTHROID) 25 MCG tablet Take 25 mcg by mouth daily Take 1 1/2 tab 6/29/13   Historical Provider, MD       Allergies   Allergen Reactions    Sulfa Antibiotics Hives       Social History     Socioeconomic History    Marital status:      Spouse name: Not on file    Number of children: Not on file    Years of education: Not on file    Highest education level: Not on file   Occupational History    Not on file   Social Needs    Financial resource strain: Not on file    Food insecurity:     Worry: Not on file     Inability: Not on file    Transportation needs:     Medical: Not on file     Non-medical: Not on file   Tobacco Use    Smoking status: Former Smoker    Smokeless tobacco: Current User     Types: Chew   Substance and Sexual Activity    Alcohol use: No    Drug use: No    Sexual activity: Not on file   Lifestyle    Physical activity:     Days per week: Not on file     Minutes per session: Not on file    Stress: Not on file   Relationships    Social connections:     Talks on phone: Not on file     Gets together: Not on file     Attends Yazidi service: Not on file     Active member of club or organization: Not on file     Attends meetings of clubs or organizations: Not on file     Relationship status: Not on file    Intimate partner violence:     Fear of current or ex partner: Not on file     Emotionally abused: Not on file     Physically abused: Not on file     Forced sexual activity: Not on file   Other Topics Concern    Not on file   Social History Narrative    ** Merged History Encounter **            Family History   Problem Relation Age of Onset    Diabetes Mother          REVIEW OF SYSTEMS:    CONSTITUTIONAL:  negative for  fevers, chills, sweats and fatigue    RESPIRATORY:  negative for  dry cough, cough with sputum, dyspnea, wheezing and chest pain    CARDIOVASCULAR:  negative for chest pain, dyspnea, palpitations, syncope    GASTROINTESTINAL:  negative for nausea, vomiting, change in bowel habits, diarrhea, constipation and abdominal pain    MUSCULOSKELETAL: negative for muscle weakness    SKIN: negative for itching or rashes.     BEHAVIOR/PSYCH:  negative for poor appetite, increased appetite,

## 2019-09-10 NOTE — OP NOTE
9/10/2019    Patient: Mahi Christian  :  1940  Age:  78 y.o. Sex:  male     PRE-OPERATIVE DIAGNOSIS: Bilateral Lumbar spondylosis, lumbar facet syndrome. POST-OPERATIVE DIAGNOSIS: Same. PROCEDURE:  # 1 Fluoroscopic guided lumbar medial branch blocks Bilateral at Levels: L3, L4, L5  SURGEON: Francy Mehta MD    ANESTHESIA: Local    ESTIMATED BLOOD LOSS: None.  ______________________________________________________________________  BRIEF HISTORY:  Mahi Christian comes in today for 1 fluoroscopic guided lumbar medial branch blocks  Bilateral  at Levels: L3, L4, L5  The potential complications of this procedure were discussed with him again today. He has elected to undergo the aforementioned procedure. Tuan complete History & Physical examination were reviewed in depth, a copy of which is in the chart. DESCRIPTION OF PROCEDURE:   After confirming written and informed consent, a time-out was performed and Tuan name and date of birth, the procedure to be performed as well as the plan for the location of the needle insertion were confirmed. The patient was brought into the procedure room and placed in the prone position on the fluoroscopy table. Standard monitors were placed and vital signs were observed throughout the procedure. The area of the lumbar spine was prepped with chloraprep and draped in a sterile manner. AP fluoroscopy was used to identify and darryl bartons point at the targeted levels. The skin and subcutaneous tissues in these identified areas were anesthetized with 0.5%Lidocaine. A 22 # gauge 3.5 inch spinal needle was advanced toward the junction of the superior articular process and the transverse process, along the course of the medial branch. Satisfactory needle placement was confirmed by AP and oblique projections.   At the sacral alar level, the sacral alar region was visualized and the needle tip was positioned on the sacral alar at the base of the

## 2019-10-04 ENCOUNTER — OFFICE VISIT (OUTPATIENT)
Dept: FAMILY MEDICINE CLINIC | Age: 79
End: 2019-10-04
Payer: MEDICARE

## 2019-10-04 VITALS
WEIGHT: 148 LBS | TEMPERATURE: 96.9 F | OXYGEN SATURATION: 95 % | HEIGHT: 67 IN | SYSTOLIC BLOOD PRESSURE: 126 MMHG | DIASTOLIC BLOOD PRESSURE: 79 MMHG | HEART RATE: 68 BPM | BODY MASS INDEX: 23.23 KG/M2

## 2019-10-04 DIAGNOSIS — E55.9 VITAMIN D DEFICIENCY: ICD-10-CM

## 2019-10-04 DIAGNOSIS — E03.9 ACQUIRED HYPOTHYROIDISM: ICD-10-CM

## 2019-10-04 DIAGNOSIS — E78.5 DYSLIPIDEMIA: ICD-10-CM

## 2019-10-04 DIAGNOSIS — E53.8 VITAMIN B12 DEFICIENCY: ICD-10-CM

## 2019-10-04 DIAGNOSIS — Z76.89 ENCOUNTER TO ESTABLISH CARE: Primary | ICD-10-CM

## 2019-10-04 DIAGNOSIS — K21.9 GASTROESOPHAGEAL REFLUX DISEASE, ESOPHAGITIS PRESENCE NOT SPECIFIED: ICD-10-CM

## 2019-10-04 DIAGNOSIS — Z86.39 HISTORY OF DIABETES MELLITUS: ICD-10-CM

## 2019-10-04 DIAGNOSIS — D50.8 OTHER IRON DEFICIENCY ANEMIA: ICD-10-CM

## 2019-10-04 PROCEDURE — 99203 OFFICE O/P NEW LOW 30 MIN: CPT | Performed by: FAMILY MEDICINE

## 2019-10-04 PROCEDURE — G8482 FLU IMMUNIZE ORDER/ADMIN: HCPCS | Performed by: FAMILY MEDICINE

## 2019-10-04 PROCEDURE — 4040F PNEUMOC VAC/ADMIN/RCVD: CPT | Performed by: FAMILY MEDICINE

## 2019-10-04 PROCEDURE — 90732 PPSV23 VACC 2 YRS+ SUBQ/IM: CPT | Performed by: FAMILY MEDICINE

## 2019-10-04 PROCEDURE — 4004F PT TOBACCO SCREEN RCVD TLK: CPT | Performed by: FAMILY MEDICINE

## 2019-10-04 PROCEDURE — 1123F ACP DISCUSS/DSCN MKR DOCD: CPT | Performed by: FAMILY MEDICINE

## 2019-10-04 PROCEDURE — G0009 ADMIN PNEUMOCOCCAL VACCINE: HCPCS | Performed by: FAMILY MEDICINE

## 2019-10-04 PROCEDURE — G8427 DOCREV CUR MEDS BY ELIG CLIN: HCPCS | Performed by: FAMILY MEDICINE

## 2019-10-04 PROCEDURE — G8420 CALC BMI NORM PARAMETERS: HCPCS | Performed by: FAMILY MEDICINE

## 2019-10-04 PROCEDURE — 90653 IIV ADJUVANT VACCINE IM: CPT | Performed by: FAMILY MEDICINE

## 2019-10-04 PROCEDURE — G0008 ADMIN INFLUENZA VIRUS VAC: HCPCS | Performed by: FAMILY MEDICINE

## 2019-10-04 ASSESSMENT — ENCOUNTER SYMPTOMS
BLOOD IN STOOL: 0
BACK PAIN: 1
SHORTNESS OF BREATH: 1
ABDOMINAL PAIN: 0

## 2019-10-04 ASSESSMENT — PATIENT HEALTH QUESTIONNAIRE - PHQ9
1. LITTLE INTEREST OR PLEASURE IN DOING THINGS: 0
2. FEELING DOWN, DEPRESSED OR HOPELESS: 0
SUM OF ALL RESPONSES TO PHQ QUESTIONS 1-9: 0
SUM OF ALL RESPONSES TO PHQ9 QUESTIONS 1 & 2: 0
SUM OF ALL RESPONSES TO PHQ QUESTIONS 1-9: 0

## 2019-10-09 ENCOUNTER — OFFICE VISIT (OUTPATIENT)
Dept: PAIN MANAGEMENT | Age: 79
End: 2019-10-09
Payer: MEDICARE

## 2019-10-09 ENCOUNTER — HOSPITAL ENCOUNTER (OUTPATIENT)
Age: 79
Discharge: HOME | End: 2019-10-11
Payer: MEDICARE

## 2019-10-09 ENCOUNTER — PREP FOR PROCEDURE (OUTPATIENT)
Dept: PAIN MANAGEMENT | Age: 79
End: 2019-10-09

## 2019-10-09 VITALS
WEIGHT: 150 LBS | OXYGEN SATURATION: 93 % | BODY MASS INDEX: 23.54 KG/M2 | SYSTOLIC BLOOD PRESSURE: 120 MMHG | DIASTOLIC BLOOD PRESSURE: 68 MMHG | HEIGHT: 67 IN | RESPIRATION RATE: 18 BRPM | TEMPERATURE: 98.9 F | HEART RATE: 80 BPM

## 2019-10-09 DIAGNOSIS — M96.1 POSTLAMINECTOMY SYNDROME, LUMBAR: Primary | ICD-10-CM

## 2019-10-09 DIAGNOSIS — E53.8 VITAMIN B12 DEFICIENCY: ICD-10-CM

## 2019-10-09 DIAGNOSIS — Z86.39 HISTORY OF DIABETES MELLITUS: ICD-10-CM

## 2019-10-09 DIAGNOSIS — M54.81 OCCIPITAL NEURALGIA OF LEFT SIDE: Chronic | ICD-10-CM

## 2019-10-09 DIAGNOSIS — E78.5 DYSLIPIDEMIA: ICD-10-CM

## 2019-10-09 DIAGNOSIS — M47.817 LUMBOSACRAL SPONDYLOSIS WITHOUT MYELOPATHY: Primary | ICD-10-CM

## 2019-10-09 DIAGNOSIS — E03.9 ACQUIRED HYPOTHYROIDISM: ICD-10-CM

## 2019-10-09 DIAGNOSIS — K21.9 GASTROESOPHAGEAL REFLUX DISEASE, ESOPHAGITIS PRESENCE NOT SPECIFIED: ICD-10-CM

## 2019-10-09 DIAGNOSIS — M54.2 CERVICALGIA: ICD-10-CM

## 2019-10-09 DIAGNOSIS — M43.02 CERVICAL SPONDYLOLYSIS: ICD-10-CM

## 2019-10-09 DIAGNOSIS — D50.8 OTHER IRON DEFICIENCY ANEMIA: ICD-10-CM

## 2019-10-09 DIAGNOSIS — M47.817 LUMBOSACRAL SPONDYLOSIS WITHOUT MYELOPATHY: ICD-10-CM

## 2019-10-09 DIAGNOSIS — E55.9 VITAMIN D DEFICIENCY: ICD-10-CM

## 2019-10-09 DIAGNOSIS — G89.29 CHRONIC MYOFASCIAL PAIN: ICD-10-CM

## 2019-10-09 DIAGNOSIS — M79.18 CHRONIC MYOFASCIAL PAIN: ICD-10-CM

## 2019-10-09 LAB
ALBUMIN SERPL-MCNC: 4.3 G/DL (ref 3.5–5.2)
ALP BLD-CCNC: 58 U/L (ref 40–129)
ALT SERPL-CCNC: 14 U/L (ref 0–40)
ANION GAP SERPL CALCULATED.3IONS-SCNC: 12 MMOL/L (ref 7–16)
AST SERPL-CCNC: 18 U/L (ref 0–39)
BASOPHILS ABSOLUTE: 0.06 E9/L (ref 0–0.2)
BASOPHILS RELATIVE PERCENT: 1 % (ref 0–2)
BILIRUB SERPL-MCNC: 0.4 MG/DL (ref 0–1.2)
BUN BLDV-MCNC: 16 MG/DL (ref 8–23)
CALCIUM SERPL-MCNC: 9.8 MG/DL (ref 8.6–10.2)
CHLORIDE BLD-SCNC: 100 MMOL/L (ref 98–107)
CHOLESTEROL, TOTAL: 177 MG/DL (ref 0–199)
CO2: 27 MMOL/L (ref 22–29)
CREAT SERPL-MCNC: 1.5 MG/DL (ref 0.7–1.2)
CREATININE URINE: 130 MG/DL (ref 40–278)
EOSINOPHILS ABSOLUTE: 0.26 E9/L (ref 0.05–0.5)
EOSINOPHILS RELATIVE PERCENT: 4.5 % (ref 0–6)
FERRITIN: 33 NG/ML
GFR AFRICAN AMERICAN: 54
GFR NON-AFRICAN AMERICAN: 45 ML/MIN/1.73
GLUCOSE BLD-MCNC: 159 MG/DL (ref 74–99)
HBA1C MFR BLD: 7.4 % (ref 4–5.6)
HCT VFR BLD CALC: 45 % (ref 37–54)
HDLC SERPL-MCNC: 41 MG/DL
HEMOGLOBIN: 14.5 G/DL (ref 12.5–16.5)
IMMATURE GRANULOCYTES #: 0.04 E9/L
IMMATURE GRANULOCYTES %: 0.7 % (ref 0–5)
IRON SATURATION: 39 % (ref 20–55)
IRON: 127 MCG/DL (ref 59–158)
LDL CHOLESTEROL CALCULATED: 103 MG/DL (ref 0–99)
LYMPHOCYTES ABSOLUTE: 1.93 E9/L (ref 1.5–4)
LYMPHOCYTES RELATIVE PERCENT: 33.3 % (ref 20–42)
MCH RBC QN AUTO: 34.1 PG (ref 26–35)
MCHC RBC AUTO-ENTMCNC: 32.2 % (ref 32–34.5)
MCV RBC AUTO: 105.9 FL (ref 80–99.9)
MICROALBUMIN UR-MCNC: 25 MG/L
MICROALBUMIN/CREAT UR-RTO: 19.2 (ref 0–30)
MONOCYTES ABSOLUTE: 0.55 E9/L (ref 0.1–0.95)
MONOCYTES RELATIVE PERCENT: 9.5 % (ref 2–12)
NEUTROPHILS ABSOLUTE: 2.96 E9/L (ref 1.8–7.3)
NEUTROPHILS RELATIVE PERCENT: 51 % (ref 43–80)
PDW BLD-RTO: 12.9 FL (ref 11.5–15)
PLATELET # BLD: 173 E9/L (ref 130–450)
PMV BLD AUTO: 10.5 FL (ref 7–12)
POTASSIUM SERPL-SCNC: 5.1 MMOL/L (ref 3.5–5)
RBC # BLD: 4.25 E12/L (ref 3.8–5.8)
SODIUM BLD-SCNC: 139 MMOL/L (ref 132–146)
T4 FREE: 1.18 NG/DL (ref 0.93–1.7)
TOTAL IRON BINDING CAPACITY: 324 MCG/DL (ref 250–450)
TOTAL PROTEIN: 7.3 G/DL (ref 6.4–8.3)
TRIGL SERPL-MCNC: 163 MG/DL (ref 0–149)
TSH SERPL DL<=0.05 MIU/L-ACNC: 1.95 UIU/ML (ref 0.27–4.2)
VITAMIN D 25-HYDROXY: 66 NG/ML (ref 30–100)
VLDLC SERPL CALC-MCNC: 33 MG/DL
WBC # BLD: 5.8 E9/L (ref 4.5–11.5)

## 2019-10-09 PROCEDURE — 64405 NJX AA&/STRD GR OCPL NRV: CPT | Performed by: ANESTHESIOLOGY

## 2019-10-09 PROCEDURE — 20552 NJX 1/MLT TRIGGER POINT 1/2: CPT | Performed by: ANESTHESIOLOGY

## 2019-10-09 PROCEDURE — 99213 OFFICE O/P EST LOW 20 MIN: CPT | Performed by: ANESTHESIOLOGY

## 2019-10-09 PROCEDURE — 82570 ASSAY OF URINE CREATININE: CPT

## 2019-10-09 PROCEDURE — 85025 COMPLETE CBC W/AUTO DIFF WBC: CPT

## 2019-10-09 PROCEDURE — G8482 FLU IMMUNIZE ORDER/ADMIN: HCPCS | Performed by: ANESTHESIOLOGY

## 2019-10-09 PROCEDURE — 82728 ASSAY OF FERRITIN: CPT

## 2019-10-09 PROCEDURE — G8420 CALC BMI NORM PARAMETERS: HCPCS | Performed by: ANESTHESIOLOGY

## 2019-10-09 PROCEDURE — 6360000002 HC RX W HCPCS

## 2019-10-09 PROCEDURE — 82044 UR ALBUMIN SEMIQUANTITATIVE: CPT

## 2019-10-09 PROCEDURE — 82746 ASSAY OF FOLIC ACID SERUM: CPT

## 2019-10-09 PROCEDURE — 1123F ACP DISCUSS/DSCN MKR DOCD: CPT | Performed by: ANESTHESIOLOGY

## 2019-10-09 PROCEDURE — 82607 VITAMIN B-12: CPT

## 2019-10-09 PROCEDURE — 80061 LIPID PANEL: CPT

## 2019-10-09 PROCEDURE — 4004F PT TOBACCO SCREEN RCVD TLK: CPT | Performed by: ANESTHESIOLOGY

## 2019-10-09 PROCEDURE — 4040F PNEUMOC VAC/ADMIN/RCVD: CPT | Performed by: ANESTHESIOLOGY

## 2019-10-09 PROCEDURE — 82306 VITAMIN D 25 HYDROXY: CPT

## 2019-10-09 PROCEDURE — G8427 DOCREV CUR MEDS BY ELIG CLIN: HCPCS | Performed by: ANESTHESIOLOGY

## 2019-10-09 PROCEDURE — 83550 IRON BINDING TEST: CPT

## 2019-10-09 PROCEDURE — 84443 ASSAY THYROID STIM HORMONE: CPT

## 2019-10-09 PROCEDURE — 84439 ASSAY OF FREE THYROXINE: CPT

## 2019-10-09 PROCEDURE — 36415 COLL VENOUS BLD VENIPUNCTURE: CPT

## 2019-10-09 PROCEDURE — 83036 HEMOGLOBIN GLYCOSYLATED A1C: CPT

## 2019-10-09 PROCEDURE — 80053 COMPREHEN METABOLIC PANEL: CPT

## 2019-10-09 PROCEDURE — 83540 ASSAY OF IRON: CPT

## 2019-10-09 RX ORDER — BUPIVACAINE HYDROCHLORIDE 2.5 MG/ML
8 INJECTION, SOLUTION EPIDURAL; INFILTRATION; INTRACAUDAL ONCE
Status: COMPLETED | OUTPATIENT
Start: 2019-10-09 | End: 2019-10-09

## 2019-10-09 RX ORDER — TRIAMCINOLONE ACETONIDE 40 MG/ML
30 INJECTION, SUSPENSION INTRA-ARTICULAR; INTRAMUSCULAR ONCE
Status: COMPLETED | OUTPATIENT
Start: 2019-10-09 | End: 2019-10-09

## 2019-10-09 RX ADMIN — BUPIVACAINE HYDROCHLORIDE 25 MG: 2.5 INJECTION, SOLUTION EPIDURAL; INFILTRATION; INTRACAUDAL at 12:50

## 2019-10-09 RX ADMIN — TRIAMCINOLONE ACETONIDE 40 MG: 40 INJECTION, SUSPENSION INTRA-ARTICULAR; INTRAMUSCULAR at 12:51

## 2019-10-10 LAB
FOLATE: 13.8 NG/ML (ref 4.8–24.2)
VITAMIN B-12: >2000 PG/ML (ref 211–946)

## 2019-10-11 ENCOUNTER — TELEPHONE (OUTPATIENT)
Dept: FAMILY MEDICINE CLINIC | Age: 79
End: 2019-10-11

## 2019-10-16 ENCOUNTER — OFFICE VISIT (OUTPATIENT)
Dept: FAMILY MEDICINE CLINIC | Age: 79
End: 2019-10-16
Payer: MEDICARE

## 2019-10-16 VITALS
WEIGHT: 147 LBS | DIASTOLIC BLOOD PRESSURE: 66 MMHG | BODY MASS INDEX: 23.07 KG/M2 | HEIGHT: 67 IN | TEMPERATURE: 97.2 F | SYSTOLIC BLOOD PRESSURE: 122 MMHG | OXYGEN SATURATION: 98 % | HEART RATE: 82 BPM

## 2019-10-16 DIAGNOSIS — E03.9 ACQUIRED HYPOTHYROIDISM: ICD-10-CM

## 2019-10-16 DIAGNOSIS — R79.89 ELEVATED SERUM CREATININE: ICD-10-CM

## 2019-10-16 DIAGNOSIS — E27.8 ADRENAL NODULE (HCC): ICD-10-CM

## 2019-10-16 DIAGNOSIS — E53.8 VITAMIN B12 DEFICIENCY: ICD-10-CM

## 2019-10-16 DIAGNOSIS — F03.A0 MILD DEMENTIA: ICD-10-CM

## 2019-10-16 DIAGNOSIS — E11.9 DIABETES MELLITUS TYPE 2, DIET-CONTROLLED (HCC): ICD-10-CM

## 2019-10-16 DIAGNOSIS — K21.9 GASTROESOPHAGEAL REFLUX DISEASE, ESOPHAGITIS PRESENCE NOT SPECIFIED: ICD-10-CM

## 2019-10-16 DIAGNOSIS — E55.9 VITAMIN D DEFICIENCY: ICD-10-CM

## 2019-10-16 DIAGNOSIS — E78.5 DYSLIPIDEMIA: Primary | ICD-10-CM

## 2019-10-16 PROCEDURE — G8420 CALC BMI NORM PARAMETERS: HCPCS | Performed by: FAMILY MEDICINE

## 2019-10-16 PROCEDURE — G8482 FLU IMMUNIZE ORDER/ADMIN: HCPCS | Performed by: FAMILY MEDICINE

## 2019-10-16 PROCEDURE — G8427 DOCREV CUR MEDS BY ELIG CLIN: HCPCS | Performed by: FAMILY MEDICINE

## 2019-10-16 PROCEDURE — 99214 OFFICE O/P EST MOD 30 MIN: CPT | Performed by: FAMILY MEDICINE

## 2019-10-16 PROCEDURE — 4040F PNEUMOC VAC/ADMIN/RCVD: CPT | Performed by: FAMILY MEDICINE

## 2019-10-16 PROCEDURE — 1123F ACP DISCUSS/DSCN MKR DOCD: CPT | Performed by: FAMILY MEDICINE

## 2019-10-16 PROCEDURE — 4004F PT TOBACCO SCREEN RCVD TLK: CPT | Performed by: FAMILY MEDICINE

## 2019-10-16 RX ORDER — LANOLIN ALCOHOL/MO/W.PET/CERES
500 CREAM (GRAM) TOPICAL DAILY
Qty: 30 TABLET | Refills: 0
Start: 2019-10-16 | End: 2021-07-27

## 2019-10-16 ASSESSMENT — ENCOUNTER SYMPTOMS
ABDOMINAL PAIN: 0
BACK PAIN: 1
BLOOD IN STOOL: 0
SHORTNESS OF BREATH: 1

## 2019-10-28 ENCOUNTER — ANESTHESIA EVENT (OUTPATIENT)
Dept: OPERATING ROOM | Age: 79
End: 2019-10-28
Payer: MEDICARE

## 2019-10-28 ASSESSMENT — LIFESTYLE VARIABLES: SMOKING_STATUS: 0

## 2019-10-29 ENCOUNTER — ANESTHESIA (OUTPATIENT)
Dept: OPERATING ROOM | Age: 79
End: 2019-10-29
Payer: MEDICARE

## 2019-10-29 ENCOUNTER — HOSPITAL ENCOUNTER (OUTPATIENT)
Age: 79
Setting detail: OUTPATIENT SURGERY
Discharge: HOME | End: 2019-10-29
Attending: ANESTHESIOLOGY | Admitting: ANESTHESIOLOGY
Payer: MEDICARE

## 2019-10-29 ENCOUNTER — HOSPITAL ENCOUNTER (OUTPATIENT)
Dept: OPERATING ROOM | Age: 79
Setting detail: OUTPATIENT SURGERY
Discharge: HOME | End: 2019-10-29
Attending: ANESTHESIOLOGY
Payer: MEDICARE

## 2019-10-29 VITALS
DIASTOLIC BLOOD PRESSURE: 108 MMHG | SYSTOLIC BLOOD PRESSURE: 148 MMHG | OXYGEN SATURATION: 99 % | TEMPERATURE: 98.6 F | RESPIRATION RATE: 6 BRPM

## 2019-10-29 VITALS
BODY MASS INDEX: 24.27 KG/M2 | WEIGHT: 154.6 LBS | HEART RATE: 74 BPM | DIASTOLIC BLOOD PRESSURE: 55 MMHG | RESPIRATION RATE: 16 BRPM | OXYGEN SATURATION: 96 % | TEMPERATURE: 97 F | SYSTOLIC BLOOD PRESSURE: 106 MMHG | HEIGHT: 67 IN

## 2019-10-29 DIAGNOSIS — M47.896 OTHER OSTEOARTHRITIS OF SPINE, LUMBAR REGION: ICD-10-CM

## 2019-10-29 PROCEDURE — 2709999900 HC NON-CHARGEABLE SUPPLY: Performed by: ANESTHESIOLOGY

## 2019-10-29 PROCEDURE — 64636 DESTROY L/S FACET JNT ADDL: CPT | Performed by: ANESTHESIOLOGY

## 2019-10-29 PROCEDURE — 6360000002 HC RX W HCPCS: Performed by: ANESTHESIOLOGY

## 2019-10-29 PROCEDURE — 2580000003 HC RX 258: Performed by: ANESTHESIOLOGY

## 2019-10-29 PROCEDURE — 6360000002 HC RX W HCPCS: Performed by: NURSE ANESTHETIST, CERTIFIED REGISTERED

## 2019-10-29 PROCEDURE — 3600000005 HC SURGERY LEVEL 5 BASE: Performed by: ANESTHESIOLOGY

## 2019-10-29 PROCEDURE — 3600000015 HC SURGERY LEVEL 5 ADDTL 15MIN: Performed by: ANESTHESIOLOGY

## 2019-10-29 PROCEDURE — 2500000003 HC RX 250 WO HCPCS: Performed by: ANESTHESIOLOGY

## 2019-10-29 PROCEDURE — 64635 DESTROY LUMB/SAC FACET JNT: CPT | Performed by: ANESTHESIOLOGY

## 2019-10-29 PROCEDURE — C1713 ANCHOR/SCREW BN/BN,TIS/BN: HCPCS | Performed by: ANESTHESIOLOGY

## 2019-10-29 PROCEDURE — 3209999900 FLUORO FOR SURGICAL PROCEDURES

## 2019-10-29 PROCEDURE — 7100000010 HC PHASE II RECOVERY - FIRST 15 MIN: Performed by: ANESTHESIOLOGY

## 2019-10-29 PROCEDURE — 7100000011 HC PHASE II RECOVERY - ADDTL 15 MIN: Performed by: ANESTHESIOLOGY

## 2019-10-29 PROCEDURE — 3700000000 HC ANESTHESIA ATTENDED CARE: Performed by: ANESTHESIOLOGY

## 2019-10-29 PROCEDURE — 3700000001 HC ADD 15 MINUTES (ANESTHESIA): Performed by: ANESTHESIOLOGY

## 2019-10-29 RX ORDER — LIDOCAINE HYDROCHLORIDE 5 MG/ML
INJECTION, SOLUTION INFILTRATION; INTRAVENOUS PRN
Status: DISCONTINUED | OUTPATIENT
Start: 2019-10-29 | End: 2019-10-29 | Stop reason: ALTCHOICE

## 2019-10-29 RX ORDER — BUPIVACAINE HYDROCHLORIDE 2.5 MG/ML
INJECTION, SOLUTION EPIDURAL; INFILTRATION; INTRACAUDAL PRN
Status: DISCONTINUED | OUTPATIENT
Start: 2019-10-29 | End: 2019-10-29 | Stop reason: ALTCHOICE

## 2019-10-29 RX ORDER — LIDOCAINE HYDROCHLORIDE 10 MG/ML
INJECTION, SOLUTION EPIDURAL; INFILTRATION; INTRACAUDAL; PERINEURAL PRN
Status: DISCONTINUED | OUTPATIENT
Start: 2019-10-29 | End: 2019-10-29 | Stop reason: ALTCHOICE

## 2019-10-29 RX ORDER — FENTANYL CITRATE 50 UG/ML
INJECTION, SOLUTION INTRAMUSCULAR; INTRAVENOUS PRN
Status: DISCONTINUED | OUTPATIENT
Start: 2019-10-29 | End: 2019-10-29 | Stop reason: SDUPTHER

## 2019-10-29 RX ORDER — TRIAMCINOLONE ACETONIDE 40 MG/ML
INJECTION, SUSPENSION INTRA-ARTICULAR; INTRAMUSCULAR PRN
Status: DISCONTINUED | OUTPATIENT
Start: 2019-10-29 | End: 2019-10-29 | Stop reason: ALTCHOICE

## 2019-10-29 RX ORDER — SODIUM CHLORIDE, SODIUM LACTATE, POTASSIUM CHLORIDE, CALCIUM CHLORIDE 600; 310; 30; 20 MG/100ML; MG/100ML; MG/100ML; MG/100ML
INJECTION, SOLUTION INTRAVENOUS CONTINUOUS
Status: DISCONTINUED | OUTPATIENT
Start: 2019-10-29 | End: 2019-10-29 | Stop reason: HOSPADM

## 2019-10-29 RX ADMIN — SODIUM CHLORIDE, POTASSIUM CHLORIDE, SODIUM LACTATE AND CALCIUM CHLORIDE: 600; 310; 30; 20 INJECTION, SOLUTION INTRAVENOUS at 09:23

## 2019-10-29 RX ADMIN — FENTANYL CITRATE 50 MCG: 50 INJECTION, SOLUTION INTRAMUSCULAR; INTRAVENOUS at 09:30

## 2019-10-29 RX ADMIN — FENTANYL CITRATE 50 MCG: 50 INJECTION, SOLUTION INTRAMUSCULAR; INTRAVENOUS at 09:31

## 2019-10-29 ASSESSMENT — PULMONARY FUNCTION TESTS
PIF_VALUE: 0

## 2019-10-29 ASSESSMENT — PAIN - FUNCTIONAL ASSESSMENT: PAIN_FUNCTIONAL_ASSESSMENT: 0-10

## 2019-10-29 ASSESSMENT — PAIN SCALES - GENERAL
PAINLEVEL_OUTOF10: 0

## 2019-11-20 ENCOUNTER — OFFICE VISIT (OUTPATIENT)
Dept: PAIN MANAGEMENT | Age: 79
End: 2019-11-20
Payer: MEDICARE

## 2019-11-20 VITALS
BODY MASS INDEX: 23.54 KG/M2 | HEIGHT: 67 IN | DIASTOLIC BLOOD PRESSURE: 62 MMHG | RESPIRATION RATE: 16 BRPM | SYSTOLIC BLOOD PRESSURE: 118 MMHG | TEMPERATURE: 97.9 F | WEIGHT: 150 LBS | OXYGEN SATURATION: 97 % | HEART RATE: 90 BPM

## 2019-11-20 DIAGNOSIS — M50.30 DDD (DEGENERATIVE DISC DISEASE), CERVICAL: ICD-10-CM

## 2019-11-20 DIAGNOSIS — Z98.1 STATUS POST CERVICAL SPINAL FUSION: Chronic | ICD-10-CM

## 2019-11-20 DIAGNOSIS — M79.18 CHRONIC MYOFASCIAL PAIN: ICD-10-CM

## 2019-11-20 DIAGNOSIS — M47.817 LUMBOSACRAL SPONDYLOSIS WITHOUT MYELOPATHY: ICD-10-CM

## 2019-11-20 DIAGNOSIS — M54.2 CERVICALGIA: ICD-10-CM

## 2019-11-20 DIAGNOSIS — M43.02 CERVICAL SPONDYLOLYSIS: ICD-10-CM

## 2019-11-20 DIAGNOSIS — G89.29 CHRONIC MYOFASCIAL PAIN: ICD-10-CM

## 2019-11-20 DIAGNOSIS — M51.36 DDD (DEGENERATIVE DISC DISEASE), LUMBAR: ICD-10-CM

## 2019-11-20 DIAGNOSIS — M96.1 LUMBAR POST-LAMINECTOMY SYNDROME: Primary | Chronic | ICD-10-CM

## 2019-11-20 PROBLEM — M51.369 DDD (DEGENERATIVE DISC DISEASE), LUMBAR: Status: ACTIVE | Noted: 2019-11-20

## 2019-11-20 PROCEDURE — 99213 OFFICE O/P EST LOW 20 MIN: CPT | Performed by: ANESTHESIOLOGY

## 2019-11-20 PROCEDURE — G8482 FLU IMMUNIZE ORDER/ADMIN: HCPCS | Performed by: ANESTHESIOLOGY

## 2019-11-20 PROCEDURE — 4004F PT TOBACCO SCREEN RCVD TLK: CPT | Performed by: ANESTHESIOLOGY

## 2019-11-20 PROCEDURE — 99214 OFFICE O/P EST MOD 30 MIN: CPT | Performed by: ANESTHESIOLOGY

## 2019-11-20 PROCEDURE — 1123F ACP DISCUSS/DSCN MKR DOCD: CPT | Performed by: ANESTHESIOLOGY

## 2019-11-20 PROCEDURE — G8427 DOCREV CUR MEDS BY ELIG CLIN: HCPCS | Performed by: ANESTHESIOLOGY

## 2019-11-20 PROCEDURE — 4040F PNEUMOC VAC/ADMIN/RCVD: CPT | Performed by: ANESTHESIOLOGY

## 2019-11-20 PROCEDURE — G8420 CALC BMI NORM PARAMETERS: HCPCS | Performed by: ANESTHESIOLOGY

## 2019-12-09 ENCOUNTER — NURSE TRIAGE (OUTPATIENT)
Dept: OTHER | Facility: CLINIC | Age: 79
End: 2019-12-09

## 2019-12-12 ENCOUNTER — OFFICE VISIT (OUTPATIENT)
Dept: FAMILY MEDICINE CLINIC | Age: 79
End: 2019-12-12
Payer: MEDICARE

## 2019-12-12 ENCOUNTER — HOSPITAL ENCOUNTER (OUTPATIENT)
Age: 79
Discharge: HOME | End: 2019-12-14
Payer: MEDICARE

## 2019-12-12 VITALS
DIASTOLIC BLOOD PRESSURE: 68 MMHG | TEMPERATURE: 96.5 F | OXYGEN SATURATION: 96 % | HEART RATE: 83 BPM | BODY MASS INDEX: 24.14 KG/M2 | SYSTOLIC BLOOD PRESSURE: 122 MMHG | HEIGHT: 67 IN | WEIGHT: 153.8 LBS

## 2019-12-12 DIAGNOSIS — E11.9 TYPE 2 DIABETES MELLITUS WITHOUT COMPLICATION, WITHOUT LONG-TERM CURRENT USE OF INSULIN (HCC): ICD-10-CM

## 2019-12-12 DIAGNOSIS — E11.9 TYPE 2 DIABETES MELLITUS WITHOUT COMPLICATION, WITHOUT LONG-TERM CURRENT USE OF INSULIN (HCC): Primary | ICD-10-CM

## 2019-12-12 DIAGNOSIS — Z76.0 MEDICATION REFILL: ICD-10-CM

## 2019-12-12 LAB
ANION GAP SERPL CALCULATED.3IONS-SCNC: 10 MMOL/L (ref 7–16)
BUN BLDV-MCNC: 20 MG/DL (ref 8–23)
CALCIUM SERPL-MCNC: 9.6 MG/DL (ref 8.6–10.2)
CHLORIDE BLD-SCNC: 96 MMOL/L (ref 98–107)
CO2: 28 MMOL/L (ref 22–29)
CREAT SERPL-MCNC: 1.2 MG/DL (ref 0.7–1.2)
GFR AFRICAN AMERICAN: >60
GFR NON-AFRICAN AMERICAN: 58 ML/MIN/1.73
GLUCOSE BLD-MCNC: 343 MG/DL (ref 74–99)
POTASSIUM SERPL-SCNC: 4.4 MMOL/L (ref 3.5–5)
SODIUM BLD-SCNC: 134 MMOL/L (ref 132–146)

## 2019-12-12 PROCEDURE — G8420 CALC BMI NORM PARAMETERS: HCPCS | Performed by: FAMILY MEDICINE

## 2019-12-12 PROCEDURE — 99213 OFFICE O/P EST LOW 20 MIN: CPT | Performed by: FAMILY MEDICINE

## 2019-12-12 PROCEDURE — 80048 BASIC METABOLIC PNL TOTAL CA: CPT

## 2019-12-12 PROCEDURE — 36415 COLL VENOUS BLD VENIPUNCTURE: CPT

## 2019-12-12 PROCEDURE — 4040F PNEUMOC VAC/ADMIN/RCVD: CPT | Performed by: FAMILY MEDICINE

## 2019-12-12 PROCEDURE — 1123F ACP DISCUSS/DSCN MKR DOCD: CPT | Performed by: FAMILY MEDICINE

## 2019-12-12 PROCEDURE — G8427 DOCREV CUR MEDS BY ELIG CLIN: HCPCS | Performed by: FAMILY MEDICINE

## 2019-12-12 PROCEDURE — G8482 FLU IMMUNIZE ORDER/ADMIN: HCPCS | Performed by: FAMILY MEDICINE

## 2019-12-12 PROCEDURE — 4004F PT TOBACCO SCREEN RCVD TLK: CPT | Performed by: FAMILY MEDICINE

## 2019-12-12 RX ORDER — SIMVASTATIN 20 MG
20 TABLET ORAL NIGHTLY
Qty: 30 TABLET | Refills: 2 | Status: SHIPPED | OUTPATIENT
Start: 2019-12-12 | End: 2020-01-16 | Stop reason: SDUPTHER

## 2019-12-12 ASSESSMENT — ENCOUNTER SYMPTOMS
BLOOD IN STOOL: 0
ABDOMINAL PAIN: 0
SHORTNESS OF BREATH: 0
BACK PAIN: 1

## 2020-01-08 ENCOUNTER — OFFICE VISIT (OUTPATIENT)
Dept: PAIN MANAGEMENT | Age: 80
End: 2020-01-08
Payer: MEDICARE

## 2020-01-08 VITALS
TEMPERATURE: 98 F | WEIGHT: 155 LBS | HEART RATE: 96 BPM | RESPIRATION RATE: 18 BRPM | SYSTOLIC BLOOD PRESSURE: 128 MMHG | OXYGEN SATURATION: 91 % | DIASTOLIC BLOOD PRESSURE: 78 MMHG | BODY MASS INDEX: 24.33 KG/M2 | HEIGHT: 67 IN

## 2020-01-08 PROCEDURE — 20552 NJX 1/MLT TRIGGER POINT 1/2: CPT | Performed by: ANESTHESIOLOGY

## 2020-01-08 PROCEDURE — 6360000002 HC RX W HCPCS

## 2020-01-08 PROCEDURE — 99213 OFFICE O/P EST LOW 20 MIN: CPT | Performed by: ANESTHESIOLOGY

## 2020-01-08 PROCEDURE — 4004F PT TOBACCO SCREEN RCVD TLK: CPT | Performed by: ANESTHESIOLOGY

## 2020-01-08 PROCEDURE — G8420 CALC BMI NORM PARAMETERS: HCPCS | Performed by: ANESTHESIOLOGY

## 2020-01-08 PROCEDURE — 1123F ACP DISCUSS/DSCN MKR DOCD: CPT | Performed by: ANESTHESIOLOGY

## 2020-01-08 PROCEDURE — G8427 DOCREV CUR MEDS BY ELIG CLIN: HCPCS | Performed by: ANESTHESIOLOGY

## 2020-01-08 PROCEDURE — 4040F PNEUMOC VAC/ADMIN/RCVD: CPT | Performed by: ANESTHESIOLOGY

## 2020-01-08 PROCEDURE — G8482 FLU IMMUNIZE ORDER/ADMIN: HCPCS | Performed by: ANESTHESIOLOGY

## 2020-01-08 RX ORDER — BUPIVACAINE HYDROCHLORIDE 2.5 MG/ML
30 INJECTION, SOLUTION INFILTRATION; PERINEURAL ONCE
Status: COMPLETED | OUTPATIENT
Start: 2020-01-08 | End: 2020-01-08

## 2020-01-08 RX ORDER — TRIAMCINOLONE ACETONIDE 40 MG/ML
40 INJECTION, SUSPENSION INTRA-ARTICULAR; INTRAMUSCULAR ONCE
Status: COMPLETED | OUTPATIENT
Start: 2020-01-08 | End: 2020-01-08

## 2020-01-08 RX ADMIN — TRIAMCINOLONE ACETONIDE 40 MG: 40 INJECTION, SUSPENSION INTRA-ARTICULAR; INTRAMUSCULAR at 16:26

## 2020-01-08 RX ADMIN — BUPIVACAINE HYDROCHLORIDE 25 MG: 2.5 INJECTION, SOLUTION INFILTRATION; PERINEURAL at 16:25

## 2020-01-08 NOTE — PROGRESS NOTES
Right 08/22/2016    shoulder    NERVE BLOCK Right 08/29/2016    shoulder injection #2    NERVE BLOCK Bilateral 09/10/2019    BILATERAL LUMBAR FACET MEDIAL BRANCH NERVE BLOCK AT L3,L4 MEDIAL BRANCH AND L5 DORSAL RAMI    NERVE BLOCK Bilateral 10/29/2019    lumbar facet, meidal branch    OPEN PROX HUMERAL FRACTURE PROSHETIC REPLACEMENT Left 10/23/2018    LEFT CLAVICLE OPEN REDUCTION INTERNAL FIXATION performed by Johnny Joya DO at [facility] Left 101/10/2014    repair left hand, finger laceration    CT CHEST SURGERY PROCEDURE UNLISTED Left 8/6/2018    LEFT SIDED VIDEO ASSISTED THORACOSCOPY WITH RIB PLATING performed by Beau Alcazar MD at [address] [place] d/t a fall    RADIOFREQUENCY ABLATION NERVES Bilateral 10/29/2019    BILATERAL LUMBAR FACET L3 L4 MEDIAL BRANCH  L5 DORSAL RAMI  RADIOFREQUENCY ABLATION SEDATION (CPT G8373045) performed by Saumya Alicea MD at [facility] Bilateral     SHOULDER ARTHROSCOPY Right 12/08/2016    repair rotator cuff, bursectomy, debridement glenohumerol     SKIN BIOPSY         Prior to Admission medications    Medication Sig Start Date End Date Taking? Authorizing Provider   simvastatin (ZOCOR) 20 MG tablet Take 1 tablet by mouth nightly 12/12/19  Yes Jae Denise, DO   linaGLIPtin-metFORMIN HCl ER (JENTADUETO XR) 2.5-1000 MG TB24 Take 1 tablet by mouth 2 times daily 12/12/19  Yes [employer], DO   vitamin B-12 (CYANOCOBALAMIN) 1000 MCG tablet Take 0.5 tablets by mouth daily 10/16/19  Yes Jae Denise DO   meloxicam (MOBIC) 15 MG tablet Take 15 mg by mouth daily  3/15/19  Yes Historical Provider, MD   diclofenac (FLECTOR) 1.3 % patch Place 1 patch onto the skin 2 times daily as needed for Pain (as needed for pain) 4/12/19  Yes Saumya Alicea MD   HYDROcodone-acetaminophen (NORCO)  MG per tablet Take 1 tablet by mouth every 6 hours as needed for Pain.  1/2 tab bid prn is how he is taking it   Yes

## 2020-01-16 ENCOUNTER — HOSPITAL ENCOUNTER (OUTPATIENT)
Age: 80
Discharge: HOME | End: 2020-01-18
Payer: MEDICARE

## 2020-01-16 ENCOUNTER — OFFICE VISIT (OUTPATIENT)
Dept: FAMILY MEDICINE CLINIC | Age: 80
End: 2020-01-16
Payer: MEDICARE

## 2020-01-16 VITALS
TEMPERATURE: 97.8 F | HEART RATE: 80 BPM | WEIGHT: 152.4 LBS | DIASTOLIC BLOOD PRESSURE: 86 MMHG | HEIGHT: 67 IN | SYSTOLIC BLOOD PRESSURE: 128 MMHG | OXYGEN SATURATION: 97 % | BODY MASS INDEX: 23.92 KG/M2

## 2020-01-16 LAB
ALBUMIN SERPL-MCNC: 4.3 G/DL (ref 3.5–5.2)
ALP BLD-CCNC: 46 U/L (ref 40–129)
ALT SERPL-CCNC: 13 U/L (ref 0–40)
ANION GAP SERPL CALCULATED.3IONS-SCNC: 16 MMOL/L (ref 7–16)
AST SERPL-CCNC: 19 U/L (ref 0–39)
BASOPHILS ABSOLUTE: 0.05 E9/L (ref 0–0.2)
BASOPHILS RELATIVE PERCENT: 0.7 % (ref 0–2)
BILIRUB SERPL-MCNC: 0.3 MG/DL (ref 0–1.2)
BUN BLDV-MCNC: 19 MG/DL (ref 8–23)
CALCIUM SERPL-MCNC: 10.2 MG/DL (ref 8.6–10.2)
CHLORIDE BLD-SCNC: 96 MMOL/L (ref 98–107)
CO2: 25 MMOL/L (ref 22–29)
CREAT SERPL-MCNC: 1.3 MG/DL (ref 0.7–1.2)
EOSINOPHILS ABSOLUTE: 0.23 E9/L (ref 0.05–0.5)
EOSINOPHILS RELATIVE PERCENT: 3.2 % (ref 0–6)
GFR AFRICAN AMERICAN: >60
GFR NON-AFRICAN AMERICAN: 53 ML/MIN/1.73
GLUCOSE BLD-MCNC: 129 MG/DL (ref 74–99)
HBA1C MFR BLD: 7.8 % (ref 4–5.6)
HCT VFR BLD CALC: 43.2 % (ref 37–54)
HEMOGLOBIN: 13.7 G/DL (ref 12.5–16.5)
IMMATURE GRANULOCYTES #: 0.05 E9/L
IMMATURE GRANULOCYTES %: 0.7 % (ref 0–5)
LYMPHOCYTES ABSOLUTE: 2.25 E9/L (ref 1.5–4)
LYMPHOCYTES RELATIVE PERCENT: 31.1 % (ref 20–42)
MCH RBC QN AUTO: 34.1 PG (ref 26–35)
MCHC RBC AUTO-ENTMCNC: 31.7 % (ref 32–34.5)
MCV RBC AUTO: 107.5 FL (ref 80–99.9)
MONOCYTES ABSOLUTE: 0.81 E9/L (ref 0.1–0.95)
MONOCYTES RELATIVE PERCENT: 11.2 % (ref 2–12)
NEUTROPHILS ABSOLUTE: 3.84 E9/L (ref 1.8–7.3)
NEUTROPHILS RELATIVE PERCENT: 53.1 % (ref 43–80)
PDW BLD-RTO: 12.8 FL (ref 11.5–15)
PLATELET # BLD: 212 E9/L (ref 130–450)
PMV BLD AUTO: 10.8 FL (ref 7–12)
POTASSIUM SERPL-SCNC: 5.1 MMOL/L (ref 3.5–5)
RBC # BLD: 4.02 E12/L (ref 3.8–5.8)
SODIUM BLD-SCNC: 137 MMOL/L (ref 132–146)
TOTAL PROTEIN: 6.8 G/DL (ref 6.4–8.3)
WBC # BLD: 7.2 E9/L (ref 4.5–11.5)

## 2020-01-16 PROCEDURE — 99213 OFFICE O/P EST LOW 20 MIN: CPT | Performed by: FAMILY MEDICINE

## 2020-01-16 PROCEDURE — 83036 HEMOGLOBIN GLYCOSYLATED A1C: CPT

## 2020-01-16 PROCEDURE — 1123F ACP DISCUSS/DSCN MKR DOCD: CPT | Performed by: FAMILY MEDICINE

## 2020-01-16 PROCEDURE — G8427 DOCREV CUR MEDS BY ELIG CLIN: HCPCS | Performed by: FAMILY MEDICINE

## 2020-01-16 PROCEDURE — 4040F PNEUMOC VAC/ADMIN/RCVD: CPT | Performed by: FAMILY MEDICINE

## 2020-01-16 PROCEDURE — G8420 CALC BMI NORM PARAMETERS: HCPCS | Performed by: FAMILY MEDICINE

## 2020-01-16 PROCEDURE — 36415 COLL VENOUS BLD VENIPUNCTURE: CPT

## 2020-01-16 PROCEDURE — 4004F PT TOBACCO SCREEN RCVD TLK: CPT | Performed by: FAMILY MEDICINE

## 2020-01-16 PROCEDURE — G8482 FLU IMMUNIZE ORDER/ADMIN: HCPCS | Performed by: FAMILY MEDICINE

## 2020-01-16 PROCEDURE — 85025 COMPLETE CBC W/AUTO DIFF WBC: CPT

## 2020-01-16 PROCEDURE — 80053 COMPREHEN METABOLIC PANEL: CPT

## 2020-01-16 RX ORDER — SIMVASTATIN 20 MG
20 TABLET ORAL NIGHTLY
Qty: 30 TABLET | Refills: 5 | Status: SHIPPED
Start: 2020-01-16 | End: 2020-06-01 | Stop reason: SDUPTHER

## 2020-01-16 RX ORDER — PANTOPRAZOLE SODIUM 40 MG/1
40 TABLET, DELAYED RELEASE ORAL
Qty: 30 TABLET | Refills: 5 | Status: SHIPPED
Start: 2020-01-16 | End: 2020-07-15 | Stop reason: SDUPTHER

## 2020-01-16 RX ORDER — CITALOPRAM 40 MG/1
40 TABLET ORAL DAILY
Qty: 30 TABLET | Refills: 5 | Status: SHIPPED
Start: 2020-01-16 | End: 2020-08-12 | Stop reason: SDUPTHER

## 2020-01-16 RX ORDER — LEVOTHYROXINE SODIUM 0.03 MG/1
37.5 TABLET ORAL DAILY
Qty: 45 TABLET | Refills: 5 | Status: SHIPPED
Start: 2020-01-16 | End: 2020-09-01 | Stop reason: SDUPTHER

## 2020-01-16 RX ORDER — OMEPRAZOLE 20 MG/1
20 CAPSULE, DELAYED RELEASE ORAL DAILY
Qty: 30 CAPSULE | Status: CANCELLED | OUTPATIENT
Start: 2020-01-16

## 2020-01-16 ASSESSMENT — PATIENT HEALTH QUESTIONNAIRE - PHQ9
SUM OF ALL RESPONSES TO PHQ QUESTIONS 1-9: 2
1. LITTLE INTEREST OR PLEASURE IN DOING THINGS: 1
SUM OF ALL RESPONSES TO PHQ9 QUESTIONS 1 & 2: 2
SUM OF ALL RESPONSES TO PHQ QUESTIONS 1-9: 2
2. FEELING DOWN, DEPRESSED OR HOPELESS: 1

## 2020-01-16 ASSESSMENT — ENCOUNTER SYMPTOMS
SHORTNESS OF BREATH: 0
ABDOMINAL PAIN: 1
BLOOD IN STOOL: 0
VOMITING: 0
DIARRHEA: 0
NAUSEA: 0

## 2020-01-16 NOTE — PROGRESS NOTES
(CYANOCOBALAMIN) 1000 MCG tablet Take 0.5 tablets by mouth daily 30 tablet 0    meloxicam (MOBIC) 15 MG tablet Take 15 mg by mouth daily       diclofenac (FLECTOR) 1.3 % patch Place 1 patch onto the skin 2 times daily as needed for Pain (as needed for pain) 30 patch 1    HYDROcodone-acetaminophen (NORCO)  MG per tablet Take 1 tablet by mouth every 6 hours as needed for Pain. 1/2 tab bid prn is how he is taking it      ferrous sulfate 325 (65 Fe) MG tablet Take 325 mg by mouth daily (with breakfast)      meclizine (ANTIVERT) 25 MG tablet Take 25 mg by mouth 3 times daily as needed      vitamin D (CHOLECALCIFEROL) 1000 UNIT TABS tablet Take 1,000 Units by mouth daily      aspirin EC 81 MG EC tablet Take 81 mg by mouth daily      latanoprost (XALATAN) 0.005 % ophthalmic solution Place 1 drop into both eyes nightly       No current facility-administered medications for this visit.         History    Past Medical History:   Diagnosis Date    Arthritis     CAD (coronary artery disease)     Cancer ([facility] [address]     skin    Chronic pain     Clavicle fracture     Depression     Diabetes mellitus (HCC)     diet controlled    GERD (gastroesophageal reflux disease)     Headache(784.0)     History of blood transfusion     Hyperlipidemia     Kidney stone     Neck pain     Osteoarthritis     Rib fracture     Thyroid disease        Past Surgical History:   Procedure Laterality Date    ANESTHESIA NERVE BLOCK Bilateral 9/10/2019    BILATERAL LUMBAR FACET MEDIAL BRANCH BLOCK L3 L4 AND L5 DORSAL RAMI (CPT [id number]) performed by Jada Jeter MD at [address]      x2 lumbar    CATARACT REMOVAL WITH IMPLANT Left 10/08/13    CATARACT REMOVAL WITH IMPLANT Right 12/10/13    CERVICAL SPINE SURGERY  2012    cervical fusion    COLON SURGERY      due to diverticulitis colon resection    CORONARY ANGIOPLASTY WITH STENT PLACEMENT  2005    x2 stents    DILATATION, ESOPHAGUS      FRACTURE SURGERY      Lt. Jaw Plate    NERVE BLOCK Left 3/7/16    cervical transforaminal #1    NERVE BLOCK Left 3/28/16    cervical transforaminal #2    NERVE BLOCK Left 04 04 2016    transforaminal nerve block left cervical #3    NERVE BLOCK Left 07/25/2016    shoulder    NERVE BLOCK Left 08/15/2016    left shoulder injection #2    NERVE BLOCK Right 08/22/2016    shoulder    NERVE BLOCK Right 08/29/2016    shoulder injection #2    NERVE BLOCK Bilateral 09/10/2019    BILATERAL LUMBAR FACET MEDIAL BRANCH NERVE BLOCK AT L3,L4 MEDIAL BRANCH AND L5 DORSAL RAMI    NERVE BLOCK Bilateral 10/29/2019    lumbar facet, meidal branch    OPEN PROX HUMERAL FRACTURE PROSHETIC REPLACEMENT Left 10/23/2018    LEFT CLAVICLE OPEN REDUCTION INTERNAL FIXATION performed by Marc Rodarte DO at [facility] Left 101/10/2014    repair left hand, finger laceration    AR CHEST SURGERY PROCEDURE UNLISTED Left 8/6/2018    LEFT SIDED VIDEO ASSISTED THORACOSCOPY WITH RIB PLATING performed by Noris Young MD at [address] [place] d/t a fall    RADIOFREQUENCY ABLATION NERVES Bilateral 10/29/2019    BILATERAL LUMBAR FACET L3 L4 MEDIAL BRANCH  L5 DORSAL RAMI  RADIOFREQUENCY ABLATION SEDATION (CPT 05461) performed by Cooper Malone MD at [facility] Bilateral     SHOULDER ARTHROSCOPY Right 12/08/2016    repair rotator cuff, bursectomy, debridement glenohumerol     SKIN BIOPSY         Allergies   Allergen Reactions    Sulfa Antibiotics Hives       Family History   Problem Relation Age of Onset    Diabetes Mother        Social History     Socioeconomic History    Marital status:      Spouse name: None    Number of children: None    Years of education: None    Highest education level: None   Occupational History    None   Social Needs    Financial resource strain: None    Food insecurity:     Worry: None     Inability: None    Transportation needs:     Medical: None

## 2020-01-30 ENCOUNTER — TELEPHONE (OUTPATIENT)
Dept: FAMILY MEDICINE CLINIC | Age: 80
End: 2020-01-30

## 2020-01-30 ENCOUNTER — OFFICE VISIT (OUTPATIENT)
Dept: FAMILY MEDICINE CLINIC | Age: 80
End: 2020-01-30
Payer: MEDICARE

## 2020-01-30 VITALS
SYSTOLIC BLOOD PRESSURE: 126 MMHG | OXYGEN SATURATION: 98 % | WEIGHT: 156 LBS | DIASTOLIC BLOOD PRESSURE: 68 MMHG | BODY MASS INDEX: 24.48 KG/M2 | HEART RATE: 86 BPM | HEIGHT: 67 IN

## 2020-01-30 PROBLEM — G31.84 MILD COGNITIVE IMPAIRMENT: Status: ACTIVE | Noted: 2020-01-30

## 2020-01-30 PROCEDURE — 4004F PT TOBACCO SCREEN RCVD TLK: CPT | Performed by: FAMILY MEDICINE

## 2020-01-30 PROCEDURE — G8427 DOCREV CUR MEDS BY ELIG CLIN: HCPCS | Performed by: FAMILY MEDICINE

## 2020-01-30 PROCEDURE — 4040F PNEUMOC VAC/ADMIN/RCVD: CPT | Performed by: FAMILY MEDICINE

## 2020-01-30 PROCEDURE — 1123F ACP DISCUSS/DSCN MKR DOCD: CPT | Performed by: FAMILY MEDICINE

## 2020-01-30 PROCEDURE — G8420 CALC BMI NORM PARAMETERS: HCPCS | Performed by: FAMILY MEDICINE

## 2020-01-30 PROCEDURE — 99214 OFFICE O/P EST MOD 30 MIN: CPT | Performed by: FAMILY MEDICINE

## 2020-01-30 PROCEDURE — G8482 FLU IMMUNIZE ORDER/ADMIN: HCPCS | Performed by: FAMILY MEDICINE

## 2020-01-30 RX ORDER — DONEPEZIL HYDROCHLORIDE 5 MG/1
5 TABLET, FILM COATED ORAL NIGHTLY
Qty: 30 TABLET | Refills: 5 | Status: SHIPPED
Start: 2020-01-30 | End: 2020-06-01 | Stop reason: SDUPTHER

## 2020-01-30 ASSESSMENT — ENCOUNTER SYMPTOMS
ABDOMINAL PAIN: 0
BACK PAIN: 1
BLOOD IN STOOL: 0
SHORTNESS OF BREATH: 0

## 2020-01-30 NOTE — PROGRESS NOTES
memory  MRI of the brain with and without contrast was last done 6/2018. MMSE was done today with result 20/30    Review of Systems   Constitutional: Negative for fatigue and fever. Respiratory: Negative for shortness of breath. Cardiovascular: Negative for chest pain and leg swelling. Gastrointestinal: Negative for abdominal pain and blood in stool. Genitourinary: Negative for dysuria and frequency. Musculoskeletal: Positive for arthralgias, back pain and neck pain. Neurological: Positive for numbness (b/l hands, worse at night) and headaches. Negative for weakness. Psychiatric/Behavioral:        Controlled     Health Maintenance Due   Topic Date Due    Shingles Vaccine (1 of 2) 07/21/1988    Annual Wellness Visit (AWV)  06/23/2019       Current Outpatient Medications   Medication Sig Dispense Refill    donepezil (ARICEPT) 5 MG tablet Take 1 tablet by mouth nightly 30 tablet 5    citalopram (CELEXA) 40 MG tablet Take 1 tablet by mouth daily 30 tablet 5    levothyroxine (SYNTHROID) 25 MCG tablet Take 1.5 tablets by mouth daily 45 tablet 5    simvastatin (ZOCOR) 20 MG tablet Take 1 tablet by mouth nightly 30 tablet 5    pantoprazole (PROTONIX) 40 MG tablet Take 1 tablet by mouth every morning (before breakfast) 30 tablet 5    Melatonin 5 MG CAPS Take 5 mg by mouth nightly as needed (insomnia) 30 capsule 5    linaGLIPtin-metFORMIN HCl ER (JENTADUETO XR) 2.5-1000 MG TB24 Take 1 tablet by mouth 2 times daily 60 tablet 2    vitamin B-12 (CYANOCOBALAMIN) 1000 MCG tablet Take 0.5 tablets by mouth daily 30 tablet 0    meloxicam (MOBIC) 15 MG tablet Take 15 mg by mouth daily       diclofenac (FLECTOR) 1.3 % patch Place 1 patch onto the skin 2 times daily as needed for Pain (as needed for pain) 30 patch 1    HYDROcodone-acetaminophen (NORCO)  MG per tablet Take 1 tablet by mouth every 6 hours as needed for Pain.  1/2 tab bid prn is how he is taking it      ferrous sulfate 325 (65 Fe) MG tablet Take 325 mg by mouth daily (with breakfast)      meclizine (ANTIVERT) 25 MG tablet Take 25 mg by mouth 3 times daily as needed      vitamin D (CHOLECALCIFEROL) 1000 UNIT TABS tablet Take 1,000 Units by mouth daily      aspirin EC 81 MG EC tablet Take 81 mg by mouth daily      latanoprost (XALATAN) 0.005 % ophthalmic solution Place 1 drop into both eyes nightly       No current facility-administered medications for this visit. History    Past Medical History:   Diagnosis Date    Arthritis     CAD (coronary artery disease)     Cancer ([facility]     skin    Chronic pain     Clavicle fracture     Depression     Diabetes mellitus (HCC)     diet controlled    GERD (gastroesophageal reflux disease)     Headache(784.0)     History of blood transfusion     Hyperlipidemia     Kidney stone     Neck pain     Osteoarthritis     Rib fracture     Thyroid disease        Past Surgical History:   Procedure Laterality Date    ANESTHESIA NERVE BLOCK Bilateral 9/10/2019    BILATERAL LUMBAR FACET MEDIAL BRANCH BLOCK L3 L4 AND L5 DORSAL RAMI (CPT [id number]) performed by Trey Viera MD at [address]      x2 lumbar    CATARACT REMOVAL WITH IMPLANT Left 10/08/13    CATARACT REMOVAL WITH IMPLANT Right 12/10/13    CERVICAL SPINE SURGERY  2012    cervical fusion    COLON SURGERY      due to diverticulitis colon resection    CORONARY ANGIOPLASTY WITH STENT PLACEMENT  2005    x2 stents    DILATATION, ESOPHAGUS      FRACTURE SURGERY      Lt. Jaw Plate    NERVE BLOCK Left 3/7/16    cervical transforaminal #1    NERVE BLOCK Left 3/28/16    cervical transforaminal #2    NERVE BLOCK Left 04 04 2016    transforaminal nerve block left cervical #3    NERVE BLOCK Left 07/25/2016    shoulder    NERVE BLOCK Left 08/15/2016    left shoulder injection #2    NERVE BLOCK Right 08/22/2016    shoulder    NERVE BLOCK Right 08/29/2016    shoulder injection #2    NERVE BLOCK Bilateral 09/10/2019 Panel; Future  - Hemoglobin A1C; Future    2. Dyslipidemia  Continue current treatment and obtain relevant lab work for review next visit. - Comprehensive Metabolic Panel; Future  - Lipid Panel; Future    3. Acquired hypothyroidism  Clinically stable. Continue current treatment and obtain relevant lab work for review next visit. - TSH without Reflex; Future  - T4, Free; Future    4. Gastroesophageal reflux disease, esophagitis presence not specified  Controlled, continue current treatment. - Comprehensive Metabolic Panel; Future  - CBC Auto Differential; Future    5. Impaired memory  MMSE 20-30, begin donepezil as per #6    6. Mild cognitive impairment  - donepezil (ARICEPT) 5 MG tablet; Take 1 tablet by mouth nightly  Dispense: 30 tablet; Refill: 5    Return in about 3 months (around 4/30/2020) for lab review, or sooner as needed. , Labs are to be done one week prior to next visit. James Jimenez DO  01/30/20  6:45 PM    There are no Patient Instructions on file for this visit.

## 2020-02-12 ENCOUNTER — OFFICE VISIT (OUTPATIENT)
Dept: PAIN MANAGEMENT | Age: 80
End: 2020-02-12
Payer: MEDICARE

## 2020-02-12 VITALS
TEMPERATURE: 97.7 F | RESPIRATION RATE: 16 BRPM | DIASTOLIC BLOOD PRESSURE: 80 MMHG | SYSTOLIC BLOOD PRESSURE: 144 MMHG | HEART RATE: 100 BPM

## 2020-02-12 PROCEDURE — G8427 DOCREV CUR MEDS BY ELIG CLIN: HCPCS | Performed by: ANESTHESIOLOGY

## 2020-02-12 PROCEDURE — G8482 FLU IMMUNIZE ORDER/ADMIN: HCPCS | Performed by: ANESTHESIOLOGY

## 2020-02-12 PROCEDURE — 99213 OFFICE O/P EST LOW 20 MIN: CPT

## 2020-02-12 PROCEDURE — G8420 CALC BMI NORM PARAMETERS: HCPCS | Performed by: ANESTHESIOLOGY

## 2020-02-12 PROCEDURE — 1123F ACP DISCUSS/DSCN MKR DOCD: CPT | Performed by: ANESTHESIOLOGY

## 2020-02-12 PROCEDURE — 99214 OFFICE O/P EST MOD 30 MIN: CPT | Performed by: ANESTHESIOLOGY

## 2020-02-12 PROCEDURE — 4040F PNEUMOC VAC/ADMIN/RCVD: CPT | Performed by: ANESTHESIOLOGY

## 2020-02-12 PROCEDURE — 4004F PT TOBACCO SCREEN RCVD TLK: CPT | Performed by: ANESTHESIOLOGY

## 2020-02-12 RX ORDER — HYDROCODONE BITARTRATE AND ACETAMINOPHEN 5; 325 MG/1; MG/1
1 TABLET ORAL DAILY PRN
Qty: 30 TABLET | Refills: 0 | Status: SHIPPED
Start: 2020-02-12 | End: 2020-03-30 | Stop reason: SDUPTHER

## 2020-02-12 NOTE — PROGRESS NOTES
[address] [facility], [address] [place] [address] Edvin  [phone]    Follow up Note      Aurora Robles     Date of Visit:  2/12/2020    CC:  Patient presents for follow up   Chief Complaint   Patient presents with    Neck Pain    Back Pain       HPI:  Mr. Aurora Robles is a 78 years pleasant gentleman presented for evaluation of neck pain. Prior trigger point injection which had helped him significantly. He has noticed recurrence of similar pain.     Pain mainly over the left side of the neck and shoulder blade. Prior H/o ACDF C3-C7.     He denies any upper extremity radicular pain.     No UE weakness or numbness- except occ numbness. No New bowel or bladder symptoms    Prior right sided cervical paraspinal muscle trigger point injection which provided excellent pain relief of > 90%    He also has history of chronic low back pain in the lumbar area. He has undergone lumbar spine surgery X 2 in the 1980's. Pain is mainly in the low back - predominantly axial in nature. Denies significant LE symptoms. He is s/p bilateral lumbar facet MBNB X 2 with > 75-80% pain relief . And subsequently underwent radio frequency ablation over the bilateral L3, L4 and L5 DR on 10/29/2019 which has given him good pain relief. He states that his low back pain is still better. He is able to stand for long period of time and he is walking is improved since the procedure. He continues to do home exercise program/stretching/strengthening exercise as tolerated. No new weakness or numbness    No bowel or bladder symptoms. He takes Hydrocodone prn- given by his PCP. Takes this only on PRN basis for severe pain. There is no signs of misuse abuse or diversion. Patient states medications help his pain. He has reduced his medication requirement since we had started interventional procedures. Nursing notes and details of the pain history reviewed.  Please see disc herniation there is   moderate stenosis       At L3-4: There is a large broad-based disc herniation, there is severe   canal stenosis.       At L2-3: There is a mild broad-based disc herniation, there is mild   canal stenosis       At L1-2: There is a mild broad-based disc herniation, there is mild   canal stenosis               Impression   Findings compatible with degenerative changes   Bilateral L4 pars defect   Severe canal stenosis at L3-4 and moderate canal stenosis at L4-5          Potential Aberrant Drug-Related Behavior:  No    Urine Drug Screening: no    OARRS report[de-identified]   yes 8/27/2019: consistent  Yes: 10/9/2019: consistent-getting his meds from PCP  11/20/2019: consistent  1/8/2019: consistent   2/12/2020: last script for hydrocodone 10/325 filled on 7/3/2019 for # 90 tabs. Given by Dr. Hafsa Sauceda MD    Past Medical History:   Diagnosis Date    Arthritis     CAD (coronary artery disease)     Cancer ([address]     skin    Chronic pain     Clavicle fracture     Depression     Diabetes mellitus ([address]     diet controlled    GERD (gastroesophageal reflux disease)     Headache(784.0)     History of blood transfusion     Hyperlipidemia     Kidney stone     Neck pain     Osteoarthritis     Rib fracture     Thyroid disease        Past Surgical History:   Procedure Laterality Date    ANESTHESIA NERVE BLOCK Bilateral 9/10/2019    BILATERAL LUMBAR FACET MEDIAL BRANCH BLOCK L3 L4 AND L5 DORSAL RAMI (CPT [id number]) performed by Guillaume Austin MD at [address]      x2 lumbar    CATARACT REMOVAL WITH IMPLANT Left 10/08/13    CATARACT REMOVAL WITH IMPLANT Right 12/10/13    CERVICAL SPINE SURGERY  2012    cervical fusion    COLON SURGERY      due to diverticulitis colon resection    CORONARY ANGIOPLASTY WITH STENT PLACEMENT  2005    x2 stents    DILATATION, ESOPHAGUS      FRACTURE SURGERY      Lt. Jaw Plate    NERVE BLOCK Left 3/7/16    cervical transforaminal #1    NERVE Jae Denise, DO   Melatonin 5 MG CAPS Take 5 mg by mouth nightly as needed (insomnia) 1/16/20  Yes Jae Denise, DO   linaGLIPtin-metFORMIN HCl ER (JENTADUETO XR) 2.5-1000 MG TB24 Take 1 tablet by mouth 2 times daily 12/12/19  Yes Jae Rebolledo, DO   vitamin B-12 (CYANOCOBALAMIN) 1000 MCG tablet Take 0.5 tablets by mouth daily 10/16/19  Yes Jae Denise DO   meloxicam (MOBIC) 15 MG tablet Take 15 mg by mouth daily  3/15/19  Yes Historical Provider, MD   diclofenac (FLECTOR) 1.3 % patch Place 1 patch onto the skin 2 times daily as needed for Pain (as needed for pain) 4/12/19  Yes Sara Hector MD   HYDROcodone-acetaminophen (NORCO)  MG per tablet Take 1 tablet by mouth every 6 hours as needed for Pain.  1/2 tab bid prn is how he is taking it   Yes Historical Provider, MD   ferrous sulfate 325 (65 Fe) MG tablet Take 325 mg by mouth daily (with breakfast)   Yes Historical Provider, MD   meclizine (ANTIVERT) 25 MG tablet Take 25 mg by mouth 3 times daily as needed   Yes Historical Provider, MD   vitamin D (CHOLECALCIFEROL) 1000 UNIT TABS tablet Take 1,000 Units by mouth daily   Yes Historical Provider, MD   aspirin EC 81 MG EC tablet Take 81 mg by mouth daily   Yes Historical Provider, MD   latanoprost (XALATAN) 0.005 % ophthalmic solution Place 1 drop into both eyes nightly   Yes Historical Provider, MD       Allergies   Allergen Reactions    Sulfa Antibiotics Hives       Social History     Socioeconomic History    Marital status:      Spouse name: Not on file    Number of children: Not on file    Years of education: Not on file    Highest education level: Not on file   Occupational History    Not on file   Social Needs    Financial resource strain: Not on file    Food insecurity:     Worry: Not on file     Inability: Not on file    Transportation needs:     Medical: Not on file     Non-medical: Not on file   Tobacco Use    Smoking status: Former Smoker     Years: 40.00 healed well, ROM reduced, Lumbar facet loading + bilaterally, Sensory and motor in b/l LE intact, DTR;'s in b/l LE equal.     Musculoskeletal:     Trigger points noted over the cervical paraspinal muscle, trapezius muscles mainly on the left side. He also has tenderness over the left occipital nerve.     Extremities:     No edema     Neurological:     Sensory: normal to light touch      Motor:   No focal deficits, generalized weakness noted. Reflexes:       B/l equal     Gait:normal     Dermatology:     Skin:no rashes or lesions noted    Assessment/Plan:   Diagnosis Orders   1. Cervical spondylolysis     2. Chronic pain syndrome     3. Cervicalgia     4. Cervical facet syndrome     5. Cervical post-laminectomy syndrome     6. Lumbosacral spondylosis without myelopathy     7. DDD (degenerative disc disease), lumbar     8. Postlaminectomy syndrome, lumbar     9. Sacroiliitis     10. Chronic myofascial pain         78 yrs pleasant gentleman with prior H/o Cervical spine surgery- ACDF C3-7 having neck pain with features of myofacial pain and also facet pain. Previous trigger point injections over the cervical paraspinal muscle, trapezius muscle had helped him significantly. Has also undergone occipital nerve block in the past which had helped him. He has finished PT and continues HEP. He has history of chronic low back pain. Has h/o prior lumbar spine surgery in the 1980's. Pain is mainly axial in nature with clinical features of facet pain. He has failed conservative treatment with meds/ PT. S/P Bilateral lumbar facet MBNB X 2 at L3, L4 MB & L5 DR with 75-80% pain relief. Subsequently underwent Radiofrequency ablation at the same levels on 10/29/2019 with excellent pain relief. He got good pain relief over the lower back. He is able to walk better and stand longer and he has noted significant improvement in pain and functionality over the lower back.     He is on ASA-81 mg. H/o CAD s/p stent. He got a prescription for compound cream with a combination of diclofenac 3%, gabapentin 6%, baclofen 2%, lidocaine 5%, doxepin 5%. He has been using it which helps him moderately. He apparently has reduced the intake of Norco and takes it only prn for severe pain. There is no signs of misuse abuse or diversion. He states that helps of his pain. The ORRS reviewed. Last prescription for 90 tablets noted in on 7/3/2019 by Dr. Miguel Michel. Apparently he is not going to Dr. Miguel Michel any more and is out of meds. He is requesting for refill on Hydrocodone. He takes only prn once in few days - 90 tabs given in July had lasted for over 6 months. We will take over his medication prescription. Will sign opioid agreement. Detailed discussion about the salient features fo opioid agreement done today. Also discussed about the appropriate medication use, side effects of chronic opioid use, phenomenon of tolerance / Dependence. Will prescribe Norco 5/325 1 tab po prn 1 / day # 30 tabs given. Dosing and side effects explained. Counseling regarding the importance of regular exercise program and appropriate medication usage done. He was instructed to reduce the medication to the lowest minimal and to take only on as necessary basis for severe pain. Baseline Drug screen- saliva : today- last dose of hydrocodone 5 days ago. F/u in 3 months.     Mayco Pennington MD     CC:  Maryam Barnard DO

## 2020-02-18 ENCOUNTER — OFFICE VISIT (OUTPATIENT)
Dept: FAMILY MEDICINE CLINIC | Age: 80
End: 2020-02-18
Payer: MEDICARE

## 2020-02-18 VITALS
WEIGHT: 157 LBS | HEIGHT: 67 IN | HEART RATE: 89 BPM | OXYGEN SATURATION: 97 % | BODY MASS INDEX: 24.64 KG/M2 | TEMPERATURE: 97.8 F | DIASTOLIC BLOOD PRESSURE: 62 MMHG | SYSTOLIC BLOOD PRESSURE: 112 MMHG

## 2020-02-18 PROCEDURE — G0438 PPPS, INITIAL VISIT: HCPCS | Performed by: FAMILY MEDICINE

## 2020-02-18 PROCEDURE — 1123F ACP DISCUSS/DSCN MKR DOCD: CPT | Performed by: FAMILY MEDICINE

## 2020-02-18 PROCEDURE — 4040F PNEUMOC VAC/ADMIN/RCVD: CPT | Performed by: FAMILY MEDICINE

## 2020-02-18 PROCEDURE — G8482 FLU IMMUNIZE ORDER/ADMIN: HCPCS | Performed by: FAMILY MEDICINE

## 2020-02-18 ASSESSMENT — PATIENT HEALTH QUESTIONNAIRE - PHQ9
SUM OF ALL RESPONSES TO PHQ QUESTIONS 1-9: 2
SUM OF ALL RESPONSES TO PHQ QUESTIONS 1-9: 2

## 2020-02-18 ASSESSMENT — LIFESTYLE VARIABLES: HOW OFTEN DO YOU HAVE A DRINK CONTAINING ALCOHOL: 0

## 2020-02-18 NOTE — PROGRESS NOTES
shoulder injection #2    NERVE BLOCK Right 08/22/2016    shoulder    NERVE BLOCK Right 08/29/2016    shoulder injection #2    NERVE BLOCK Bilateral 09/10/2019    BILATERAL LUMBAR FACET MEDIAL BRANCH NERVE BLOCK AT L3,L4 MEDIAL BRANCH AND L5 DORSAL RAMI    NERVE BLOCK Bilateral 10/29/2019    lumbar facet, meidal branch    OPEN PROX HUMERAL FRACTURE PROSHETIC REPLACEMENT Left 10/23/2018    LEFT CLAVICLE OPEN REDUCTION INTERNAL FIXATION performed by Damián Smalls DO at [facility] Left 101/10/2014    repair left hand, finger laceration    [place] CHEST SURGERY PROCEDURE UNLISTED Left 8/6/2018    LEFT SIDED VIDEO ASSISTED THORACOSCOPY WITH RIB PLATING performed by Sabas Burt MD at [address] d/t a fall    RADIOFREQUENCY ABLATION NERVES Bilateral 10/29/2019    BILATERAL LUMBAR FACET L3 L4 MEDIAL BRANCH  L5 DORSAL RAMI  RADIOFREQUENCY ABLATION SEDATION (CPT 98114) performed by Melanie Driscoll MD at [facility] Bilateral     SHOULDER ARTHROSCOPY Right 12/08/2016    repair rotator cuff, bursectomy, debridement glenohumerol     SKIN BIOPSY           Family History   Problem Relation Age of Onset    Diabetes Mother        CareTeam (Including outside providers/suppliers regularly involved in providing care):   Patient Care Team:  Jay Garcia DO as PCP - General (Family Medicine)  Jay Garcia DO as PCP - [facility] Empaneled Provider  [place] MD Nanette    Wt Readings from Last 3 Encounters:   02/18/20 157 lb (71.2 kg)   01/30/20 156 lb (70.8 kg)   01/16/20 152 lb 6.4 oz (69.1 kg)     Vitals:    02/18/20 1105   BP: 112/62   Pulse: 89   Temp: 97.8 °F (36.6 °C)   TempSrc: Oral   SpO2: 97%   Weight: 157 lb (71.2 kg)   Height: 5' 7\" (1.702 m)     Body mass index is 24.59 kg/m². Based upon direct observation of the patient, evaluation of cognition reveals global memory impairment noted.         Patient's complete Health Risk Assessment and screening values have been reviewed and are found in Flowsheets. The following problems were reviewed today and where indicated follow up appointments were made and/or referrals ordered. Positive Risk Factor Screenings with Interventions:     Fall Risk:  Timed Up and Go Test > 12 seconds? (Complete if either Fall Risk answers are Yes): no  2 or more falls in past year?: (!) yes(August 2018)  Fall with injury in past year?: no  Fall Risk Interventions:    · Physical therapy referral ordered for strength and balance training    Substance Abuse:  Social History     Tobacco History     Smoking Status  Former Smoker Smoking Frequency  For 40 years Smoking Tobacco Type  Cigarettes    Smokeless Tobacco Use  Current User Smokeless Tobacco Type  Chew          Alcohol History     Alcohol Use Status  No          Drug Use     Drug Use Status  No          Sexual Activity     Sexually Active  Not Currently               Audit Questionnaire: Screen for Alcohol Misuse  How often do you have a drink containing alcohol?: Never  Substance Abuse Interventions:  · none needed    Health Habits/Nutrition:  Health Habits/Nutrition  Do you exercise for at least 20 minutes 2-3 times per week?: Yes  Have you lost any weight without trying in the past 3 months?: No  Do you eat fewer than 2 meals per day?: No  Have you seen a dentist within the past year?: (!) No  Body mass index is 24.59 kg/m².   Health Habits/Nutrition Interventions:  · has dentures    Hearing/Vision:  No exam data present  Hearing/Vision  Do you or your family notice any trouble with your hearing?: (!) Yes  Do you have difficulty driving, watching TV, or doing any of your daily activities because of your eyesight?: No  Have you had an eye exam within the past year?: (!) No  Hearing/Vision Interventions:  · Hearing concerns:  patient declines any further evaluation/treatment for hearing issues, saw ENT in the past, did not get hearing aids d/t cost  · Vision concerns:  patient

## 2020-02-18 NOTE — LETTER
[address] [facility] [address]  Phone: [phone]  Fax: Emmett Locke DO        February 18, 2020     Patient: Cristhian Wilkins   YOB: 1940   Date of Visit: 2/18/2020       To Whom It May Concern: It is my medical opinion that Marlyse Rides requires a disability parking placard for the following reasons:  He cannot walk 200 feet without stopping to rest.  Duration of need: 1 year    If you have any questions or concerns, please don't hesitate to call.     Sincerely,        Joselyn Peck, DO

## 2020-03-03 NOTE — PLAN OF CARE
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 Developmental delay: lives at home but dependent on parents for ADLs including bathing, dressing; attends day program for developmentally challenged\par \par 2. Juvenile RA: stable, last attack >4 years ago as per mother; currently receiving Remicade infusions every 6 weeks; follows closely with Rheum Dr. Canseco; needs TB screening today - will draw Quant Gold with labs (TST previously negative as per mother)\par \par 3. Retinitis pigmentosa: follows closely with Optho Dr. De La Fuente every 6 months; currently on Diamox and Vit A\par \par 4. Overweight: encouraged pt to incorporate more exercise in daily routine; likes to use Elliptical machine at the day program he attends but mom admits that he sits for long periods of time on the computer; will check HbA1c, TSH and lipids with labs today\par \par 5. HCM: Vaccines Tdap up to date as per mother; check baseline CBC, CMP with labs today; HIV screening with labs

## 2020-03-30 RX ORDER — HYDROCODONE BITARTRATE AND ACETAMINOPHEN 5; 325 MG/1; MG/1
1 TABLET ORAL DAILY PRN
Qty: 30 TABLET | Refills: 0 | Status: SHIPPED | OUTPATIENT
Start: 2020-03-30 | End: 2020-04-29

## 2020-05-08 ENCOUNTER — VIRTUAL VISIT (OUTPATIENT)
Dept: PAIN MANAGEMENT | Age: 80
End: 2020-05-08
Payer: MEDICARE

## 2020-05-08 ENCOUNTER — PREP FOR PROCEDURE (OUTPATIENT)
Dept: PAIN MANAGEMENT | Age: 80
End: 2020-05-08

## 2020-05-08 PROBLEM — M96.1 CERVICAL POSTLAMINECTOMY SYNDROME: Status: ACTIVE | Noted: 2020-05-08

## 2020-05-08 PROCEDURE — 99443 PR PHYS/QHP TELEPHONE EVALUATION 21-30 MIN: CPT | Performed by: ANESTHESIOLOGY

## 2020-05-08 RX ORDER — HYDROCODONE BITARTRATE AND ACETAMINOPHEN 5; 325 MG/1; MG/1
1 TABLET ORAL DAILY PRN
Qty: 30 TABLET | Refills: 0 | Status: SHIPPED | OUTPATIENT
Start: 2020-05-08 | End: 2020-06-07

## 2020-05-08 NOTE — PROGRESS NOTES
[address]  [address] [facility], [address] [place] [address] [place]  [phone]  Telephone follow up visit      Date of Visit:  5/8/2020    CC:   Chief Complaint   Patient presents with    Follow-up    Back Pain    Neck Pain       Consent:  Telephone follow up due to [place] pandemic   The patient and/or health care decision maker is aware that he/she may receive a bill for this telephone service, depending on his insurance coverage, and has provided verbal consent to proceed: Yes    I have considered the risks of abuse, dependence, addiction and diversion. My patient is aware that they will need a follow-up visit (in-person or virtually) at the appropriate time indicated for continued medications. Further, my patient is aware that when this acute crisis has lifted, they will be expected to return for an in-person visit and all elements of standard local and hospital guidelines in order to continue this medication. Patient location: Home   Physician Location:Home     HPI:    Dione Boggs a 78 years pleasant gentleman for follow evaluation of low back pain and neck pain    Neck pain. H/o Prior H/o ACDF C3-C7. Predominantly axial in nature.     Pain mainly over the left side of the neck and shoulder blade.       He denies any upper extremity radicular pain.     No UE weakness or numbness- except occ numbness. No New bowel or bladder symptoms     Prior right sided cervical paraspinal muscle trigger point injection which provided excellent pain relief of > 90%    Low back pain:     He also has history of chronic low back pain in the lumbar area.  He has undergone lumbar spine surgery X 2 in the 1980's.      Pain is mainly in the low back with occ LE pain.     He is s/p bilateral lumbar facet MBNB X 2 with > 75-80% pain relief .     And subsequently underwent radio frequency ablation over the bilateral L3, L4 and L5 DR on 10/29/2019 which has given him good pain transfusion     Hyperlipidemia     Kidney stone     Neck pain     Osteoarthritis     Rib fracture     Thyroid disease        Past Surgical History: Reviewed   Past Surgical History:   Procedure Laterality Date    ANESTHESIA NERVE BLOCK Bilateral 9/10/2019    BILATERAL LUMBAR FACET MEDIAL BRANCH BLOCK L3 L4 AND L5 DORSAL RAMI (CPT [id number]) performed by Tana Nazario MD at [address]       x2 lumbar    CATARACT REMOVAL WITH IMPLANT Left 10/08/13    CATARACT REMOVAL WITH IMPLANT Right 12/10/13    CERVICAL SPINE SURGERY  2012    cervical fusion    COLON SURGERY      due to diverticulitis colon resection    CORONARY ANGIOPLASTY WITH STENT PLACEMENT  2005    x2 stents    DILATATION, ESOPHAGUS      FRACTURE SURGERY      Lt. Jaw Plate    NERVE BLOCK Left 3/7/16    cervical transforaminal #1    NERVE BLOCK Left 3/28/16    cervical transforaminal #2    NERVE BLOCK Left 04 04 2016    transforaminal nerve block left cervical #3    NERVE BLOCK Left 07/25/2016    shoulder    NERVE BLOCK Left 08/15/2016    left shoulder injection #2    NERVE BLOCK Right 08/22/2016    shoulder    NERVE BLOCK Right 08/29/2016    shoulder injection #2    NERVE BLOCK Bilateral 09/10/2019    BILATERAL LUMBAR FACET MEDIAL BRANCH NERVE BLOCK AT L3,L4 MEDIAL BRANCH AND L5 DORSAL RAMI    NERVE BLOCK Bilateral 10/29/2019    lumbar facet, meidal branch    OPEN PROX HUMERAL FRACTURE PROSHETIC REPLACEMENT Left 10/23/2018    LEFT CLAVICLE OPEN REDUCTION INTERNAL FIXATION performed by Clarissa Cedeno DO at [facility] Left 101/10/2014    repair left hand, finger laceration    [place] CHEST SURGERY PROCEDURE UNLISTED Left 8/6/2018    LEFT SIDED VIDEO ASSISTED THORACOSCOPY WITH RIB PLATING performed by Misael Pagan MD at [address] [place] d/t a fall    RADIOFREQUENCY ABLATION NERVES Bilateral 10/29/2019    BILATERAL LUMBAR FACET L3 L4 MEDIAL BRANCH  L5 DORSAL RAMI  RADIOFREQUENCY ABLATION SEDATION

## 2020-05-28 ENCOUNTER — HOSPITAL ENCOUNTER (OUTPATIENT)
Age: 80
Discharge: HOME | End: 2020-05-30
Payer: MEDICARE

## 2020-05-28 LAB
ALBUMIN SERPL-MCNC: 4.2 G/DL (ref 3.5–5.2)
ALP BLD-CCNC: 45 U/L (ref 40–129)
ALT SERPL-CCNC: 9 U/L (ref 0–40)
ANION GAP SERPL CALCULATED.3IONS-SCNC: 13 MMOL/L (ref 7–16)
AST SERPL-CCNC: 17 U/L (ref 0–39)
BASOPHILS ABSOLUTE: 0.06 E9/L (ref 0–0.2)
BASOPHILS RELATIVE PERCENT: 1 % (ref 0–2)
BILIRUB SERPL-MCNC: 0.2 MG/DL (ref 0–1.2)
BUN BLDV-MCNC: 20 MG/DL (ref 8–23)
CALCIUM SERPL-MCNC: 9.6 MG/DL (ref 8.6–10.2)
CHLORIDE BLD-SCNC: 101 MMOL/L (ref 98–107)
CHOLESTEROL, TOTAL: 155 MG/DL (ref 0–199)
CO2: 25 MMOL/L (ref 22–29)
CREAT SERPL-MCNC: 1.4 MG/DL (ref 0.7–1.2)
EOSINOPHILS ABSOLUTE: 0.25 E9/L (ref 0.05–0.5)
EOSINOPHILS RELATIVE PERCENT: 4.3 % (ref 0–6)
GFR AFRICAN AMERICAN: 59
GFR NON-AFRICAN AMERICAN: 49 ML/MIN/1.73
GLUCOSE BLD-MCNC: 132 MG/DL (ref 74–99)
HBA1C MFR BLD: 6.3 % (ref 4–5.6)
HCT VFR BLD CALC: 40.5 % (ref 37–54)
HDLC SERPL-MCNC: 34 MG/DL
HEMOGLOBIN: 13 G/DL (ref 12.5–16.5)
IMMATURE GRANULOCYTES #: 0.03 E9/L
IMMATURE GRANULOCYTES %: 0.5 % (ref 0–5)
LDL CHOLESTEROL CALCULATED: 78 MG/DL (ref 0–99)
LYMPHOCYTES ABSOLUTE: 1.7 E9/L (ref 1.5–4)
LYMPHOCYTES RELATIVE PERCENT: 29.5 % (ref 20–42)
MCH RBC QN AUTO: 34.6 PG (ref 26–35)
MCHC RBC AUTO-ENTMCNC: 32.1 % (ref 32–34.5)
MCV RBC AUTO: 107.7 FL (ref 80–99.9)
MONOCYTES ABSOLUTE: 0.7 E9/L (ref 0.1–0.95)
MONOCYTES RELATIVE PERCENT: 12.1 % (ref 2–12)
NEUTROPHILS ABSOLUTE: 3.03 E9/L (ref 1.8–7.3)
NEUTROPHILS RELATIVE PERCENT: 52.6 % (ref 43–80)
PDW BLD-RTO: 12.7 FL (ref 11.5–15)
PLATELET # BLD: 180 E9/L (ref 130–450)
PMV BLD AUTO: 11.2 FL (ref 7–12)
POTASSIUM SERPL-SCNC: 4.5 MMOL/L (ref 3.5–5)
RBC # BLD: 3.76 E12/L (ref 3.8–5.8)
SODIUM BLD-SCNC: 139 MMOL/L (ref 132–146)
T4 FREE: 1.1 NG/DL (ref 0.93–1.7)
TOTAL PROTEIN: 6.9 G/DL (ref 6.4–8.3)
TRIGL SERPL-MCNC: 215 MG/DL (ref 0–149)
TSH SERPL DL<=0.05 MIU/L-ACNC: 1.6 UIU/ML (ref 0.27–4.2)
VLDLC SERPL CALC-MCNC: 43 MG/DL
WBC # BLD: 5.8 E9/L (ref 4.5–11.5)

## 2020-05-28 PROCEDURE — 83036 HEMOGLOBIN GLYCOSYLATED A1C: CPT

## 2020-05-28 PROCEDURE — 80061 LIPID PANEL: CPT

## 2020-05-28 PROCEDURE — 36415 COLL VENOUS BLD VENIPUNCTURE: CPT

## 2020-05-28 PROCEDURE — 84443 ASSAY THYROID STIM HORMONE: CPT

## 2020-05-28 PROCEDURE — 80053 COMPREHEN METABOLIC PANEL: CPT

## 2020-05-28 PROCEDURE — 84439 ASSAY OF FREE THYROXINE: CPT

## 2020-05-28 PROCEDURE — 85025 COMPLETE CBC W/AUTO DIFF WBC: CPT

## 2020-06-01 ENCOUNTER — OFFICE VISIT (OUTPATIENT)
Dept: FAMILY MEDICINE CLINIC | Age: 80
End: 2020-06-01
Payer: MEDICARE

## 2020-06-01 VITALS
OXYGEN SATURATION: 96 % | BODY MASS INDEX: 23.23 KG/M2 | RESPIRATION RATE: 16 BRPM | HEART RATE: 68 BPM | WEIGHT: 148 LBS | SYSTOLIC BLOOD PRESSURE: 124 MMHG | DIASTOLIC BLOOD PRESSURE: 68 MMHG | HEIGHT: 67 IN | TEMPERATURE: 97.6 F

## 2020-06-01 PROBLEM — I25.10 CORONARY ATHEROSCLEROSIS: Status: ACTIVE | Noted: 2020-06-01

## 2020-06-01 PROBLEM — D75.89 MACROCYTOSIS WITHOUT ANEMIA: Status: ACTIVE | Noted: 2020-06-01

## 2020-06-01 PROBLEM — T14.90XA TRAUMA: Status: RESOLVED | Noted: 2018-08-04 | Resolved: 2020-06-01

## 2020-06-01 PROBLEM — G47.00 INSOMNIA: Status: ACTIVE | Noted: 2020-06-01

## 2020-06-01 PROBLEM — K56.609 SBO (SMALL BOWEL OBSTRUCTION) (HCC): Status: RESOLVED | Noted: 2019-04-23 | Resolved: 2020-06-01

## 2020-06-01 PROBLEM — Z95.5 HISTORY OF CORONARY ARTERY STENT PLACEMENT: Status: ACTIVE | Noted: 2020-06-01

## 2020-06-01 PROBLEM — R21 RASH AND OTHER NONSPECIFIC SKIN ERUPTION: Status: RESOLVED | Noted: 2018-05-19 | Resolved: 2020-06-01

## 2020-06-01 PROBLEM — N18.30 STAGE 3 CHRONIC KIDNEY DISEASE (HCC): Status: ACTIVE | Noted: 2020-06-01

## 2020-06-01 PROCEDURE — G8427 DOCREV CUR MEDS BY ELIG CLIN: HCPCS | Performed by: FAMILY MEDICINE

## 2020-06-01 PROCEDURE — G8420 CALC BMI NORM PARAMETERS: HCPCS | Performed by: FAMILY MEDICINE

## 2020-06-01 PROCEDURE — 99214 OFFICE O/P EST MOD 30 MIN: CPT | Performed by: FAMILY MEDICINE

## 2020-06-01 PROCEDURE — 4004F PT TOBACCO SCREEN RCVD TLK: CPT | Performed by: FAMILY MEDICINE

## 2020-06-01 PROCEDURE — 4040F PNEUMOC VAC/ADMIN/RCVD: CPT | Performed by: FAMILY MEDICINE

## 2020-06-01 PROCEDURE — 1123F ACP DISCUSS/DSCN MKR DOCD: CPT | Performed by: FAMILY MEDICINE

## 2020-06-01 RX ORDER — TRAZODONE HYDROCHLORIDE 50 MG/1
50 TABLET ORAL NIGHTLY PRN
Qty: 30 TABLET | Refills: 2 | Status: SHIPPED
Start: 2020-06-01 | End: 2020-09-01 | Stop reason: SDUPTHER

## 2020-06-01 RX ORDER — DONEPEZIL HYDROCHLORIDE 5 MG/1
5 TABLET, FILM COATED ORAL NIGHTLY
Qty: 30 TABLET | Refills: 2 | Status: SHIPPED
Start: 2020-06-01 | End: 2021-03-22 | Stop reason: SDUPTHER

## 2020-06-01 RX ORDER — LINAGLIPTIN AND METFORMIN HYDROCHLORIDE 2.5; 1 MG/1; MG/1
1 TABLET, FILM COATED, EXTENDED RELEASE ORAL 2 TIMES DAILY
Qty: 60 TABLET | Refills: 2 | Status: SHIPPED
Start: 2020-06-01 | End: 2020-09-01 | Stop reason: SDUPTHER

## 2020-06-01 RX ORDER — SIMVASTATIN 20 MG
20 TABLET ORAL NIGHTLY
Qty: 30 TABLET | Refills: 2 | Status: SHIPPED
Start: 2020-06-01 | End: 2020-11-03

## 2020-06-01 RX ORDER — LINAGLIPTIN AND METFORMIN HYDROCHLORIDE 2.5; 1 MG/1; MG/1
TABLET, FILM COATED, EXTENDED RELEASE ORAL
COMMUNITY
End: 2020-06-01

## 2020-06-01 ASSESSMENT — ENCOUNTER SYMPTOMS
BACK PAIN: 1
SHORTNESS OF BREATH: 0
CONSTIPATION: 0
ABDOMINAL PAIN: 0
DIARRHEA: 0
SORE THROAT: 0
SINUS PAIN: 0
COUGH: 0
VOMITING: 0
RHINORRHEA: 0
NAUSEA: 0

## 2020-06-01 NOTE — PROGRESS NOTES
BLOCK Bilateral 09/10/2019    BILATERAL LUMBAR FACET MEDIAL BRANCH NERVE BLOCK AT L3,L4 MEDIAL BRANCH AND L5 DORSAL RAMI    NERVE BLOCK Bilateral 10/29/2019    lumbar facet, meidal branch    OPEN PROX HUMERAL FRACTURE PROSHETIC REPLACEMENT Left 10/23/2018    LEFT CLAVICLE OPEN REDUCTION INTERNAL FIXATION performed by Emile Pereira DO at [address] Left 101/10/2014    repair left hand, finger laceration    [place] CHEST SURGERY PROCEDURE UNLISTED Left 8/6/2018    LEFT SIDED VIDEO ASSISTED THORACOSCOPY WITH RIB PLATING performed by Pretty Santos MD at [address] [place] [address] d/t a fall    RADIOFREQUENCY ABLATION NERVES Bilateral 10/29/2019    BILATERAL LUMBAR FACET L3 L4 MEDIAL BRANCH  L5 DORSAL RAMI  RADIOFREQUENCY ABLATION SEDATION (CPT 25774) performed by Ivy Sawyer MD at [address] [facility] Bilateral     SHOULDER ARTHROSCOPY Right 12/08/2016    repair rotator cuff, bursectomy, debridement glenohumerol     SKIN BIOPSY         Social History     Socioeconomic History    Marital status:      Spouse name: Not on file    Number of children: Not on file    Years of education: Not on file    Highest education level: Not on file   Occupational History    Not on file   Social Needs    Financial resource strain: Not on file    Food insecurity     Worry: Not on file     Inability: Not on file   [employer] needs     Medical: Not on file     Non-medical: Not on file   Tobacco Use    Smoking status: Former Smoker     Years: 40.00     Types: Cigarettes    Smokeless tobacco: Current User     Types: Chew   Substance and Sexual Activity    Alcohol use: No    Drug use: No    Sexual activity: Not Currently   Lifestyle    Physical activity     Days per week: Not on file     Minutes per session: Not on file    Stress: Not on file   Relationships    Social connections     Talks on phone: Not on file     Gets together: Not on file     Attends [employer] service:

## 2020-06-14 ENCOUNTER — APPOINTMENT (OUTPATIENT)
Dept: GENERAL RADIOLOGY | Age: 80
End: 2020-06-14
Payer: MEDICARE

## 2020-06-14 ENCOUNTER — HOSPITAL ENCOUNTER (EMERGENCY)
Age: 80
Discharge: HOME | End: 2020-06-14
Payer: MEDICARE

## 2020-06-14 ENCOUNTER — APPOINTMENT (OUTPATIENT)
Dept: CT IMAGING | Age: 80
End: 2020-06-14
Payer: MEDICARE

## 2020-06-14 VITALS
TEMPERATURE: 97.8 F | DIASTOLIC BLOOD PRESSURE: 84 MMHG | HEART RATE: 80 BPM | OXYGEN SATURATION: 97 % | RESPIRATION RATE: 18 BRPM | SYSTOLIC BLOOD PRESSURE: 156 MMHG

## 2020-06-14 PROCEDURE — 72072 X-RAY EXAM THORAC SPINE 3VWS: CPT

## 2020-06-14 PROCEDURE — 73030 X-RAY EXAM OF SHOULDER: CPT

## 2020-06-14 PROCEDURE — 72100 X-RAY EXAM L-S SPINE 2/3 VWS: CPT

## 2020-06-14 PROCEDURE — 72125 CT NECK SPINE W/O DYE: CPT

## 2020-06-14 PROCEDURE — 70450 CT HEAD/BRAIN W/O DYE: CPT

## 2020-06-14 PROCEDURE — 99284 EMERGENCY DEPT VISIT MOD MDM: CPT

## 2020-06-14 ASSESSMENT — PAIN DESCRIPTION - PAIN TYPE: TYPE: ACUTE PAIN

## 2020-06-14 ASSESSMENT — PAIN SCALES - GENERAL: PAINLEVEL_OUTOF10: 9

## 2020-06-14 NOTE — ED PROVIDER NOTES
[facility]     Department of Emergency Medicine   ED  Provider Note  Admit Date/RoomTime: 6/14/2020 12:48 PM  ED Room: [place] [address]    Chief Complaint   Shoulder Pain (right shoulder pain. ); Neck Pain; Back Pain (lower back pain.); and Fall (fell 5 feet from a step ladder on Friday. )    History of Present Illness   Source of history provided by:  patient. History/Exam Limitations: none. Latricia Rapp is a 78 y.o. old male who has a past medical history of:   Past Medical History:   Diagnosis Date    Accident caused by farm tractor     Arthritis     CAD (coronary artery disease)     Chronic pain     Clavicle fracture     Concussion with no loss of consciousness     Depression     Diabetes mellitus ([address]     GERD (gastroesophageal reflux disease)     Headache(784.0)     History of blood transfusion     Hyperlipidemia     Kidney stone     Laceration of flexor tendon of hand, not in \"no man's land\" 10/10/2014    Multiple closed fractures of ribs of left side     Neck pain     Osteoarthritis     Rib fracture     [address]BO (small bowel obstruction) ([facility] [address] 4/23/2019    Skin cancer     Thyroid disease     Trauma 8/4/2018    Traumatic hemopneumothorax, initial encounter     presents to the emergency department by private vehicle, for a fall which occured 2 day(s) prior to arrival. He was reportedly working on ladder while at home. Patient states that the ladder slid out from under him, falling on his decking. He states that it was a wooden deck. He landed on his right side. He hit the right side of his head, denies any loss of consciousness. Reports an occipital headache since then. He denies being on any blood thinners. No vomiting since then. Denies any visual changes. He complains of neck pain and back pain and shoulder pain. He denies any loss of control of his bowels or bladder, no numbness in the groin area. He states no previous injury to his back or shoulder.     ROS PAVAN OR    ROTATOR CUFF REPAIR Bilateral     SHOULDER ARTHROSCOPY Right 12/08/2016    repair rotator cuff, bursectomy, debridement glenohumerol     SKIN BIOPSY     Social History:  reports that he has quit smoking. His smoking use included cigarettes. He quit after 40.00 years of use. His smokeless tobacco use includes chew. He reports that he does not drink alcohol or use drugs. Family History: family history includes Diabetes in his mother. Allergies: Sulfa antibiotics    Physical Exam   Oxygen Saturation Interpretation: Normal.  ED Triage Vitals [06/14/20 1246]   BP Temp Temp Source Pulse Resp SpO2 Height Weight   (!) 156/84 97.8 °F (36.6 °C) Oral 80 18 97 % -- --       Physical Exam  · Constitutional:  Alert, development consistent with age. · HEENT:  NC/NT. Airway patent. · Neck:  No midline or paravertebral tenderness. Tenderness to palpation of the right paraspinous muscles into the right trapezius. No bruising or swelling is present. Normal ROM. Supple. · Chest:  Symmetrical without visible rash or tenderness. · Respiratory:  Lungs Clear to auscultation and breath sounds equal.  · CV:  Regular rate and rhythm, normal heart sounds, without pathological murmurs, ectopy, gallops, or rubs. · GI:  Abdomen Soft, nontender, good bowel sounds. No firm or pulsatile mass. · Pelvis:  Stable, nontender to palpation. · Back: Patient has moderate tenderness over the mid thoracic midline. He also has diffuse tenderness over the lumbar midline. No bruising or swelling is present. · Extremities: 2+ dorsalis pedis pulses bilaterally. 2+ radial pulses bilaterally. Tenderness to palpation of the right lateral shoulder. Limited range of motion due to pain. No tenderness to palpation over the right hand, wrist, forearm, elbow or upper arm. · Integument:  Normal turgor. Warm, dry, without visible rash, unless noted elsewhere.   · Lymphatic: no lymphadenopathy noted  · Neurological:  Oriented x3, GCS

## 2020-06-17 ENCOUNTER — OFFICE VISIT (OUTPATIENT)
Dept: ORTHOPEDIC SURGERY | Age: 80
End: 2020-06-17
Payer: MEDICARE

## 2020-06-17 ENCOUNTER — OFFICE VISIT (OUTPATIENT)
Dept: PAIN MANAGEMENT | Age: 80
End: 2020-06-17
Payer: MEDICARE

## 2020-06-17 VITALS
HEART RATE: 84 BPM | DIASTOLIC BLOOD PRESSURE: 70 MMHG | TEMPERATURE: 98.5 F | RESPIRATION RATE: 16 BRPM | SYSTOLIC BLOOD PRESSURE: 126 MMHG

## 2020-06-17 VITALS — BODY MASS INDEX: 22.19 KG/M2 | HEIGHT: 70 IN | TEMPERATURE: 98 F | WEIGHT: 155 LBS

## 2020-06-17 PROCEDURE — 99213 OFFICE O/P EST LOW 20 MIN: CPT | Performed by: ORTHOPAEDIC SURGERY

## 2020-06-17 PROCEDURE — 99213 OFFICE O/P EST LOW 20 MIN: CPT | Performed by: ANESTHESIOLOGY

## 2020-06-17 PROCEDURE — 4004F PT TOBACCO SCREEN RCVD TLK: CPT | Performed by: ORTHOPAEDIC SURGERY

## 2020-06-17 PROCEDURE — 99213 OFFICE O/P EST LOW 20 MIN: CPT

## 2020-06-17 PROCEDURE — G8427 DOCREV CUR MEDS BY ELIG CLIN: HCPCS | Performed by: ANESTHESIOLOGY

## 2020-06-17 PROCEDURE — G8420 CALC BMI NORM PARAMETERS: HCPCS | Performed by: ANESTHESIOLOGY

## 2020-06-17 PROCEDURE — 4040F PNEUMOC VAC/ADMIN/RCVD: CPT | Performed by: ORTHOPAEDIC SURGERY

## 2020-06-17 PROCEDURE — 4004F PT TOBACCO SCREEN RCVD TLK: CPT | Performed by: ANESTHESIOLOGY

## 2020-06-17 PROCEDURE — 4040F PNEUMOC VAC/ADMIN/RCVD: CPT | Performed by: ANESTHESIOLOGY

## 2020-06-17 PROCEDURE — 1123F ACP DISCUSS/DSCN MKR DOCD: CPT | Performed by: ORTHOPAEDIC SURGERY

## 2020-06-17 PROCEDURE — 1123F ACP DISCUSS/DSCN MKR DOCD: CPT | Performed by: ANESTHESIOLOGY

## 2020-06-17 PROCEDURE — G8427 DOCREV CUR MEDS BY ELIG CLIN: HCPCS | Performed by: ORTHOPAEDIC SURGERY

## 2020-06-17 PROCEDURE — G8420 CALC BMI NORM PARAMETERS: HCPCS | Performed by: ORTHOPAEDIC SURGERY

## 2020-06-17 RX ORDER — HYDROCODONE BITARTRATE AND ACETAMINOPHEN 5; 325 MG/1; MG/1
1 TABLET ORAL DAILY PRN
Qty: 30 TABLET | Refills: 0 | Status: SHIPPED
Start: 2020-06-17 | End: 2020-07-15 | Stop reason: SDUPTHER

## 2020-06-17 NOTE — PROGRESS NOTES
Rafael Gonzalez is a 78 y.o. male, who presents   Chief Complaint   Patient presents with    Shoulder Injury     new patient right shoulder pain. Patient fell on 6/12/20 and is c/o of more clavicle pain than shoulder pain. Has issues raising his right arm. HPI[de-identified] Injury occurred about 5 days ago. Patient was doing some work around the home and was on barrel or bucket and lost his balance and fell striking his right anterior chest wall against the corner of a cabinet or use of furniture. It was quite painful. He noticed development of bruising and went to emergency room 2 days later. This involved many evaluations including CT scans of the cervical spine and x-rays of the shoulder as well as other studies. He was discharged in stable condition without the need for acute aggressive treatment. Allergies; medications; past medical, surgical, family, and social history; and problem list have been reviewed today and updated as indicated in this encounter - see below following Ortho specifics. Musculoskeletal: The patient is alert and oriented and shows no signs of any head injury at this time. He guards his right shoulder from movement which causes pain in the right clavicle area particularly medial at the sternoclavicular joint. There is bruising in the anterior chest wall. Any palpation of the medial clavicle results in a withdrawal reaction and immediately he contracts he has neck muscles. There is no randee instability of the sternoclavicular joint detected. There is some anterior prominence of the medial end of the sternum. There is no crepitus. Neurovascular function is intact to his right upper extremity. He is breathing without great distress. Radiologic Studies: Imaging of the right shoulder showed presence of rotator cuff anchor. No other injuries were evident. The medial end of the clavicle was not visualized here.     CT of the cervical spine did show the sternoclavicular DDD (degenerative disc disease), lumbar    Type 2 diabetes mellitus without complication, without long-term current use of insulin (HCC)    Mild cognitive impairment    Cervical postlaminectomy syndrome    Stage 3 chronic kidney disease (HCC)    Macrocytosis without anemia    Insomnia    Coronary atherosclerosis    History of coronary artery stent placement       Past Medical History:   Diagnosis Date    Accident caused by farm tractor     Arthritis     CAD (coronary artery disease)     Chronic pain     Clavicle fracture     Concussion with no loss of consciousness     Depression     Diabetes mellitus (HCC)     GERD (gastroesophageal reflux disease)     Headache(784.0)     History of blood transfusion     Hyperlipidemia     Kidney stone     Laceration of flexor tendon of hand, not in \"no man's land\" 10/10/2014    Multiple closed fractures of ribs of left side     Neck pain     Osteoarthritis     Rib fracture     SBO (small bowel obstruction) ([facility] [address] 4/23/2019    Skin cancer     Thyroid disease     Trauma 8/4/2018    Traumatic hemopneumothorax, initial encounter        Past Surgical History:   Procedure Laterality Date    ANESTHESIA NERVE BLOCK Bilateral 9/10/2019    BILATERAL LUMBAR FACET MEDIAL BRANCH BLOCK L3 L4 AND L5 DORSAL RAMI (CPT [id number]) performed by Lida Fletcher MD at [address]       x2 lumbar    CATARACT REMOVAL WITH IMPLANT Left 10/08/13    CATARACT REMOVAL WITH IMPLANT Right 12/10/13    CERVICAL SPINE SURGERY  2012    cervical fusion    COLON SURGERY      due to diverticulitis colon resection    CORONARY ANGIOPLASTY WITH STENT PLACEMENT  2005    x2 stents    DILATATION, ESOPHAGUS      FRACTURE SURGERY      Lt. Jaw Plate    NERVE BLOCK Left 3/7/16    cervical transforaminal #1    NERVE BLOCK Left 3/28/16    cervical transforaminal #2    NERVE BLOCK Left 04 04 2016    transforaminal nerve block left cervical #3    NERVE BLOCK Left 07/25/2016    shoulder    NERVE BLOCK Left 08/15/2016    left shoulder injection #2    NERVE BLOCK Right 08/22/2016    shoulder    NERVE BLOCK Right 08/29/2016    shoulder injection #2    NERVE BLOCK Bilateral 09/10/2019    BILATERAL LUMBAR FACET MEDIAL BRANCH NERVE BLOCK AT L3,L4 MEDIAL BRANCH AND L5 DORSAL RAMI    NERVE BLOCK Bilateral 10/29/2019    lumbar facet, meidal branch    OPEN PROX HUMERAL FRACTURE PROSHETIC REPLACEMENT Left 10/23/2018    LEFT CLAVICLE OPEN REDUCTION INTERNAL FIXATION performed by Ramakrishna Pink DO [place] [address] Left 101/10/2014    repair left hand, finger laceration    DE CHEST SURGERY PROCEDURE UNLISTED Left 8/6/2018    LEFT SIDED VIDEO ASSISTED THORACOSCOPY WITH RIB PLATING performed by Curt Vasquez MD at [address] d/t a fall    RADIOFREQUENCY ABLATION NERVES Bilateral 10/29/2019    BILATERAL LUMBAR FACET L3 L4 MEDIAL BRANCH  L5 DORSAL RAMI  RADIOFREQUENCY ABLATION SEDATION (CPT [id number]) performed by Ama Chisholm MD at [address] Bilateral     SHOULDER ARTHROSCOPY Right 12/08/2016    repair rotator cuff, bursectomy, debridement glenohumerol     SKIN BIOPSY         Current Outpatient Medications   Medication Sig Dispense Refill    linaGLIPtin-metFORMIN HCl ER (JENTADUETO XR) 2.5-1000 MG TB24 Take 1 tablet by mouth 2 times daily 60 tablet 2    simvastatin (ZOCOR) 20 MG tablet Take 1 tablet by mouth nightly 30 tablet 2    donepezil (ARICEPT) 5 MG tablet Take 1 tablet by mouth nightly 30 tablet 2    traZODone (DESYREL) 50 MG tablet Take 1 tablet by mouth nightly as needed for Sleep 30 tablet 2    citalopram (CELEXA) 40 MG tablet Take 1 tablet by mouth daily 30 tablet 5    levothyroxine (SYNTHROID) 25 MCG tablet Take 1.5 tablets by mouth daily 45 tablet 5    pantoprazole (PROTONIX) 40 MG tablet Take 1 tablet by mouth every morning (before breakfast) 30 tablet 5    vitamin B-12 (CYANOCOBALAMIN) 1000 MCG tablet

## 2020-06-17 NOTE — PROGRESS NOTES
was evaluated by Dr. Surinder Price from ortho and was diagnosed with right clavicle contusion/ chest wall contusion. Managed conservatively. NSAID's: Yes prn.     Anticoagulants: Yes ASA-81 mg     Imaging studies:  Left Shoulder: 6/14/2020: Impression   No acute osseous abnormality.       Degenerative changes as above. Xr Lumbar spine: 6/2020: Impression   Diffuse osteopenia with moderate to advanced degenerative changes of the   lumbar spine, as above.  No convincing evidence of an acute fracture. CT cervical spein: 6/14/2020:     FINDINGS:   BONES/ALIGNMENT: No evidence of cervical fracture or traumatic subluxation. Stable T3 wedge compression.  There has been fusion of C3 through C7.  There   is an anterior plate with screws at C3, C4, and C7.  There is a bone block   graft at C3-C4.  There is a block graft extending from C4 through C7.  The   hardware and grafts are intact.       DEGENERATIVE CHANGES: Degenerative change at C1-C.  Degenerative disc disease   C7-T1.  Diffuse facet osteoarthritis with stable several mm anterolisthesis   of C7.       SOFT TISSUES: No prevertebral soft tissue swelling.  Maxillary sinusitis   changes noted           Impression   No acute abnormality of the cervical spine.         Xray thoracic spine: 6/14/2020: Impression   Limited examination, as described above.  Within these limitations, no   convincing evidence of an acute fracture of the thoracic spine.           X ray LS spine: 3/2018:     Lumbar spine: There is slight levoconvex curvature with normal lumbar lordosis. Vertebral bodies maintain normal height. There is trace retrolisthesis   of L2 on L3 and L3 on L4. There is trace anterolisthesis of L4 on L5. There is multilevel intervertebral disc space narrowing with   hypertrophic endplate changes, most pronounced at L4-L5. No acute   fractures identified.  There is lower lumbar facet arthrosis.       Sacrum and coccyx:   Overlying stool obscures the and

## 2020-07-10 ENCOUNTER — TELEPHONE (OUTPATIENT)
Dept: PAIN MANAGEMENT | Age: 80
End: 2020-07-10

## 2020-07-10 NOTE — TELEPHONE ENCOUNTER
7/10/2020-upon review of Rachid's chart, it is noted that he takes ASA . Medical clearance obtained from Dr. Enrique Gamino OK to hold ASA  for 7 days. Call to [facility], advised him to hold his ASA  for 7 days before his 07/21/2020 procedure, last dose to be 07/13/2020. he shows an understanding to not take it before his procedure. Instructed of need for COVID-19 testing and self quarantining. Instructed him that the surgery center should call him a few days before for the pre op call and after 3:00 PM the business day before with the arrival time. [facility] verbalized understanding.      COVID-19 symptom and exposure screening:    Fever: No  Headache:  No  Cough: No  Shortness of breath: No  Exposed to anyone with these symptoms: No     Di Hoyt RN  Pain Management

## 2020-07-15 ENCOUNTER — VIRTUAL VISIT (OUTPATIENT)
Dept: PAIN MANAGEMENT | Age: 80
End: 2020-07-15
Payer: MEDICARE

## 2020-07-15 PROCEDURE — 99442 PR PHYS/QHP TELEPHONE EVALUATION 11-20 MIN: CPT | Performed by: ANESTHESIOLOGY

## 2020-07-15 RX ORDER — HYDROCODONE BITARTRATE AND ACETAMINOPHEN 5; 325 MG/1; MG/1
1 TABLET ORAL 2 TIMES DAILY PRN
Qty: 60 TABLET | Refills: 0 | Status: SHIPPED | OUTPATIENT
Start: 2020-07-15 | End: 2020-08-14

## 2020-07-15 RX ORDER — PANTOPRAZOLE SODIUM 40 MG/1
40 TABLET, DELAYED RELEASE ORAL
Qty: 30 TABLET | Refills: 2 | Status: SHIPPED
Start: 2020-07-15 | End: 2020-10-21 | Stop reason: SDUPTHER

## 2020-07-15 NOTE — PROGRESS NOTES
Les Casillas was read the following message We want to confirm that, for purposes of billing, this is a virtual visit with your provider for which we will submit a claim for reimbursement with your insurance company. You will be responsible for any copays, coinsurance amounts or other amounts not covered by your insurance company. If you do not accept this, unfortunately we will not be able to schedule a virtual visit with the provider. Do you accept? Rene Tomas responded Yes .

## 2020-07-16 ENCOUNTER — HOSPITAL ENCOUNTER (OUTPATIENT)
Age: 80
Discharge: HOME | End: 2020-07-18
Payer: MEDICARE

## 2020-07-16 PROCEDURE — U0003 INFECTIOUS AGENT DETECTION BY NUCLEIC ACID (DNA OR RNA); SEVERE ACUTE RESPIRATORY SYNDROME CORONAVIRUS 2 (SARS-COV-2) (CORONAVIRUS DISEASE [COVID-19]), AMPLIFIED PROBE TECHNIQUE, MAKING USE OF HIGH THROUGHPUT TECHNOLOGIES AS DESCRIBED BY CMS-2020-01-R: HCPCS

## 2020-07-19 LAB
SARS-COV-2: NOT DETECTED
SOURCE: NORMAL

## 2020-07-21 ENCOUNTER — HOSPITAL ENCOUNTER (OUTPATIENT)
Dept: OPERATING ROOM | Age: 80
Setting detail: OUTPATIENT SURGERY
Discharge: HOME | End: 2020-07-21
Attending: ANESTHESIOLOGY
Payer: MEDICARE

## 2020-07-21 ENCOUNTER — HOSPITAL ENCOUNTER (OUTPATIENT)
Age: 80
Setting detail: OUTPATIENT SURGERY
Discharge: HOME | End: 2020-07-21
Attending: ANESTHESIOLOGY | Admitting: ANESTHESIOLOGY
Payer: MEDICARE

## 2020-07-21 VITALS
HEART RATE: 76 BPM | WEIGHT: 154 LBS | BODY MASS INDEX: 24.17 KG/M2 | RESPIRATION RATE: 18 BRPM | SYSTOLIC BLOOD PRESSURE: 115 MMHG | OXYGEN SATURATION: 97 % | HEIGHT: 67 IN | TEMPERATURE: 98.2 F | DIASTOLIC BLOOD PRESSURE: 62 MMHG

## 2020-07-21 PROCEDURE — 7100000011 HC PHASE II RECOVERY - ADDTL 15 MIN: Performed by: ANESTHESIOLOGY

## 2020-07-21 PROCEDURE — 6360000002 HC RX W HCPCS: Performed by: ANESTHESIOLOGY

## 2020-07-21 PROCEDURE — 3209999900 FLUORO FOR SURGICAL PROCEDURES

## 2020-07-21 PROCEDURE — 2709999900 HC NON-CHARGEABLE SUPPLY: Performed by: ANESTHESIOLOGY

## 2020-07-21 PROCEDURE — 62323 NJX INTERLAMINAR LMBR/SAC: CPT | Performed by: ANESTHESIOLOGY

## 2020-07-21 PROCEDURE — 6360000004 HC RX CONTRAST MEDICATION: Performed by: ANESTHESIOLOGY

## 2020-07-21 PROCEDURE — 7100000010 HC PHASE II RECOVERY - FIRST 15 MIN: Performed by: ANESTHESIOLOGY

## 2020-07-21 PROCEDURE — 3600000005 HC SURGERY LEVEL 5 BASE: Performed by: ANESTHESIOLOGY

## 2020-07-21 PROCEDURE — 2500000003 HC RX 250 WO HCPCS: Performed by: ANESTHESIOLOGY

## 2020-07-21 RX ORDER — LIDOCAINE HYDROCHLORIDE 5 MG/ML
INJECTION, SOLUTION INFILTRATION; INTRAVENOUS PRN
Status: DISCONTINUED | OUTPATIENT
Start: 2020-07-21 | End: 2020-07-21 | Stop reason: ALTCHOICE

## 2020-07-21 ASSESSMENT — PAIN DESCRIPTION - PAIN TYPE: TYPE: CHRONIC PAIN

## 2020-07-21 ASSESSMENT — PAIN SCALES - GENERAL: PAINLEVEL_OUTOF10: 2

## 2020-07-21 ASSESSMENT — PAIN DESCRIPTION - LOCATION: LOCATION: BACK

## 2020-07-21 ASSESSMENT — PAIN - FUNCTIONAL ASSESSMENT: PAIN_FUNCTIONAL_ASSESSMENT: 0-10

## 2020-07-21 NOTE — H&P
08/15/2016    left shoulder injection #2    NERVE BLOCK Right 08/22/2016    shoulder    NERVE BLOCK Right 08/29/2016    shoulder injection #2    NERVE BLOCK Bilateral 09/10/2019    BILATERAL LUMBAR FACET MEDIAL BRANCH NERVE BLOCK AT L3,L4 MEDIAL BRANCH AND L5 DORSAL RAMI    NERVE BLOCK Bilateral 10/29/2019    lumbar facet, meidal branch    OPEN PROX HUMERAL FRACTURE PROSHETIC REPLACEMENT Left 10/23/2018    LEFT CLAVICLE OPEN REDUCTION INTERNAL FIXATION [facility] [address] [facility]2014    repair left hand, finger laceration    VT CHEST SURGERY PROCEDURE UNLISTED Left 8/6/2018    LEFT SIDED VIDEO ASSISTED THORACOSCOPY WITH RIB PLATING performed by Cesilia Allen MD at [address] d/t a fall    RADIOFREQUENCY ABLATION NERVES Bilateral 10/29/2019    BILATERAL LUMBAR FACET L3 L4 MEDIAL BRANCH  L5 DORSAL RAMI  RADIOFREQUENCY ABLATION SEDATION (CPT [id number]) performed by Darius Alberts MD at [facility] Bilateral     SHOULDER ARTHROSCOPY Right 12/08/2016    repair rotator cuff, bursectomy, debridement glenohumerol     SKIN BIOPSY         Prior to Admission medications    Medication Sig Start Date End Date Taking? Authorizing Provider   aspirin EC 81 MG EC tablet Take 81 mg by mouth daily   Yes Historical Provider, MD   pantoprazole (PROTONIX) 40 MG tablet Take 1 tablet by mouth every morning (before breakfast) 7/15/20   [facility]   HYDROcodone-acetaminophen (NORCO) 5-325 MG per tablet Take 1 tablet by mouth 2 times daily as needed for Pain (once or twice a day as needed for severe pain) for up to 30 days.  Take lowest dose possible to manage pain 7/15/20 8/14/20  Darius Alberts MD   linaGLIPtin-metFORMIN HCl ER (JENTADUETO XR) 2.5-1000 MG TB24 Take 1 tablet by mouth 2 times daily 6/1/20   Lolly , DO   simvastatin (ZOCOR) 20 MG tablet Take 1 tablet by mouth nightly 6/1/20   Lolly , DO   donepezil (ARICEPT) 5 MG tablet Take 1 tablet by mouth nightly 6/1/20   Aurora Cardoso DO   traZODone (DESYREL) 50 MG tablet Take 1 tablet by mouth nightly as needed for Sleep 6/1/20   Aurora Cardoso DO   citalopram (CELEXA) 40 MG tablet Take 1 tablet by mouth daily 1/16/20   Jae Denise DO   levothyroxine (SYNTHROID) 25 MCG tablet Take 1.5 tablets by mouth daily 1/16/20   Jae Denise DO   vitamin B-12 (CYANOCOBALAMIN) 1000 MCG tablet Take 0.5 tablets by mouth daily 10/16/19   Jae Denise DO   ferrous sulfate 325 (65 Fe) MG tablet Take 325 mg by mouth daily (with breakfast)    Historical Provider, MD   meclizine (ANTIVERT) 25 MG tablet Take 25 mg by mouth 3 times daily as needed    Historical Provider, MD   vitamin D (CHOLECALCIFEROL) 1000 UNIT TABS tablet Take 1,000 Units by mouth daily    Historical Provider, MD       Allergies   Allergen Reactions    Sulfa Antibiotics Hives       Social History     Socioeconomic History    Marital status:      Spouse name: Not on file    Number of children: Not on file    Years of education: Not on file    Highest education level: Not on file   Occupational History    Not on file   Social Needs    Financial resource strain: Not on file    Food insecurity     Worry: Not on file     Inability: Not on file    Transportation needs     Medical: Not on file     Non-medical: Not on file   Tobacco Use    Smoking status: Former Smoker     Years: 40.00     Types: Cigarettes    Smokeless tobacco: Current User     Types: Chew   Substance and Sexual Activity    Alcohol use: No    Drug use: No    Sexual activity: Not Currently   Lifestyle    Physical activity     Days per week: Not on file     Minutes per session: Not on file    Stress: Not on file   Relationships    Social connections     Talks on phone: Not on file     Gets together: Not on file     Attends Yazidism service: Not on file     Active member of club or organization: Not on file     Attends meetings of clubs or organizations: Not on file     Relationship status: Not on file    Intimate partner violence     Fear of current or ex partner: Not on file     Emotionally abused: Not on file     Physically abused: Not on file     Forced sexual activity: Not on file   Other Topics Concern    Not on file   Social History Narrative    ** Merged History Encounter **            Family History   Problem Relation Age of Onset    Diabetes Mother          REVIEW OF SYSTEMS:    CONSTITUTIONAL:  negative for  fevers, chills, sweats and fatigue    RESPIRATORY:  negative for  dry cough, cough with sputum, dyspnea, wheezing and chest pain    CARDIOVASCULAR:  negative for chest pain, dyspnea, palpitations, syncope    GASTROINTESTINAL:  negative for nausea, vomiting, change in bowel habits, diarrhea, constipation and abdominal pain    MUSCULOSKELETAL: negative for muscle weakness    SKIN: negative for itching or rashes. BEHAVIOR/PSYCH:  negative for poor appetite, increased appetite, decreased sleep and poor concentration    All other systems negative      PHYSICAL EXAM:    VITALS:  /65   Pulse 76   Temp 98.2 °F (36.8 °C) (Skin)   Resp 14   Ht 5' 7\" (1.702 m)   Wt 154 lb (69.9 kg)   SpO2 98%   BMI 24.12 kg/m²     CONSTITUTIONAL:  awake, alert, cooperative, no apparent distress, and appears stated age    EYES: PERRLA, EOMI    LUNGS:  No increased work of breathing, no audible wheezing    CARDIOVASCULAR:  regular rate and rhythm    ABDOMEN:  Soft non tender non distended     EXTREMITIES: no signs of clubbing or cyanosis. MUSCULOSKELETAL: negative for flaccid muscle tone or spastic movements. SKIN: gross examination reveals no signs of rashes, or diaphoresis. NEURO: Cranial nerves II-XII grossly intact. No signs of agitated mood. Assessment/Plan:    Patient  is here for [facility] for low back pain.   The patient was counseled at length about the risks of osmani Covid-19 during their perioperative period and any recovery window from their procedure. The patient was made aware that osmani Covid-19  may worsen their prognosis for recovering from their procedure  and lend to a higher morbidity and/or mortality risk. All material risks, benefits, and reasonable alternatives including postponing the procedure were discussed. The patient does wish to proceed with the procedure at this time.     Bess Whatley MD

## 2020-08-12 RX ORDER — CITALOPRAM 40 MG/1
40 TABLET ORAL DAILY
Qty: 30 TABLET | Refills: 5 | Status: SHIPPED
Start: 2020-08-12 | End: 2021-02-04

## 2020-08-20 ENCOUNTER — NURSE TRIAGE (OUTPATIENT)
Dept: OTHER | Facility: CLINIC | Age: 80
End: 2020-08-20

## 2020-08-20 ENCOUNTER — HOSPITAL ENCOUNTER (EMERGENCY)
Age: 80
Discharge: HOME | End: 2020-08-20
Attending: EMERGENCY MEDICINE
Payer: MEDICARE

## 2020-08-20 ENCOUNTER — TELEPHONE (OUTPATIENT)
Dept: ADMINISTRATIVE | Age: 80
End: 2020-08-20

## 2020-08-20 ENCOUNTER — APPOINTMENT (OUTPATIENT)
Dept: CT IMAGING | Age: 80
End: 2020-08-20
Payer: MEDICARE

## 2020-08-20 VITALS
SYSTOLIC BLOOD PRESSURE: 123 MMHG | HEIGHT: 67 IN | BODY MASS INDEX: 23.71 KG/M2 | TEMPERATURE: 98 F | WEIGHT: 151.06 LBS | HEART RATE: 74 BPM | DIASTOLIC BLOOD PRESSURE: 75 MMHG | OXYGEN SATURATION: 97 % | RESPIRATION RATE: 16 BRPM

## 2020-08-20 LAB
ALBUMIN SERPL-MCNC: 3.8 G/DL (ref 3.5–5.2)
ALP BLD-CCNC: 64 U/L (ref 40–129)
ALT SERPL-CCNC: 12 U/L (ref 0–40)
ANION GAP SERPL CALCULATED.3IONS-SCNC: 11 MMOL/L (ref 7–16)
AST SERPL-CCNC: 20 U/L (ref 0–39)
BASOPHILS ABSOLUTE: 0.13 E9/L (ref 0–0.2)
BASOPHILS RELATIVE PERCENT: 2.6 % (ref 0–2)
BILIRUB SERPL-MCNC: <0.2 MG/DL (ref 0–1.2)
BILIRUBIN URINE: NEGATIVE
BLOOD, URINE: NEGATIVE
BUN BLDV-MCNC: 16 MG/DL (ref 8–23)
CALCIUM SERPL-MCNC: 9.1 MG/DL (ref 8.6–10.2)
CHLORIDE BLD-SCNC: 103 MMOL/L (ref 98–107)
CLARITY: CLEAR
CO2: 25 MMOL/L (ref 22–29)
COLOR: YELLOW
CREAT SERPL-MCNC: 1.2 MG/DL (ref 0.7–1.2)
EOSINOPHILS ABSOLUTE: 0.4 E9/L (ref 0.05–0.5)
EOSINOPHILS RELATIVE PERCENT: 7.8 % (ref 0–6)
GFR AFRICAN AMERICAN: >60
GFR NON-AFRICAN AMERICAN: 58 ML/MIN/1.73
GLUCOSE BLD-MCNC: 110 MG/DL (ref 74–99)
GLUCOSE URINE: 250 MG/DL
HCT VFR BLD CALC: 36.8 % (ref 37–54)
HEMOGLOBIN: 12 G/DL (ref 12.5–16.5)
KETONES, URINE: ABNORMAL MG/DL
LACTIC ACID: 2.5 MMOL/L (ref 0.5–2.2)
LEUKOCYTE ESTERASE, URINE: NEGATIVE
LIPASE: 162 U/L (ref 13–60)
LYMPHOCYTES ABSOLUTE: 0.97 E9/L (ref 1.5–4)
LYMPHOCYTES RELATIVE PERCENT: 19.1 % (ref 20–42)
MCH RBC QN AUTO: 34.8 PG (ref 26–35)
MCHC RBC AUTO-ENTMCNC: 32.6 % (ref 32–34.5)
MCV RBC AUTO: 106.7 FL (ref 80–99.9)
MONOCYTES ABSOLUTE: 0.41 E9/L (ref 0.1–0.95)
MONOCYTES RELATIVE PERCENT: 7.8 % (ref 2–12)
MYELOCYTE PERCENT: 0.9 % (ref 0–0)
NEUTROPHILS ABSOLUTE: 3.21 E9/L (ref 1.8–7.3)
NEUTROPHILS RELATIVE PERCENT: 61.7 % (ref 43–80)
NITRITE, URINE: NEGATIVE
PDW BLD-RTO: 13 FL (ref 11.5–15)
PH UA: 5 (ref 5–9)
PLATELET # BLD: 166 E9/L (ref 130–450)
PMV BLD AUTO: 9.8 FL (ref 7–12)
POTASSIUM SERPL-SCNC: 4.5 MMOL/L (ref 3.5–5)
PROTEIN UA: NEGATIVE MG/DL
RBC # BLD: 3.45 E12/L (ref 3.8–5.8)
SODIUM BLD-SCNC: 139 MMOL/L (ref 132–146)
SPECIFIC GRAVITY UA: 1.02 (ref 1–1.03)
TOTAL PROTEIN: 7 G/DL (ref 6.4–8.3)
UROBILINOGEN, URINE: 0.2 E.U./DL
WBC # BLD: 5.1 E9/L (ref 4.5–11.5)

## 2020-08-20 PROCEDURE — 83690 ASSAY OF LIPASE: CPT

## 2020-08-20 PROCEDURE — 83605 ASSAY OF LACTIC ACID: CPT

## 2020-08-20 PROCEDURE — 96374 THER/PROPH/DIAG INJ IV PUSH: CPT

## 2020-08-20 PROCEDURE — 85025 COMPLETE CBC W/AUTO DIFF WBC: CPT

## 2020-08-20 PROCEDURE — 74176 CT ABD & PELVIS W/O CONTRAST: CPT

## 2020-08-20 PROCEDURE — 99284 EMERGENCY DEPT VISIT MOD MDM: CPT

## 2020-08-20 PROCEDURE — 99283 EMERGENCY DEPT VISIT LOW MDM: CPT

## 2020-08-20 PROCEDURE — 80053 COMPREHEN METABOLIC PANEL: CPT

## 2020-08-20 PROCEDURE — 81003 URINALYSIS AUTO W/O SCOPE: CPT

## 2020-08-20 PROCEDURE — 6360000002 HC RX W HCPCS: Performed by: EMERGENCY MEDICINE

## 2020-08-20 RX ORDER — DOCUSATE SODIUM 100 MG/1
100 CAPSULE, LIQUID FILLED ORAL 2 TIMES DAILY PRN
Qty: 20 CAPSULE | Refills: 0 | Status: SHIPPED | OUTPATIENT
Start: 2020-08-20 | End: 2020-08-25

## 2020-08-20 RX ORDER — FENTANYL CITRATE 50 UG/ML
25 INJECTION, SOLUTION INTRAMUSCULAR; INTRAVENOUS ONCE
Status: COMPLETED | OUTPATIENT
Start: 2020-08-20 | End: 2020-08-20

## 2020-08-20 RX ADMIN — FENTANYL CITRATE 25 MCG: 50 INJECTION, SOLUTION INTRAMUSCULAR; INTRAVENOUS at 12:30

## 2020-08-20 ASSESSMENT — PAIN DESCRIPTION - FREQUENCY: FREQUENCY: INTERMITTENT

## 2020-08-20 ASSESSMENT — ENCOUNTER SYMPTOMS
ABDOMINAL PAIN: 1
COUGH: 0
VOMITING: 0
BACK PAIN: 0
CHEST TIGHTNESS: 0
WHEEZING: 0
SORE THROAT: 0
SHORTNESS OF BREATH: 0
DIARRHEA: 0
ABDOMINAL DISTENTION: 0
NAUSEA: 0

## 2020-08-20 ASSESSMENT — PAIN - FUNCTIONAL ASSESSMENT: PAIN_FUNCTIONAL_ASSESSMENT: PREVENTS OR INTERFERES SOME ACTIVE ACTIVITIES AND ADLS

## 2020-08-20 ASSESSMENT — PAIN DESCRIPTION - DESCRIPTORS: DESCRIPTORS: DISCOMFORT

## 2020-08-20 ASSESSMENT — PAIN DESCRIPTION - LOCATION: LOCATION: ABDOMEN

## 2020-08-20 ASSESSMENT — PAIN SCALES - GENERAL
PAINLEVEL_OUTOF10: 6
PAINLEVEL_OUTOF10: 5

## 2020-08-20 ASSESSMENT — PAIN DESCRIPTION - PAIN TYPE: TYPE: ACUTE PAIN

## 2020-08-20 NOTE — ED PROVIDER NOTES
heart sounds. No murmur. Pulmonary:      Effort: Pulmonary effort is normal. No respiratory distress. Breath sounds: Normal breath sounds. No stridor, decreased air movement or transmitted upper airway sounds. No decreased breath sounds, wheezing, rhonchi or rales. Chest:      Chest wall: No tenderness. Abdominal:      General: Bowel sounds are normal. There is no distension. Palpations: Abdomen is soft. There is no pulsatile mass. Tenderness: There is abdominal tenderness. There is no right CVA tenderness, left CVA tenderness, guarding or rebound. Comments: Abdomen soft with mild left general tenderness on palpation with no guarding, rebound tenderness or rigidity. No CVA tenderness. No right-sided tenderness. No jaundice or icterus. Genitourinary:     Rectum: Guaiac result negative. No tenderness or anal fissure. Normal anal tone. Comments: MLP Lawence Shorts chaperone, dark stool, guaiac negative, controls reacted appropriately. Fairly unremarkable exam with no tenderness, no prostate tenderness. Musculoskeletal:         General: No swelling, tenderness, deformity or signs of injury. Right lower leg: No edema. Left lower leg: No edema. Skin:     General: Skin is warm and dry. Coloration: Skin is not jaundiced or pale. Findings: No erythema or rash. Neurological:      General: No focal deficit present. Mental Status: He is alert and oriented to person, place, and time. GCS: GCS eye subscore is 4. GCS verbal subscore is 5. GCS motor subscore is 6. Cranial Nerves: No cranial nerve deficit.       Coordination: Coordination normal.          Procedures     [facility]                --------------------------------------------- PAST HISTORY ---------------------------------------------  Past Medical History:  has a past medical history of Accident caused by farm tractor, Arthritis, CAD (coronary artery disease), Chronic pain, Clavicle fracture, Concussion -------------------------------------------------  Labs:  Results for orders placed or performed during the hospital encounter of 08/20/20   CBC Auto Differential   Result Value Ref Range    WBC 5.1 4.5 - 11.5 E9/L    RBC 3.45 (L) 3.80 - 5.80 E12/L    Hemoglobin 12.0 (L) 12.5 - 16.5 g/dL    Hematocrit 36.8 (L) 37.0 - 54.0 %    .7 (H) 80.0 - 99.9 fL    MCH 34.8 26.0 - 35.0 pg    MCHC 32.6 32.0 - 34.5 %    RDW 13.0 11.5 - 15.0 fL    Platelets 833 945 - 737 E9/L    MPV 9.8 7.0 - 12.0 fL    Neutrophils % 61.7 43.0 - 80.0 %    Lymphocytes % 19.1 (L) 20.0 - 42.0 %    Monocytes % 7.8 2.0 - 12.0 %    Eosinophils % 7.8 (H) 0.0 - 6.0 %    Basophils % 2.6 (H) 0.0 - 2.0 %    Neutrophils Absolute 3.21 1.80 - 7.30 E9/L    Lymphocytes Absolute 0.97 (L) 1.50 - 4.00 E9/L    Monocytes Absolute 0.41 0.10 - 0.95 E9/L    Eosinophils Absolute 0.40 0.05 - 0.50 E9/L    Basophils Absolute 0.13 0.00 - 0.20 E9/L    Myelocyte Percent 0.9 0 - 0 %   Comprehensive Metabolic Panel   Result Value Ref Range    Sodium 139 132 - 146 mmol/L    Potassium 4.5 3.5 - 5.0 mmol/L    Chloride 103 98 - 107 mmol/L    CO2 25 22 - 29 mmol/L    Anion Gap 11 7 - 16 mmol/L    Glucose 110 (H) 74 - 99 mg/dL    BUN 16 8 - 23 mg/dL    CREATININE 1.2 0.7 - 1.2 mg/dL    GFR Non-African American 58 >=60 mL/min/1.73    GFR African American >60     Calcium 9.1 8.6 - 10.2 mg/dL    Total Protein 7.0 6.4 - 8.3 g/dL    Alb 3.8 3.5 - 5.2 g/dL    Total Bilirubin <0.2 0.0 - 1.2 mg/dL    Alkaline Phosphatase 64 40 - 129 U/L    ALT 12 0 - 40 U/L    AST 20 0 - 39 U/L   Lactic Acid, Plasma   Result Value Ref Range    Lactic Acid 2.5 (H) 0.5 - 2.2 mmol/L   Urinalysis   Result Value Ref Range    Color, UA Yellow Straw/Yellow    Clarity, UA Clear Clear    Glucose, Ur 250 (A) Negative mg/dL    Bilirubin Urine Negative Negative    Ketones, Urine TRACE (A) Negative mg/dL    Specific Gravity, UA 1.020 1.005 - 1.030    Blood, Urine Negative Negative    pH, UA 5.0 5.0 - 9.0    Protein, UA Negative Negative mg/dL    Urobilinogen, Urine 0.2 <2.0 E.U./dL    Nitrite, Urine Negative Negative    Leukocyte Esterase, Urine Negative Negative   Lipase   Result Value Ref Range    Lipase 162 (H) 13 - 60 U/L       Radiology:  CT ABDOMEN PELVIS WO CONTRAST   Final Result   1. There is no acute intra-or intrapelvic pathology. Specifically, there are   no findings of diverticulitis and there is no appreciable colonic mass or   bowel obstruction. 2. Trace left pleural effusion. This trace pleural effusion is chronic and   may be secondary to loculated effusion secondary to the previous old chest   wall trauma. ------------------------- NURSING NOTES AND VITALS REVIEWED ---------------------------  Date / Time Roomed:  8/20/2020 11:32 AM  ED Bed Assignment:  19/19    The nursing notes within the ED encounter and vital signs as below have been reviewed. /75   Pulse 74   Temp 98 °F (36.7 °C)   Resp 16   Ht 5' 7\" (1.702 m)   Wt 151 lb 1 oz (68.5 kg)   SpO2 97%   BMI 23.66 kg/m²   Oxygen Saturation Interpretation: Normal      ------------------------------------------ PROGRESS NOTES ------------------------------------------  I have spoken with the patient and discussed todays results, in addition to providing specific details for the plan of care and counseling regarding the diagnosis and prognosis. Their questions are answered at this time and they are agreeable with the plan. I discussed at length with them reasons for immediate return here for re evaluation. They will followup with primary care by calling their office tomorrow. --------------------------------- ADDITIONAL PROVIDER NOTES ---------------------------------  At this time the patient is without objective evidence of an acute process requiring hospitalization or inpatient management. They have remained hemodynamically stable throughout their entire ED visit and are stable for discharge with outpatient follow-up. The plan has been discussed in detail and they are aware of the specific conditions for emergent return, as well as the importance of follow-up. New Prescriptions    DOCUSATE SODIUM (COLACE) 100 MG CAPSULE    Take 1 capsule by mouth 2 times daily as needed for Constipation       Diagnosis:  1. Left lower quadrant abdominal pain        Disposition:  Patient's disposition: Discharge to home  Patient's condition is stable.              Surendra Baker DO  08/20/20 1312

## 2020-08-20 NOTE — TELEPHONE ENCOUNTER
Reason for Disposition   Bloody, black, or tarry bowel movements    Answer Assessment - Initial Assessment Questions  1. LOCATION: \"Where does it hurt? \"       Left lower  2. RADIATION: \"Does the pain shoot anywhere else? \" (e.g., chest, back)      No  3. ONSET: \"When did the pain begin? \" (Minutes, hours or days ago)       One Month  4. SUDDEN: \"Gradual or sudden onset? \"      sudden  5. PATTERN \"Does the pain come and go, or is it constant? \"     - If constant: \"Is it getting better, staying the same, or worsening? \"       (Note: Constant means the pain never goes away completely; most serious pain is constant and it progresses)      - If intermittent: \"How long does it last?\" \"Do you have pain now? \"      (Note: Intermittent means the pain goes away completely between bouts)      constant  6. SEVERITY: \"How bad is the pain? \"  (e.g., Scale 1-10; mild, moderate, or severe)     - MILD (1-3): doesn't interfere with normal activities, abdomen soft and not tender to touch      - MODERATE (4-7): interferes with normal activities or awakens from sleep, tender to touch      - SEVERE (8-10): excruciating pain, doubled over, unable to do any normal activities    7/10      7. RECURRENT SYMPTOM: \"Have you ever had this type of abdominal pain before? \" If so, ask: \"When was the last time? \" and \"What happened that time? \"       NO  8. CAUSE: \"What do you think is causing the abdominal pain? \"      unknown  9. RELIEVING/AGGRAVATING FACTORS: \"What makes it better or worse? \" (e.g., movement, antacids, bowel movement)      No  10. OTHER SYMPTOMS: \"Has there been any vomiting, diarrhea, constipation, or urine problems? \"        No    Protocols used: ABDOMINAL PAIN - MALE-ADULT-[place]    Patient called [facility] to schedule appointment with red flag complaint; transferred to Nurse Access for triage by Kavita Holland. Pt calls to report symptoms of left lower abdominal pain.  Pt states symptoms have been present for a month. Rates the pain as moderate and constant. Reports stools being black in color. Denies vomiting or chest pain. Informed of disposition. Provided with telehealth options and walk-in clinics. Care advice as documented. Instructed to call back with worsening symptoms. Verbalized understanding and denies any further questions/concerns. Please do not respond to the triage nurse through this encounter. Any subsequent communication should be directly with the patient.

## 2020-08-20 NOTE — TELEPHONE ENCOUNTER
Pt called c/o abdominal and L side pain , also is having some diarrhea and going to the bathroom frequently.  Transferred call to nurse triage

## 2020-08-26 ENCOUNTER — PREP FOR PROCEDURE (OUTPATIENT)
Dept: PAIN MANAGEMENT | Age: 80
End: 2020-08-26

## 2020-08-26 ENCOUNTER — OFFICE VISIT (OUTPATIENT)
Dept: PAIN MANAGEMENT | Age: 80
End: 2020-08-26
Payer: MEDICARE

## 2020-08-26 ENCOUNTER — HOSPITAL ENCOUNTER (OUTPATIENT)
Age: 80
Discharge: HOME | End: 2020-08-28
Payer: MEDICARE

## 2020-08-26 VITALS
DIASTOLIC BLOOD PRESSURE: 68 MMHG | RESPIRATION RATE: 16 BRPM | OXYGEN SATURATION: 97 % | WEIGHT: 151 LBS | HEART RATE: 84 BPM | TEMPERATURE: 97.5 F | SYSTOLIC BLOOD PRESSURE: 104 MMHG | HEIGHT: 67 IN | BODY MASS INDEX: 23.7 KG/M2

## 2020-08-26 PROBLEM — M54.16 LUMBAR RADICULOPATHY: Status: ACTIVE | Noted: 2020-08-26

## 2020-08-26 LAB
ANION GAP SERPL CALCULATED.3IONS-SCNC: 13 MMOL/L (ref 7–16)
BUN BLDV-MCNC: 21 MG/DL (ref 8–23)
CALCIUM SERPL-MCNC: 9.9 MG/DL (ref 8.6–10.2)
CHLORIDE BLD-SCNC: 100 MMOL/L (ref 98–107)
CO2: 27 MMOL/L (ref 22–29)
CREAT SERPL-MCNC: 1.3 MG/DL (ref 0.7–1.2)
FOLATE: 8.2 NG/ML (ref 4.8–24.2)
GFR AFRICAN AMERICAN: >60
GFR NON-AFRICAN AMERICAN: 53 ML/MIN/1.73
GLUCOSE BLD-MCNC: 123 MG/DL (ref 74–99)
HBA1C MFR BLD: 5.8 % (ref 4–5.6)
HCT VFR BLD CALC: 36.6 % (ref 37–54)
IMMATURE RETIC FRACT: 20 % (ref 2.3–13.4)
POTASSIUM SERPL-SCNC: 5.1 MMOL/L (ref 3.5–5)
RETIC HGB EQUIVALENT: 36.4 PG (ref 28.2–36.6)
RETICULOCYTE ABSOLUTE COUNT: 0.07 E12/L
RETICULOCYTE COUNT PCT: 2 % (ref 0.4–1.9)
SODIUM BLD-SCNC: 140 MMOL/L (ref 132–146)
VITAMIN B-12: 1810 PG/ML (ref 211–946)

## 2020-08-26 PROCEDURE — 82607 VITAMIN B-12: CPT

## 2020-08-26 PROCEDURE — 4004F PT TOBACCO SCREEN RCVD TLK: CPT | Performed by: ANESTHESIOLOGY

## 2020-08-26 PROCEDURE — G8427 DOCREV CUR MEDS BY ELIG CLIN: HCPCS | Performed by: ANESTHESIOLOGY

## 2020-08-26 PROCEDURE — 99214 OFFICE O/P EST MOD 30 MIN: CPT | Performed by: ANESTHESIOLOGY

## 2020-08-26 PROCEDURE — 36415 COLL VENOUS BLD VENIPUNCTURE: CPT

## 2020-08-26 PROCEDURE — 82746 ASSAY OF FOLIC ACID SERUM: CPT

## 2020-08-26 PROCEDURE — 85045 AUTOMATED RETICULOCYTE COUNT: CPT

## 2020-08-26 PROCEDURE — 83036 HEMOGLOBIN GLYCOSYLATED A1C: CPT

## 2020-08-26 PROCEDURE — 4040F PNEUMOC VAC/ADMIN/RCVD: CPT | Performed by: ANESTHESIOLOGY

## 2020-08-26 PROCEDURE — G8420 CALC BMI NORM PARAMETERS: HCPCS | Performed by: ANESTHESIOLOGY

## 2020-08-26 PROCEDURE — 99213 OFFICE O/P EST LOW 20 MIN: CPT | Performed by: ANESTHESIOLOGY

## 2020-08-26 PROCEDURE — 85025 COMPLETE CBC W/AUTO DIFF WBC: CPT

## 2020-08-26 PROCEDURE — 1123F ACP DISCUSS/DSCN MKR DOCD: CPT | Performed by: ANESTHESIOLOGY

## 2020-08-26 PROCEDURE — 80048 BASIC METABOLIC PNL TOTAL CA: CPT

## 2020-08-26 RX ORDER — HYDROCODONE BITARTRATE AND ACETAMINOPHEN 5; 325 MG/1; MG/1
1 TABLET ORAL 2 TIMES DAILY PRN
Qty: 45 TABLET | Refills: 0 | Status: SHIPPED | OUTPATIENT
Start: 2020-08-26 | End: 2020-09-25

## 2020-08-26 NOTE — PROGRESS NOTES
Do you currently have any of the following:    Fever: No  Headache:  No  Cough: No  Shortness of breath: No  Exposed to anyone with these symptoms: No                                                                                                                Matilde Tavarez presents to the [facility] on 8/26/2020. Edith Temple is complaining of pain neck lower back and down right. The pain is constant. The pain is described as aching, shooting, stabbing, sharp and burning. Pain is rated on his best day at a 6, on his worst day at a 9, and on average at a 6 on the VAS scale. He took his last dose of Norco and Tylenol today. Edith Temple does not have issues with constipation. Any procedures since your last visit: Yes, with 60 % relief. Three weeks. He is not on NSAIDS and  is  on anticoagulation medications to include ASA and is managed by Balta Leslie DO  . Pacemaker or defibrilator: No Physician managing device is . /68   Pulse 84   Temp 97.5 °F (36.4 °C) (Infrared)   Resp 16   Ht 5' 7\" (1.702 m)   Wt 151 lb (68.5 kg)   SpO2 97%   BMI 23.65 kg/m²      No LMP for male patient.

## 2020-08-26 NOTE — PROGRESS NOTES
[facility], [address] [place] [address] Edvin  [phone]    Follow up Note      Caleb Churchill     Date of Visit:  8/26/2020    CC:  Patient presents for follow up   Chief Complaint   Patient presents with    Follow-up     neck lower back and down left leg       HPI:    Chronic Neck pain. H/o Prior H/o ACDF C3-C7. Predominantly axial in nature.   Pain mainly over the left side of the neck and shoulder blade.       He denies any upper extremity radicular pain.     Prior right sided cervical paraspinal muscle trigger point injection which provided excellent pain relief of > 90%     Low back pain:     He also has history of chronic low back pain in the lumbar area. He has undergone lumbar spine surgery X 2 in the 1980's.      Pain is mainly in the low back with occ LE pain.     S/P radio frequency ablation over the bilateral L3, L4 and L5 DR on 10/29/2019 which has given him good pain relief. He is status post lumbar epidural steroid injection at L4-5 on 7/21/2020 within excellent pain relief of > 70% for 3-4 weeks. Now has recurrence fo similar pain. Right shoulder/ clavicle injury in June 2020- followed by ortho - significantly improved. He takes Hydrocodone prn  Takes this only on PRN basis for severe pain. Bernadette [employer] is no signs of misuse abuse or diversion.  Patient states medications help his pain. He has reduced his medication requirement since we had started interventional procedures. Anticoagulants: Yes ASA-81 mg     Prior treatments; PT/ HEP/ meds/ interventions     Imaging studies:  Left Shoulder: 6/14/2020:      Impression   No acute osseous abnormality.       Degenerative changes as above.      XR Lumbar spine: 6/2020:   Impression   Diffuse osteopenia with moderate to advanced degenerative changes of the   lumbar spine, as above.  No convincing evidence of an acute fracture.      CT cervical spine: 6/14/2020:      FINDINGS:   BONES/ALIGNMENT: transfusion     Hyperlipidemia     Kidney stone     Laceration of flexor tendon of hand, not in \"no man's land\" 10/10/2014    Multiple closed fractures of ribs of left side     Neck pain     Osteoarthritis     Rib fracture     SBO (small bowel obstruction) ([facility] [address] 2019    Skin cancer     Thyroid disease     Trauma 2018    Traumatic hemopneumothorax, initial encounter        Past Surgical History:   Procedure Laterality Date    ANESTHESIA NERVE BLOCK Bilateral 9/10/2019    BILATERAL LUMBAR FACET MEDIAL BRANCH BLOCK L3 L4 AND L5 DORSAL RAMI (CPT J0615496) performed by Barron Apgar, MD at [address] N/A 2020    LUMBAR EPIDURAL STEROID INJECTION L4-L5 X-RAY performed by Barron Apgar, MD at [address]      x2 lumbar    CATARACT REMOVAL WITH IMPLANT Left 10/08/13    CATARACT REMOVAL WITH IMPLANT Right 12/10/13    CERVICAL SPINE SURGERY  2012    cervical fusion    COLON SURGERY      due to diverticulitis colon resection    CORONARY ANGIOPLASTY WITH STENT PLACEMENT  2005    x2 stents    DILATATION, ESOPHAGUS      FRACTURE SURGERY      Lt. Jaw Plate    NERVE BLOCK Left 3/7/16    cervical transforaminal #1    NERVE BLOCK Left 3/28/16    cervical transforaminal #2    NERVE BLOCK Left 2016    transforaminal nerve block left cervical #3    NERVE BLOCK Left 2016    shoulder    NERVE BLOCK Left 08/15/2016    left shoulder injection #2    NERVE BLOCK Right 2016    shoulder    NERVE BLOCK Right 2016    shoulder injection #2    NERVE BLOCK Bilateral 09/10/2019    BILATERAL LUMBAR FACET MEDIAL BRANCH NERVE BLOCK AT L3,L4 MEDIAL BRANCH AND L5 DORSAL RAMI    NERVE BLOCK Bilateral 10/29/2019    lumbar facet, meidal branch    NERVE BLOCK N/A 2020    Lumbar epidural steriod injection L4-L5    OPEN PROX HUMERAL FRACTURE PROSHETIC REPLACEMENT Left 10/23/2018    LEFT CLAVICLE OPEN REDUCTION INTERNAL FIXATION performed by Tierra Candelaria DO at [address] [facility] Left 101/10/2014    repair left hand, finger laceration    [place] CHEST SURGERY PROCEDURE UNLISTED Left 8/6/2018    LEFT SIDED VIDEO ASSISTED THORACOSCOPY WITH RIB PLATING performed by Enrico Gant MD at [address] d/t a fall    RADIOFREQUENCY ABLATION NERVES Bilateral 10/29/2019    BILATERAL LUMBAR FACET L3 L4 MEDIAL BRANCH  L5 DORSAL RAMI  RADIOFREQUENCY ABLATION SEDATION (CPT C728687) performed by Natalie Orozco MD at [address] [facility] Bilateral     SHOULDER ARTHROSCOPY Right 12/08/2016    repair rotator cuff, bursectomy, debridement glenohumerol     SKIN BIOPSY         Prior to Admission medications    Medication Sig Start Date End Date Taking?  Authorizing Provider   citalopram (CELEXA) 40 MG tablet Take 1 tablet by mouth daily 8/12/20  Yes Arnaud Arreola, DO   pantoprazole (PROTONIX) 40 MG tablet Take 1 tablet by mouth every morning (before breakfast) 7/15/20  Yes Arnaud Arreola, DO   linaGLIPtin-metFORMIN HCl ER (JENTADUETO XR) 2.5-1000 MG TB24 Take 1 tablet by mouth 2 times daily 6/1/20  Yes Arnaud Arreola, DO   simvastatin (ZOCOR) 20 MG tablet Take 1 tablet by mouth nightly 6/1/20  Yes Lysle Siemens, DO   donepezil (ARICEPT) 5 MG tablet Take 1 tablet by mouth nightly 6/1/20  Yes Lysle Siemens, DO   traZODone (DESYREL) 50 MG tablet Take 1 tablet by mouth nightly as needed for Sleep 6/1/20  Yes Lysle Siemens, DO   levothyroxine (SYNTHROID) 25 MCG tablet Take 1.5 tablets by mouth daily 1/16/20  Yes [employer], DO   vitamin B-12 (CYANOCOBALAMIN) 1000 MCG tablet Take 0.5 tablets by mouth daily 10/16/19  Yes Jae eDnise, DO   ferrous sulfate 325 (65 Fe) MG tablet Take 325 mg by mouth daily (with breakfast)   Yes Historical Provider, MD   meclizine (ANTIVERT) 25 MG tablet Take 25 mg by mouth 3 times daily as needed   Yes Historical Provider, MD   vitamin D (CHOLECALCIFEROL) 1000 UNIT TABS tablet Take 1,000 Units by mouth daily   Yes Historical Provider, MD   aspirin EC 81 MG EC tablet Take 81 mg by mouth daily   Yes Historical Provider, MD       Allergies   Allergen Reactions    Sulfa Antibiotics Hives       Social History     Socioeconomic History    Marital status:      Spouse name: Not on file    Number of children: Not on file    Years of education: Not on file    Highest education level: Not on file   Occupational History    Not on file   Social Needs    Financial resource strain: Not on file    Food insecurity     Worry: Not on file     Inability: Not on file    Transportation needs     Medical: Not on file     Non-medical: Not on file   Tobacco Use    Smoking status: Former Smoker     Years: 40.00     Types: Cigarettes    Smokeless tobacco: Current User     Types: Chew   Substance and Sexual Activity    Alcohol use: No    Drug use: No    Sexual activity: Not Currently   Lifestyle    Physical activity     Days per week: Not on file     Minutes per session: Not on file    Stress: Not on file   Relationships    Social connections     Talks on phone: Not on file     Gets together: Not on file     Attends Anabaptist service: Not on file     Active member of club or organization: Not on file     Attends meetings of clubs or organizations: Not on file     Relationship status: Not on file    Intimate partner violence     Fear of current or ex partner: Not on file     Emotionally abused: Not on file     Physically abused: Not on file     Forced sexual activity: Not on file   Other Topics Concern    Not on file   Social History Narrative    ** Merged History Encounter **            Family History   Problem Relation Age of Onset    Diabetes Mother        REVIEW OF SYSTEMS:     Supa Medeiros denies fever/chills, chest pain, shortness of breath, new bowel or bladder complaints. All other review of systems was negative.     PHYSICAL EXAMINATION:      /68   Pulse 84   Temp 97.5 °F (36.4 °C) occipital block - helped significantly.     He has finished PT and continues HEP.     He has history of chronic low back pain. Has H/o prior lumbar spine surgery in the 1980's.      He has failed conservative treatment with meds/ PT.     S/P Bilateral lumbar facet MB RFA at L3, L4 MB & L5 DR on 10/29/2019 with excellent pain relief.     He got a prescription for compound cream - helps.     He takes Norco only prn for severe pain. There is no signs of misuse abuse or diversion.  He states that helps of his pain. The [place]ARRS reviewed- consistent.     Detailed discussion about the salient features of opioid agreement done today.       Clavicle injury improved    Refill [place] 5/325 1 po prn. He takes once or twice a day prn only. Dosing and side effects explained. Will refill this today for # 45 tabs.     S/P LESI on 7/21/2020: with good pain relief for 3-4 weeks/ Now has recurrence of pain. Mainly on the right LE. Will plan right L4/ L5 Transforaminal LUCILA under fluoroscopy. RBA discussed and patient agreed. Hold ASA for 5 days. He is on ASA-81 mg. H/o CAD s/p stent.      Counseling regarding the importance of regular exercise program and appropriate medication usage done. He was instructed to reduce the medication to the lowest minimal and to take only on as necessary basis for severe pain.      Baseline Drug screen-in follow up visit.     We discussed with the patient that combining opioids, benzodiazepines, alcohol, illicit drugs or sleep aids increases the risk of respiratory depression including death. We discussed that these medications may cause drowsiness, sedation or dizziness and have counseled the patient not to drive or operate machinery. We have discussed that these medications will be prescribed only by one provider. We have discussed with the patient about age related risk factors and have thoroughly discussed the importance of taking these medications as prescribed.  The patient verbalizes understanding. Also discussed about the appropriate medication use, side effects of chronic opioid use, phenomenon of tolerance / Dependence. > 25 mins spent for the visit with > 50% time face to face in counseling/ coordination of care.      Ammon Gottlieb MD

## 2020-08-27 LAB
BASOPHILS ABSOLUTE: 0.04 E9/L (ref 0–0.2)
BASOPHILS RELATIVE PERCENT: 0.7 % (ref 0–2)
EOSINOPHILS ABSOLUTE: 0.15 E9/L (ref 0.05–0.5)
EOSINOPHILS RELATIVE PERCENT: 2.7 % (ref 0–6)
HEMOGLOBIN: 11.8 G/DL (ref 12.5–16.5)
IMMATURE GRANULOCYTES #: 0.02 E9/L
IMMATURE GRANULOCYTES %: 0.4 % (ref 0–5)
LYMPHOCYTES ABSOLUTE: 0.93 E9/L (ref 1.5–4)
LYMPHOCYTES RELATIVE PERCENT: 16.5 % (ref 20–42)
MCH RBC QN AUTO: 34.1 PG (ref 26–35)
MCHC RBC AUTO-ENTMCNC: 31.6 % (ref 32–34.5)
MCV RBC AUTO: 108.1 FL (ref 80–99.9)
MONOCYTES ABSOLUTE: 0.63 E9/L (ref 0.1–0.95)
MONOCYTES RELATIVE PERCENT: 11.2 % (ref 2–12)
NEUTROPHILS ABSOLUTE: 3.88 E9/L (ref 1.8–7.3)
NEUTROPHILS RELATIVE PERCENT: 68.5 % (ref 43–80)
PATHOLOGIST REVIEW: NORMAL
PDW BLD-RTO: 13.4 FL (ref 11.5–15)
PLATELET # BLD: 191 E9/L (ref 130–450)
PMV BLD AUTO: 10.4 FL (ref 7–12)
RBC # BLD: 3.46 E12/L (ref 3.8–5.8)
WBC # BLD: 5.7 E9/L (ref 4.5–11.5)

## 2020-09-01 ENCOUNTER — OFFICE VISIT (OUTPATIENT)
Dept: FAMILY MEDICINE CLINIC | Age: 80
End: 2020-09-01
Payer: MEDICARE

## 2020-09-01 VITALS
RESPIRATION RATE: 18 BRPM | DIASTOLIC BLOOD PRESSURE: 62 MMHG | OXYGEN SATURATION: 97 % | BODY MASS INDEX: 23.7 KG/M2 | SYSTOLIC BLOOD PRESSURE: 120 MMHG | TEMPERATURE: 97.8 F | HEART RATE: 84 BPM | HEIGHT: 67 IN | WEIGHT: 151 LBS

## 2020-09-01 PROBLEM — E78.5 DYSLIPIDEMIA: Status: ACTIVE | Noted: 2020-09-01

## 2020-09-01 PROBLEM — E03.9 ACQUIRED HYPOTHYROIDISM: Status: ACTIVE | Noted: 2020-09-01

## 2020-09-01 PROCEDURE — G8420 CALC BMI NORM PARAMETERS: HCPCS | Performed by: FAMILY MEDICINE

## 2020-09-01 PROCEDURE — 99214 OFFICE O/P EST MOD 30 MIN: CPT | Performed by: FAMILY MEDICINE

## 2020-09-01 PROCEDURE — 4040F PNEUMOC VAC/ADMIN/RCVD: CPT | Performed by: FAMILY MEDICINE

## 2020-09-01 PROCEDURE — 1123F ACP DISCUSS/DSCN MKR DOCD: CPT | Performed by: FAMILY MEDICINE

## 2020-09-01 PROCEDURE — G8427 DOCREV CUR MEDS BY ELIG CLIN: HCPCS | Performed by: FAMILY MEDICINE

## 2020-09-01 PROCEDURE — 4004F PT TOBACCO SCREEN RCVD TLK: CPT | Performed by: FAMILY MEDICINE

## 2020-09-01 RX ORDER — LEVOTHYROXINE SODIUM 0.03 MG/1
37.5 TABLET ORAL DAILY
Qty: 45 TABLET | Refills: 5 | Status: SHIPPED
Start: 2020-09-01 | End: 2020-12-23 | Stop reason: SDUPTHER

## 2020-09-01 RX ORDER — TRAZODONE HYDROCHLORIDE 100 MG/1
100 TABLET ORAL NIGHTLY PRN
Qty: 30 TABLET | Refills: 5 | Status: SHIPPED
Start: 2020-09-01 | End: 2021-02-10

## 2020-09-01 RX ORDER — LINAGLIPTIN AND METFORMIN HYDROCHLORIDE 2.5; 1 MG/1; MG/1
1 TABLET, FILM COATED, EXTENDED RELEASE ORAL 2 TIMES DAILY
Qty: 60 TABLET | Refills: 5 | Status: SHIPPED
Start: 2020-09-01 | End: 2021-02-10 | Stop reason: SDUPTHER

## 2020-09-01 ASSESSMENT — ENCOUNTER SYMPTOMS
ABDOMINAL PAIN: 0
NAUSEA: 0
COUGH: 0
BACK PAIN: 1
DIARRHEA: 0
VOMITING: 0
SHORTNESS OF BREATH: 0
CONSTIPATION: 0

## 2020-09-01 NOTE — PROGRESS NOTES
Component Value Date    ALT 12 08/20/2020    AST 20 08/20/2020     Lab Results   Component Value Date    CHOL 155 05/28/2020    TRIG 215 (H) 05/28/2020    HDL 34 05/28/2020    LDLCALC 78 05/28/2020          Hyperlipidemia / CAD  Current treatment: Simvastatin 20 mg daily and aspirin 81 mg daily  Recent medication changes: None  No new myalgias or GI upset  S/p PCI years prior  Following with Cardiology      Lab Results   Component Value Date    CHOL 155 05/28/2020    TRIG 215 (H) 05/28/2020    HDL 34 05/28/2020    [address] 78 05/28/2020     Lab Results   Component Value Date    ALT 12 08/20/2020    AST 20 08/20/2020        Hypothyroidism  Current treatment: Levothyroxine 37.5 mg daily  Recent medication changes: none  Recent symptoms: none  Patient is  taking his medication consistently on an empty stomach. No results found for: [facility]  Lab Results   Component Value Date    TSH 1.600 05/28/2020    TSH 1.950 10/09/2019    TSH 0.996 04/25/2019     GERD  Current treatment: Pantoprazole 40 mg daily  Recent medication changes: none  Symptoms are stable    Depression   Current treatment: Citalopram 40 mg daily  Recent medication changes: none  Symptoms are stable     MCI  Current treatment: Aricept 5 mg nightly  Recent medication changes: none  Symptoms are stable      Insomnia  Current treatment: melatonin use, which has been not very effective and trazodone 50 mg nightly  Medication changes: Trazodone started   He reports difficulty staying asleep nightly. He reports initial improvement over the first few weeks of starting trazodone      Review of Systems   Constitutional: Negative for chills and fever. Respiratory: Negative for cough and shortness of breath. Cardiovascular: Negative for chest pain and leg swelling. Gastrointestinal: Negative for abdominal pain, constipation, diarrhea, nausea and vomiting. Genitourinary: Negative for dysuria.    Musculoskeletal: Positive for arthralgias, back pain and myalgias. Neurological: Negative for headaches. Psychiatric/Behavioral: Positive for sleep disturbance. Negative for dysphoric mood. The patient is not nervous/anxious. Prior to Visit Medications    Medication Sig Taking? Authorizing Provider   HYDROcodone-acetaminophen (NORCO) 5-325 MG per tablet Take 1 tablet by mouth 2 times daily as needed for Pain (take once or twice a day for severe pain.) for up to 30 days. Take lowest dose possible to manage pain Yes Charity Boland MD   citalopram (CELEXA) 40 MG tablet Take 1 tablet by mouth daily Yes Arnaud Arreola DO   pantoprazole (PROTONIX) 40 MG tablet Take 1 tablet by mouth every morning (before breakfast) Yes Arnaud Arreola DO   linaGLIPtin-metFORMIN HCl ER (JENTADUETO XR) 2.5-1000 MG TB24 Take 1 tablet by mouth 2 times daily Yes Arnaud Arreola DO   simvastatin (ZOCOR) 20 MG tablet Take 1 tablet by mouth nightly Yes Arnaud Arreola DO   donepezil (ARICEPT) 5 MG tablet Take 1 tablet by mouth nightly Yes Arnaud Arreola DO   traZODone (DESYREL) 50 MG tablet Take 1 tablet by mouth nightly as needed for Sleep Yes Arnaud Arreola DO   levothyroxine (SYNTHROID) 25 MCG tablet Take 1.5 tablets by mouth daily Yes Jae Denise DO   vitamin B-12 (CYANOCOBALAMIN) 1000 MCG tablet Take 0.5 tablets by mouth daily Yes Jae Denise DO   ferrous sulfate 325 (65 Fe) MG tablet Take 325 mg by mouth daily (with breakfast) Yes Historical Provider, MD   meclizine (ANTIVERT) 25 MG tablet Take 25 mg by mouth 3 times daily as needed Yes Historical Provider, MD   vitamin D (CHOLECALCIFEROL) 1000 UNIT TABS tablet Take 1,000 Units by mouth daily Yes Historical Provider, MD   aspirin EC 81 MG EC tablet Take 81 mg by mouth daily Yes Historical Provider, MD        Social History     Tobacco Use    Smoking status: Former Smoker     Years: 40.00     Types: Cigarettes    Smokeless tobacco: Current User     Types: Chew   Substance Use Topics    Alcohol use:  No Past Surgical History:   Procedure Laterality Date    ANESTHESIA NERVE BLOCK Bilateral 9/10/2019    BILATERAL LUMBAR FACET MEDIAL BRANCH BLOCK L3 L4 AND L5 DORSAL RAMI (CPT [id number]) performed by Sarah Holden MD at [facility] N/A 7/21/2020    LUMBAR EPIDURAL STEROID INJECTION L4-L5 X-RAY performed by Sarah Holden MD at [address]      x2 lumbar    CATARACT REMOVAL WITH IMPLANT Left 10/08/13    CATARACT REMOVAL WITH IMPLANT Right 12/10/13    CERVICAL SPINE SURGERY  2012    cervical fusion    COLON SURGERY      due to diverticulitis colon resection    CORONARY ANGIOPLASTY WITH STENT PLACEMENT  2005    x2 stents    DILATATION, ESOPHAGUS      FRACTURE SURGERY      Lt. Jaw Plate    NERVE BLOCK Left 3/7/16    cervical transforaminal #1    NERVE BLOCK Left 3/28/16    cervical transforaminal #2    NERVE BLOCK Left 04 04 2016    transforaminal nerve block left cervical #3    NERVE BLOCK Left 07/25/2016    shoulder    NERVE BLOCK Left 08/15/2016    left shoulder injection #2    NERVE BLOCK Right 08/22/2016    shoulder    NERVE BLOCK Right 08/29/2016    shoulder injection #2    NERVE BLOCK Bilateral 09/10/2019    BILATERAL LUMBAR FACET MEDIAL BRANCH NERVE BLOCK AT L3,L4 MEDIAL BRANCH AND L5 DORSAL RAMI    NERVE BLOCK Bilateral 10/29/2019    lumbar facet, meidal branch    NERVE BLOCK N/A 07/21/2020    Lumbar epidural steriod injection L4-L5    OPEN PROX HUMERAL FRACTURE PROSHETIC REPLACEMENT Left 10/23/2018    LEFT CLAVICLE OPEN REDUCTION INTERNAL FIXATION performed by Nicolas Kathleen DO at [address] [place] [address] Left 101/10/2014    repair left hand, finger laceration    DE CHEST SURGERY PROCEDURE UNLISTED Left 8/6/2018    LEFT SIDED VIDEO ASSISTED THORACOSCOPY WITH RIB PLATING performed by Ayde Martinez MD at [address] d/t a fall    RADIOFREQUENCY ABLATION NERVES Bilateral 10/29/2019    BILATERAL LUMBAR FACET L3 L4 MEDIAL BRANCH  L5 DORSAL RAMI  RADIOFREQUENCY ABLATION SEDATION (CPT [id number]) performed by Opal Dee MD at [facility] Bilateral     SHOULDER ARTHROSCOPY Right 12/08/2016    repair rotator cuff, bursectomy, debridement glenohumerol     SKIN BIOPSY         Vitals:    09/01/20 1247   BP: 120/62   Pulse: 84   Resp: 18   Temp: 97.8 °F (36.6 °C)   TempSrc: Temporal   SpO2: 97%   Weight: 151 lb (68.5 kg)   Height: 5' 7\" (1.702 m)     Estimated body mass index is 23.65 kg/m² as calculated from the following:    Height as of this encounter: 5' 7\" (1.702 m). Weight as of this encounter: 151 lb (68.5 kg). Physical Exam  Constitutional:       General: He is not in acute distress. Appearance: He is well-developed. HENT:      Head: Normocephalic and atraumatic. Right Ear: External ear normal.      Left Ear: External ear normal.      Nose: Nose normal. No congestion or rhinorrhea. Eyes:      Extraocular Movements: Extraocular movements intact. Pupils: Pupils are equal, round, and reactive to light. Neck:      Musculoskeletal: Normal range of motion. Thyroid: No thyromegaly. Cardiovascular:      Rate and Rhythm: Normal rate and regular rhythm. Pulmonary:      Effort: Pulmonary effort is normal. No respiratory distress. Breath sounds: Normal breath sounds. No wheezing or rales. Abdominal:      General: There is no distension. Palpations: Abdomen is soft. Tenderness: There is no abdominal tenderness. Musculoskeletal:         General: No swelling or deformity. Skin:     General: Skin is warm. Findings: No rash. Neurological:      General: No focal deficit present. Mental Status: He is alert. Mental status is at baseline. ASSESSMENT/PLAN:  Mik Serra was seen today for 3 month follow-up, insomnia, diabetes and medication refill.     Diagnoses and all orders for this visit:    Type 2 diabetes mellitus without complication, without long-term current use of insulin (HCC)  -     linaGLIPtin-metFORMIN HCl ER (JENTADUETO XR) 2.5-1000 MG TB24; Take 1 tablet by mouth 2 times daily  -     HEMOGLOBIN A1C; Future  -     Comprehensive Metabolic Panel; Future    Atherosclerosis of coronary artery of native heart without angina pectoris, unspecified vessel or lesion type  -     Comprehensive Metabolic Panel; Future    Dyslipidemia  -     Comprehensive Metabolic Panel; Future    Acquired hypothyroidism  -     levothyroxine (SYNTHROID) 25 MCG tablet; Take 1.5 tablets by mouth daily  -     TSH; Future    Stage 3 chronic kidney disease (HCC)    Mild dementia (HCC)    Insomnia, unspecified type  -     traZODone (DESYREL) 100 MG tablet; Take 1 tablet by mouth nightly as needed for Sleep    Anemia, unspecified type  -     CBC Auto Differential; Future  -     VITAMIN B12 & FOLATE; Future  -     FERRITIN; Future  -     IRON AND TIBC; Future  -     TSH; Future  -     RETICULOCYTES; Future       Additional plan and future considerations:   As above. Recent labs reviewed in office. Continue current medications and increase trazodone to 100 mg nightly. Return to office in 6 months with labs drawn 1 week prior for annual wellness visit or sooner if needed    Educational materials and/or home exercises printed for patient's review and were included in patient instructions on his/her After Visit Summary and given to patient at the end of visit. Counseled regarding above diagnosis, including possible risks and complications,  especially if left uncontrolled. Counseled regarding the possible side effects, risks, benefits and alternatives to treatment; patient and/or guardian verbalizes understanding, agrees, feels comfortable with and wishes to proceed with above treatment plan.     Advised patient to call with any new medication issues, and read all Rx info from pharmacy to assure aware of all possible risks and side effects of medication before taking. Reviewed age and gender appropriate health screening exams and vaccinations. Advised patient regarding importance of keeping up with recommended health maintenance and to schedule as soon as possible if overdue, as this is important in assessing for undiagnosed pathology,especially cancer, as well as protecting against potentially harmful/life threatening disease. Patient and/or guardian verbalizes understanding and agrees with above counseling, assessment and plan. All questions answered.       Future Appointments   Date Time Provider Lobito Frey   10/23/2020 11:15 AM Betty Castleman, MD [facility]   2/4/2021 11:30 AM Stevo Leslie DO [facility]         --Stevo Leslie DO on 9/1/2020 at 1:04 PM

## 2020-09-03 ENCOUNTER — TELEPHONE (OUTPATIENT)
Dept: PAIN MANAGEMENT | Age: 80
End: 2020-09-03

## 2020-09-03 NOTE — TELEPHONE ENCOUNTER
9/3/2020-upon review of Rachid's chart, it is noted that he takes ASA . Medical clearance obtained from Dr. Miguel Vidal OK to hold ASA  for 5 days. Call to [facility], advised him to hold his ASA  for 5 days before his 09/15/2020 procedure, last dose to be 09/09/2020. he shows an understanding to not take it before his procedure. Instructed of need for COVID-19 testing and self quarantining. Instructed him that the surgery center should call him a few days before for the pre op call and after 3:00 PM the business day before with the arrival time. [facility] verbalized understanding.      COVID-19 symptom and exposure screening:    Fever: No  Headache:  No  Cough: No  Shortness of breath: No  Exposed to anyone with these symptoms: No     Di Hoyt RN  Pain Management

## 2020-09-09 ENCOUNTER — HOSPITAL ENCOUNTER (OUTPATIENT)
Age: 80
Discharge: HOME | End: 2020-09-11
Payer: MEDICARE

## 2020-09-09 PROCEDURE — U0003 INFECTIOUS AGENT DETECTION BY NUCLEIC ACID (DNA OR RNA); SEVERE ACUTE RESPIRATORY SYNDROME CORONAVIRUS 2 (SARS-COV-2) (CORONAVIRUS DISEASE [COVID-19]), AMPLIFIED PROBE TECHNIQUE, MAKING USE OF HIGH THROUGHPUT TECHNOLOGIES AS DESCRIBED BY CMS-2020-01-R: HCPCS

## 2020-09-11 LAB
SARS-COV-2: NOT DETECTED
SOURCE: NORMAL

## 2020-09-15 ENCOUNTER — HOSPITAL ENCOUNTER (OUTPATIENT)
Age: 80
Setting detail: OUTPATIENT SURGERY
Discharge: HOME | End: 2020-09-15
Attending: ANESTHESIOLOGY | Admitting: ANESTHESIOLOGY
Payer: MEDICARE

## 2020-09-15 ENCOUNTER — HOSPITAL ENCOUNTER (OUTPATIENT)
Dept: OPERATING ROOM | Age: 80
Discharge: HOME | End: 2020-09-15
Payer: MEDICARE

## 2020-09-15 VITALS
RESPIRATION RATE: 14 BRPM | HEIGHT: 67 IN | WEIGHT: 150 LBS | OXYGEN SATURATION: 99 % | BODY MASS INDEX: 23.54 KG/M2 | SYSTOLIC BLOOD PRESSURE: 122 MMHG | DIASTOLIC BLOOD PRESSURE: 77 MMHG | HEART RATE: 78 BPM

## 2020-09-15 PROCEDURE — 64483 NJX AA&/STRD TFRM EPI L/S 1: CPT | Performed by: ANESTHESIOLOGY

## 2020-09-15 PROCEDURE — 7100000011 HC PHASE II RECOVERY - ADDTL 15 MIN: Performed by: ANESTHESIOLOGY

## 2020-09-15 PROCEDURE — 3600000005 HC SURGERY LEVEL 5 BASE: Performed by: ANESTHESIOLOGY

## 2020-09-15 PROCEDURE — 3209999900 FLUORO FOR SURGICAL PROCEDURES

## 2020-09-15 PROCEDURE — 3600000015 HC SURGERY LEVEL 5 ADDTL 15MIN: Performed by: ANESTHESIOLOGY

## 2020-09-15 PROCEDURE — 7100000010 HC PHASE II RECOVERY - FIRST 15 MIN: Performed by: ANESTHESIOLOGY

## 2020-09-15 PROCEDURE — 6360000004 HC RX CONTRAST MEDICATION: Performed by: ANESTHESIOLOGY

## 2020-09-15 PROCEDURE — 2500000003 HC RX 250 WO HCPCS: Performed by: ANESTHESIOLOGY

## 2020-09-15 PROCEDURE — 6360000002 HC RX W HCPCS: Performed by: ANESTHESIOLOGY

## 2020-09-15 PROCEDURE — 2709999900 HC NON-CHARGEABLE SUPPLY: Performed by: ANESTHESIOLOGY

## 2020-09-15 PROCEDURE — 64484 NJX AA&/STRD TFRM EPI L/S EA: CPT | Performed by: ANESTHESIOLOGY

## 2020-09-15 RX ORDER — CEFAZOLIN SODIUM 2 G/50ML
2 SOLUTION INTRAVENOUS ONCE
Status: DISCONTINUED | OUTPATIENT
Start: 2020-09-15 | End: 2020-09-15 | Stop reason: HOSPADM

## 2020-09-15 RX ORDER — LIDOCAINE HYDROCHLORIDE 5 MG/ML
INJECTION, SOLUTION INFILTRATION; INTRAVENOUS PRN
Status: DISCONTINUED | OUTPATIENT
Start: 2020-09-15 | End: 2020-09-15 | Stop reason: ALTCHOICE

## 2020-09-15 RX ORDER — METHYLPREDNISOLONE ACETATE 40 MG/ML
INJECTION, SUSPENSION INTRA-ARTICULAR; INTRALESIONAL; INTRAMUSCULAR; SOFT TISSUE PRN
Status: DISCONTINUED | OUTPATIENT
Start: 2020-09-15 | End: 2020-09-15 | Stop reason: ALTCHOICE

## 2020-09-15 ASSESSMENT — PAIN DESCRIPTION - DESCRIPTORS: DESCRIPTORS: ACHING

## 2020-09-15 ASSESSMENT — PAIN SCALES - GENERAL
PAINLEVEL_OUTOF10: 0
PAINLEVEL_OUTOF10: 0

## 2020-09-15 ASSESSMENT — PAIN - FUNCTIONAL ASSESSMENT: PAIN_FUNCTIONAL_ASSESSMENT: 0-10

## 2020-09-15 NOTE — OP NOTE
Operative Note      Patient: Carmine Roberts  YOB: 1940  MRN: [id number]    Date of Procedure: 9/15/2020    Pre-Op Diagnosis: LUMBAR RADICULOPATHY, spinal stenosis lumbar    Post-Op Diagnosis: Same       Procedure(s):  TRANSFORAMINAL EPIDURAL STEROID INJECTION RIGHT L4 AND L5 UNDER FLUOROSCOPIC GUIDANCE (CPT 61902,01505)    Surgeon(s):  Jannet Pham MD    Assistant:   * No surgical staff found *    Anesthesia: Local    Estimated Blood Loss (mL): Minimal    Complications: None    Specimens:   * No specimens in log *    Implants:  * No implants in log *      Drains: * No LDAs found *    Findings: good needle placement    Detailed Description of Procedure:   9/15/2020    Patient: Carmine Roberts  :  1940  Age:  78 y.o. Sex:  male     PRE-OPERATIVE DIAGNOSIS: Lumbar disc displacement, lumbar neural foraminal stenosis, lumbar radiculopathy. POST-OPERATIVE DIAGNOSIS: Same. PROCEDURE: Right Transforaminal epidural steroid injection under fluoroscopic guidance at foraminal level L4 and L5 .    SURGEON: Jannet Pham MD    ANESTHESIA: Local    ESTIMATED BLOOD LOSS: None.  ______________________________________________________________________  BRIEF HISTORY: Carmine Roberts comes in today for the  Right Transforaminal epidural steroid injection under fluoroscopic guidance at foraminal level L4 and L5 . The potential complications of this procedure were discussed with him again today. He has elected to undergo the aforementioned procedure. Tuan complete History & Physical examination were reviewed in depth, a copy of which is in the chart. DESCRIPTION OF PROCEDURE:    After confirming written and informed consent, a time-out was performed and Tuan name and date of birth, the procedure to be performed as well as the plan for the location of the needle insertion were confirmed.     The patient was brought into the procedure room and placed in the prone position on the fluoroscopy table. Standard monitors were placed and vital signs were observed throughout the procedure. The area of the lumbar spine was prepped with chloraprep and draped in a sterile manner. The vertebral body was identified with AP fluoroscopy. An oblique view was obtained to better visualize the inferior junction of the pedicle and transverse process . The 6 o'clock position of the pedicle was marked and identified. The skin and subcutaneous tissue were anesthetized with 0.5% lidocaine. Two # 22 gauge pencil point needle was directed toward the targeted point under fluoroscopy until bone was contacted. The needle was then walked inferiorly until the neural foramen was entered . A lateral fluoroscopic view was then used to place the needle tip in the middle of the foramen. Negative aspiration was confirmed for blood and CSF and 0.5 cc of Omnipaque 300 contrast was injected at each level under live fluoroscopy. Appropriate neurograms were observed under AP fluoroscopy. Then after negative aspiration, a solution of the 3 cc of 0.5% lidocaine and 8 mg Dexamethasone was easily injected at each level. The needles were removed with constant aspiration technique. The patient back was cleaned and a bandage was placed over the needle insertion points. Note: At L4-5 level, when contrast was injected, intradiscal spread was noted. Needle was withdrawn few mm and repeat contrast injection showed epidural spread. Ancef was given IV post procedure as a prophylaxis. Patient was informed. Disposition the patient tolerated the procedure well and there were no complications . Vital signs remained stable throughout the procedure. The patient was escorted to the recovery area where they remained until discharge and written discharge instructions for the procedure were given. Plan: Bernice Leos will return to our pain management center as scheduled.      Joy Madrigal MD

## 2020-09-15 NOTE — H&P
transforaminal #2    NERVE BLOCK Left 04 04 2016    transforaminal nerve block left cervical #3    NERVE BLOCK Left 07/25/2016    shoulder    NERVE BLOCK Left 08/15/2016    left shoulder injection #2    NERVE BLOCK Right 08/22/2016    shoulder    NERVE BLOCK Right 08/29/2016    shoulder injection #2    NERVE BLOCK Bilateral 09/10/2019    BILATERAL LUMBAR FACET MEDIAL BRANCH NERVE BLOCK AT L3,L4 MEDIAL BRANCH AND L5 DORSAL RAMI    NERVE BLOCK Bilateral 10/29/2019    lumbar facet, meidal branch    NERVE BLOCK N/A 07/21/2020    Lumbar epidural steriod injection L4-L5    OPEN PROX HUMERAL FRACTURE PROSHETIC REPLACEMENT Left 10/23/2018    LEFT CLAVICLE OPEN REDUCTION INTERNAL FIXATION performed by Chino Cloud DO [place] [address] Left 101/10/2014    repair left hand, finger laceration    [place] CHEST SURGERY PROCEDURE UNLISTED Left 8/6/2018    LEFT SIDED VIDEO ASSISTED THORACOSCOPY WITH RIB PLATING performed by Melly Barrett MD at [address] d/t a fall    RADIOFREQUENCY ABLATION NERVES Bilateral 10/29/2019    BILATERAL LUMBAR FACET L3 L4 MEDIAL BRANCH  L5 DORSAL RAMI  RADIOFREQUENCY ABLATION SEDATION (CPT [id number]) performed by Altagracia Clay MD at [facility] Bilateral     SHOULDER ARTHROSCOPY Right 12/08/2016    repair rotator cuff, bursectomy, debridement glenohumerol     SKIN BIOPSY         Prior to Admission medications    Medication Sig Start Date End Date Taking? Authorizing Provider   linaGLIPtin-metFORMIN HCl ER (JENTADUETO XR) 2.5-1000 MG TB24 Take 1 tablet by mouth 2 times daily 9/1/20  Yes Arnaud Arreola DO   HYDROcodone-acetaminophen (NORCO) 5-325 MG per tablet Take 1 tablet by mouth 2 times daily as needed for Pain (take once or twice a day for severe pain.) for up to 30 days.  Take lowest dose possible to manage pain 8/26/20 9/25/20 Yes Altagracia Clay MD   citalopram (CELEXA) 40 MG tablet Take 1 tablet by mouth daily 8/12/20  Yes Hoda Frank, DO   levothyroxine (SYNTHROID) 25 MCG tablet Take 1.5 tablets by mouth daily 9/1/20   [facility]   traZODone (DESYREL) 100 MG tablet Take 1 tablet by mouth nightly as needed for Sleep 9/1/20   [facility]   pantoprazole (PROTONIX) 40 MG tablet Take 1 tablet by mouth every morning (before breakfast) 7/15/20   Hodacristian Frank, DO   simvastatin (ZOCOR) 20 MG tablet Take 1 tablet by mouth nightly 6/1/20   Arnaud Arreola, DO   donepezil (ARICEPT) 5 MG tablet Take 1 tablet by mouth nightly 6/1/20   Hoda [place], DO   vitamin B-12 (CYANOCOBALAMIN) 1000 MCG tablet Take 0.5 tablets by mouth daily 10/16/19   Jae Denise, DO   ferrous sulfate 325 (65 Fe) MG tablet Take 325 mg by mouth daily (with breakfast)    Historical Provider, MD   meclizine (ANTIVERT) 25 MG tablet Take 25 mg by mouth 3 times daily as needed    Historical Provider, MD   vitamin D (CHOLECALCIFEROL) 1000 UNIT TABS tablet Take 1,000 Units by mouth daily    Historical Provider, MD   aspirin EC 81 MG EC tablet Take 81 mg by mouth daily    Historical Provider, MD       Allergies   Allergen Reactions    Sulfa Antibiotics Hives       Social History     Socioeconomic History    Marital status:      Spouse name: Not on file    Number of children: Not on file    Years of education: Not on file    Highest education level: Not on file   Occupational History    Not on file   Social Needs    Financial resource strain: Not on file    Food insecurity     Worry: Not on file     Inability: Not on file    Transportation needs     Medical: Not on file     Non-medical: Not on file   Tobacco Use    Smoking status: Former Smoker     Years: 40.00     Types: Cigarettes    Smokeless tobacco: Current User     Types: Chew   Substance and Sexual Activity    Alcohol use: No    Drug use: No    Sexual activity: Not Currently   Lifestyle    Physical activity     Days per week: Not on file     Minutes per session: Not on file    signs of agitated mood. Assessment/Plan:    Patient  is here for lumbar TFESI for low back/ LE pain. The patient was counseled at length about the risks of osmani Covid-19 during their perioperative period and any recovery window from their procedure. The patient was made aware that osmani Covid-19  may worsen their prognosis for recovering from their procedure  and lend to a higher morbidity and/or mortality risk. All material risks, benefits, and reasonable alternatives including postponing the procedure were discussed. The patient does wish to proceed with the procedure at this time.       Milly Yip MD

## 2020-10-21 ENCOUNTER — OFFICE VISIT (OUTPATIENT)
Dept: PAIN MANAGEMENT | Age: 80
End: 2020-10-21
Payer: MEDICARE

## 2020-10-21 VITALS
SYSTOLIC BLOOD PRESSURE: 118 MMHG | HEIGHT: 67 IN | TEMPERATURE: 97.5 F | RESPIRATION RATE: 18 BRPM | WEIGHT: 150 LBS | HEART RATE: 74 BPM | OXYGEN SATURATION: 97 % | DIASTOLIC BLOOD PRESSURE: 72 MMHG | BODY MASS INDEX: 23.54 KG/M2

## 2020-10-21 PROCEDURE — 99213 OFFICE O/P EST LOW 20 MIN: CPT | Performed by: ANESTHESIOLOGY

## 2020-10-21 PROCEDURE — 4004F PT TOBACCO SCREEN RCVD TLK: CPT | Performed by: ANESTHESIOLOGY

## 2020-10-21 PROCEDURE — 1123F ACP DISCUSS/DSCN MKR DOCD: CPT | Performed by: ANESTHESIOLOGY

## 2020-10-21 PROCEDURE — G8427 DOCREV CUR MEDS BY ELIG CLIN: HCPCS | Performed by: ANESTHESIOLOGY

## 2020-10-21 PROCEDURE — 4040F PNEUMOC VAC/ADMIN/RCVD: CPT | Performed by: ANESTHESIOLOGY

## 2020-10-21 PROCEDURE — G8484 FLU IMMUNIZE NO ADMIN: HCPCS | Performed by: ANESTHESIOLOGY

## 2020-10-21 PROCEDURE — G8420 CALC BMI NORM PARAMETERS: HCPCS | Performed by: ANESTHESIOLOGY

## 2020-10-21 RX ORDER — PANTOPRAZOLE SODIUM 40 MG/1
40 TABLET, DELAYED RELEASE ORAL
Qty: 30 TABLET | Refills: 5 | Status: SHIPPED
Start: 2020-10-21 | End: 2021-04-01

## 2020-10-21 RX ORDER — HYDROCODONE BITARTRATE AND ACETAMINOPHEN 5; 325 MG/1; MG/1
1 TABLET ORAL 2 TIMES DAILY PRN
Qty: 45 TABLET | Refills: 0 | Status: SHIPPED
Start: 2020-10-21 | End: 2020-12-18 | Stop reason: SDUPTHER

## 2020-10-21 NOTE — PROGRESS NOTES
Do you currently have any of the following:    Fever: No  Headache:  No  Cough: No  Shortness of breath: No  Exposed to anyone with these symptoms: Brenda Bustillos presents to the [facility] 10/21/[facility]. Gilberto Lozoya is complaining of pain in the back . The pain is constant. The pain is described as aching. Pain is rated on his best day at a 6, on his worst day at a 6, and on average at a 6 on the VAS scale. He took his last dose of Norco 3-4 days ago . Gilberto Lozoya does not have issues with constipation. Any procedures since your last visit: No, with none % relief. He is not   on NSAIDS and  is  on anticoagulation medications to include ASA and is managed by . .. Kaushal Caldera DO        Pacemaker or defibrilator: No Physician managing device is none. /72   Pulse 74   Temp 97.5 °F (36.4 °C)   Resp 18   Ht 5' 7\" (1.702 m)   Wt 150 lb (68 kg)   SpO2 97%   BMI 23.49 kg/m²      No LMP for male patient.

## 2020-10-21 NOTE — TELEPHONE ENCOUNTER
Pt. Left vm for refills    Last seen 9/1/2020  Next appt 2/4/2021    [facility] pharmacy liza hdz

## 2020-10-21 NOTE — PROGRESS NOTES
[address] [facility], [address] [place] [address] Edvin  [phone]    Follow up Note      Francisca Rubio     Date of Visit:  10/21/2020    CC:  Patient presents for follow up   Chief Complaint   Patient presents with    Pain     lower back and down the back o the legs        HPI:    Chronic Neck pain. H/o Prior H/o ACDF C3-C7. Predominantly axial in nature. Pain mainly over the left side of the neck and shoulder blade.       He denies any upper extremity radicular pain.     Prior right sided cervical paraspinal muscle trigger point injection which provided excellent pain relief.     Low back pain:     He also has history of chronic low back pain in the lumbar area. He has undergone lumbar spine surgery X 2 in the 1980's.      Pain is mainly in the low back with occ LE pain.     S/P radio frequency ablation over the bilateral L3, L4 and L5 DR on 10/29/2019 which has given him good pain relief. He is status post lumbar epidural steroid injection at L4-5 on 7/21/2020 within excellent pain relief of > 70% for 3-4 weeks. S/P Right L4/5 TFESI on 9/15/2020 with excellent pain relief of the LE pain. Current pain is mainly over the low back bilaterally axial in nature. He takes Hydrocodone prn. Takes this only on PRN basis for severe pain. There is no signs of misuse abuse or diversion. Patient states medications help his pain. He has reduced his medication requirement since we had started interventional procedures. Anticoagulants: Yes ASA-81 mg.     Prior treatments; PT/ HEP/ meds/ interventions     Imaging studies:  Left Shoulder: 6/14/2020:      Impression   No acute osseous abnormality.       Degenerative changes as above.      XR Lumbar spine: 6/2020:   Impression   Diffuse osteopenia with moderate to advanced degenerative changes of the   lumbar spine, as above.  No convincing evidence of an acute fracture.      CT cervical spine: 6/14/2020:      FINDINGS: BONES/ALIGNMENT: No evidence of cervical fracture or traumatic subluxation. Stable T3 wedge compression.  There has been fusion of C3 through C7.  There   is an anterior plate with screws at C3, C4, and C7.  There is a bone block   graft at C3-C4.  There is a block graft extending from C4 through C7.  The   hardware and grafts are intact.       DEGENERATIVE CHANGES: Degenerative change at C1-C.  Degenerative disc disease   C7-T1.  Diffuse facet osteoarthritis with stable several mm anterolisthesis   of C7.       SOFT TISSUES: No prevertebral soft tissue swelling.  Maxillary sinusitis   changes noted           Impression   No acute abnormality of the cervical spine.          Xray thoracic spine: 6/14/2020:      Impression   Limited examination, as described above.  Within these limitations, no   convincing evidence of an acute fracture of the thoracic spine.             X ray LS spine: 3/2018:      Lumbar spine: There is slight levoconvex curvature with normal lumbar lordosis. Vertebral bodies maintain normal height. There is trace retrolisthesis   of L2 on L3 and L3 on L4. There is trace anterolisthesis of L4 on L5. There is multilevel intervertebral disc space narrowing with   hypertrophic endplate changes, most pronounced at L4-L5. No acute   fractures identified. There is lower lumbar facet arthrosis.       Sacrum and coccyx:   Overlying stool obscures the and coccyx on the frontal projections. No   acute displaced sacral or coccygeal fractures identified. Bilateral   sacroiliac joints and the pubic symphysis are maintained. There is   lower lumbar spondylosis.         Impression       Lumbar spine: Moderate multilevel lumbar spondylosis.       Sacrum and coccyx:   Unremarkable sacrum and coccyx radiographs.         CT Lumbar Spine: 8/2018:      Findings: There are bilateral pars defects noted at L4. There is a   spondylolisthesis of L4 on L5.    There is wedging of T12 which may be physiologic Changes seen throughout the discs are abnormal. There are findings to   suggest  degenerative disc disease.       Changes seen throughout the bone marrow are abnormal. Findings are   compatible with degenerative disc disease       Changes within the facets are abnormal. Findings are compatible with   degenerative facet disease.           At L5-S1: There is a mild broad-based disc herniation or bony   spurring. There is a laminectomy defect. There is facet hypertrophy. There is mild narrowing of the neural foramina       At L4-5: There is a moderate broad-based disc herniation there is   moderate stenosis       At L3-4: There is a large broad-based disc herniation, there is severe   canal stenosis.       At L2-3: There is a mild broad-based disc herniation, there is mild   canal stenosis       At L1-2: There is a mild broad-based disc herniation, there is mild   canal stenosis               Impression   Findings compatible with degenerative changes   Bilateral L4 pars defect   Severe canal stenosis at L3-4 and moderate canal stenosis at L4-5                                            Potential Aberrant Drug-Related Behavior:  No     Urine Drug Screening: no     OARRS report[de-identified]   yes 8/27/2019: consistent  Yes: 10/9/2019: consistent-getting his meds from PCP  11/20/2019: consistent  1/8/2019: consistent   2/12/2020: last script for hydrocodone 10/325 filled on 7/3/2019 for # 90 tabs.  Given by Dr. Deepak Nicole MD   6/17/2020: consistent- last filled on 5/8/2020  7/15/2020: Consistent- # 30 tabs last filled on 6/17/2020 8/26/2020: Consistent - last refill  On 7/15/2020 for # 60 tabs  10/21/2020: Consistent last script for # 45 tabs on 8/26/2020      Past Medical History:   Diagnosis Date    Accident caused by farm tractor    [address] CAD (coronary artery disease)     Chronic pain     Clavicle fracture     Concussion with no loss of consciousness     Depression     Diabetes mellitus ([facility] [address]     GERD (gastroesophageal reflux disease)     Headache(784.0)     History of blood transfusion     Hyperlipidemia     Kidney stone     Laceration of flexor tendon of hand, not in \"[place]\" 10/10/2014    Multiple closed fractures of ribs of left side     Neck pain     Osteoarthritis     Rib fracture     SBO (small bowel obstruction) ([facility] [address] 4/23/2019    Skin cancer     Thyroid disease     Trauma 8/4/2018    Traumatic hemopneumothorax, initial encounter        Past Surgical History:   Procedure Laterality Date    ANESTHESIA NERVE BLOCK Bilateral 9/10/2019    BILATERAL LUMBAR FACET MEDIAL BRANCH BLOCK L3 L4 AND L5 DORSAL RAMI (CPT [id number]) performed by Venice Nix MD at [address] N/A 7/21/2020    LUMBAR EPIDURAL STEROID INJECTION L4-L5 X-RAY performed by Venice Nix MD at [address]      x2 lumbar    CATARACT REMOVAL WITH IMPLANT Left 10/08/13    CATARACT REMOVAL WITH IMPLANT Right 12/10/13    CERVICAL SPINE SURGERY  2012    cervical fusion    COLON SURGERY      due to diverticulitis colon resection    CORONARY ANGIOPLASTY WITH STENT PLACEMENT  2005    x2 stents    DILATATION, ESOPHAGUS      FRACTURE SURGERY      Lt. Jaw Plate    NERVE BLOCK Left 3/7/16    cervical transforaminal #1    NERVE BLOCK Left 3/28/16    cervical transforaminal #2    NERVE BLOCK Left 04 04 2016    transforaminal nerve block left cervical #3    NERVE BLOCK Left 07/25/2016    shoulder    NERVE BLOCK Left 08/15/2016    left shoulder injection #2    NERVE BLOCK Right 08/22/2016    shoulder    NERVE BLOCK Right 08/29/2016    shoulder injection #2    NERVE BLOCK Bilateral 09/10/2019    BILATERAL LUMBAR FACET MEDIAL BRANCH NERVE BLOCK AT L3,L4 MEDIAL BRANCH AND L5 DORSAL RAMI    NERVE BLOCK Bilateral 10/29/2019    lumbar facet, meidal branch    NERVE BLOCK N/A 07/21/2020    Lumbar epidural steriod injection L4-L5    NERVE BLOCK Right 09/15/2020 transforaminal L4,5    NERVE BLOCK Right 9/15/2020    TRANSFORAMINAL EPIDURAL STEROID INJECTION RIGHT L4 AND L5 UNDER FLUOROSCOPIC GUIDANCE (CPT 90949,00787) performed by Corinne Clarity, MD at [address] [facility]. Left 10/23/2018    LEFT CLAVICLE OPEN REDUCTION INTERNAL FIXATION performed by Nanci Silverio DO at [address] Left 101/10/2014    repair left hand, finger laceration    MT CHEST SURGERY PROCEDURE UNLISTED Left 8/6/2018    LEFT SIDED VIDEO ASSISTED THORACOSCOPY WITH RIB PLATING performed by Bronwyn Branham MD at [address] [place] [address] d/t a fall    RADIOFREQUENCY ABLATION NERVES Bilateral 10/29/2019    BILATERAL LUMBAR FACET L3 L4 MEDIAL BRANCH  L5 DORSAL RAMI  RADIOFREQUENCY ABLATION SEDATION (CPT [id number]) performed by Corinne Clarity, MD at [address] [facility] Bilateral     SHOULDER ARTHROSCOPY Right 12/08/2016    repair rotator cuff, bursectomy, debridement glenohumerol     SKIN BIOPSY         Prior to Admission medications    Medication Sig Start Date End Date Taking?  Authorizing Provider   pantoprazole (PROTONIX) 40 MG tablet Take 1 tablet by mouth every morning (before breakfast) 10/21/20  Yes Arnaud Arreola DO   levothyroxine (SYNTHROID) 25 MCG tablet Take 1.5 tablets by mouth daily 9/1/20  Yes Arnaud Arreola DO   linaGLIPtin-metFORMIN HCl ER (JENTADUETO XR) 2.5-1000 MG TB24 Take 1 tablet by mouth 2 times daily 9/1/20  Yes Queenie Wells,    traZODone (DESYREL) 100 MG tablet Take 1 tablet by mouth nightly as needed for Sleep 9/1/20  Yes Queenie Wells DO   citalopram (CELEXA) 40 MG tablet Take 1 tablet by mouth daily 8/12/20  Yes Arnaud Arreola DO   simvastatin (ZOCOR) 20 MG tablet Take 1 tablet by mouth nightly 6/1/20  Yes Queenie Wells DO   donepezil (ARICEPT) 5 MG tablet Take 1 tablet by mouth nightly 6/1/20  Yes Arnaud Arreola DO   vitamin B-12 (CYANOCOBALAMIN) 1000 MCG tablet Take 0.5 tablets Encounter **            Family History   Problem Relation Age of Onset    Diabetes Mother        REVIEW OF SYSTEMS:     Charlee Zavaleta denies fever/chills, chest pain, shortness of breath, new bowel or bladder complaints. All other review of systems was negative. PHYSICAL EXAMINATION:      /72   Pulse 74   Temp 97.5 °F (36.4 °C)   Resp 18   Ht 5' 7\" (1.702 m)   Wt 150 lb (68 kg)   SpO2 97%   BMI 23.49 kg/m²   General:       General appearance:  Pleasant and well-hydrated, in no distress and A & O x3  Build:Normal Weight  Function:Rises from a seated position easily.     HEENT:     Head:normocephalic, atraumatic  Pupils:regular, round, equal  Sclera: icterus absent     Lungs:     Breathing:normal breathing pattern      CVS:  RRR     Abdomen:     Shape:non-distended and normal  Tenderness:none  Guarding:none     Cervical spine:     Inspection:normal  Palpation: tenderness over lower cervical paraspinal muscles, tenderness trapezium, left positive. Range of motion: limitations of ROM noted. Facet tenderness noted over the mid and lower cervical facets.     Lumbar Spine:      Scar from the prior surgery noted, healed well, ROM reduced, Lumbar facet loading + bilaterally, Sensory and motor in b/l LE intact, DTR;'s in b/l LE equal.     Musculoskeletal:     Trigger points noted over the cervical paraspinal muscle, trapezius muscles mainly on the left side. He also has tenderness over the left occipital nerve.     Extremities:     No edema     Neurological:     Sensory: normal to light touch      Motor:   No focal deficits, generalized weakness noted.               Reflexes:       B/l equal     Gait:normal     Dermatology:     Skin:no rashes or lesions noted    Assessment/Plan:   Diagnosis Orders   1. DDD (degenerative disc disease), lumbar     2. Lumbosacral spondylosis without myelopathy     3. Spinal stenosis of lumbar region, unspecified whether neurogenic claudication present     4.  Postlaminectomy syndrome, lumbar     5. Cervical postlaminectomy syndrome     6. DDD (degenerative disc disease), cervical     7. Cervical spondylolysis          78 y.o.  pleasant gentleman with prior H/o Cervical spine surgery- ACDF C3-7 having neck pain with features of myofacial pain and also facet pain.     Prior TPI and occipital block - helped significantly.     He has finished PT and continues HEP.     He has history of chronic low back pain. Has H/o prior lumbar spine surgery in the 1980's.      He has failed conservative treatment with meds/ PT.     S/P Bilateral lumbar facet MB RFA at L3, L4 MB & L5 DR on 10/29/2019 with excellent pain relief. Recent Lumbar LUCILA/ TFESI had helped the lower extremity pain significantly. Current pain is mainly over the low back in the lumbar area. Facet tenderness + bilaterally. Will plan to repeat Lumbar facet MB RFA in few months.     He takes Norco only prn for severe pain. There is no signs of misuse abuse or diversion.  He states that helps of his pain. The OARRS reviewed- consistent.     Detailed discussion about the salient features of opioid agreement done. Refill Norco 5/325 1 po prn. He takes once or twice a day prn only. Dosing and side effects explained. Will refill this today for # 45 tabs. ( Last filled on 8/26/2020)     He is on ASA-81 mg. H/o CAD s/p stent.      Counseling regarding the importance of regular exercise program and appropriate medication usage done. He was instructed to reduce the medication to the lowest minimal and to take only on as necessary basis for severe pain.      Drug screen-today- he has not taken Hydrocodone for few days since he is out of it. Screen may be negative - consistent.     We discussed with the patient that combining opioids, benzodiazepines, alcohol, illicit drugs or sleep aids increases the risk of respiratory depression including death.  We discussed that these medications may cause drowsiness, sedation or dizziness and have

## 2020-11-18 RX ORDER — SIMVASTATIN 20 MG
20 TABLET ORAL NIGHTLY
Qty: 30 TABLET | Refills: 3 | Status: SHIPPED
Start: 2020-11-18 | End: 2021-02-10 | Stop reason: SDUPTHER

## 2020-12-18 ENCOUNTER — PREP FOR PROCEDURE (OUTPATIENT)
Dept: PAIN MANAGEMENT | Age: 80
End: 2020-12-18

## 2020-12-18 ENCOUNTER — VIRTUAL VISIT (OUTPATIENT)
Dept: PAIN MANAGEMENT | Age: 80
End: 2020-12-18
Payer: MEDICARE

## 2020-12-18 PROCEDURE — 99441 PR PHYS/QHP TELEPHONE EVALUATION 5-10 MIN: CPT | Performed by: NURSE PRACTITIONER

## 2020-12-18 RX ORDER — HYDROCODONE BITARTRATE AND ACETAMINOPHEN 5; 325 MG/1; MG/1
1 TABLET ORAL 2 TIMES DAILY PRN
Qty: 45 TABLET | Refills: 0 | Status: SHIPPED
Start: 2020-12-18 | End: 2021-01-19 | Stop reason: SDUPTHER

## 2020-12-18 NOTE — PROGRESS NOTES
[address]  [address] [facility], [address] [place] [address] Edvin  [phone]  Telephone follow up visit      Date of Visit:  12/18/2020    CC:     Consent:  Telephone follow up due to Jj 19 pandemic   The patient and/or health care decision maker is aware that he/she may receive a bill for this telephone service, depending on his insurance coverage, and has provided verbal consent to proceed: Yes    I have considered the risks of abuse, dependence, addiction and diversion. My patient is aware that they will need a follow-up visit (in-person or virtually) at the appropriate time indicated for continued medications. Further, my patient is aware that when this acute crisis has lifted, they will be expected to return for an in-person visit and all elements of standard local and hospital guidelines in order to continue this medication. Patient location: Home   Physician Location:Other address in [place]    HPI:  Pt followed today for low back pain with cervical spine myofascial pain. Patient main complaint is axial low back pain with intermittent right leg radicular sx  Appropriate analgesia with current medications regimen: yes - . Change in quality of symptoms:yes -worsening low back     Medication side effects:none. Recent diagnostic testing:none. Recent interventional procedures:none.fair. He is on ASA-81 mg. H/o CAD s/p stent.     IImaging studies:  Left Shoulder: 6/14/2020:      Impression   No acute osseous abnormality.       Degenerative changes as above.      XR Lumbar spine: 6/2020: Impression   Diffuse osteopenia with moderate to advanced degenerative changes of the   lumbar spine, as above.  No convincing evidence of an acute fracture.      CT cervical spine: 6/14/2020:      FINDINGS:   BONES/ALIGNMENT: No evidence of cervical fracture or traumatic subluxation.    Stable T3 wedge compression.  There has been fusion of C3 through C7. Yaya Mays Changes seen throughout the bone marrow are abnormal. Findings are   compatible with degenerative disc disease       Changes within the facets are abnormal. Findings are compatible with   degenerative facet disease.           At L5-S1: There is a mild broad-based disc herniation or bony   spurring. There is a laminectomy defect. There is facet hypertrophy. There is mild narrowing of the neural foramina       At L4-5: There is a moderate broad-based disc herniation there is   moderate stenosis       At L3-4: There is a large broad-based disc herniation, there is severe   canal stenosis.       At L2-3: There is a mild broad-based disc herniation, there is mild   canal stenosis       At L1-2: There is a mild broad-based disc herniation, there is mild   canal stenosis               Impression   Findings compatible with degenerative changes   Bilateral L4 pars defect   Severe canal stenosis at L3-4 and moderate canal stenosis at L4-5                                            Potential Aberrant Drug-Related Behavior:  No     Urine Drug Screening:   10/27/2020: UDS negative for Norco as expected per DR DIALLO previous note     OARRS report[de-identified]   yes 8/27/2019: consistent  Yes: 10/9/2019: consistent-getting his meds from PCP  11/20/2019: consistent  1/8/2019: consistent   2/12/2020: last script for hydrocodone 10/325 filled on 7/3/2019 for # 90 tabs. Given by Dr. Ladonna Calabrese MD   6/17/2020: consistent- last filled on 5/8/2020  7/15/2020: Consistent- # 30 tabs last filled on 6/17/2020 8/26/2020: Consistent - last refill  On 7/15/2020 for # 60 tabs  10/21/2020: Consistent last script for # 45 tabs on 8/26/2020    Past Medical History: Reviewed    Past Surgical History: Reviewed     Home Medications: Reviewed    Allergies: Reviewed     Social History: Reviewed     REVIEW OF SYSTEMS:     Gilberto Lozoya denies fever/chills, chest pain, shortness of breath, new bowel or bladder complaints. All other review of systems was negative. PHYSICAL EXAMINATION:     General:       A & O x3    Lungs:    Breathing:Nonlabored on the phone      Impression:    78 y.o. [facility] gentleman with prior H/o Cervical spine surgery- ACDF C3-7 having neck pain with features of myofacial pain and also facet pain. He has undergone lumbar spine surgery X 2 in the 1980's. 1. DDD (degenerative disc disease), lumbar      2. Lumbosacral spondylosis without myelopathy      3. Spinal stenosis of lumbar region, unspecified whether neurogenic claudication present      4. Postlaminectomy syndrome, lumbar      5. Cervical postlaminectomy syndrome      6. DDD (degenerative disc disease), cervical      7. Cervical spondylolysis        09/2020:  Lumbar LUCILA/ TFESI had helped the lower extremity pain significantly, >70% relief                   Plan:   Followed for neck pain with myofascial component and severe low back pain primarily axial and main complaint  Continue with HEP as tolerated   Schedule for Repeat BILATERAL LUMBAR FACET L3 L4 MEDIAL BRANCH  L5 DORSAL RAMI  RADIOFREQUENCY ABLATION, last done 10/29/2019   Follow up 2 weeks post procedure  Refill  Norco 1 tab po daily-bid #45 prn pain   OARRS consistent 12/2020. He is on ASA-81 mg. H/o CAD s/p stent.   10/27/2020: UDS negative for Norco as expected per DR DIALLO previous note    We discussed with the patient that combining opioids, benzodiazepines, alcohol, illicit drugs or sleep aids increases the risk of respiratory depression including death. We discussed that these medications may cause drowsiness, sedation or dizziness and have counseled the patient not to drive or operate machinery. We have discussed that these medications will be prescribed only by one provider. We have discussed with the patient about age related risk factors and have thoroughly discussed the importance of taking these medications as prescribed. The patient verbalizes understanding. Patient advised regarding steps to help prevent the spread of COVID-19   SOURCE - https://deacon-carla.info/. html     1-Stay home except to get medical care  2-Clean your hands often for atleast 20 secnds, avoid touching: Avoid touching your eyes, nose, and mouth with unwashed hands. 3-Seek medical attention: Seek prompt medical attention if your illness is worsening (e.g., difficulty breathing). Call you doctor first.  3-Wear a facemask if you are sick   4-Cover your coughs and sneezes        I affirm this is a Patient Initiated Episode with an Established Patient who has not had a related appointment within my department in the past 7 days or scheduled within the next 24 hours.     Total Time: minutes: 5-10 minutes    Note: not billable if this call serves to triage the patient into an appointment for the relevant concern

## 2020-12-21 DIAGNOSIS — E03.9 ACQUIRED HYPOTHYROIDISM: ICD-10-CM

## 2020-12-23 ENCOUNTER — TELEPHONE (OUTPATIENT)
Dept: PAIN MANAGEMENT | Age: 80
End: 2020-12-23

## 2020-12-23 RX ORDER — LEVOTHYROXINE SODIUM 0.03 MG/1
37.5 TABLET ORAL DAILY
Qty: 90 TABLET | Refills: 0 | Status: SHIPPED
Start: 2020-12-23 | End: 2021-02-10 | Stop reason: SDUPTHER

## 2020-12-23 NOTE — TELEPHONE ENCOUNTER
12/23/2020-upon review of Rachid's chart, it is noted that he takes ASA . Medical clearance obtained from Dr. Laura Vargas OK to hold ASA  for 7 days. Call to [facility], advised him to hold his ASA  for 7 days before his 01/05/2021 procedure, last dose to be 12/28/2020. he shows an understanding to not take it before his procedure. Instructed of need for COVID-19 testing and self quarantining. Instructed him that the surgery center should call him a few days before for the pre op call and after 3:00 PM the business day before with the arrival time. Left voice mail message.      COVID-19 symptom and exposure screening:    Fever: No  Headache:  No  Cough: No  Shortness of breath: No  Exposed to anyone with these symptoms: No     Di Hoyt RN  Pain Management

## 2020-12-29 ENCOUNTER — HOSPITAL ENCOUNTER (OUTPATIENT)
Age: 80
Discharge: HOME | End: 2020-12-31
Payer: MEDICARE

## 2020-12-29 DIAGNOSIS — M43.06 LUMBAR SPONDYLOLYSIS: ICD-10-CM

## 2020-12-29 PROCEDURE — U0003 INFECTIOUS AGENT DETECTION BY NUCLEIC ACID (DNA OR RNA); SEVERE ACUTE RESPIRATORY SYNDROME CORONAVIRUS 2 (SARS-COV-2) (CORONAVIRUS DISEASE [COVID-19]), AMPLIFIED PROBE TECHNIQUE, MAKING USE OF HIGH THROUGHPUT TECHNOLOGIES AS DESCRIBED BY CMS-2020-01-R: HCPCS

## 2020-12-31 LAB
SARS-COV-2: NOT DETECTED
SOURCE: NORMAL

## 2021-01-04 ENCOUNTER — ANESTHESIA EVENT (OUTPATIENT)
Dept: OPERATING ROOM | Age: 81
End: 2021-01-04
Payer: MEDICARE

## 2021-01-04 NOTE — ANESTHESIA PRE PROCEDURE
Department of Anesthesiology  Preprocedure Note       Name:  Marbella Santos   Age:  78 y.o.  :  1940                                          MRN:  [id number]         Date:  2021      Surgeon: Eduardo Urias):  Beatrice Ludwig MD    Procedure: Procedure(s):  BILATERAL LUMBAR FACET L3,L4 MEDIAL BRANCH L5 DORSAL RAMI RADIOFREQUENCY ABLATION WITH IV SEDATION (CPT 84601,40306)    Medications prior to admission:   Prior to Admission medications    Medication Sig Start Date End Date Taking? Authorizing Provider   levothyroxine (SYNTHROID) 25 MCG tablet Take 1.5 tablets by mouth daily 20ette Round, DO   HYDROcodone-acetaminophen (NORCO) 5-325 MG per tablet Take 1 tablet by mouth 2 times daily as needed for Pain (once or twice a day) for up to 30 days.  20  Armida Lucero, APRN - CNP   simvastatin (ZOCOR) 20 MG tablet Take 1 tablet by mouth nightly 20   Arnaud Arreola, DO   pantoprazole (PROTONIX) 40 MG tablet Take 1 tablet by mouth every morning (before breakfast) 10/21/20   Becky Round, DO   linaGLIPtin-metFORMIN HCl ER (JENTADUETO XR) 2.5-1000 MG TB24 Take 1 tablet by mouth 2 times daily 20 Round, DO   traZODone (DESYREL) 100 MG tablet Take 1 tablet by mouth nightly as needed for Sleep 9/1/20   Becky Round, DO   citalopram (CELEXA) 40 MG tablet Take 1 tablet by mouth daily 20   Becky Round, DO   donepezil (ARICEPT) 5 MG tablet Take 1 tablet by mouth nightly 6/1/20   Becky Round, DO   vitamin B-12 (CYANOCOBALAMIN) 1000 MCG tablet Take 0.5 tablets by mouth daily 10/16/19   Jae Denise, DO   ferrous sulfate 325 (65 Fe) MG tablet Take 325 mg by mouth daily (with breakfast)    Historical Provider, MD   meclizine (ANTIVERT) 25 MG tablet Take 25 mg by mouth 3 times daily as needed    Historical Provider, MD   vitamin D (CHOLECALCIFEROL) 1000 UNIT TABS tablet Take 1,000 Units by mouth daily    Historical Provider, MD  DDD (degenerative disc disease), cervical M50.30    Status post cervical spinal fusion Z98.1    Cervical spondylolysis M43.02    Osteoarthritis thoracic spine M47.814    Osteoarthritis of lumbosacral spine M47.817    Cervical facet syndrome M47.812    Thoracic facet syndrome M47.894    Facet syndrome, lumbar M47.816    Osteoarthritis of both shoulders M19.011, M19.012    Postlaminectomy syndrome, lumbar M96.1    Sacroiliitis M46.1    Neural foraminal stenosis of cervical spine M99.71    Protruded cervical disc M50.20    Cervical radiculopathy M54.12    Arthritis right shoulder M19.90    Bilateral renal cysts N28.1    Displaced fracture of shaft of left clavicle S42.022A    Chronic pain syndrome G89.4    Cervicalgia M54.2    Spinal stenosis of lumbar region M48.061    Lumbosacral spondylosis without myelopathy M47.817    DDD (degenerative disc disease), lumbar M51.36    Type 2 diabetes mellitus without complication, without long-term current use of insulin (HCC) E11.9    Mild cognitive impairment G31.84    Cervical postlaminectomy syndrome M96.1    Stage 3 chronic kidney disease N18.30    Macrocytosis without anemia D75.89    Insomnia G47.00    Coronary atherosclerosis I25.10    History of coronary artery stent placement Z95.5    Lumbar radiculopathy M54.16    Dyslipidemia E78.5    Acquired hypothyroidism E03.9       Past Medical History:        Diagnosis Date    Accident caused by farm tractor     Arthritis     CAD (coronary artery disease)     Chronic pain     Clavicle fracture     Concussion with no loss of consciousness     Depression     Diabetes mellitus (HCC)     GERD (gastroesophageal reflux disease)     Headache(784.0)     History of blood transfusion     Hyperlipidemia     Kidney stone     Laceration of flexor tendon of hand, not in \"no man's land\" 10/10/2014    Multiple closed fractures of ribs of left side     Neck pain     Osteoarthritis  Rib fracture     SBO (small bowel obstruction) ([facility] [address] 4/23/2019    Skin cancer     Thyroid disease     Trauma 8/4/2018    Traumatic hemopneumothorax, initial encounter        Past Surgical History:        Procedure Laterality Date    ANESTHESIA NERVE BLOCK Bilateral 9/10/2019    BILATERAL LUMBAR FACET MEDIAL BRANCH BLOCK L3 L4 AND L5 DORSAL RAMI (CPT [id number]) performed by Billy Sood MD at [address] [facility] N/A 7/21/2020    LUMBAR EPIDURAL STEROID INJECTION L4-L5 X-RAY performed by Billy Sood MD at [address] [place] [address]      x2 lumbar    CATARACT REMOVAL WITH IMPLANT Left 10/08/13    CATARACT REMOVAL WITH IMPLANT Right 12/10/13    CERVICAL SPINE SURGERY  2012    cervical fusion    COLON SURGERY      due to diverticulitis colon resection    CORONARY ANGIOPLASTY WITH STENT PLACEMENT  2005    x2 stents    DILATATION, ESOPHAGUS      FRACTURE SURGERY      Lt. Jaw Plate    NERVE BLOCK Left 3/7/16    cervical transforaminal #1    NERVE BLOCK Left 3/28/16    cervical transforaminal #2    NERVE BLOCK Left 04 04 2016    transforaminal nerve block left cervical #3    NERVE BLOCK Left 07/25/2016    shoulder    NERVE BLOCK Left 08/15/2016    left shoulder injection #2    NERVE BLOCK Right 08/22/2016    shoulder    NERVE BLOCK Right 08/29/2016    shoulder injection #2    NERVE BLOCK Bilateral 09/10/2019    BILATERAL LUMBAR FACET MEDIAL BRANCH NERVE BLOCK AT L3,L4 MEDIAL BRANCH AND L5 DORSAL RAMI    NERVE BLOCK Bilateral 10/29/2019    lumbar facet, meidal branch    NERVE BLOCK N/A 07/21/2020    Lumbar epidural steriod injection L4-L5    NERVE BLOCK Right 09/15/2020    transforaminal L4,5    NERVE BLOCK Right 9/15/2020    TRANSFORAMINAL EPIDURAL STEROID INJECTION RIGHT L4 AND L5 UNDER FLUOROSCOPIC GUIDANCE (CPT 02205,31529) performed by Billy Sood MD at [address] [place] [address] Left 10/23/2018 LEFT CLAVICLE OPEN REDUCTION INTERNAL FIXATION performed by Dana Buckley DO at [address] [facility] Left 101/10/2014    repair left hand, finger laceration    [place] CHEST SURGERY PROCEDURE UNLISTED Left 8/6/2018    LEFT SIDED VIDEO ASSISTED THORACOSCOPY WITH RIB PLATING performed by Robert Reyes MD at [address] d/t a fall    RADIOFREQUENCY ABLATION NERVES Bilateral 10/29/2019    BILATERAL LUMBAR FACET L3 L4 MEDIAL BRANCH  L5 DORSAL RAMI  RADIOFREQUENCY ABLATION SEDATION (CPT 23590) performed by Ena He MD at [address] [facility] Bilateral     SHOULDER ARTHROSCOPY Right 12/08/2016    repair rotator cuff, bursectomy, debridement glenohumerol     SKIN BIOPSY         Social History:    Social History     Tobacco Use    Smoking status: Former Smoker     Years: 40.00     Types: Cigarettes    Smokeless tobacco: Current User     Types: Chew   Substance Use Topics    Alcohol use: No                                Ready to quit: Not Answered  Counseling given: Not Answered      Vital Signs (Current):   Vitals:    12/22/20 1152   Weight: 155 lb (70.3 kg)   Height: 5' 7\" (1.702 m)                                              BP Readings from Last 3 Encounters:   10/21/20 118/72   09/15/20 122/77   09/01/20 120/62       NPO Status:                                                                                 BMI:   Wt Readings from Last 3 Encounters:   10/21/20 150 lb (68 kg)   09/09/20 150 lb (68 kg)   09/01/20 151 lb (68.5 kg)     Body mass index is 24.28 kg/m².     CBC:   Lab Results   Component Value Date    WBC 5.7 08/26/2020    RBC 3.46 08/26/2020    HGB 11.8 08/26/2020    HCT 36.6 08/26/2020    .1 08/26/2020    RDW 13.4 08/26/2020     08/26/2020       CMP:   Lab Results   Component Value Date     08/26/2020    K 5.1 08/26/2020    K 4.2 04/24/2019     08/26/2020    CO2 27 08/26/2020    BUN 21 08/26/2020    CREATININE 1.3 08/26/2020 GFRAA >60 08/26/2020    LABGLOM 53 08/26/2020    GLUCOSE 123 08/26/2020    PROT 7.0 08/20/2020    CALCIUM 9.9 08/26/2020    BILITOT <0.2 08/20/2020    ALKPHOS 64 08/20/2020    AST 20 08/20/2020    ALT 12 08/20/2020       POC Tests: No results for input(s): POCGLU, POCNA, POCK, POCCL, POCBUN, POCHEMO, POCHCT in the last 72 hours. Coags:   Lab Results   Component Value Date    PROTIME 11.9 08/04/2018    INR 1.0 08/04/2018    APTT <20.0 08/04/2018       HCG (If Applicable): No results found for: PREGTESTUR, PREGSERUM, HCG, HCGQUANT     ABGs: No results found for: PHART, PO2ART, JHA2BUA, VRQ9GQP, BEART, F7OFURWA     Type & Screen (If Applicable):  No results found for: LABABO, LABRH    Drug/Infectious Status (If Applicable):  No results found for: HIV, HEPCAB    COVID-19 Screening (If Applicable):   Lab Results   Component Value Date    COVID19 Not Detected 12/29/2020         Anesthesia Evaluation  Patient summary reviewed no history of anesthetic complications:   Airway: Mallampati: III  TM distance: >3 FB   Neck ROM: full  Mouth opening: > = 3 FB Dental:    (+) upper dentures      Pulmonary:Negative Pulmonary ROS                              Cardiovascular:    (+) CAD:, CABG/stent:,         Rhythm: regular  Rate: normal                 ROS comment: CAD with stent placement     Neuro/Psych:   (+) neuromuscular disease:, headaches: cluster headaches, psychiatric history:            GI/Hepatic/Renal:   (+) GERD: well controlled,           Endo/Other:    (+) DiabetesType II DM, , hypothyroidism: arthritis:., .                 Abdominal:           Vascular: negative vascular ROS. Anesthesia Plan      MAC     ASA 3       Induction: intravenous. MIPS: Postoperative opioids intended and Prophylactic antiemetics administered. Anesthetic plan and risks discussed with patient. Plan discussed with CRNA. Cardiac clearance in the chart. Preoperative note evaluation done by reviewing the chart on 01/04/21 and patient will be evaluated on the day of the surgery by the anesthesiologist on duty. Connie Britton MD   1/4/2021  DOS STAFF ADDENDUM:    Pt seen and examined, chart reviewed (including anesthesia, drug and allergy history). Anesthetic plan, risks, benefits, alternatives, and personnel involved discussed with patient. Patient verbalized an understanding and agrees to proceed. Plan discussed with care team members and agreed upon.     Elizabeth Salgado MD  Staff Anesthesiologist  10:09 AM

## 2021-01-05 ENCOUNTER — ANESTHESIA (OUTPATIENT)
Dept: OPERATING ROOM | Age: 81
End: 2021-01-05
Payer: MEDICARE

## 2021-01-05 ENCOUNTER — HOSPITAL ENCOUNTER (OUTPATIENT)
Dept: OPERATING ROOM | Age: 81
Discharge: HOME | End: 2021-01-05
Attending: ANESTHESIOLOGY
Payer: MEDICARE

## 2021-01-05 ENCOUNTER — HOSPITAL ENCOUNTER (OUTPATIENT)
Age: 81
Setting detail: OUTPATIENT SURGERY
Discharge: HOME | End: 2021-01-05
Attending: ANESTHESIOLOGY | Admitting: ANESTHESIOLOGY
Payer: MEDICARE

## 2021-01-05 VITALS
OXYGEN SATURATION: 97 % | RESPIRATION RATE: 16 BRPM | WEIGHT: 155 LBS | DIASTOLIC BLOOD PRESSURE: 67 MMHG | HEIGHT: 67 IN | TEMPERATURE: 98 F | HEART RATE: 77 BPM | SYSTOLIC BLOOD PRESSURE: 128 MMHG | BODY MASS INDEX: 24.33 KG/M2

## 2021-01-05 VITALS
OXYGEN SATURATION: 97 % | DIASTOLIC BLOOD PRESSURE: 84 MMHG | SYSTOLIC BLOOD PRESSURE: 157 MMHG | RESPIRATION RATE: 8 BRPM

## 2021-01-05 DIAGNOSIS — M47.896 OTHER OSTEOARTHRITIS OF SPINE, LUMBAR REGION: ICD-10-CM

## 2021-01-05 DIAGNOSIS — M43.06 LUMBAR SPONDYLOLYSIS: Primary | ICD-10-CM

## 2021-01-05 LAB — METER GLUCOSE: 97 MG/DL (ref 74–99)

## 2021-01-05 PROCEDURE — 7100000011 HC PHASE II RECOVERY - ADDTL 15 MIN: Performed by: ANESTHESIOLOGY

## 2021-01-05 PROCEDURE — 64635 DESTROY LUMB/SAC FACET JNT: CPT | Performed by: ANESTHESIOLOGY

## 2021-01-05 PROCEDURE — C1713 ANCHOR/SCREW BN/BN,TIS/BN: HCPCS | Performed by: ANESTHESIOLOGY

## 2021-01-05 PROCEDURE — 82962 GLUCOSE BLOOD TEST: CPT | Performed by: ANESTHESIOLOGY

## 2021-01-05 PROCEDURE — 3600000015 HC SURGERY LEVEL 5 ADDTL 15MIN: Performed by: ANESTHESIOLOGY

## 2021-01-05 PROCEDURE — 3209999900 FLUORO FOR SURGICAL PROCEDURES

## 2021-01-05 PROCEDURE — 2500000003 HC RX 250 WO HCPCS: Performed by: ANESTHESIOLOGY

## 2021-01-05 PROCEDURE — 6360000002 HC RX W HCPCS: Performed by: NURSE ANESTHETIST, CERTIFIED REGISTERED

## 2021-01-05 PROCEDURE — 6360000002 HC RX W HCPCS: Performed by: ANESTHESIOLOGY

## 2021-01-05 PROCEDURE — 2709999900 HC NON-CHARGEABLE SUPPLY: Performed by: ANESTHESIOLOGY

## 2021-01-05 PROCEDURE — 2580000003 HC RX 258: Performed by: ANESTHESIOLOGY

## 2021-01-05 PROCEDURE — 3700000001 HC ADD 15 MINUTES (ANESTHESIA): Performed by: ANESTHESIOLOGY

## 2021-01-05 PROCEDURE — 82962 GLUCOSE BLOOD TEST: CPT

## 2021-01-05 PROCEDURE — 64636 DESTROY L/S FACET JNT ADDL: CPT | Performed by: ANESTHESIOLOGY

## 2021-01-05 PROCEDURE — 7100000010 HC PHASE II RECOVERY - FIRST 15 MIN: Performed by: ANESTHESIOLOGY

## 2021-01-05 PROCEDURE — 3600000005 HC SURGERY LEVEL 5 BASE: Performed by: ANESTHESIOLOGY

## 2021-01-05 PROCEDURE — 3700000000 HC ANESTHESIA ATTENDED CARE: Performed by: ANESTHESIOLOGY

## 2021-01-05 RX ORDER — BUPIVACAINE HYDROCHLORIDE 2.5 MG/ML
INJECTION, SOLUTION EPIDURAL; INFILTRATION; INTRACAUDAL PRN
Status: DISCONTINUED | OUTPATIENT
Start: 2021-01-05 | End: 2021-01-05 | Stop reason: ALTCHOICE

## 2021-01-05 RX ORDER — FENTANYL CITRATE 50 UG/ML
INJECTION, SOLUTION INTRAMUSCULAR; INTRAVENOUS PRN
Status: DISCONTINUED | OUTPATIENT
Start: 2021-01-05 | End: 2021-01-05 | Stop reason: SDUPTHER

## 2021-01-05 RX ORDER — METHYLPREDNISOLONE ACETATE 40 MG/ML
INJECTION, SUSPENSION INTRA-ARTICULAR; INTRALESIONAL; INTRAMUSCULAR; SOFT TISSUE PRN
Status: DISCONTINUED | OUTPATIENT
Start: 2021-01-05 | End: 2021-01-05 | Stop reason: ALTCHOICE

## 2021-01-05 RX ORDER — LIDOCAINE HYDROCHLORIDE 5 MG/ML
INJECTION, SOLUTION INFILTRATION; INTRAVENOUS PRN
Status: DISCONTINUED | OUTPATIENT
Start: 2021-01-05 | End: 2021-01-05 | Stop reason: ALTCHOICE

## 2021-01-05 RX ORDER — LIDOCAINE HYDROCHLORIDE 10 MG/ML
INJECTION, SOLUTION EPIDURAL; INFILTRATION; INTRACAUDAL; PERINEURAL PRN
Status: DISCONTINUED | OUTPATIENT
Start: 2021-01-05 | End: 2021-01-05 | Stop reason: ALTCHOICE

## 2021-01-05 RX ORDER — SODIUM CHLORIDE, SODIUM LACTATE, POTASSIUM CHLORIDE, CALCIUM CHLORIDE 600; 310; 30; 20 MG/100ML; MG/100ML; MG/100ML; MG/100ML
INJECTION, SOLUTION INTRAVENOUS CONTINUOUS
Status: DISCONTINUED | OUTPATIENT
Start: 2021-01-05 | End: 2021-01-05 | Stop reason: HOSPADM

## 2021-01-05 RX ADMIN — FENTANYL CITRATE 25 MCG: 50 INJECTION, SOLUTION INTRAMUSCULAR; INTRAVENOUS at 10:59

## 2021-01-05 RX ADMIN — FENTANYL CITRATE 25 MCG: 50 INJECTION, SOLUTION INTRAMUSCULAR; INTRAVENOUS at 10:57

## 2021-01-05 RX ADMIN — FENTANYL CITRATE 25 MCG: 50 INJECTION, SOLUTION INTRAMUSCULAR; INTRAVENOUS at 11:10

## 2021-01-05 RX ADMIN — SODIUM CHLORIDE, POTASSIUM CHLORIDE, SODIUM LACTATE AND CALCIUM CHLORIDE: 600; 310; 30; 20 INJECTION, SOLUTION INTRAVENOUS at 09:50

## 2021-01-05 RX ADMIN — FENTANYL CITRATE 25 MCG: 50 INJECTION, SOLUTION INTRAMUSCULAR; INTRAVENOUS at 11:15

## 2021-01-05 ASSESSMENT — PAIN SCALES - GENERAL
PAINLEVEL_OUTOF10: 0

## 2021-01-05 ASSESSMENT — PAIN - FUNCTIONAL ASSESSMENT: PAIN_FUNCTIONAL_ASSESSMENT: 0-10

## 2021-01-05 ASSESSMENT — PAIN DESCRIPTION - DESCRIPTORS: DESCRIPTORS: DISCOMFORT

## 2021-01-05 NOTE — OP NOTE
Operative Note      Patient: Venus Casillas  YOB: 1940  MRN: [id number]    Date of Procedure: 2021    Pre-Op Diagnosis: LUMBAR SPONDYLOSIS    Post-Op Diagnosis: Same       Procedure(s):  BILATERAL LUMBAR FACET L3, L4 MEDIAL BRANCH L5 DORSAL RAMI RADIOFREQUENCY ABLATION     Surgeon(s):  Bettie Juarez MD    Assistant:   * No surgical staff found *    Anesthesia: Monitor Anesthesia Care    Estimated Blood Loss (mL): Minimal    Complications: None    Specimens:   * No specimens in log *    Implants:  * No implants in log *      Drains: * No LDAs found *    Findings: good needle placement    Detailed Description of Procedure:   2021    Patient: Venus Casillas  :  1940  Age:  78 y.o. Sex:  male     PRE-OPERATIVE DIAGNOSIS: Bilateral Lumbar spondylosis, lumbar facet arthropathy. POST-OPERATIVE DIAGNOSIS: Same. PROCEDURE:  Fluoroscopic-guided Bilateral  Lumbar facet medial branch radiofrequency ablation at levels L3, L4, L5  SURGEON:  Bettie Juarez MD    ANESTHESIA: MAC- se anesthesia records for details    ESTIMATED BLOOD LOSS: None.  ______________________________________________________________________  HISTORY & PHYSICAL: Venus Casillas presents today for fluoroscopic-guided Bilateral lumbar facet medial branch radiofrequency ablation at levels L3, L4, L5  The potential complications of the procedure were explained to [facility] again today and he has elected to undergo the aforementioned procedure. Tuan complete History & Physical examination were reviewed in depth, a copy of which is in the chart. DESCRIPTION OF PROCEDURE:    After confirming written and informed consent, a time-out was performed and Tuan name and date of birth, the procedure to be performed as well as the plan for the location of the needle insertion were confirmed. The patient was brought into the procedure room and placed in the prone position on the fluoroscopy table. Standard monitors were placed and vital signs were observed throughout the procedure. The area of the lumbar spine and upper buttocks was sterilely prepped with chloraprep and draped in a sterile manner. AP fluoroscopy was used to identify and darryl bartons point at the targeted area. # 20  gauge 10 mm radiofrequency probe was advanced toward each of these points. Once bone was contacted, negative aspiration for blood and CSF was confirmed, sensory stimulation was performed at 50 Hz and at 0.4 volts generating a pressure sensation. Motor stimulation < 2.0 volts elicited multifidus twitching without any radicular symptoms. 0.5-1 ml of 1 % lidocaine was injected prior to lesioning, which was performed for 90 seconds at 90 degrees centigrade. Once the lesions were complete, a solution of 0.25% marcaine 3 cc and 30 mg DepoMedrol was injected and distributed equally through each probe. The probes were removed . The patient's back was cleaned and bandages were placed over the needle insertion sites. Disposition the patient tolerated the procedure well and there were no complications . Vital signs remained stable throughout the procedure. The patient was escorted to the recovery area where they remained until discharge and written discharge instructions for the procedure were given. Plan: Sae Delgado will return to our pain management center as scheduled.      Nicolle Vincent MD

## 2021-01-05 NOTE — H&P
[address]  [address] [facility], [address] [place] [address] Edvin  [phone]    Procedure History & Physical      Marbella Santos     HPI:    Patient  is here for Lumbar facet MB RFA for low back pain. Labs/imaging studies reviewed   All question and concerns addressed including R/B/A associated with the procedure    Past Medical History:   Diagnosis Date    Accident caused by farm tractor    [address] CAD (coronary artery disease)     Chronic pain     Clavicle fracture     Concussion with no loss of consciousness     Depression     Diabetes mellitus ([address]     GERD (gastroesophageal reflux disease)     Headache(784.0)     History of blood transfusion     Hyperlipidemia     Kidney stone     Laceration of flexor tendon of hand, not in \"no man's land\" 10/10/2014    Multiple closed fractures of ribs of left side     Neck pain     Osteoarthritis     Rib fracture     SBO (small bowel obstruction) ([facility] [address] 4/23/2019    Skin cancer     Thyroid disease     Trauma 8/4/2018    Traumatic hemopneumothorax, initial encounter        Past Surgical History:   Procedure Laterality Date    ANESTHESIA NERVE BLOCK Bilateral 9/10/2019    BILATERAL LUMBAR FACET MEDIAL BRANCH BLOCK L3 L4 AND L5 DORSAL RAMI (CPT [id number]) performed by Beatrice Ludwig MD at [address] [facility] N/A 7/21/2020    LUMBAR EPIDURAL STEROID INJECTION L4-L5 X-RAY performed by Beatrice Ludwig MD at [address]      x2 lumbar    CATARACT REMOVAL WITH IMPLANT Left 10/08/13    CATARACT REMOVAL WITH IMPLANT Right 12/10/13    CERVICAL SPINE SURGERY  2012    cervical fusion    COLON SURGERY      due to diverticulitis colon resection    CORONARY ANGIOPLASTY WITH STENT PLACEMENT  2005    x2 stents    DILATATION, ESOPHAGUS      FRACTURE SURGERY      Lt. Jaw Plate    NERVE BLOCK Left 3/7/16    cervical transforaminal #1    NERVE BLOCK Left 3/28/16 cervical transforaminal #2    NERVE BLOCK Left 04 04 2016    transforaminal nerve block left cervical #3    NERVE BLOCK Left 07/25/2016    shoulder    NERVE BLOCK Left 08/15/2016    left shoulder injection #2    NERVE BLOCK Right 08/22/2016    shoulder    NERVE BLOCK Right 08/29/2016    shoulder injection #2    NERVE BLOCK Bilateral 09/10/2019    BILATERAL LUMBAR FACET MEDIAL BRANCH NERVE BLOCK AT L3,L4 MEDIAL BRANCH AND L5 DORSAL RAMI    NERVE BLOCK Bilateral 10/29/2019    lumbar facet, meidal branch    NERVE BLOCK N/A 07/21/2020    Lumbar epidural steriod injection L4-L5    NERVE BLOCK Right 09/15/2020    transforaminal L4,5    NERVE BLOCK Right 9/15/2020    TRANSFORAMINAL EPIDURAL STEROID INJECTION RIGHT L4 AND L5 UNDER FLUOROSCOPIC GUIDANCE (CPT 98079,70591) performed by Charlotte Mcbride MD at [address] [place] [address]. Left 10/23/2018    LEFT CLAVICLE OPEN REDUCTION INTERNAL FIXATION performed by Lilly Flores DO at [facility] Left 101/10/2014    repair left hand, finger laceration    [place] CHEST SURGERY PROCEDURE UNLISTED Left 8/6/2018    LEFT SIDED VIDEO ASSISTED THORACOSCOPY WITH RIB PLATING performed by Adrián Wetzel MD at [address] [place] [address] d/t a fall    RADIOFREQUENCY ABLATION NERVES Bilateral 10/29/2019    BILATERAL LUMBAR FACET L3 L4 MEDIAL BRANCH  L5 DORSAL RAMI  RADIOFREQUENCY ABLATION SEDATION (CPT [id number]) performed by Charlotte Mcbride MD at [address] [facility] Bilateral     SHOULDER ARTHROSCOPY Right 12/08/2016    repair rotator cuff, bursectomy, debridement glenohumerol     SKIN BIOPSY         Prior to Admission medications    Medication Sig Start Date End Date Taking?  Authorizing Provider   aspirin EC 81 MG EC tablet Take 81 mg by mouth daily   Yes Historical Provider, MD   levothyroxine (SYNTHROID) 25 MCG tablet Take 1.5 tablets by mouth daily 12/23/20 2/21/21  Amina Hsieh DO HYDROcodone-acetaminophen (NORCO) 5-325 MG per tablet Take 1 tablet by mouth 2 times daily as needed for Pain (once or twice a day) for up to 30 days.  12/18/20 1/17/21  VIKRAM Chung - CNP   simvastatin (ZOCOR) 20 MG tablet Take 1 tablet by mouth nightly 11/18/20   Arnaud Arreola, DO   pantoprazole (PROTONIX) 40 MG tablet Take 1 tablet by mouth every morning (before breakfast) 10/21/20   Addison Samples, DO   linaGLIPtin-metFORMIN HCl ER (JENTADUETO XR) 2.5-1000 MG TB24 Take 1 tablet by mouth 2 times daily 9/1/20   Addison Samples, DO   traZODone (DESYREL) 100 MG tablet Take 1 tablet by mouth nightly as needed for Sleep 9/1/20   Addison Samples, DO   citalopram (CELEXA) 40 MG tablet Take 1 tablet by mouth daily 8/12/20   Addison Samples, DO   donepezil (ARICEPT) 5 MG tablet Take 1 tablet by mouth nightly 6/1/20   Addison Samples, DO   vitamin B-12 (CYANOCOBALAMIN) 1000 MCG tablet Take 0.5 tablets by mouth daily 10/16/19   Jae Denise, DO   ferrous sulfate 325 (65 Fe) MG tablet Take 325 mg by mouth daily (with breakfast)    Historical Provider, MD   meclizine (ANTIVERT) 25 MG tablet Take 25 mg by mouth 3 times daily as needed    Historical Provider, MD   vitamin D (CHOLECALCIFEROL) 1000 UNIT TABS tablet Take 1,000 Units by mouth daily    Historical Provider, MD       Allergies   Allergen Reactions    Sulfa Antibiotics Hives       Social History     Socioeconomic History    Marital status:      Spouse name: Not on file    Number of children: Not on file    Years of education: Not on file    Highest education level: Not on file   Occupational History    Not on file   Social Needs    Financial resource strain: Not on file    Food insecurity     Worry: Not on file     Inability: Not on file    Transportation needs     Medical: Not on file     Non-medical: Not on file   Tobacco Use    Smoking status: Former Smoker     Years: 40.00     Types: Cigarettes    Smokeless tobacco: Current User ABDOMEN:  Soft non tender non distended     EXTREMITIES: no signs of clubbing or cyanosis. MUSCULOSKELETAL: negative for flaccid muscle tone or spastic movements. SKIN: gross examination reveals no signs of rashes, or diaphoresis. NEURO: Cranial nerves II-XII grossly intact. No signs of agitated mood. Assessment/Plan:    Patient  is here for Lumbar facet MB RFA for low back pain. The patient was counseled at length about the risks of osmani Covid-19 during their perioperative period and any recovery window from their procedure. The patient was made aware that osmani Covid-19  may worsen their prognosis for recovering from their procedure  and lend to a higher morbidity and/or mortality risk. All material risks, benefits, and reasonable alternatives including postponing the procedure were discussed. The patient does wish to proceed with the procedure at this time.       Carmelina Chu MD

## 2021-01-19 ENCOUNTER — OFFICE VISIT (OUTPATIENT)
Dept: PAIN MANAGEMENT | Age: 81
End: 2021-01-19
Payer: MEDICARE

## 2021-01-19 VITALS
HEART RATE: 94 BPM | DIASTOLIC BLOOD PRESSURE: 62 MMHG | OXYGEN SATURATION: 95 % | BODY MASS INDEX: 24.33 KG/M2 | TEMPERATURE: 96.4 F | SYSTOLIC BLOOD PRESSURE: 115 MMHG | WEIGHT: 155 LBS | RESPIRATION RATE: 16 BRPM | HEIGHT: 67 IN

## 2021-01-19 DIAGNOSIS — M54.2 CERVICALGIA: ICD-10-CM

## 2021-01-19 DIAGNOSIS — M96.1 POSTLAMINECTOMY SYNDROME, LUMBAR: ICD-10-CM

## 2021-01-19 DIAGNOSIS — M96.1 CERVICAL POSTLAMINECTOMY SYNDROME: ICD-10-CM

## 2021-01-19 DIAGNOSIS — M54.16 LUMBAR RADICULOPATHY: ICD-10-CM

## 2021-01-19 DIAGNOSIS — M43.02 CERVICAL SPONDYLOLYSIS: ICD-10-CM

## 2021-01-19 DIAGNOSIS — M47.812 CERVICAL FACET SYNDROME: ICD-10-CM

## 2021-01-19 DIAGNOSIS — M47.816 FACET SYNDROME, LUMBAR: ICD-10-CM

## 2021-01-19 DIAGNOSIS — M79.18 CHRONIC MYOFASCIAL PAIN: ICD-10-CM

## 2021-01-19 DIAGNOSIS — M47.817 LUMBOSACRAL SPONDYLOSIS WITHOUT MYELOPATHY: ICD-10-CM

## 2021-01-19 DIAGNOSIS — M54.12 CERVICAL RADICULOPATHY: Primary | ICD-10-CM

## 2021-01-19 DIAGNOSIS — G89.29 CHRONIC MYOFASCIAL PAIN: ICD-10-CM

## 2021-01-19 DIAGNOSIS — G89.4 CHRONIC PAIN SYNDROME: ICD-10-CM

## 2021-01-19 DIAGNOSIS — M50.30 DDD (DEGENERATIVE DISC DISEASE), CERVICAL: ICD-10-CM

## 2021-01-19 DIAGNOSIS — M51.36 DDD (DEGENERATIVE DISC DISEASE), LUMBAR: ICD-10-CM

## 2021-01-19 DIAGNOSIS — M47.894 THORACIC FACET SYNDROME: ICD-10-CM

## 2021-01-19 DIAGNOSIS — M25.532 ACUTE PAIN OF LEFT WRIST: ICD-10-CM

## 2021-01-19 DIAGNOSIS — M48.061 SPINAL STENOSIS OF LUMBAR REGION, UNSPECIFIED WHETHER NEUROGENIC CLAUDICATION PRESENT: ICD-10-CM

## 2021-01-19 DIAGNOSIS — M25.542 PAIN IN THUMB JOINT WITH MOVEMENT OF LEFT HAND: ICD-10-CM

## 2021-01-19 DIAGNOSIS — M50.20 PROTRUDED CERVICAL DISC: ICD-10-CM

## 2021-01-19 DIAGNOSIS — M48.02 NEURAL FORAMINAL STENOSIS OF CERVICAL SPINE: ICD-10-CM

## 2021-01-19 PROCEDURE — 4040F PNEUMOC VAC/ADMIN/RCVD: CPT | Performed by: NURSE PRACTITIONER

## 2021-01-19 PROCEDURE — 1123F ACP DISCUSS/DSCN MKR DOCD: CPT | Performed by: NURSE PRACTITIONER

## 2021-01-19 PROCEDURE — 4004F PT TOBACCO SCREEN RCVD TLK: CPT | Performed by: NURSE PRACTITIONER

## 2021-01-19 PROCEDURE — 99213 OFFICE O/P EST LOW 20 MIN: CPT | Performed by: NURSE PRACTITIONER

## 2021-01-19 PROCEDURE — G8427 DOCREV CUR MEDS BY ELIG CLIN: HCPCS | Performed by: NURSE PRACTITIONER

## 2021-01-19 PROCEDURE — G8484 FLU IMMUNIZE NO ADMIN: HCPCS | Performed by: NURSE PRACTITIONER

## 2021-01-19 PROCEDURE — G8420 CALC BMI NORM PARAMETERS: HCPCS | Performed by: NURSE PRACTITIONER

## 2021-01-19 RX ORDER — LIDOCAINE 36 MG/1
PATCH TOPICAL
Qty: 6 PATCH | Refills: 0 | COMMUNITY
Start: 2021-01-19 | End: 2021-08-12

## 2021-01-19 RX ORDER — HYDROCODONE BITARTRATE AND ACETAMINOPHEN 5; 325 MG/1; MG/1
1 TABLET ORAL 2 TIMES DAILY PRN
Qty: 45 TABLET | Refills: 0 | Status: SHIPPED
Start: 2021-01-19 | End: 2021-03-19 | Stop reason: SDUPTHER

## 2021-01-19 NOTE — PROGRESS NOTES
Do you currently have any of the following:    Fever: No  Headache:  No  Cough: No  Shortness of breath: No  Exposed to anyone with these symptoms: No                                                                                                                Randee Morrow presents to the [address] [address] on 1/19/2021. Abdirahman Luther is complaining of pain in lower back. . The pain is intermittent. The pain is described as aching, throbbing, shooting and sharp. Pain is rated on his best day at a 5, on his worst day at a 9, and on average at a 7 on the VAS scale. He took his last dose of Norco a couple weeks. Bala Cox does not have issues with constipation. Any procedures since your last visit: Yes, with 60 % relief. He is not on NSAIDS and  is  on anticoagulation medications to include ASA and is managed by NA. Pacemaker or defibrillator: No Physician managing device is NA. Medication Contract and Consent for Opioid Use Documents Filed     Patient Documents       Type of Document Status Date Received Received By Description     Medication Contract Received 3/15/2019  1:18 PM AALIYAH HOPE PAIN MANAGEMENT PT AGREEMENT 3/15/2019     Medication Contract Received 2/12/2020  3:12 PM Izabel RIBERA pain management patient agreement 02/12/2020                   /62   Pulse 94   Temp 96.4 °F (35.8 °C) (Infrared)   Resp 16   Ht 5' 7\" (1.702 m)   Wt 155 lb (70.3 kg)   SpO2 95%   BMI 24.28 kg/m²      No LMP for male patient.

## 2021-01-19 NOTE — PROGRESS NOTES
[id number] [facility], [address] [place] [address]  [phone]    Follow up Note      Fany Faye     Date of Visit:  1/19/2021    CC:  Patient presents for follow up neck and low back pain     HPI:  Pt followed today for low back pain improved at least 50% since recent LRFA 2 weeks ago. Pt also presents with continued cervical spine myofascial pain unchanged. Patient main complaint is left thumb dorsal pain and edema with decreased  strength that started one month ago. Discussed trialing lidocaine patch samples for added pain relief and referral to hand surgeon to discuss further with hx of CTS and forearm surgeries. Appropriate analgesia with current medications regimen: yes - . Change in quality of symptoms: yes improved low back pain   Medication side effects:none. Recent diagnostic testing:none. Recent interventional procedures: 01/5/21: S/p Bilateral L345 LRFA with >50% relief     He is on ASA-81 mg. H/o CAD s/p stent.      IImaging studies:  Left Shoulder: 6/14/2020:      Impression   No acute osseous abnormality.       Degenerative changes as above.      XR Lumbar spine: 6/2020: Impression   Diffuse osteopenia with moderate to advanced degenerative changes of the   lumbar spine, as above.  No convincing evidence of an acute fracture.      CT cervical spine: 6/14/2020:      FINDINGS:   BONES/ALIGNMENT: No evidence of cervical fracture or traumatic subluxation.    Stable T3 wedge compression.  There has been fusion of C3 through C7.  There   is an anterior plate with screws at C3, C4, and C7.  There is a bone block   graft at C3-C4.  There is a block graft extending from C4 through C7.  The   hardware and grafts are intact.       DEGENERATIVE CHANGES: Degenerative change at C1-C.  Degenerative disc disease   C7-T1.  Diffuse facet osteoarthritis with stable several mm anterolisthesis   of C7.       SOFT TISSUES: No prevertebral soft tissue swelling.  Maxillary sinusitis   changes noted           Impression   No acute abnormality of the cervical spine.          Xray thoracic spine: 6/14/2020:      Impression   Limited examination, as described above.  Within these limitations, no   convincing evidence of an acute fracture of the thoracic spine.             X ray LS spine: 3/2018:      Lumbar spine: There is slight levoconvex curvature with normal lumbar lordosis. Vertebral bodies maintain normal height. There is trace retrolisthesis   of L2 on L3 and L3 on L4. There is trace anterolisthesis of L4 on L5. There is multilevel intervertebral disc space narrowing with   hypertrophic endplate changes, most pronounced at L4-L5. No acute   fractures identified. There is lower lumbar facet arthrosis.       Sacrum and coccyx:   Overlying stool obscures the and coccyx on the frontal projections. No   acute displaced sacral or coccygeal fractures identified. Bilateral   sacroiliac joints and the pubic symphysis are maintained. There is   lower lumbar spondylosis.         Impression       Lumbar spine: Moderate multilevel lumbar spondylosis.       Sacrum and coccyx:   Unremarkable sacrum and coccyx radiographs.         CT Lumbar Spine: 8/2018:      Findings: There are bilateral pars defects noted at L4. There is a   spondylolisthesis of L4 on L5. There is wedging of T12 which may be physiologic   Changes seen throughout the discs are abnormal. There are findings to   suggest  degenerative disc disease.       Changes seen throughout the bone marrow are abnormal. Findings are   compatible with degenerative disc disease       Changes within the facets are abnormal. Findings are compatible with   degenerative facet disease.           At L5-S1: There is a mild broad-based disc herniation or bony   spurring. There is a laminectomy defect. There is facet hypertrophy.    There is mild narrowing of the neural foramina       At L4-5: There is a moderate SURGICAL HISTORY Bilateral 01/05/2021    lumbar facet medial branch dorsal rami radiofrequency    PAIN MANAGEMENT PROCEDURE Bilateral 1/5/2021    BILATERAL LUMBAR FACET L3,L4 MEDIAL BRANCH L5 DORSAL RAMI RADIOFREQUENCY ABLATION WITH IV SEDATION (CPT 26777,76581) performed by Tiffanie Rasheed MD at [address] UNLISTED Left 8/6/2018    LEFT SIDED VIDEO ASSISTED THORACOSCOPY WITH RIB PLATING performed by Nancy Foy MD at [address] d/t a fall    RADIOFREQUENCY ABLATION NERVES Bilateral 10/29/2019    BILATERAL LUMBAR FACET L3 L4 MEDIAL BRANCH  L5 DORSAL RAMI  RADIOFREQUENCY ABLATION SEDATION (CPT F9931127) performed by Tiffanie Rasheed MD at [facility] Bilateral     SHOULDER ARTHROSCOPY Right 12/08/2016    repair rotator cuff, bursectomy, debridement glenohumerol     SKIN BIOPSY         Prior to Admission medications    Medication Sig Start Date End Date Taking?  Authorizing Provider   levothyroxine (SYNTHROID) 25 MCG tablet Take 1.5 tablets by mouth daily 12/23/20 2/21/21  Ceasar Dowling, DO   simvastatin (ZOCOR) 20 MG tablet Take 1 tablet by mouth nightly 11/18/20   Arnaud Arreola, DO   pantoprazole (PROTONIX) 40 MG tablet Take 1 tablet by mouth every morning (before breakfast) 10/21/20   Ceasar Dowling, DO   linaGLIPtin-metFORMIN HCl ER (JENTADUETO XR) 2.5-1000 MG TB24 Take 1 tablet by mouth 2 times daily 9/1/20   Ceasar Dowling, DO   traZODone (DESYREL) 100 MG tablet Take 1 tablet by mouth nightly as needed for Sleep 9/1/20   Ceasar Dowling, DO   citalopram (CELEXA) 40 MG tablet Take 1 tablet by mouth daily 8/12/20   Ceasar Dowling, DO   donepezil (ARICEPT) 5 MG tablet Take 1 tablet by mouth nightly 6/1/20   Ceasar Dowling, DO   vitamin B-12 (CYANOCOBALAMIN) 1000 MCG tablet Take 0.5 tablets by mouth daily 10/16/19   Jae Denise, DO   ferrous sulfate 325 (65 Fe) MG tablet Take 325 mg by mouth daily (with breakfast)    Historical Provider, MD   meclizine (ANTIVERT) 25 MG tablet Take 25 mg by mouth 3 times daily as needed    Historical Provider, MD   vitamin D (CHOLECALCIFEROL) 1000 UNIT TABS tablet Take 1,000 Units by mouth daily    Historical Provider, MD   aspirin EC 81 MG EC tablet Take 81 mg by mouth daily    Historical Provider, MD       Allergies   Allergen Reactions    Sulfa Antibiotics Hives       Social History     Socioeconomic History    Marital status:      Spouse name: Not on file    Number of children: Not on file    Years of education: Not on file    Highest education level: Not on file   Occupational History    Not on file   Social Needs    Financial resource strain: Not on file    Food insecurity     Worry: Not on file     Inability: Not on file    Transportation needs     Medical: Not on file     Non-medical: Not on file   Tobacco Use    Smoking status: Former Smoker     Years: 40.00     Types: Cigarettes    Smokeless tobacco: Current User     Types: Chew   Substance and Sexual Activity    Alcohol use: No    Drug use: No    Sexual activity: Not Currently   Lifestyle    Physical activity     Days per week: Not on file     Minutes per session: Not on file    Stress: Not on file   Relationships    Social connections     Talks on phone: Not on file     Gets together: Not on file     Attends Evangelical service: Not on file     Active member of club or organization: Not on file     Attends meetings of clubs or organizations: Not on file     Relationship status: Not on file    Intimate partner violence     Fear of current or ex partner: Not on file     Emotionally abused: Not on file     Physically abused: Not on file     Forced sexual activity: Not on file   Other Topics Concern    Not on file   Social History Narrative    ** Merged History Encounter **            Family History   Problem Relation Age of Onset    Diabetes Mother        REVIEW OF SYSTEMS:     Vanita Almanza denies fever/chills, chest pain, shortness of breath, new bowel or bladder complaints or suicidal ideations. All other review of systems wasnegative. Review of Systems    PHYSICAL EXAMINATION:      General:    General appearance:  Pleasant and well-hydrated, in mild distress and A & O g5Ayxam: Normal WeightFunction:Rises from a seated position easily.     HEENT:   Head:normocephalic, atraumatic  Pupils:regular, round, equalSclera: icterus absent     Lungs:   Breathing:normal breathing pattern      CVS:  clear and non labored      Abdomen:  Shape:non-distended and normal  Tenderness:none  Guarding:none     Cervical spine:  Inspection:normal  Palpation: tenderness over lower cervical paraspinal muscles, tenderness trapezium, left positive. Range of motion: limitations of ROM noted. Facet tenderness noted over the mid and lower cervical facets.     Lumbar Spine:   Scar from the prior surgery noted, healed well, ROM reduced, Lumbar facet loading + bilaterally, Sensory and motor in b/l LE intact, DTR;'s in b/l LE equal.    Musculoskeletal:   Trigger points noted over the cervical paraspinal muscle, trapezius muscles mainly on the left side. He also has tenderness over the left occipital nerve.     Extremities:   Yes dorsal aspect of thumb to left hand and left wrist     Neurological:  Sensory: normal to light touch   Motor: No focal deficits, generalized weakness noted.    strength 5/5 right hand and 4/5 left hand  Reflexes: B/l equal  Gait:normal     Dermatology:     Skin:no rashes or lesions notedAssessment/Plan:  78 y. o.  pleasant gentleman with prior H/o Cervical spine surgery- ACDF C3-7 having neck pain with features of myofacial pain and also facet pain. He has undergone lumbar spine surgery X 2 in the 1980's.      1. DDD (degenerative disc disease), lumbar      2. Lumbosacral spondylosis without myelopathy      3. Spinal stenosis of lumbar region, unspecified whether neurogenic claudication present      4.  Postlaminectomy syndrome, lumbar     5. Cervical postlaminectomy syndrome      6. DDD (degenerative disc disease), cervical      7. Cervical spondylolysis        09/2020:  Lumbar LUCILA/ TFESI had helped the lower extremity pain significantly, >70% relief           Plan:   Followed for neck pain with myofascial component and axial low back pain primarily axial, left thumb/wrist pain and edema x1 month   S/P BILATERAL LUMBAR FACET L3 L4 MEDIAL BRANCH L5 DORSAL RAMI  RADIOFREQUENCY ABLATION WITH >50% relief, still improving  Refill Norco 1 tab po daily-bid #45 prn pain, typically lasts 2 months   Referral to orthopedic hand surgeon GEMA Kirk for acute left thumb and wrist pain and edema x1 month, prior hx of CTS and forearm trauma   OARRS consistent 01/2021. He is on ASA-81 mg. H/o CAD s/p stent.   Continue with HEP as tolerated   Follow up in 2 months for medication refill      We discussed with the patient that combining opioids, benzodiazepines, alcohol, illicit drugs or sleep aids increases the risk of respiratory depression including death. We discussed that these medications may cause drowsiness, sedation or dizziness and have counseled the patient not to drive or operate machinery. We have discussed that these medications will be prescribed only by one provider. We have discussed with the patient about age related risk factors and have thoroughly discussed the importance of taking these medications as prescribed. The patient verbalizes understanding.           ccreferring physic        Electronically signed by VIKRAM Stevens CNP on 1/19/21 at 11:29 AM EST

## 2021-01-28 DIAGNOSIS — D64.9 ANEMIA, UNSPECIFIED TYPE: ICD-10-CM

## 2021-01-28 DIAGNOSIS — E11.9 TYPE 2 DIABETES MELLITUS WITHOUT COMPLICATION, WITHOUT LONG-TERM CURRENT USE OF INSULIN (HCC): ICD-10-CM

## 2021-01-28 DIAGNOSIS — E03.9 ACQUIRED HYPOTHYROIDISM: ICD-10-CM

## 2021-01-28 DIAGNOSIS — E78.5 DYSLIPIDEMIA: ICD-10-CM

## 2021-01-28 DIAGNOSIS — I25.10 ATHEROSCLEROSIS OF CORONARY ARTERY OF NATIVE HEART WITHOUT ANGINA PECTORIS, UNSPECIFIED VESSEL OR LESION TYPE: ICD-10-CM

## 2021-01-28 LAB
ALBUMIN SERPL-MCNC: 4.2 G/DL (ref 3.5–5.2)
ALP BLD-CCNC: 40 U/L (ref 40–129)
ALT SERPL-CCNC: 11 U/L (ref 0–40)
ANION GAP SERPL CALCULATED.3IONS-SCNC: 14 MMOL/L (ref 7–16)
AST SERPL-CCNC: 24 U/L (ref 0–39)
BASOPHILS ABSOLUTE: 0.04 E9/L (ref 0–0.2)
BASOPHILS RELATIVE PERCENT: 0.7 % (ref 0–2)
BILIRUB SERPL-MCNC: 0.4 MG/DL (ref 0–1.2)
BUN BLDV-MCNC: 20 MG/DL (ref 8–23)
CALCIUM SERPL-MCNC: 9.7 MG/DL (ref 8.6–10.2)
CHLORIDE BLD-SCNC: 102 MMOL/L (ref 98–107)
CO2: 24 MMOL/L (ref 22–29)
CREAT SERPL-MCNC: 1.4 MG/DL (ref 0.7–1.2)
EOSINOPHILS ABSOLUTE: 0.24 E9/L (ref 0.05–0.5)
EOSINOPHILS RELATIVE PERCENT: 4.2 % (ref 0–6)
FERRITIN: 49 NG/ML
FOLATE: 14.9 NG/ML (ref 4.8–24.2)
GFR AFRICAN AMERICAN: 59
GFR NON-AFRICAN AMERICAN: 48 ML/MIN/1.73
GLUCOSE BLD-MCNC: 147 MG/DL (ref 74–99)
HBA1C MFR BLD: 6.1 % (ref 4–5.6)
HCT VFR BLD CALC: 39.5 % (ref 37–54)
HEMOGLOBIN: 13.1 G/DL (ref 12.5–16.5)
IMMATURE GRANULOCYTES #: 0.03 E9/L
IMMATURE GRANULOCYTES %: 0.5 % (ref 0–5)
IMMATURE RETIC FRACT: 17.1 % (ref 2.3–13.4)
IRON SATURATION: 35 % (ref 20–55)
IRON: 102 MCG/DL (ref 59–158)
LYMPHOCYTES ABSOLUTE: 1.74 E9/L (ref 1.5–4)
LYMPHOCYTES RELATIVE PERCENT: 30.5 % (ref 20–42)
MCH RBC QN AUTO: 34.3 PG (ref 26–35)
MCHC RBC AUTO-ENTMCNC: 33.2 % (ref 32–34.5)
MCV RBC AUTO: 103.4 FL (ref 80–99.9)
MONOCYTES ABSOLUTE: 0.6 E9/L (ref 0.1–0.95)
MONOCYTES RELATIVE PERCENT: 10.5 % (ref 2–12)
NEUTROPHILS ABSOLUTE: 3.05 E9/L (ref 1.8–7.3)
NEUTROPHILS RELATIVE PERCENT: 53.6 % (ref 43–80)
PDW BLD-RTO: 12.9 FL (ref 11.5–15)
PLATELET # BLD: 157 E9/L (ref 130–450)
PMV BLD AUTO: 11 FL (ref 7–12)
POTASSIUM SERPL-SCNC: 4.7 MMOL/L (ref 3.5–5)
RBC # BLD: 3.82 E12/L (ref 3.8–5.8)
RETIC HGB EQUIVALENT: 38.8 PG (ref 28.2–36.6)
RETICULOCYTE ABSOLUTE COUNT: 0.07 E12/L
RETICULOCYTE COUNT PCT: 1.9 % (ref 0.4–1.9)
SODIUM BLD-SCNC: 140 MMOL/L (ref 132–146)
TOTAL IRON BINDING CAPACITY: 295 MCG/DL (ref 250–450)
TOTAL PROTEIN: 6.9 G/DL (ref 6.4–8.3)
TSH SERPL DL<=0.05 MIU/L-ACNC: 2.3 UIU/ML (ref 0.27–4.2)
VITAMIN B-12: >2000 PG/ML (ref 211–946)
WBC # BLD: 5.7 E9/L (ref 4.5–11.5)

## 2021-02-04 DIAGNOSIS — Z76.0 MEDICATION REFILL: ICD-10-CM

## 2021-02-04 RX ORDER — CITALOPRAM 40 MG/1
TABLET ORAL
Qty: 30 TABLET | Refills: 2 | Status: SHIPPED
Start: 2021-02-04 | End: 2021-06-01

## 2021-02-10 ENCOUNTER — OFFICE VISIT (OUTPATIENT)
Dept: FAMILY MEDICINE CLINIC | Age: 81
End: 2021-02-10
Payer: MEDICARE

## 2021-02-10 VITALS
HEIGHT: 67 IN | BODY MASS INDEX: 24.8 KG/M2 | DIASTOLIC BLOOD PRESSURE: 70 MMHG | TEMPERATURE: 97.3 F | WEIGHT: 158 LBS | SYSTOLIC BLOOD PRESSURE: 120 MMHG | HEART RATE: 86 BPM | OXYGEN SATURATION: 96 %

## 2021-02-10 DIAGNOSIS — G47.00 INSOMNIA, UNSPECIFIED TYPE: ICD-10-CM

## 2021-02-10 DIAGNOSIS — E03.9 ACQUIRED HYPOTHYROIDISM: ICD-10-CM

## 2021-02-10 DIAGNOSIS — E11.9 TYPE 2 DIABETES MELLITUS WITHOUT COMPLICATION, WITHOUT LONG-TERM CURRENT USE OF INSULIN (HCC): ICD-10-CM

## 2021-02-10 DIAGNOSIS — E78.5 DYSLIPIDEMIA: ICD-10-CM

## 2021-02-10 DIAGNOSIS — N18.31 STAGE 3A CHRONIC KIDNEY DISEASE (HCC): ICD-10-CM

## 2021-02-10 DIAGNOSIS — Z00.00 ROUTINE GENERAL MEDICAL EXAMINATION AT A HEALTH CARE FACILITY: Primary | ICD-10-CM

## 2021-02-10 DIAGNOSIS — Z76.0 MEDICATION REFILL: ICD-10-CM

## 2021-02-10 DIAGNOSIS — K21.9 GASTROESOPHAGEAL REFLUX DISEASE, UNSPECIFIED WHETHER ESOPHAGITIS PRESENT: ICD-10-CM

## 2021-02-10 PROCEDURE — G0439 PPPS, SUBSEQ VISIT: HCPCS | Performed by: FAMILY MEDICINE

## 2021-02-10 PROCEDURE — 4040F PNEUMOC VAC/ADMIN/RCVD: CPT | Performed by: FAMILY MEDICINE

## 2021-02-10 PROCEDURE — G8482 FLU IMMUNIZE ORDER/ADMIN: HCPCS | Performed by: FAMILY MEDICINE

## 2021-02-10 PROCEDURE — 1123F ACP DISCUSS/DSCN MKR DOCD: CPT | Performed by: FAMILY MEDICINE

## 2021-02-10 RX ORDER — SIMVASTATIN 20 MG
20 TABLET ORAL NIGHTLY
Qty: 90 TABLET | Refills: 1 | Status: SHIPPED
Start: 2021-02-10 | End: 2021-09-14 | Stop reason: SDUPTHER

## 2021-02-10 RX ORDER — FAMOTIDINE 40 MG/1
40 TABLET, FILM COATED ORAL EVERY EVENING
Qty: 90 TABLET | Refills: 1 | Status: SHIPPED
Start: 2021-02-10 | End: 2021-10-21 | Stop reason: SDUPTHER

## 2021-02-10 RX ORDER — LEVOTHYROXINE SODIUM 0.03 MG/1
37.5 TABLET ORAL DAILY
Qty: 135 TABLET | Refills: 1 | Status: SHIPPED | OUTPATIENT
Start: 2021-02-10 | End: 2021-10-28 | Stop reason: SDUPTHER

## 2021-02-10 RX ORDER — LINAGLIPTIN AND METFORMIN HYDROCHLORIDE 2.5; 1 MG/1; MG/1
1 TABLET, FILM COATED, EXTENDED RELEASE ORAL 2 TIMES DAILY
Qty: 180 TABLET | Refills: 1 | Status: SHIPPED
Start: 2021-02-10 | End: 2021-09-14 | Stop reason: SDUPTHER

## 2021-02-10 ASSESSMENT — LIFESTYLE VARIABLES
AUDIT TOTAL SCORE: INCOMPLETE
HOW OFTEN DO YOU HAVE A DRINK CONTAINING ALCOHOL: NEVER
AUDIT-C TOTAL SCORE: INCOMPLETE

## 2021-02-10 ASSESSMENT — PATIENT HEALTH QUESTIONNAIRE - PHQ9
SUM OF ALL RESPONSES TO PHQ QUESTIONS 1-9: 0
SUM OF ALL RESPONSES TO PHQ QUESTIONS 1-9: 0
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
## 2021-03-01 DIAGNOSIS — Z76.0 MEDICATION REFILL: ICD-10-CM

## 2021-03-01 RX ORDER — CITALOPRAM 40 MG/1
TABLET ORAL
Qty: 30 TABLET | Refills: 2 | OUTPATIENT
Start: 2021-03-01

## 2021-03-05 ENCOUNTER — TELEPHONE (OUTPATIENT)
Dept: PAIN MANAGEMENT | Age: 81
End: 2021-03-05

## 2021-03-05 DIAGNOSIS — G89.29 CHRONIC BILATERAL LOW BACK PAIN, UNSPECIFIED WHETHER SCIATICA PRESENT: Primary | ICD-10-CM

## 2021-03-05 DIAGNOSIS — M54.50 CHRONIC BILATERAL LOW BACK PAIN, UNSPECIFIED WHETHER SCIATICA PRESENT: Primary | ICD-10-CM

## 2021-03-05 NOTE — TELEPHONE ENCOUNTER
Rico Rodgers came into the office today requesting a handicap placard prescription. Reviewed with Rebecca Harris CNP.  Handicap placard prescription given through 4/1/2022

## 2021-03-10 ENCOUNTER — TELEPHONE (OUTPATIENT)
Dept: ORTHOPEDIC SURGERY | Age: 81
End: 2021-03-10

## 2021-03-10 DIAGNOSIS — M25.532 LEFT WRIST PAIN: Primary | ICD-10-CM

## 2021-03-19 ENCOUNTER — OFFICE VISIT (OUTPATIENT)
Dept: PAIN MANAGEMENT | Age: 81
End: 2021-03-19
Payer: MEDICARE

## 2021-03-19 VITALS
WEIGHT: 158 LBS | HEART RATE: 84 BPM | TEMPERATURE: 97.7 F | SYSTOLIC BLOOD PRESSURE: 128 MMHG | HEIGHT: 67 IN | BODY MASS INDEX: 24.8 KG/M2 | DIASTOLIC BLOOD PRESSURE: 72 MMHG | RESPIRATION RATE: 16 BRPM

## 2021-03-19 DIAGNOSIS — M47.894 THORACIC FACET SYNDROME: Chronic | ICD-10-CM

## 2021-03-19 DIAGNOSIS — M54.16 LUMBAR RADICULOPATHY: ICD-10-CM

## 2021-03-19 DIAGNOSIS — M79.18 CHRONIC MYOFASCIAL PAIN: ICD-10-CM

## 2021-03-19 DIAGNOSIS — M96.1 CERVICAL POSTLAMINECTOMY SYNDROME: ICD-10-CM

## 2021-03-19 DIAGNOSIS — G56.02 CARPAL TUNNEL SYNDROME ON LEFT: ICD-10-CM

## 2021-03-19 DIAGNOSIS — M47.812 CERVICAL FACET SYNDROME: Chronic | ICD-10-CM

## 2021-03-19 DIAGNOSIS — M47.816 FACET SYNDROME, LUMBAR: Chronic | ICD-10-CM

## 2021-03-19 DIAGNOSIS — M50.20 PROTRUDED CERVICAL DISC: Chronic | ICD-10-CM

## 2021-03-19 DIAGNOSIS — M19.211 OTHER SECONDARY OSTEOARTHRITIS OF BOTH SHOULDERS: Chronic | ICD-10-CM

## 2021-03-19 DIAGNOSIS — Z91.81 AT HIGH RISK FOR FALLS: ICD-10-CM

## 2021-03-19 DIAGNOSIS — M96.1 POSTLAMINECTOMY SYNDROME, LUMBAR: Primary | Chronic | ICD-10-CM

## 2021-03-19 DIAGNOSIS — M19.212 OTHER SECONDARY OSTEOARTHRITIS OF BOTH SHOULDERS: Chronic | ICD-10-CM

## 2021-03-19 DIAGNOSIS — M50.30 DDD (DEGENERATIVE DISC DISEASE), CERVICAL: Chronic | ICD-10-CM

## 2021-03-19 DIAGNOSIS — M51.36 DDD (DEGENERATIVE DISC DISEASE), LUMBAR: ICD-10-CM

## 2021-03-19 DIAGNOSIS — M54.12 CERVICAL RADICULOPATHY: Chronic | ICD-10-CM

## 2021-03-19 DIAGNOSIS — M47.817 LUMBOSACRAL SPONDYLOSIS WITHOUT MYELOPATHY: ICD-10-CM

## 2021-03-19 DIAGNOSIS — G89.4 CHRONIC PAIN SYNDROME: ICD-10-CM

## 2021-03-19 DIAGNOSIS — M48.02 NEURAL FORAMINAL STENOSIS OF CERVICAL SPINE: Chronic | ICD-10-CM

## 2021-03-19 DIAGNOSIS — M54.2 CERVICALGIA: ICD-10-CM

## 2021-03-19 DIAGNOSIS — G89.29 CHRONIC MYOFASCIAL PAIN: ICD-10-CM

## 2021-03-19 DIAGNOSIS — Z98.1 STATUS POST CERVICAL SPINAL FUSION: Chronic | ICD-10-CM

## 2021-03-19 DIAGNOSIS — M43.02 CERVICAL SPONDYLOLYSIS: Chronic | ICD-10-CM

## 2021-03-19 PROCEDURE — 99213 OFFICE O/P EST LOW 20 MIN: CPT | Performed by: NURSE PRACTITIONER

## 2021-03-19 PROCEDURE — 4040F PNEUMOC VAC/ADMIN/RCVD: CPT | Performed by: NURSE PRACTITIONER

## 2021-03-19 PROCEDURE — 4004F PT TOBACCO SCREEN RCVD TLK: CPT | Performed by: NURSE PRACTITIONER

## 2021-03-19 PROCEDURE — G8420 CALC BMI NORM PARAMETERS: HCPCS | Performed by: NURSE PRACTITIONER

## 2021-03-19 PROCEDURE — G8482 FLU IMMUNIZE ORDER/ADMIN: HCPCS | Performed by: NURSE PRACTITIONER

## 2021-03-19 PROCEDURE — G8427 DOCREV CUR MEDS BY ELIG CLIN: HCPCS | Performed by: NURSE PRACTITIONER

## 2021-03-19 PROCEDURE — 1123F ACP DISCUSS/DSCN MKR DOCD: CPT | Performed by: NURSE PRACTITIONER

## 2021-03-19 RX ORDER — HYDROCODONE BITARTRATE AND ACETAMINOPHEN 5; 325 MG/1; MG/1
1 TABLET ORAL 2 TIMES DAILY PRN
Qty: 60 TABLET | Refills: 0 | Status: SHIPPED | OUTPATIENT
Start: 2021-03-19 | End: 2021-04-18

## 2021-03-19 NOTE — PROGRESS NOTES
[id number] [facility], [address] [place] [id number]  [phone]    Follow up Note      Trace Saab     Date of Visit:  3/19/2021    CC:  Patient presents for follow up neck and low back pain     Pertinent Information:  78 y. o.  pleasant gentleman with prior h/o cervical spine surgery- ACDF C3-7 having neck pain with features of myofacial pain and also facet pain. He has undergone lumbar spine surgery X 2 in the 1980's    HPI:  Pt followed today for axial  low back pain gradually getting worse and is main complaint. Continues to have cervical spine myofascial pain. Left hand pain and edema resolved last week with rest and Ztlido patch samples given last visit. Appropriate analgesia with current medications regimen: yes  Change in quality of symptoms: worsening low back   Medication side effects:none. Recent diagnostic testing:none. Recent interventional procedures: none     He is on ASA-81 mg. H/o CAD s/p stent.      IImaging studies:  Left Shoulder: 6/14/2020:      Impression   No acute osseous abnormality.       Degenerative changes as above.      XR Lumbar spine: 6/2020: Impression   Diffuse osteopenia with moderate to advanced degenerative changes of the   lumbar spine, as above.  No convincing evidence of an acute fracture.      CT cervical spine: 6/14/2020:      FINDINGS:   BONES/ALIGNMENT: No evidence of cervical fracture or traumatic subluxation.    Stable T3 wedge compression.  There has been fusion of C3 through C7.  There   is an anterior plate with screws at C3, C4, and C7.  There is a bone block   graft at C3-C4.  There is a block graft extending from C4 through C7.  The   hardware and grafts are intact.       DEGENERATIVE CHANGES: Degenerative change at C1-C.  Degenerative disc disease   C7-T1.  Diffuse facet osteoarthritis with stable several mm anterolisthesis   of C7.       SOFT TISSUES: No prevertebral soft tissue swelling.  Maxillary sinusitis   changes noted           Impression   No acute abnormality of the cervical spine.          Xray thoracic spine: 6/14/2020:      Impression   Limited examination, as described above.  Within these limitations, no   convincing evidence of an acute fracture of the thoracic spine.             X ray L/S spine: 3/2018:      Lumbar spine: There is slight levoconvex curvature with normal lumbar lordosis. Vertebral bodies maintain normal height. There is trace retrolisthesis   of L2 on L3 and L3 on L4. There is trace anterolisthesis of L4 on L5. There is multilevel intervertebral disc space narrowing with   hypertrophic endplate changes, most pronounced at L4-L5. No acute   fractures identified. There is lower lumbar facet arthrosis.       Sacrum and coccyx:   Overlying stool obscures the and coccyx on the frontal projections. No   acute displaced sacral or coccygeal fractures identified. Bilateral   sacroiliac joints and the pubic symphysis are maintained. There is   lower lumbar spondylosis.         Impression       Lumbar spine: Moderate multilevel lumbar spondylosis.       Sacrum and coccyx:   Unremarkable sacrum and coccyx radiographs.         CT Lumbar Spine: 8/2018:      Findings: There are bilateral pars defects noted at L4. There is a   spondylolisthesis of L4 on L5. There is wedging of T12 which may be physiologic   Changes seen throughout the discs are abnormal. There are findings to   suggest  degenerative disc disease.       Changes seen throughout the bone marrow are abnormal. Findings are   compatible with degenerative disc disease       Changes within the facets are abnormal. Findings are compatible with   degenerative facet disease.           At L5-S1: There is a mild broad-based disc herniation or bony   spurring. There is a laminectomy defect. There is facet hypertrophy.    There is mild narrowing of the neural foramina       At L4-5: There is a moderate broad-based disc herniation there is   moderate stenosis       At L3-4: There is a large broad-based disc herniation, there is severe   canal stenosis.       At L2-3: There is a mild broad-based disc herniation, there is mild   canal stenosis       At L1-2: There is a mild broad-based disc herniation, there is mild   canal stenosis               Impression   Findings compatible with degenerative changes   Bilateral L4 pars defect   Severe canal stenosis at L3-4 and moderate canal stenosis at L4-5                                            Potential Aberrant Drug-Related Behavior:  No     Urine Drug Screening:   10/27/2020: UDS negative for Norco as expected per DR DIALLO previous note\    Pain agreement updated: 02/12/2020      OARRS report[de-identified]   yes 8/27/2019: consistent  Yes: 10/9/2019: consistent-getting his meds from PCP  11/20/2019: consistent  1/8/2019: consistent   2/12/2020: last script for hydrocodone 10/325 filled on 7/3/2019 for # 90 tabs.  Given by Dr. Suzanne Gastelum MD   6/17/2020: consistent- last filled on 5/8/2020  7/15/2020: Consistent- # 30 tabs last filled on 6/17/2020 8/26/2020: Consistent - last refill  On 7/15/2020 for # 60 tabs  10/21/2020: Consistent last script for # 45 tabs on 8/26/2020 03/2021: consistent       Past Medical History:   Diagnosis Date    Accident caused by farm tractor    [address] CAD (coronary artery disease)     Chronic pain     Clavicle fracture     Concussion with no loss of consciousness     Depression     Diabetes mellitus ([address]     GERD (gastroesophageal reflux disease)     Headache(784.0)     History of blood transfusion     Hyperlipidemia     Kidney stone     Laceration of flexor tendon of hand, not in \"[place]\" 10/10/2014    Multiple closed fractures of ribs of left side     Neck pain     Osteoarthritis     Rib fracture     SBO (small bowel obstruction) ([address] 4/23/2019    Skin cancer     Thyroid disease     Trauma 8/4/2018    Traumatic hemopneumothorax, initial encounter        Past Surgical History:   Procedure Laterality Date    ANESTHESIA NERVE BLOCK Bilateral 9/10/2019    BILATERAL LUMBAR FACET MEDIAL BRANCH BLOCK L3 L4 AND L5 DORSAL RAMI (CPT [id number]) performed by Berkley Hinson MD at [facility] N/A 7/21/2020    LUMBAR EPIDURAL STEROID INJECTION L4-L5 X-RAY performed by Berkley Hinson MD at [address]      x2 lumbar    CATARACT REMOVAL WITH IMPLANT Left 10/08/13    CATARACT REMOVAL WITH IMPLANT Right 12/10/13    CERVICAL SPINE SURGERY  2012    cervical fusion    COLON SURGERY      due to diverticulitis colon resection    CORONARY ANGIOPLASTY WITH STENT PLACEMENT  2005    x2 stents    DILATATION, ESOPHAGUS      FRACTURE SURGERY      Lt. Jaw Plate    NERVE BLOCK Left 3/7/16    cervical transforaminal #1    NERVE BLOCK Left 3/28/16    cervical transforaminal #2    NERVE BLOCK Left 04 04 2016    transforaminal nerve block left cervical #3    NERVE BLOCK Left 07/25/2016    shoulder    NERVE BLOCK Left 08/15/2016    left shoulder injection #2    NERVE BLOCK Right 08/22/2016    shoulder    NERVE BLOCK Right 08/29/2016    shoulder injection #2    NERVE BLOCK Bilateral 09/10/2019    BILATERAL LUMBAR FACET MEDIAL BRANCH NERVE BLOCK AT L3,L4 MEDIAL BRANCH AND L5 DORSAL RAMI    NERVE BLOCK Bilateral 10/29/2019    lumbar facet, meidal branch    NERVE BLOCK N/A 07/21/2020    Lumbar epidural steriod injection L4-L5    NERVE BLOCK Right 09/15/2020    transforaminal L4,5    NERVE BLOCK Right 9/15/2020    TRANSFORAMINAL EPIDURAL STEROID INJECTION RIGHT L4 AND L5 UNDER FLUOROSCOPIC GUIDANCE (CPT 81784,23123) performed by Berkley Hinson MD at [address] [place] [address]. Left 10/23/2018    LEFT CLAVICLE OPEN REDUCTION INTERNAL FIXATION performed by Nino Kearney DO at [place] [address]. Left 101/10/2014    repair left hand, finger laceration  OTHER SURGICAL HISTORY Bilateral 01/05/2021    lumbar facet medial branch dorsal rami radiofrequency    PAIN MANAGEMENT PROCEDURE Bilateral 1/5/2021    BILATERAL LUMBAR FACET L3,L4 MEDIAL BRANCH L5 DORSAL RAMI RADIOFREQUENCY ABLATION WITH IV SEDATION (CPT 25851,50905) performed by Shavon Truong MD at [address] UNLISTED Left 8/6/2018    LEFT SIDED VIDEO ASSISTED THORACOSCOPY WITH RIB PLATING performed by Marilin Hernandez MD at [address] d/t a fall    RADIOFREQUENCY ABLATION NERVES Bilateral 10/29/2019    BILATERAL LUMBAR FACET L3 L4 MEDIAL BRANCH  L5 DORSAL RAMI  RADIOFREQUENCY ABLATION SEDATION (CPT [id number]) performed by Shavon Truong MD at [facility] Bilateral     SHOULDER ARTHROSCOPY Right 12/08/2016    repair rotator cuff, bursectomy, debridement glenohumerol     SKIN BIOPSY         Prior to Admission medications    Medication Sig Start Date End Date Taking?  Authorizing Provider   levothyroxine (SYNTHROID) 25 MCG tablet Take 1.5 tablets by mouth daily 2/10/21 5/11/21  Tressia Flash, DO   simvastatin (ZOCOR) 20 MG tablet Take 1 tablet by mouth nightly 2/10/21   Tressia Flash, DO   famotidine (PEPCID) 40 MG tablet Take 1 tablet by mouth every evening 2/10/21   Tressia Flash, DO   linaGLIPtin-metFORMIN HCl ER (JENTADUETO XR) 2.5-1000 MG TB24 Take 1 tablet by mouth 2 times daily 2/10/21   Arnaud Arreola, DO   citalopram (CELEXA) 40 MG tablet TAKE 1 TABLET BY MOUTH EVERY DAY 2/4/21   Arnaud Arreola, DO   Lidocaine (ZTLIDO) 1.8 % PTCH 12 hours on 12 hours off 1/19/21   Rebecca Harris, APRN - CNP   pantoprazole (PROTONIX) 40 MG tablet Take 1 tablet by mouth every morning (before breakfast) 10/21/20   Tressia Flash, DO   donepezil (ARICEPT) 5 MG tablet Take 1 tablet by mouth nightly 6/1/20   Tressia Flash, DO   vitamin B-12 (CYANOCOBALAMIN) 1000 MCG tablet Take 0.5 tablets by mouth daily 10/16/19   Toby Sunshine, DO   ferrous sulfate 325 (65 Fe) MG tablet Take 325 mg by mouth daily (with breakfast)    Historical Provider, MD   meclizine (ANTIVERT) 25 MG tablet Take 25 mg by mouth 3 times daily as needed    Historical Provider, MD   vitamin D (CHOLECALCIFEROL) 1000 UNIT TABS tablet Take 1,000 Units by mouth daily    Historical Provider, MD   aspirin EC 81 MG EC tablet Take 81 mg by mouth daily    Historical Provider, MD       Allergies   Allergen Reactions    Sulfa Antibiotics Hives       Social History     Socioeconomic History    Marital status:      Spouse name: Not on file    Number of children: Not on file    Years of education: Not on file    Highest education level: Not on file   Occupational History    Not on file   Social Needs    Financial resource strain: Not on file    Food insecurity     Worry: Not on file     Inability: Not on file    Transportation needs     Medical: Not on file     Non-medical: Not on file   Tobacco Use    Smoking status: Former Smoker     Years: 40.00     Types: Cigarettes    Smokeless tobacco: Current User     Types: Chew   Substance and Sexual Activity    Alcohol use: No    Drug use: No    Sexual activity: Not Currently   Lifestyle    Physical activity     Days per week: Not on file     Minutes per session: Not on file    Stress: Not on file   Relationships    Social connections     Talks on phone: Not on file     Gets together: Not on file     Attends Hoahaoism service: Not on file     Active member of club or organization: Not on file     Attends meetings of clubs or organizations: Not on file     Relationship status: Not on file    Intimate partner violence     Fear of current or ex partner: Not on file     Emotionally abused: Not on file     Physically abused: Not on file     Forced sexual activity: Not on file   Other Topics Concern    Not on file   Social History Narrative    ** Merged History Encounter **            Family History   Problem Relation Age of Onset    Diabetes Mother        REVIEW OF SYSTEMS:     Damien Wright denies fever/chills, chest pain, shortness of breath, new bowel or bladder complaints or suicidal ideations. All other review of systems wasnegative. Review of Systems    PHYSICAL EXAMINATION:      General:    General appearance:  Pleasant and well-hydrated, in mild to moderate distress and A & O x3  Build: Normal WeightFunction:Rises from a seated position easily.     HEENT:   Head:normocephalic, atraumatic  Pupils:regular, round, equalSclera: icterus absent     Lungs:   Breathing:normal breathing pattern      CVS:  clear and non labored      Abdomen:  Shape:non-distended and normal  Tenderness:none  Guarding:none     Cervical spine:  Inspection:normal  Palpation: tenderness over lower cervical paraspinal muscles, tenderness trapezium, left positive. Range of motion: limitations of ROM noted. Facet tenderness noted over the mid and lower cervical facets.     Lumbar Spine:   Scar from the prior surgery noted, healed well, ROM reduced, Lumbar facet loading + bilaterally, Sensory and motor in b/l LE intact, DTR;'s in b/l LE equal.    Musculoskeletal:   Trigger points noted over the cervical paraspinal muscle, trapezius muscles mainly on the left side. He also has tenderness over the left occipital nerve.     Extremities:   Yes dorsal aspect of thumb to left hand and left wrist     Neurological:  Sensory: normal to light touch   Motor: No focal deficits, generalized weakness noted.   Reflexes: B/l equal  Gait:normal     Dermatology:     Skin:no rashes or lesions noted    Assessment/Plan:.      1. DDD (degenerative disc disease), lumbar      2. Lumbosacral spondylosis without myelopathy      3. Spinal stenosis of lumbar region, unspecified whether neurogenic claudication present      4. Postlaminectomy syndrome, lumbar      5. Cervical postlaminectomy syndrome      6.  DDD (degenerative disc disease), cervical      7. Cervical spondylolysis        09/2020:  Lumbar LUCILA/ TFESI had helped the lower extremity pain significantly, >70% relief         Plan:   Followed for neck pain with myofascial component and axial low back pain primarily axial   BILATERAL LUMBAR FACET L3 L4 MEDIAL BRANCH L5 DORSAL RAMI  RADIOFREQUENCY ABLATION WITH >50% relief,  Refill Norco 1 tab po bid prn pain #60, typically lasts 2 months   UDS/Buccal swab NEXT visit in May 2021 for compliance, update pain agreement today  Left hand pain and edema improved with Ztlido patches, canceled visit with hand surgeon  OARRS consistent 03/2021. He is on ASA-81 mg. H/o CAD s/p stent.   Continue with HEP as tolerated   Follow up in 2 months for medication refill      We discussed with the patient that combining opioids, benzodiazepines, alcohol, illicit drugs or sleep aids increases the risk of respiratory depression including death. We discussed that these medications may cause drowsiness, sedation or dizziness and have counseled the patient not to drive or operate machinery. We have discussed that these medications will be prescribed only by one provider. We have discussed with the patient about age related risk factors and have thoroughly discussed the importance of taking these medications as prescribed. The patient verbalizes understanding.       ccreferring physic    Electronically signed by VIKRAM Jarrett CNP on 3/19/21 at 10:29 AM EST

## 2021-03-22 DIAGNOSIS — G31.84 MILD COGNITIVE IMPAIRMENT: ICD-10-CM

## 2021-03-22 RX ORDER — DONEPEZIL HYDROCHLORIDE 5 MG/1
5 TABLET, FILM COATED ORAL NIGHTLY
Qty: 30 TABLET | Refills: 5 | Status: SHIPPED
Start: 2021-03-22 | End: 2021-10-21 | Stop reason: SDUPTHER

## 2021-03-22 NOTE — TELEPHONE ENCOUNTER
Vm msg left requesting refills. Last Rx written 6. 1.20 for #30 w/2 refills.     Last seen 2/10/2021  Next appt 8/11/2021 This department to continue to follow during this admission as schedule permits.

## 2021-03-31 DIAGNOSIS — R10.84 GENERALIZED ABDOMINAL PAIN: ICD-10-CM

## 2021-04-01 RX ORDER — PANTOPRAZOLE SODIUM 40 MG/1
TABLET, DELAYED RELEASE ORAL
Qty: 90 TABLET | Refills: 1 | Status: SHIPPED
Start: 2021-04-01 | End: 2021-10-21 | Stop reason: SDUPTHER

## 2021-04-05 ENCOUNTER — OFFICE VISIT (OUTPATIENT)
Dept: FAMILY MEDICINE CLINIC | Age: 81
End: 2021-04-05
Payer: MEDICARE

## 2021-04-05 VITALS
DIASTOLIC BLOOD PRESSURE: 70 MMHG | BODY MASS INDEX: 25.27 KG/M2 | SYSTOLIC BLOOD PRESSURE: 110 MMHG | RESPIRATION RATE: 18 BRPM | HEIGHT: 67 IN | TEMPERATURE: 97.7 F | OXYGEN SATURATION: 97 % | HEART RATE: 67 BPM | WEIGHT: 161 LBS

## 2021-04-05 DIAGNOSIS — R07.89 LEFT-SIDED CHEST WALL PAIN: ICD-10-CM

## 2021-04-05 DIAGNOSIS — J90 PLEURAL EFFUSION ON LEFT: ICD-10-CM

## 2021-04-05 DIAGNOSIS — R10.84 GENERALIZED ABDOMINAL PAIN: Primary | ICD-10-CM

## 2021-04-05 DIAGNOSIS — Z87.81 HISTORY OF RIB FRACTURE: ICD-10-CM

## 2021-04-05 PROCEDURE — 99213 OFFICE O/P EST LOW 20 MIN: CPT | Performed by: FAMILY MEDICINE

## 2021-04-05 PROCEDURE — 4004F PT TOBACCO SCREEN RCVD TLK: CPT | Performed by: FAMILY MEDICINE

## 2021-04-05 PROCEDURE — G8427 DOCREV CUR MEDS BY ELIG CLIN: HCPCS | Performed by: FAMILY MEDICINE

## 2021-04-05 PROCEDURE — G8417 CALC BMI ABV UP PARAM F/U: HCPCS | Performed by: FAMILY MEDICINE

## 2021-04-05 PROCEDURE — 1123F ACP DISCUSS/DSCN MKR DOCD: CPT | Performed by: FAMILY MEDICINE

## 2021-04-05 PROCEDURE — 4040F PNEUMOC VAC/ADMIN/RCVD: CPT | Performed by: FAMILY MEDICINE

## 2021-04-05 RX ORDER — SUCRALFATE 1 G/1
1 TABLET ORAL 2 TIMES DAILY
Qty: 60 TABLET | Refills: 1 | Status: SHIPPED
Start: 2021-04-05 | End: 2021-06-01 | Stop reason: SDUPTHER

## 2021-04-05 ASSESSMENT — ENCOUNTER SYMPTOMS
ABDOMINAL PAIN: 1
COUGH: 0
BACK PAIN: 1
BLOOD IN STOOL: 0
SHORTNESS OF BREATH: 0
CONSTIPATION: 0
NAUSEA: 0
VOMITING: 0

## 2021-04-05 NOTE — PROGRESS NOTES
2021     Andreia Kang (:  1940) is a 78 y.o. male, with a:  Past Medical History:   Diagnosis Date    Accident caused by farm tractor    [address] CAD (coronary artery disease)     Chronic pain     Clavicle fracture     Concussion with no loss of consciousness     Depression     Diabetes mellitus ([address]     GERD (gastroesophageal reflux disease)     Headache(784.0)     History of blood transfusion     Hyperlipidemia     Kidney stone     Laceration of flexor tendon of hand, not in \"no man's land\" 10/10/2014    Multiple closed fractures of ribs of left side     Neck pain     Osteoarthritis     Rib fracture     SBO (small bowel obstruction) ([facility] 2019    Skin cancer     Thyroid disease     Trauma 2018    Traumatic hemopneumothorax, initial encounter        Here for evaluation of the following medical concerns:  Chief Complaint   Patient presents with    Abdominal Pain     constant left upper abdominal pain x 2 months 4/10      Abdominal Pain  Patient complains of abdominal pain  The pain is described as aching, and is 3/10 in intensity  The patient is experiencing epigastric and LUQ pain without radiation  Onset was several months ago  Symptoms have been unchanged  Aggravating factors: none. Alleviating factors: norco (taken for chronic back pain)  Associated symptoms: loose stools. The patient denies nausea and vomiting. He has previously undergone C-scope (Dr. Chuck Huff) a few years prior  He has not previously undergone EGD    Per EMR review  CT abdomen 2020: Impression   1. There is no acute intra-or intrapelvic pathology.  Specifically, there are   no findings of diverticulitis and there is no appreciable colonic mass or   bowel obstruction.    2. Trace left pleural effusion.  This trace pleural effusion is chronic and   may be secondary to loculated effusion secondary to the previous old chest   wall trauma.         Review of Systems   Constitutional: Negative for chills and fever. Respiratory: Negative for cough and shortness of breath. Cardiovascular: Negative for chest pain and leg swelling. Gastrointestinal: Positive for abdominal pain. Negative for blood in stool, constipation, nausea and vomiting. Genitourinary: Negative for difficulty urinating and dysuria. Musculoskeletal: Positive for arthralgias and back pain. Prior to Visit Medications    Medication Sig Taking? Authorizing Provider   pantoprazole (PROTONIX) 40 MG tablet TAKE 1 TABLET BY MOUTH EVERY DAY IN THE MORNING BEFORE BREAKFAST Yes Arnaud Arreola DO   donepezil (ARICEPT) 5 MG tablet Take 1 tablet by mouth nightly Yes Arnaud Arreola DO   HYDROcodone-acetaminophen (NORCO) 5-325 MG per tablet Take 1 tablet by mouth 2 times daily as needed for Pain (once or twice a day) for up to 30 days.  Yes Zeb Dumont, VIKRAM Klein CNP   levothyroxine (SYNTHROID) 25 MCG tablet Take 1.5 tablets by mouth daily Yes Arnaud Arreola DO   simvastatin (ZOCOR) 20 MG tablet Take 1 tablet by mouth nightly Yes Arnaud Arreola DO   famotidine (PEPCID) 40 MG tablet Take 1 tablet by mouth every evening Yes Arnaud Arreola DO   linaGLIPtin-metFORMIN HCl ER (JENTADUETO XR) 2.5-1000 MG TB24 Take 1 tablet by mouth 2 times daily Yes Arnaud Arreola DO   citalopram (CELEXA) 40 MG tablet TAKE 1 TABLET BY MOUTH EVERY DAY Yes Arnaud Arreola DO   vitamin B-12 (CYANOCOBALAMIN) 1000 MCG tablet Take 0.5 tablets by mouth daily Yes Jae Denise,    ferrous sulfate 325 (65 Fe) MG tablet Take 325 mg by mouth daily (with breakfast) Yes Historical Provider, MD   meclizine (ANTIVERT) 25 MG tablet Take 25 mg by mouth 3 times daily as needed Yes Historical Provider, MD   vitamin D (CHOLECALCIFEROL) 1000 UNIT TABS tablet Take 1,000 Units by mouth daily Yes Historical Provider, MD   aspirin EC 81 MG EC tablet Take 81 mg by mouth daily Yes Historical Provider, MD   Lidocaine (ZTLIDO) 1.8 % PTCH 12 hours on 12 hours off  Manuela Skyler Diaz, APRN - CNP        Social History     Tobacco Use    Smoking status: Former Smoker     Years: 40.00     Types: Cigarettes     Quit date: 3/19/1991     Years since quittin.0    Smokeless tobacco: Current User     Types: Chew   Substance Use Topics    Alcohol use: No        Past Surgical History:   Procedure Laterality Date    ANESTHESIA NERVE BLOCK Bilateral 9/10/2019    BILATERAL LUMBAR FACET MEDIAL BRANCH BLOCK L3 L4 AND L5 DORSAL RAMI (CPT K7125853) performed by Darell Flores MD at [address] N/A 2020    LUMBAR EPIDURAL STEROID INJECTION L4-L5 X-RAY performed by Darell Flores MD at [address]      x2 lumbar    CATARACT REMOVAL WITH IMPLANT Left 10/08/13    CATARACT REMOVAL WITH IMPLANT Right 12/10/13    CERVICAL SPINE SURGERY      cervical fusion    COLON SURGERY      due to diverticulitis colon resection    CORONARY ANGIOPLASTY WITH STENT PLACEMENT  2005    x2 stents    DILATATION, ESOPHAGUS      FRACTURE SURGERY      Lt. Jaw Plate    NERVE BLOCK Left 3/7/16    cervical transforaminal #1    NERVE BLOCK Left 3/28/16    cervical transforaminal #2    NERVE BLOCK Left 2016    transforaminal nerve block left cervical #3    NERVE BLOCK Left 2016    shoulder    NERVE BLOCK Left 08/15/2016    left shoulder injection #2    NERVE BLOCK Right 2016    shoulder    NERVE BLOCK Right 2016    shoulder injection #2    NERVE BLOCK Bilateral 09/10/2019    BILATERAL LUMBAR FACET MEDIAL BRANCH NERVE BLOCK AT L3,L4 MEDIAL BRANCH AND L5 DORSAL RAMI    NERVE BLOCK Bilateral 10/29/2019    lumbar facet, meidal branch    NERVE BLOCK N/A 2020    Lumbar epidural steriod injection L4-L5    NERVE BLOCK Right 09/15/2020    transforaminal L4,5    NERVE BLOCK Right 9/15/2020    TRANSFORAMINAL EPIDURAL STEROID INJECTION RIGHT L4 AND L5 UNDER FLUOROSCOPIC GUIDANCE (CPT 61400,93175) performed by Rory Wooten Erin Ferrer MD at [facility]. Left 10/23/2018    LEFT CLAVICLE OPEN REDUCTION INTERNAL FIXATION performed by Mahin Stafford DO at [facility] Left 101/10/2014    repair left hand, finger laceration    OTHER SURGICAL HISTORY Bilateral 01/05/2021    lumbar facet medial branch dorsal rami radiofrequency    PAIN MANAGEMENT PROCEDURE Bilateral 1/5/2021    BILATERAL LUMBAR FACET L3,L4 MEDIAL BRANCH L5 DORSAL RAMI RADIOFREQUENCY ABLATION WITH IV SEDATION (CPT 27055,93684) performed by Alisson Pham MD at [place] [address] Left 8/6/2018    LEFT SIDED VIDEO ASSISTED THORACOSCOPY WITH RIB PLATING performed by Asher Bryant MD at [address] [place] [address] d/t a fall    RADIOFREQUENCY ABLATION NERVES Bilateral 10/29/2019    BILATERAL LUMBAR FACET L3 L4 MEDIAL BRANCH  L5 DORSAL RAMI  RADIOFREQUENCY ABLATION SEDATION (CPT [id number]) performed by Alisson Pham MD at [address] [place] [address] Bilateral     SHOULDER ARTHROSCOPY Right 12/08/2016    repair rotator cuff, bursectomy, debridement glenohumerol     SKIN BIOPSY         Vitals:    04/05/21 1403   BP: 110/70   Pulse: 67   Resp: 18   Temp: 97.7 °F (36.5 °C)   TempSrc: Temporal   SpO2: 97%   Weight: 161 lb (73 kg)   Height: 5' 7\" (1.702 m)     Estimated body mass index is 25.22 kg/m² as calculated from the following:    Height as of this encounter: 5' 7\" (1.702 m). Weight as of this encounter: 161 lb (73 kg). Physical Exam  Constitutional:       Appearance: Normal appearance. HENT:      Head: Normocephalic. Eyes:      Extraocular Movements: Extraocular movements intact. Conjunctiva/sclera: Conjunctivae normal.   Cardiovascular:      Rate and Rhythm: Normal rate. Pulmonary:      Effort: Pulmonary effort is normal. No respiratory distress. Abdominal:      General: There is no distension. Palpations: There is no mass. Tenderness: There is abdominal tenderness. There is no guarding. Hernia: No hernia is present. Musculoskeletal:      Right lower leg: No edema. Left lower leg: No edema. Neurological:      Mental Status: He is alert. Mental status is at baseline. ASSESSMENT/PLAN:  David Gonzalez was seen today for abdominal pain. Diagnoses and all orders for this visit:    Generalized abdominal pain  -     sucralfate (CARAFATE) 1 GM tablet; Take 1 tablet by mouth 2 times daily  -     External Referral To Gastroenterology    Left-sided chest wall pain  -     CT CHEST WO CONTRAST; Future    History of rib fracture  -     CT CHEST WO CONTRAST; Future    Pleural effusion on left  -     CT CHEST WO CONTRAST; Future    Additional plan and future considerations:   As above. Add Carafate and refer back to GI. Will order repeat CT chest for further characterization of pleural effusion on previous CT abdomen.   Return to office after imaging or sooner if needed    Future Appointments   Date Time Provider Lobito Frey   4/22/2021  1:20 PM DO ROBERTO CARLOS [address]   5/19/2021  9:45 AM Ester Squibb, APRN - CNP [facility]   8/11/2021 11:15 AM Lacey Banuelos DO [facility]         --Lacey Banuelos DO on 4/5/2021 at 2:11 PM

## 2021-04-09 DIAGNOSIS — Z76.0 MEDICATION REFILL: ICD-10-CM

## 2021-04-12 ENCOUNTER — HOSPITAL ENCOUNTER (OUTPATIENT)
Dept: CT IMAGING | Age: 81
Discharge: HOME | End: 2021-04-12
Payer: MEDICARE

## 2021-04-12 DIAGNOSIS — R07.89 LEFT-SIDED CHEST WALL PAIN: ICD-10-CM

## 2021-04-12 DIAGNOSIS — Z87.81 HISTORY OF RIB FRACTURE: ICD-10-CM

## 2021-04-12 DIAGNOSIS — J90 PLEURAL EFFUSION ON LEFT: ICD-10-CM

## 2021-04-12 PROCEDURE — 71250 CT THORAX DX C-: CPT

## 2021-04-12 RX ORDER — CITALOPRAM 40 MG/1
TABLET ORAL
Qty: 30 TABLET | Refills: 2 | OUTPATIENT
Start: 2021-04-12

## 2021-04-22 ENCOUNTER — OFFICE VISIT (OUTPATIENT)
Dept: SLEEP MEDICINE | Age: 81
End: 2021-04-22
Payer: MEDICARE

## 2021-04-22 VITALS
DIASTOLIC BLOOD PRESSURE: 70 MMHG | WEIGHT: 164 LBS | RESPIRATION RATE: 20 BRPM | SYSTOLIC BLOOD PRESSURE: 114 MMHG | OXYGEN SATURATION: 98 % | HEIGHT: 67 IN | HEART RATE: 86 BPM | BODY MASS INDEX: 25.74 KG/M2

## 2021-04-22 DIAGNOSIS — G47.00 INSOMNIA, UNSPECIFIED TYPE: ICD-10-CM

## 2021-04-22 DIAGNOSIS — E66.3 OVERWEIGHT: ICD-10-CM

## 2021-04-22 DIAGNOSIS — G47.33 OBSTRUCTIVE SLEEP APNEA: Primary | ICD-10-CM

## 2021-04-22 PROCEDURE — 1123F ACP DISCUSS/DSCN MKR DOCD: CPT | Performed by: STUDENT IN AN ORGANIZED HEALTH CARE EDUCATION/TRAINING PROGRAM

## 2021-04-22 PROCEDURE — G8417 CALC BMI ABV UP PARAM F/U: HCPCS | Performed by: STUDENT IN AN ORGANIZED HEALTH CARE EDUCATION/TRAINING PROGRAM

## 2021-04-22 PROCEDURE — 4040F PNEUMOC VAC/ADMIN/RCVD: CPT | Performed by: STUDENT IN AN ORGANIZED HEALTH CARE EDUCATION/TRAINING PROGRAM

## 2021-04-22 PROCEDURE — 99204 OFFICE O/P NEW MOD 45 MIN: CPT | Performed by: STUDENT IN AN ORGANIZED HEALTH CARE EDUCATION/TRAINING PROGRAM

## 2021-04-22 PROCEDURE — 4004F PT TOBACCO SCREEN RCVD TLK: CPT | Performed by: STUDENT IN AN ORGANIZED HEALTH CARE EDUCATION/TRAINING PROGRAM

## 2021-04-22 PROCEDURE — G8427 DOCREV CUR MEDS BY ELIG CLIN: HCPCS | Performed by: STUDENT IN AN ORGANIZED HEALTH CARE EDUCATION/TRAINING PROGRAM

## 2021-04-22 RX ORDER — HYDROCODONE BITARTRATE AND ACETAMINOPHEN 5; 325 MG/1; MG/1
1 TABLET ORAL EVERY 12 HOURS PRN
COMMUNITY
End: 2021-05-19 | Stop reason: SDUPTHER

## 2021-04-22 ASSESSMENT — SLEEP AND FATIGUE QUESTIONNAIRES
HOW LIKELY ARE YOU TO NOD OFF OR FALL ASLEEP WHILE SITTING AND READING: 0
HOW LIKELY ARE YOU TO NOD OFF OR FALL ASLEEP WHILE LYING DOWN TO REST IN THE AFTERNOON WHEN CIRCUMSTANCES PERMIT: 0
HOW LIKELY ARE YOU TO NOD OFF OR FALL ASLEEP WHILE SITTING INACTIVE IN A PUBLIC PLACE: 0
HOW LIKELY ARE YOU TO NOD OFF OR FALL ASLEEP WHILE SITTING AND TALKING TO SOMEONE: 0
HOW LIKELY ARE YOU TO NOD OFF OR FALL ASLEEP WHILE SITTING QUIETLY AFTER LUNCH WITHOUT ALCOHOL: 0

## 2021-04-22 NOTE — PROGRESS NOTES
[facility] Sleep Medicine    Patient Name: Rudolph Mac  Age: 78 y.o.   : 1940    Date of Visit: 21        HPI   Rudolph Mac is a 78 y.o. male with  has a past medical history of Accident caused by farm tractor, Arthritis, CAD (coronary artery disease), Chronic pain, Clavicle fracture, Concussion with no loss of consciousness, Depression, Diabetes mellitus ([facility] GERD (gastroesophageal reflux disease), Headache(784.0), History of blood transfusion, Hyperlipidemia, Kidney stone, Laceration of flexor tendon of hand, not in \"no man's land\" (10/10/2014), Multiple closed fractures of ribs of left side, Neck pain, Osteoarthritis, Rib fracture, SBO (small bowel obstruction) ([facility] [address] (2019), Skin cancer, Thyroid disease, Trauma (2018), and Traumatic hemopneumothorax, initial encounter. who presents as a new patient to Sleep Clinic, referred by Dr. Celia Vidales, for Sleep Apnea    Patient presents today as referred by his primary care physician for difficulty sleeping at night. He reports it can take several hours for him to fall asleep and that he normally does not fall asleep until after 1230. He does state at times that he does dream about his family, which causes him distress. He does mention that pain in his stomach and lower extremity neuropathy do impact his sleep at night. He does report that his symptoms have been worse for the last 3 to 4 months, he is not able to recall any recent events, but does state that he fell off his tractor several years ago which does cause him chronic pain. He does occasionally take Tylenol PM which does help with his sleep. Sleep Study History: No sleep study on file    Bed time:  11pm  Wake time:   730-8 am  Sleep Latency (min):  up to 2-3 hours  Sleep Medications: Uncertain if he is still taking Trazodone.  Not listed as an active medication  Awakenings:  2-3    / bathroom  / falls back asleep quickly sometimes  Estimated Sleep time (hours): 3-4  Daytime Naps: none  Sleep disturbances: back and stomach pain  Sleep Location: Bed/Recliner  Sleep environment: Sleeps with wife  Occupation: Retired -     SLEEP HYGIENE     Activities before bed: TV  Caffeine:   3-4 (last at 5:30 pm)  Coffee    0   Soda    0   Tea  Alcohol: rare        Sleep ROS:     Snoring: Y   Witnessed apneas: N  AM Headache: occasionally  Dry Mouth: rare  Daytime Sleepiness: N  Difficulty remembering things: sometimes, currently on Aricept  MVA  or near miss accident due to sleepiness in the past? N  Tonsillectomy? N  Have you lost or gained weight recently? Lost near 100 lbs 2-3 years ago. Not currently losing, but gaining weight       PARASOMNIAS/ NARCOLEPSY:  Hypnogogic/Hynopompic Hallucinations: N   Sleep paralysis: N  Cataplexy: N   REM behavior symptoms: History of swinging, but nothing recently. Nightmare: sometimes.  Occasional bad breams about family   Sleep Walking: N  Sleep Talking: N  RLS Symptoms: N. Chronic LE neuropathy           PMH:  Past Medical History:   Diagnosis Date    Accident caused by farm tractor     Arthritis     CAD (coronary artery disease)     Chronic pain     Clavicle fracture     Concussion with no loss of consciousness     Depression     Diabetes mellitus ([address]     GERD (gastroesophageal reflux disease)     Headache(784.0)     History of blood transfusion     Hyperlipidemia     Kidney stone     Laceration of flexor tendon of hand, not in \"no man's land\" 10/10/2014    Multiple closed fractures of ribs of left side     Neck pain     Osteoarthritis     Rib fracture     SBO (small bowel obstruction) ([facility] [address] [address] 4/23/2019    Skin cancer     Thyroid disease     Trauma 8/4/2018    Traumatic hemopneumothorax, initial encounter         PSH:  Past Surgical History:   Procedure Laterality Date    ANESTHESIA NERVE BLOCK Bilateral 9/10/2019    BILATERAL LUMBAR FACET MEDIAL BRANCH BLOCK L3 L4 AND L5 DORSAL RAMI (CPT [id number]) performed by Cris Lee Kait Garg MD at [address] [facility] N/A 7/21/2020    LUMBAR EPIDURAL STEROID INJECTION L4-L5 X-RAY performed by Jacque Car MD at [address] [place] [address]      x2 lumbar    CATARACT REMOVAL WITH IMPLANT Left 10/08/13    CATARACT REMOVAL WITH IMPLANT Right 12/10/13    CERVICAL SPINE SURGERY  2012    cervical fusion    COLON SURGERY      due to diverticulitis colon resection    CORONARY ANGIOPLASTY WITH STENT PLACEMENT  2005    x2 stents    DILATATION, ESOPHAGUS      FRACTURE SURGERY      Lt. Jaw Plate    NERVE BLOCK Left 3/7/16    cervical transforaminal #1    NERVE BLOCK Left 3/28/16    cervical transforaminal #2    NERVE BLOCK Left 04 04 2016    transforaminal nerve block left cervical #3    NERVE BLOCK Left 07/25/2016    shoulder    NERVE BLOCK Left 08/15/2016    left shoulder injection #2    NERVE BLOCK Right 08/22/2016    shoulder    NERVE BLOCK Right 08/29/2016    shoulder injection #2    NERVE BLOCK Bilateral 09/10/2019    BILATERAL LUMBAR FACET MEDIAL BRANCH NERVE BLOCK AT L3,L4 MEDIAL BRANCH AND L5 DORSAL RAMI    NERVE BLOCK Bilateral 10/29/2019    lumbar facet, meidal branch    NERVE BLOCK N/A 07/21/2020    Lumbar epidural steriod injection L4-L5    NERVE BLOCK Right 09/15/2020    transforaminal L4,5    NERVE BLOCK Right 9/15/2020    TRANSFORAMINAL EPIDURAL STEROID INJECTION RIGHT L4 AND L5 UNDER FLUOROSCOPIC GUIDANCE (CPT 86489,22380) performed by Jacque Car MD at [address] [place] [address]. Left 10/23/2018    LEFT CLAVICLE OPEN REDUCTION INTERNAL FIXATION performed by Manjinder Tamayo DO at [address] [facility] Left 101/10/2014    repair left hand, finger laceration    OTHER SURGICAL HISTORY Bilateral 01/05/2021    lumbar facet medial branch dorsal rami radiofrequency    PAIN MANAGEMENT PROCEDURE Bilateral 1/5/2021    BILATERAL LUMBAR FACET L3,L4 MEDIAL BRANCH L5 DORSAL RAMI RADIOFREQUENCY ABLATION WITH IV SEDATION (CPT 67026,61969) performed by Lisa Payne MD at [facility] [address] Left 2018    LEFT SIDED VIDEO ASSISTED THORACOSCOPY WITH RIB PLATING performed by Karissa Espinoza MD at [address] [place] d/t a fall    RADIOFREQUENCY ABLATION NERVES Bilateral 10/29/2019    BILATERAL LUMBAR FACET L3 L4 MEDIAL BRANCH  L5 DORSAL RAMI  RADIOFREQUENCY ABLATION SEDATION (CPT 83456) performed by Lisa Payne MD at [address] [facility] Bilateral     SHOULDER ARTHROSCOPY Right 2016    repair rotator cuff, bursectomy, debridement glenohumerol     SKIN BIOPSY            Soc Hx:  Social History     Tobacco Use    Smoking status: Former Smoker     Years: 40.00     Types: Cigarettes     Quit date: 3/19/1991     Years since quittin.1    Smokeless tobacco: Current User     Types: Chew   Substance Use Topics    Alcohol use: No    Drug use: No        Fam Hx:  Family History   Problem Relation Age of Onset    Diabetes Mother         Current Outpatient Medications   Medication Sig Dispense Refill    HYDROcodone-acetaminophen (NORCO) 5-325 MG per tablet Take 1 tablet by mouth every 12 hours as needed for Pain.       pantoprazole (PROTONIX) 40 MG tablet TAKE 1 TABLET BY MOUTH EVERY DAY IN THE MORNING BEFORE BREAKFAST 90 tablet 1    donepezil (ARICEPT) 5 MG tablet Take 1 tablet by mouth nightly 30 tablet 5    levothyroxine (SYNTHROID) 25 MCG tablet Take 1.5 tablets by mouth daily 135 tablet 1    simvastatin (ZOCOR) 20 MG tablet Take 1 tablet by mouth nightly 90 tablet 1    famotidine (PEPCID) 40 MG tablet Take 1 tablet by mouth every evening 90 tablet 1    linaGLIPtin-metFORMIN HCl ER (JENTADUETO XR) 2.5-1000 MG TB24 Take 1 tablet by mouth 2 times daily 180 tablet 1    citalopram (CELEXA) 40 MG tablet TAKE 1 TABLET BY MOUTH EVERY DAY 30 tablet 2    Lidocaine (ZTLIDO) 1.8 % PTCH 12 hours on 12 hours off 6 patch 0    vitamin B-12 (CYANOCOBALAMIN) 1000 MCG tablet Take 0.5 tablets by mouth daily 30 tablet 0    ferrous sulfate 325 (65 Fe) MG tablet Take 325 mg by mouth daily (with breakfast)      meclizine (ANTIVERT) 25 MG tablet Take 25 mg by mouth 3 times daily as needed      vitamin D (CHOLECALCIFEROL) 1000 UNIT TABS tablet Take 1,000 Units by mouth daily      aspirin EC 81 MG EC tablet Take 81 mg by mouth daily      sucralfate (CARAFATE) 1 GM tablet Take 1 tablet by mouth 2 times daily (Patient not taking: Reported on 4/22/2021) 60 tablet 1     No current facility-administered medications for this visit. Review of Systems  (Sleep ROS above)     Constitutional: no chills, no fever   Eyes:  no eyesight problems. Some blurred vision  (Wife drove him today)  ENT: no hoarseness and no sore throat. Cardiovascular: no chest pain, no lower extremity edema. Respiratory: no cough, no shortness of breath   Gastrointestinal:  no nausea,  no vomiting, no diarrhea. Musculoskeletal: no arthralgias, no back pain and no myalgias. Integumentary: no rashes or lacerations. Neurological:  no diplopia, no dizziness,  no headache, no memory changes,  and no tingling. Endocrine: No changes in appetite, no feelings of weakness      Objective:   Physical Exam  /70 (Site: Left Upper Arm, Position: Sitting, Cuff Size: Large Adult)   Pulse 86   Resp 20   Ht 5' 7\" (1.702 m)   Wt 164 lb (74.4 kg)   SpO2 98%   BMI 25.69 kg/m²             General: No acute distress. BMI of Body mass index is 25.69 kg/m². HEENT: Normocephalic, atraumatic. No oropharyngeal lesions. Neck circumference: 16     Mallampati class- 3  Tonsils- 1     Neck: Trachea midline  Lungs: Clear to auscultation bilaterally. No wheezes or crackles  Cardiac: Regular rate and rhythm. No murmurs appreciated. Neurologic: Normal gait. Balance intact. Psychiatric: Alert and oriented. Appropriate affect.    Extremities: No clubbing or no peripheral edema  Skin: No lesions or rashes  Hematologic/lymphatic/immunologic: No bruises or bleeding    Sleep Medicine 4/22/2021   Sitting and reading 0   Watching TV 0   Sitting, inactive in a public place (e.g. a theatre or a meeting) 0   As a passenger in a car for an hour without a break 0   Lying down to rest in the afternoon when circumstances permit 0   Sitting and talking to someone 0   Sitting quietly after a lunch without alcohol 0   In a car, while stopped for a few minutes in traffic 0   Total score 0   Neck circumference 16         SLEEP STUDY HISTORY      No sleep study on file    PERTINENT LAB RESULTS  Ferritin   Date Value Ref Range Status   01/28/2021 49 ng/mL Final     Comment:     FERRITIN Reference Ranges:  Adult Males   20 - 60 years:    30 - 400 ng/mL  Adult females 16 - 60 years:    13 - 150 ng/mL  Adults greater than 60 years:   no established reference range  Pediatrics:                     no established reference range            Assessment:      Gianna Luna was seen today for sleep apnea. Diagnoses and all orders for this visit:    Obstructive sleep apnea  -     Covid-19 Ambulatory; Future  -     Baseline Diagnostic Sleep Study; Future    Obesity, unspecified classification, unspecified obesity type, unspecified whether serious comorbidity present    Insomnia, unspecified type  -     Amb External Referral To Psychology    ·    Plan:      1. Suspected TREVIN. Patient was offered in lab sleep study versus home sleep study. Patient elected to proceed with in lab sleep study. Covid testing ordered per lab protocol. Follow-up in 2 months. - Sleep study ordered to assess for sleep disordered breathing  -COVID sleep lab testing requirements were reviewed with the patient. Education and precautions were provided to the patient    2. Insomnia. Initiation and maintenance type. Because of insomnia is likely multifactorial, due to possible sleep disordered breathing, chronic pain, lower extremity neuropathy.   Patient was offered referral to ecology with sleep focus, however he declines at this time. We will follow-up after sleep study. Education provided in handout. Patient advised to avoid Tylenol PM due to Benadryl side effects. .  Overweight. Body mass index is 25.69 kg/m². .  -Healthy Weight loss advised    Patient will follow up Return in about 2 months (around 6/22/2021) for Follow up after sleep study. Merle Hanks,   Sleep Medicine   [place] [phone] option 2  F- [phone]      Nurse Practioner Jaswant Little was present and observed the encounter with the patient's consent. Jigar Kaufman IM Resident PGY-3 spoke was present and pre-interviewed the patient prior to the encounter and observed the encounter with the patient's consent.

## 2021-04-22 NOTE — PATIENT INSTRUCTIONS
Appliance \"Mouth Piece\"  (Mandibular Advancement Device)     4. Positive pressure therapy with a machine and a mask (CPAP or BiPAP)     5. Different kinds of surgeries, including nasal surgery, surgery of the throat/windpipe, and nerve stimulation therapy (INSPIRE). Helpful Web Sites: For patients with ALL SLEEP DISORDERS: American Academy of Sleep Medicine http://sleepeducation. org; or Social Intelligence: https://sleepfoundation. org  For patients with TREIVN: American Sleep Apnea Association: http://estrada.org/. org  For patients with RLS: RLS Foundation: Jorge.es  For patients with INSOMNIA: https://www.toledo.org/. org/articles/sleep/insomnia-causes-and-cures. htm  For patients with DEPRESSION: ResumeQuery.com.App in the Air. DocRun/depression         Sleep Medicine Terms     CPAP - Continuous Positive Airway Pressure - 1 set pressure (i.e. 7 cwp)  APAP - Automatic Positive Airway Pressure - PAP device determines in real time what pressure will work best confined to certain settings. (I.e. 4-15 cwp)  BiPAP - Bilevel Positive Airway Pressure - Higher pressure on taking a breath and lower pressure upon breathing out (i.e. 10/6 cwp)  CWP - Centimeters of water pressure   [facility] your CPAP/APAP/BiPAP and supplies. PSG - Polysomnogram - Overnight Sleep study  Titration Study - Overnight sleep study with the PAP device tired at different settings  Split Night study - PSG and titration study          Sleep Studies: About This Test  What is it? Sleep studies are tests that watch what happens to your body during sleep. These studies usually are done in a sleep lab. Sleep labs are often located in hospitals. Sleep studies you do at home can be done with portable equipment. But they may not give the same results as a sleep lab. Why is this test done? Sleep studies are done for people who say that sleep isn't restful or that they are tired all day. These studies can help find sleep problems, such as:  · Sleep apnea. This means that an adult regularly stops breathing during sleep for 10 seconds or longer. · Excessive snoring. · Problems with nighttime behaviors. These include sleepwalking, night terrors, bed-wetting, and REM behavior disorders (RBD). · Conditions such as periodic limb movement disorder. This is repeated muscle twitching of the feet, arms, or legs during sleep. · Seizures that occur at night (nocturnal seizures). How do you prepare for the test?  · Take a shower or bath before your test, but don't use sprays, oils, or gels on your hair. Don't wear makeup, fingernail polish, or fake nails. · Pack and take along a small overnight bag with personal items, such as a toothbrush, a comb, favorite pillows or blankets, and a book. You can wear your own nightclothes. · If you will have portable sleep monitoring, your doctor will explain how to use the equipment at home. How is the test done? · In the sleep lab, you will be in a private room, much like a hotel room. · Small pads or patches called electrodes will be placed on your head and body with a small amount of glue and tape. These will record things like brain activity, eye movement, oxygen levels, and snoring. · Soft elastic belts will be placed around your chest and belly to measure your breathing. · Your blood oxygen levels will be checked by a small clip (oximeter) placed either on the tip of your index finger or on your earlobe. · If you have sleep apnea, you may wear a mask that is connected to a continuous positive airway pressure (CPAP) machine. · Depending on the type of test, you will be allowed to sleep through the night or you'll be awakened periodically and asked to stay awake for a while. · If you use portable sleep monitoring, follow the instructions your doctor gave you. How long does the test take? · You will stay in the sleep lab overnight.  For some tests, you will also stay part of the next day. What happens after the test?  · You may not sleep well during the test and may be tired the next day. · After your sleep problem has been identified, you may need a second study if your doctor orders treatment such as CPAP. Follow-up care is a key part of your treatment and safety. Be sure to make and go to all appointments, and call your doctor if you are having problems. It's also a good idea to keep a list of the medicines you take. Ask your doctor when you can expect to have your test results. Where can you learn more? Go to https://Roomle GmbHpepiceweb.Nevro. org and sign in to your Moviecom.tv account. Enter O132 in the Doostang box to learn more about \"Sleep Studies: About This Test.\"     If you do not have an account, please click on the \"Sign Up Now\" link. Current as of: February 24, 2020               Content Version: 12.5  © 2006-2020 GruvIt. Care instructions adapted under license by [facility] ([facility]). If you have questions about a medical condition or this instruction, always ask your healthcare professional. [place] [address] any warranty or liability for your use of this information.         Insomnia: Care Instructions  Your Care Instructions     Insomnia is the inability to sleep well. It is a common problem for most people at some time. Insomnia may make it hard for you to get to sleep, stay asleep, or sleep as long as you need to. This can make you tired and grouchy during the day. It can also make you forgetful, less effective at work, and unhappy. Insomnia can be caused by conditions such as depression or anxiety. Pain can also affect your ability to sleep. When these problems are solved, the insomnia usually clears up. But sometimes bad sleep habits can cause insomnia. If insomnia is affecting your work or your enjoyment of life, you can take steps to improve your sleep.   Follow-up care is a key part of your treatment and safety. Be sure to make and go to all appointments, and call your doctor if you are having problems. It's also a good idea to know your test results and keep a list of the medicines you take. How can you care for yourself at home? What to avoid   · Do not have drinks with caffeine, such as coffee or black tea, for 8 hours before bed. · Do not smoke or use other types of tobacco near bedtime. Nicotine is a stimulant and can keep you awake. · Avoid drinking alcohol late in the evening, because it can cause you to wake in the middle of the night. · Do not eat a big meal close to bedtime. If you are hungry, eat a light snack. · Do not drink a lot of water close to bedtime, because the need to urinate may wake you up during the night. · Do not read or watch TV in bed. Use the bed only for sleeping and sexual activity. What to try   · Go to bed at the same time every night, and wake up at the same time every morning. Do not take naps during the day. · Keep your bedroom quiet, dark, and cool. · Sleep on a comfortable pillow and mattress. · If watching the clock makes you anxious, turn it facing away from you so you cannot see the time. · If you worry when you lie down, start a worry book. Well before bedtime, write down your worries, and then set the book and your concerns aside. · Try meditation or other relaxation techniques before you go to bed. · If you cannot fall asleep, get up and go to another room until you feel sleepy. Do something relaxing. Repeat your bedtime routine before you go to bed again. · Make your house quiet and calm about an hour before bedtime. Turn down the lights, turn off the TV, log off the computer, and turn down the volume on music. This can help you relax after a busy day. When should you call for help?   Watch closely for changes in your health, and be sure to contact your doctor if:    · Your efforts to improve your sleep do not work.     · Your insomnia gets worse.     · You have been feeling down, depressed, or hopeless or have lost interest in things that you usually enjoy. Where can you learn more? Go to https://Mixifypepiceweb.Fitmo. org and sign in to your Pharmly account. Enter P513 in the TrendU box to learn more about \"Insomnia: Care Instructions. \"     If you do not have an account, please click on the \"Sign Up Now\" link. Current as of: August 31, 2020               Content Version: 12.8  © 2701-6780 Healthwise, FullStory. Care instructions adapted under license by [facility] ([facility]).  If you have questions about a medical condition or this instruction, always ask your healthcare professional. [address] any warranty or liability for your use of this information.

## 2021-04-26 ENCOUNTER — TELEPHONE (OUTPATIENT)
Dept: SLEEP CENTER | Age: 81
End: 2021-04-26

## 2021-05-14 ENCOUNTER — HOSPITAL ENCOUNTER (OUTPATIENT)
Age: 81
Discharge: HOME | End: 2021-05-16
Payer: MEDICARE

## 2021-05-14 DIAGNOSIS — G47.33 OBSTRUCTIVE SLEEP APNEA: ICD-10-CM

## 2021-05-14 PROCEDURE — U0003 INFECTIOUS AGENT DETECTION BY NUCLEIC ACID (DNA OR RNA); SEVERE ACUTE RESPIRATORY SYNDROME CORONAVIRUS 2 (SARS-COV-2) (CORONAVIRUS DISEASE [COVID-19]), AMPLIFIED PROBE TECHNIQUE, MAKING USE OF HIGH THROUGHPUT TECHNOLOGIES AS DESCRIBED BY CMS-2020-01-R: HCPCS

## 2021-05-14 PROCEDURE — U0005 INFEC AGEN DETEC AMPLI PROBE: HCPCS

## 2021-05-16 LAB
SARS-COV-2: NOT DETECTED
SOURCE: 1

## 2021-05-17 ENCOUNTER — TELEPHONE (OUTPATIENT)
Dept: SLEEP MEDICINE | Age: 81
End: 2021-05-17

## 2021-05-17 NOTE — RESULT ENCOUNTER NOTE
Please notify patient of COVID result. Please confirm sleep lab appointment date with patient.     Thank you

## 2021-05-19 ENCOUNTER — OFFICE VISIT (OUTPATIENT)
Dept: PAIN MANAGEMENT | Age: 81
End: 2021-05-19
Payer: MEDICARE

## 2021-05-19 VITALS
TEMPERATURE: 98.1 F | HEART RATE: 86 BPM | WEIGHT: 164 LBS | SYSTOLIC BLOOD PRESSURE: 132 MMHG | DIASTOLIC BLOOD PRESSURE: 80 MMHG | OXYGEN SATURATION: 98 % | RESPIRATION RATE: 16 BRPM | HEIGHT: 67 IN | BODY MASS INDEX: 25.74 KG/M2

## 2021-05-19 DIAGNOSIS — N18.31 STAGE 3A CHRONIC KIDNEY DISEASE (HCC): ICD-10-CM

## 2021-05-19 DIAGNOSIS — M50.30 DDD (DEGENERATIVE DISC DISEASE), CERVICAL: Chronic | ICD-10-CM

## 2021-05-19 DIAGNOSIS — G89.4 CHRONIC PAIN SYNDROME: ICD-10-CM

## 2021-05-19 DIAGNOSIS — M96.1 CERVICAL POSTLAMINECTOMY SYNDROME: ICD-10-CM

## 2021-05-19 DIAGNOSIS — Z98.1 STATUS POST CERVICAL SPINAL FUSION: Chronic | ICD-10-CM

## 2021-05-19 DIAGNOSIS — M48.02 NEURAL FORAMINAL STENOSIS OF CERVICAL SPINE: Chronic | ICD-10-CM

## 2021-05-19 DIAGNOSIS — M43.02 CERVICAL SPONDYLOLYSIS: Primary | Chronic | ICD-10-CM

## 2021-05-19 DIAGNOSIS — M51.36 DDD (DEGENERATIVE DISC DISEASE), LUMBAR: ICD-10-CM

## 2021-05-19 DIAGNOSIS — M47.817 LUMBOSACRAL SPONDYLOSIS WITHOUT MYELOPATHY: ICD-10-CM

## 2021-05-19 PROCEDURE — G8417 CALC BMI ABV UP PARAM F/U: HCPCS | Performed by: NURSE PRACTITIONER

## 2021-05-19 PROCEDURE — G8427 DOCREV CUR MEDS BY ELIG CLIN: HCPCS | Performed by: NURSE PRACTITIONER

## 2021-05-19 PROCEDURE — 99213 OFFICE O/P EST LOW 20 MIN: CPT | Performed by: NURSE PRACTITIONER

## 2021-05-19 PROCEDURE — 1123F ACP DISCUSS/DSCN MKR DOCD: CPT | Performed by: NURSE PRACTITIONER

## 2021-05-19 PROCEDURE — 4004F PT TOBACCO SCREEN RCVD TLK: CPT | Performed by: NURSE PRACTITIONER

## 2021-05-19 PROCEDURE — 4040F PNEUMOC VAC/ADMIN/RCVD: CPT | Performed by: NURSE PRACTITIONER

## 2021-05-19 RX ORDER — HYDROCODONE BITARTRATE AND ACETAMINOPHEN 5; 325 MG/1; MG/1
1 TABLET ORAL EVERY 12 HOURS PRN
Qty: 60 TABLET | Refills: 0 | Status: SHIPPED
Start: 2021-05-19 | End: 2021-06-24 | Stop reason: SDUPTHER

## 2021-05-19 NOTE — PROGRESS NOTES
Do you currently have any of the following:    Fever: No  Headache:  No  Cough: No  Shortness of breath: No  Exposed to anyone with these symptoms: No                                                                                                                Bahman Pavon presents to the [facility] on 5/19/2021. Quincy Smith is complaining of pain in his lower back. . The pain is constant. The pain is described as aching, throbbing, shooting and sharp. Pain is rated on his best day at a 4, on his worst day at a 9, and on average at a 6 on the VAS scale. He took his last dose of Donnise Francia does not have issues with constipation. Any procedures since your last visit: No    He is not on NSAIDS and  is  on anticoagulation medications to include ASA and is managed by Nicole Logan DO  . Pacemaker or defibrillator: No Physician managing device is NA. Medication Contract and Consent for Opioid Use Documents Filed     Patient Documents       Type of Document Status Date Received Received By Description     Medication Contract Received 3/15/2019  1:18 PM AALIYAH HOPE PAIN MANAGEMENT PT AGREEMENT 3/15/2019     Medication Contract Received 2/12/2020  3:12 PM Candi RIBERA pain management patient agreement 02/12/2020                   /80   Pulse 86   Temp 98.1 °F (36.7 °C) (Infrared)   Resp 16   Ht 5' 7\" (1.702 m)   Wt 164 lb (74.4 kg)   SpO2 98%   BMI 25.69 kg/m²      No LMP for male patient.

## 2021-05-19 NOTE — PROGRESS NOTES
[address] [facility], [address] [place] [id number]  [phone]    Follow up Note      Андрей Sleeemelina     Date of Visit:  5/19/2021    CC:  Patient presents for follow up neck and low back pain     Pertinent Information:  78 y. o.  pleasant gentleman with prior h/o cervical spine surgery- ACDF C3-7 having neck pain with features of myofacial pain and also facet pain. He has undergone lumbar spine surgery X 2 in the 1980's    HPI:  Pt followed today for axial  low back pain gradually getting worse and is main complaint. Continues to have cervical spine myofascial pain. Left hand pain and edema resolved last week with rest and Ztlido patch samples given last visit. Appropriate analgesia with current medications regimen: yes  Change in quality of symptoms: worsening low back   Medication side effects:none. Recent diagnostic testing:none. Recent interventional procedures: none     He is on ASA-81 mg. H/o CAD s/p stent.      IImaging studies:  Left Shoulder: 6/14/2020:      Impression   No acute osseous abnormality.       Degenerative changes as above.      XR Lumbar spine: 6/2020: Impression   Diffuse osteopenia with moderate to advanced degenerative changes of the   lumbar spine, as above.  No convincing evidence of an acute fracture.      CT cervical spine: 6/14/2020:      FINDINGS:   BONES/ALIGNMENT: No evidence of cervical fracture or traumatic subluxation.    Stable T3 wedge compression.  There has been fusion of C3 through C7.  There   is an anterior plate with screws at C3, C4, and C7.  There is a bone block   graft at C3-C4.  There is a block graft extending from C4 through C7.  The   hardware and grafts are intact.       DEGENERATIVE CHANGES: Degenerative change at C1-C.  Degenerative disc disease   C7-T1.  Diffuse facet osteoarthritis with stable several mm anterolisthesis   of C7.       SOFT TISSUES: No prevertebral soft tissue swelling.  Maxillary there is   moderate stenosis       At L3-4: There is a large broad-based disc herniation, there is severe   canal stenosis.       At L2-3: There is a mild broad-based disc herniation, there is mild   canal stenosis       At L1-2: There is a mild broad-based disc herniation, there is mild   canal stenosis               Impression   Findings compatible with degenerative changes   Bilateral L4 pars defect   Severe canal stenosis at L3-4 and moderate canal stenosis at L4-5                                            Potential Aberrant Drug-Related Behavior:  No     Urine Drug Screening:   10/27/2020: UDS negative for Norco as expected per DR DIALLO previous note\    Pain agreement updated: 02/12/2020      OARRS report[de-identified]   All of 2019: consistent  2/12/2020: last script for hydrocodone 10/325 filled on 7/3/2019 for # 90 tabs.  Given by Dr. Dayna Galarza MD   2020 x3 screenings all consistent  10/21/2020: Consistent last script for # 45 tabs on 8/26/2020 05/2021: consistent       Past Medical History:   Diagnosis Date    Accident caused by farm tractor    [address] CAD (coronary artery disease)     Chronic pain     Clavicle fracture     Concussion with no loss of consciousness     Depression     Diabetes mellitus ([address]     GERD (gastroesophageal reflux disease)     Headache(784.0)     History of blood transfusion     Hyperlipidemia     Kidney stone     Laceration of flexor tendon of hand, not in \"[place] man[place] land\" 10/10/2014    Multiple closed fractures of ribs of left side     Neck pain     Osteoarthritis     Rib fracture     SBO (small bowel obstruction) ([facility] [address] 4/23/2019    Skin cancer     Thyroid disease     Trauma 8/4/2018    Traumatic hemopneumothorax, initial encounter        Past Surgical History:   Procedure Laterality Date    ANESTHESIA NERVE BLOCK Bilateral 9/10/2019    BILATERAL LUMBAR FACET MEDIAL BRANCH BLOCK L3 L4 AND L5 DORSAL RAMI (CPT O2254884) performed by Tanner Martinez MD at [facility]    ANESTHESIA NERVE BLOCK N/A 7/21/2020    LUMBAR EPIDURAL STEROID INJECTION L4-L5 X-RAY performed by Donato Flanagan MD at [address]      x2 lumbar    CATARACT REMOVAL WITH IMPLANT Left 10/08/13    CATARACT REMOVAL WITH IMPLANT Right 12/10/13    CERVICAL SPINE SURGERY  2012    cervical fusion    COLON SURGERY      due to diverticulitis colon resection    CORONARY ANGIOPLASTY WITH STENT PLACEMENT  2005    x2 stents    DILATATION, ESOPHAGUS      FRACTURE SURGERY      Lt. Jaw Plate    NERVE BLOCK Left 3/7/16    cervical transforaminal #1    NERVE BLOCK Left 3/28/16    cervical transforaminal #2    NERVE BLOCK Left 04 04 2016    transforaminal nerve block left cervical #3    NERVE BLOCK Left 07/25/2016    shoulder    NERVE BLOCK Left 08/15/2016    left shoulder injection #2    NERVE BLOCK Right 08/22/2016    shoulder    NERVE BLOCK Right 08/29/2016    shoulder injection #2    NERVE BLOCK Bilateral 09/10/2019    BILATERAL LUMBAR FACET MEDIAL BRANCH NERVE BLOCK AT L3,L4 MEDIAL BRANCH AND L5 DORSAL RAMI    NERVE BLOCK Bilateral 10/29/2019    lumbar facet, meidal branch    NERVE BLOCK N/A 07/21/2020    Lumbar epidural steriod injection L4-L5    NERVE BLOCK Right 09/15/2020    transforaminal L4,5    NERVE BLOCK Right 9/15/2020    TRANSFORAMINAL EPIDURAL STEROID INJECTION RIGHT L4 AND L5 UNDER FLUOROSCOPIC GUIDANCE (CPT 41130,82762) performed by Donato Flanagan MD at [facility] [facility]. Left 10/23/2018    LEFT CLAVICLE OPEN REDUCTION INTERNAL FIXATION [facility]2014    repair left hand, finger laceration    OTHER SURGICAL HISTORY Bilateral 01/05/2021    lumbar facet medial branch dorsal rami radiofrequency    PAIN MANAGEMENT PROCEDURE Bilateral 1/5/2021    BILATERAL LUMBAR FACET L3,L4 MEDIAL BRANCH L5 DORSAL RAMI RADIOFREQUENCY ABLATION mouth daily 10/16/19   Jae Denise,    ferrous sulfate 325 (65 Fe) MG tablet Take 325 mg by mouth daily (with breakfast)    Historical Provider, MD   meclizine (ANTIVERT) 25 MG tablet Take 25 mg by mouth 3 times daily as needed    Historical Provider, MD   vitamin D (CHOLECALCIFEROL) 1000 UNIT TABS tablet Take 1,000 Units by mouth daily    Historical Provider, MD   aspirin EC 81 MG EC tablet Take 81 mg by mouth daily    Historical Provider, MD       Allergies   Allergen Reactions    Sulfa Antibiotics Hives       Social History     Socioeconomic History    Marital status:      Spouse name: Not on file    Number of children: Not on file    Years of education: Not on file    Highest education level: Not on file   Occupational History    Not on file   Tobacco Use    Smoking status: Former Smoker     Years: 40.00     Types: Cigarettes     Quit date: 3/19/1991     Years since quittin.    Smokeless tobacco: Current User     Types: Chew   Vaping Use    Vaping Use: Never used   Substance and Sexual Activity    Alcohol use: No    Drug use: No    Sexual activity: Not Currently   Other Topics Concern    Not on file   Social History Narrative    ** Merged History Encounter **          Social Determinants of Health     Financial Resource Strain:     Difficulty of Paying Living Expenses:    Food Insecurity:     Worried About Running Out of Food in the Last Year:     Ran Out of Food in the Last Year:    Transportation Needs:     Lack of Transportation (Medical):      Lack of Transportation (Non-Medical):    Physical Activity:     Days of Exercise per Week:     Minutes of Exercise per Session:    Stress:     Feeling of Stress :    Social Connections:     Frequency of Communication with Friends and Family:     Frequency of Social Gatherings with Friends and Family:     Attends Protestant Services:     Active Member of Clubs or Organizations:     Attends Club or Organization Meetings:    [facility] Marital Status:    Intimate Partner Violence:     Fear of Current or Ex-Partner:     Emotionally Abused:     Physically Abused:     Sexually Abused:        Family History   Problem Relation Age of Onset    Diabetes Mother        REVIEW OF SYSTEMS:     Genaro Nichole denies fever/chills, chest pain, shortness of breath, new bowel or bladder complaints or suicidal ideations. All other review of systems wasnegative. Review of Systems    PHYSICAL EXAMINATION:      General:    General appearance:  Pleasant and well-hydrated, in mild to moderate distress and A & O x3  Build: Normal WeightFunction:Rises from a seated position with some difficulty.     HEENT:   Head:normocephalic, atraumatic  Pupils:regular, round, equalSclera: icterus absent     Lungs:   Breathing:normal breathing pattern      CVS:  clear and non labored      Abdomen:  Shape:non-distended and normal  Tenderness:none  Guarding:none     Cervical spine:  Inspection:normal  Palpation: tenderness over lower cervical paraspinal muscles, tenderness trapezium, left positive. Range of motion: limitations of ROM noted. Facet tenderness noted over the mid and lower cervical facets.     Lumbar Spine:   Scar from the prior surgery noted, healed well, ROM reduced, Lumbar facet loading + bilaterally, Sensory and motor in b/l LE intact, DTR;'s in b/l LE equal.    Musculoskeletal:   Trigger points noted over the cervical paraspinal muscle, trapezius muscles mainly on the left side. He also has tenderness over the left occipital nerve.     Extremities:   Yes dorsal aspect of thumb to left hand and left wrist     Neurological:  Sensory: normal to light touch   Motor: No focal deficits, generalized weakness noted.   Reflexes: B/l equal  Gait:normal     Dermatology:     Skin:no rashes or lesions noted    Assessment/Plan:.      1.  DDD (degenerative disc disease), lumbar      2. Lumbosacral spondylosis without myelopathy      3. Spinal stenosis of lumbar region, unspecified whether neurogenic claudication present      4. Postlaminectomy syndrome, lumbar      5. Cervical postlaminectomy syndrome      6. DDD (degenerative disc disease), cervical      7. Cervical spondylolysis        09/2020:  Lumbar LUCILA/ TFESI had helped the lower extremity pain significantly, >70% relief         Plan:   Followed for neck pain with myofascial component and axial low back pain primarily axial   BILATERAL LUMBAR FACET L3 L4 MEDIAL BRANCH L5 DORSAL RAMI  RADIOFREQUENCY ABLATION WITH >50% relief,  Refill Norco 1 tab po bid prn pain #60, typically lasts 2 months   UDS/Buccal swab in July for compliance  OARRS consistent 05/2021. He is on ASA-81 mg. H/o CAD s/p stent.   Continue with HEP as tolerated   Follow up in 2 months for medication refill      We discussed with the patient that combining opioids, benzodiazepines, alcohol, illicit drugs or sleep aids increases the risk of respiratory depression including death. We discussed that these medications may cause drowsiness, sedation or dizziness and have counseled the patient not to drive or operate machinery. We have discussed that these medications will be prescribed only by one provider. We have discussed with the patient about age related risk factors and have thoroughly discussed the importance of taking these medications as prescribed. The patient verbalizes understanding.       ccreferring physic    Electronically signed by VIKRAM Bennett CNP on 5/19/21 at 10:29 AM EST

## 2021-05-22 ENCOUNTER — TELEPHONE (OUTPATIENT)
Dept: SLEEP CENTER | Age: 81
End: 2021-05-22

## 2021-05-23 ENCOUNTER — HOSPITAL ENCOUNTER (OUTPATIENT)
Dept: SLEEP CENTER | Age: 81
Discharge: HOME | End: 2021-05-23
Payer: MEDICARE

## 2021-05-23 DIAGNOSIS — G47.33 OBSTRUCTIVE SLEEP APNEA: ICD-10-CM

## 2021-05-23 PROCEDURE — 95811 POLYSOM 6/>YRS CPAP 4/> PARM: CPT | Performed by: STUDENT IN AN ORGANIZED HEALTH CARE EDUCATION/TRAINING PROGRAM

## 2021-05-23 PROCEDURE — 95811 POLYSOM 6/>YRS CPAP 4/> PARM: CPT

## 2021-05-24 LAB — STATUS: NORMAL

## 2021-05-24 NOTE — RESULT ENCOUNTER NOTE
Please notify the patient that his sleep study results are available and please try to get him into the clinic sooner, due to his severe results.      Thank you

## 2021-05-25 ENCOUNTER — TELEPHONE (OUTPATIENT)
Dept: SLEEP MEDICINE | Age: 81
End: 2021-05-25

## 2021-05-25 NOTE — TELEPHONE ENCOUNTER
Spoke with pt re moving appt up from 7/1 per Dr Request for [facility] results --pt is able to come in on 6/10 at 1220

## 2021-05-27 DIAGNOSIS — Z76.0 MEDICATION REFILL: ICD-10-CM

## 2021-06-01 DIAGNOSIS — R10.84 GENERALIZED ABDOMINAL PAIN: ICD-10-CM

## 2021-06-01 RX ORDER — SUCRALFATE 1 G/1
1 TABLET ORAL 2 TIMES DAILY
Qty: 60 TABLET | Refills: 1 | Status: SHIPPED
Start: 2021-06-01 | End: 2021-10-21 | Stop reason: SDUPTHER

## 2021-06-01 RX ORDER — CITALOPRAM 40 MG/1
TABLET ORAL
Qty: 30 TABLET | Refills: 2 | Status: SHIPPED
Start: 2021-06-01 | End: 2021-09-14 | Stop reason: SDUPTHER

## 2021-06-10 ENCOUNTER — OFFICE VISIT (OUTPATIENT)
Dept: SLEEP MEDICINE | Age: 81
End: 2021-06-10
Payer: MEDICARE

## 2021-06-10 ENCOUNTER — TELEPHONE (OUTPATIENT)
Dept: SLEEP MEDICINE | Age: 81
End: 2021-06-10

## 2021-06-10 VITALS
HEIGHT: 67 IN | OXYGEN SATURATION: 97 % | WEIGHT: 158.9 LBS | DIASTOLIC BLOOD PRESSURE: 68 MMHG | RESPIRATION RATE: 16 BRPM | HEART RATE: 74 BPM | BODY MASS INDEX: 24.94 KG/M2 | SYSTOLIC BLOOD PRESSURE: 114 MMHG

## 2021-06-10 DIAGNOSIS — G47.00 INSOMNIA, UNSPECIFIED TYPE: ICD-10-CM

## 2021-06-10 DIAGNOSIS — G47.33 OBSTRUCTIVE SLEEP APNEA: Primary | ICD-10-CM

## 2021-06-10 PROCEDURE — G8420 CALC BMI NORM PARAMETERS: HCPCS | Performed by: STUDENT IN AN ORGANIZED HEALTH CARE EDUCATION/TRAINING PROGRAM

## 2021-06-10 PROCEDURE — G8427 DOCREV CUR MEDS BY ELIG CLIN: HCPCS | Performed by: STUDENT IN AN ORGANIZED HEALTH CARE EDUCATION/TRAINING PROGRAM

## 2021-06-10 PROCEDURE — 4004F PT TOBACCO SCREEN RCVD TLK: CPT | Performed by: STUDENT IN AN ORGANIZED HEALTH CARE EDUCATION/TRAINING PROGRAM

## 2021-06-10 PROCEDURE — 99214 OFFICE O/P EST MOD 30 MIN: CPT | Performed by: STUDENT IN AN ORGANIZED HEALTH CARE EDUCATION/TRAINING PROGRAM

## 2021-06-10 PROCEDURE — 1123F ACP DISCUSS/DSCN MKR DOCD: CPT | Performed by: STUDENT IN AN ORGANIZED HEALTH CARE EDUCATION/TRAINING PROGRAM

## 2021-06-10 PROCEDURE — 4040F PNEUMOC VAC/ADMIN/RCVD: CPT | Performed by: STUDENT IN AN ORGANIZED HEALTH CARE EDUCATION/TRAINING PROGRAM

## 2021-06-10 ASSESSMENT — SLEEP AND FATIGUE QUESTIONNAIRES
HOW LIKELY ARE YOU TO NOD OFF OR FALL ASLEEP IN A CAR, WHILE STOPPED FOR A FEW MINUTES IN TRAFFIC: 0
HOW LIKELY ARE YOU TO NOD OFF OR FALL ASLEEP WHILE WATCHING TV: 0
ESS TOTAL SCORE: 4
HOW LIKELY ARE YOU TO NOD OFF OR FALL ASLEEP WHILE SITTING AND TALKING TO SOMEONE: 0
HOW LIKELY ARE YOU TO NOD OFF OR FALL ASLEEP WHILE LYING DOWN TO REST IN THE AFTERNOON WHEN CIRCUMSTANCES PERMIT: 3
HOW LIKELY ARE YOU TO NOD OFF OR FALL ASLEEP WHILE SITTING INACTIVE IN A PUBLIC PLACE: 0
HOW LIKELY ARE YOU TO NOD OFF OR FALL ASLEEP WHILE SITTING AND READING: 0
HOW LIKELY ARE YOU TO NOD OFF OR FALL ASLEEP WHEN YOU ARE A PASSENGER IN A CAR FOR AN HOUR WITHOUT A BREAK: 0
HOW LIKELY ARE YOU TO NOD OFF OR FALL ASLEEP WHILE SITTING QUIETLY AFTER LUNCH WITHOUT ALCOHOL: 1

## 2021-06-10 NOTE — PATIENT INSTRUCTIONS
It was a pleasure seeing you today Cody Santana! Summary of Today's Visit          1. Start APAP 4-15 cwp  There are several supply companies in the area, please let us know which company you would like to use for your CPAP and supplies. New PAP Therapy instructions to be compliant with most insurance coverage:    - Use PAP device for atleast 4 hours nightly. - PAP therapy must be used for 70% of nights (5/7 nights per week)    - Patient must follow up in the clinic within 30-90 days from getting the PAP device. 2. Contact your DME company about supplies or issues with your machine. As a general guideline, please replace your:  -CPAP mask every 3-6 months  -CPAP hose  every 3-6 months  -Filter every 2-4 weeks  -CPAP/BiPAP/ASV replacements every 5-7+ years     3. Continue healthy weight loss/stabilization, as this is recommended as a long-term intervention. 4. Please do not drive or operate machinery when you feel fatigued/sleepy/drowsy      5. Please try to sleep between 6-8 hours nightly with the CPAP mask    6. Please avoid sedatives, alcohol and caffeinated drinks at/near bed time. 7. Please call your supply (DME) company with any issues with your PAP device. Please call our office with any issues pertaining to the therapy.   ----Please note that complications of TREVIN if not treated can increase risk for: systemic hypertension, pulmonary hypertension, cardiovascular morbidities (heart attack and stroke), car accidents and all cause mortality.            For general questions or scheduling issues, call my nurse at [phone] option [phone] [address] [phone] option 2  f- [phone]        ----------------------------------------------------------    Please note that complications of TREVIN if not treated can increase risk for: systemic hypertension , pulmonary hypertension (high blood pressure in the lungs), cardiovascular every night while you sleep. It increases air pressure in your throat to keep your airway open. When you have sleep apnea, this can help you sleep better so you feel much better. CPAP stands for \"continuous positive airway pressure. \" Bi-PAP is a different PAP setting that allows for different pressures when you breathe in and out. The CPAP/Bi-PAP machine will have one of the following:  · A mask that covers your nose and mouth  · Prongs that fit into your nose  · A mask that covers your nose only, the most common type. This type is called NCPAP. The N stands for \"nasal.\"  Why is it done? CPAP is a common/effective treatment for obstructive sleep apnea. How does it help? · CPAP can help you have more normal sleep, so you feel less sleepy and more alert during the daytime through the treatment of sleep apnea. · CPAP may help keep heart failure or other heart problems from getting worse. · CPAP may help lower your blood pressure. · If you use CPAP, your bed partner may also sleep better because you are snoring less. What are the side effects? Some people who use CPAP have:  · A dry or stuffy nose and a sore throat. · Irritated skin on the face. · Sore eyes. · Bloating. If you have any of these problems, work with your doctor to fix them. Here are some things you can try:  · Be sure the mask or nasal prongs fit well. · See if your doctor can adjust the pressure of your CPAP. · If your nose is dry, try a humidifier. If these things do not help, you might try a different type of machine. Some machines have air pressure that adjusts on its own. Others have air pressures that are different when you breathe in than when you breathe out. This may reduce discomfort caused by too much pressure in your nose. Where can you learn more? Go to [email or web address] and sign in to your [employer] account.  Enter K679 in the Sojern box to learn more about \"Learning About CPAP for Sleep Apnea. \"     If you do not have an account, please click on the \"Sign Up Now\" link. Current as of: February 24, 2020               Content Version: 12.5  © 2006-2020 Healthwise, Incorporated. Care instructions adapted under license by [facility] [place] ([facility]). If you have questions about a medical condition or this instruction, always ask your healthcare professional. [place] [address] any warranty or liability for your use of this information.

## 2021-06-10 NOTE — PROGRESS NOTES
[facility] Sleep Medicine    Patient Name: Lizzeth Fink  Age: 78 y.o.   : 1940    Date of Visit: 6/10/21    Review of Last Visit Summary:    The patient was last seen on 2021 for suspected Obstructive Sleep Apnea and insomnia. Interim History:     Lizzeth Fink is a 78 y.o. male in office with his wife to follow up and to review his sleep study results. Patient states that the night of his sleep study was like a typical night of sleep. He underwent a split-night sleep study on 2021 which showed severe obstructive sleep apnea (AHI 66.2, O2 KRISTIN 81%). He was titrated on a CPAP with an optimal pressure of 11 cm H20. He did not reach REM sleep during both the diagnostic or titration portion of the study. He used a ResMed Airfit mask and found that it was comfortable. No new or worsening sleep complaints since last visit.       Sleep Study: Split Night Sleep Study:   Study date: 2021  AHI:  66.2   Oxygen Kristin: 81.0      Past Medical History:  Past Medical History:   Diagnosis Date    Accident caused by farm tractor     Arthritis     CAD (coronary artery disease)     Chronic pain     Clavicle fracture     Concussion with no loss of consciousness     Depression     Diabetes mellitus ([address]     GERD (gastroesophageal reflux disease)     Headache(784.0)     History of blood transfusion     Hyperlipidemia     Kidney stone     Laceration of flexor tendon of hand, not in \"[place] man's land\" 10/10/2014    Multiple closed fractures of ribs of left side     Neck pain     Osteoarthritis     Rib fracture     SBO (small bowel obstruction) ([address] 2019    Skin cancer     Thyroid disease     Trauma 2018    Traumatic hemopneumothorax, initial encounter        Past Surgical History:        Procedure Laterality Date    ANESTHESIA NERVE BLOCK Bilateral 9/10/2019    BILATERAL LUMBAR FACET MEDIAL BRANCH BLOCK L3 L4 AND L5 DORSAL RAMI (CPT [id number]) performed by Alem Juarez Glenis Byrd MD at [facility] N/A 7/21/2020    LUMBAR EPIDURAL STEROID INJECTION L4-L5 X-RAY performed by Rolf Baker MD at [address]      x2 lumbar    CATARACT REMOVAL WITH IMPLANT Left 10/08/13    CATARACT REMOVAL WITH IMPLANT Right 12/10/13    CERVICAL SPINE SURGERY  2012    cervical fusion    COLON SURGERY      due to diverticulitis colon resection    CORONARY ANGIOPLASTY WITH STENT PLACEMENT  2005    x2 stents    DILATATION, ESOPHAGUS      FRACTURE SURGERY      Lt. Jaw Plate    NERVE BLOCK Left 3/7/16    cervical transforaminal #1    NERVE BLOCK Left 3/28/16    cervical transforaminal #2    NERVE BLOCK Left 04 04 2016    transforaminal nerve block left cervical #3    NERVE BLOCK Left 07/25/2016    shoulder    NERVE BLOCK Left 08/15/2016    left shoulder injection #2    NERVE BLOCK Right 08/22/2016    shoulder    NERVE BLOCK Right 08/29/2016    shoulder injection #2    NERVE BLOCK Bilateral 09/10/2019    BILATERAL LUMBAR FACET MEDIAL BRANCH NERVE BLOCK AT L3,L4 MEDIAL BRANCH AND L5 DORSAL RAMI    NERVE BLOCK Bilateral 10/29/2019    lumbar facet, meidal branch    NERVE BLOCK N/A 07/21/2020    Lumbar epidural steriod injection L4-L5    NERVE BLOCK Right 09/15/2020    transforaminal L4,5    NERVE BLOCK Right 9/15/2020    TRANSFORAMINAL EPIDURAL STEROID INJECTION RIGHT L4 AND L5 UNDER FLUOROSCOPIC GUIDANCE (CPT 69430,11064) performed by Rolf Baker MD at [address] [place] [address]. Left 10/23/2018    LEFT CLAVICLE OPEN REDUCTION INTERNAL FIXATION performed by Yelitza Crum DO at [facility] Left 101/10/2014    repair left hand, finger laceration    OTHER SURGICAL HISTORY Bilateral 01/05/2021    lumbar facet medial branch dorsal rami radiofrequency    PAIN MANAGEMENT PROCEDURE Bilateral 1/5/2021    BILATERAL LUMBAR FACET L3,L4 MEDIAL BRANCH L5 DORSAL RAMI RADIOFREQUENCY ABLATION WITH IV SEDATION (CPT 91416,27516) performed by Lynda Singleton MD at [address] UNLISTED Left 2018    LEFT SIDED VIDEO ASSISTED THORACOSCOPY WITH RIB PLATING performed by Shakila Solo MD at [address] d/t a fall    RADIOFREQUENCY ABLATION NERVES Bilateral 10/29/2019    BILATERAL LUMBAR FACET L3 L4 MEDIAL BRANCH  L5 DORSAL RAMI  RADIOFREQUENCY ABLATION SEDATION (CPT [id number]) performed by Lynda Singleton MD at [facility] Bilateral     SHOULDER ARTHROSCOPY Right 2016    repair rotator cuff, bursectomy, debridement glenohumerol     SKIN BIOPSY         Allergies:  is allergic to sulfa antibiotics. Social History:    Social History     Tobacco Use    Smoking status: Former Smoker     Years: 40.00     Types: Cigarettes     Quit date: 3/19/1991     Years since quittin.2    Smokeless tobacco: Current User     Types: Chew   Vaping Use    Vaping Use: Never used   Substance Use Topics    Alcohol use: No    Drug use: No        Family History:       Problem Relation Age of Onset    Diabetes Mother        Current Medications:    Current Outpatient Medications:     citalopram (CELEXA) 40 MG tablet, TAKE 1 TABLET BY MOUTH EVERY DAY, Disp: 30 tablet, Rfl: 2    sucralfate (CARAFATE) 1 GM tablet, Take 1 tablet by mouth 2 times daily, Disp: 60 tablet, Rfl: 1    HYDROcodone-acetaminophen (NORCO) 5-325 MG per tablet, Take 1 tablet by mouth every 12 hours as needed for Pain for up to 30 days. , Disp: 60 tablet, Rfl: 0    pantoprazole (PROTONIX) 40 MG tablet, TAKE 1 TABLET BY MOUTH EVERY DAY IN THE MORNING BEFORE BREAKFAST, Disp: 90 tablet, Rfl: 1    donepezil (ARICEPT) 5 MG tablet, Take 1 tablet by mouth nightly, Disp: 30 tablet, Rfl: 5    levothyroxine (SYNTHROID) 25 MCG tablet, Take 1.5 tablets by mouth daily, Disp: 135 tablet, Rfl: 1    simvastatin (ZOCOR) 20 MG tablet, Take 1 tablet by mouth nightly, Disp: chills      Physical exam:  Gen: No acute distress. BMI of Body mass index is 24.89 kg/m². Eyes: PERRL. No sclera icterus. No conjunctival injection. ENT: No nasal/oral discharge. Pharynx clear. Neck: Trachea midline. No obvious mass. Neck circumference     Resp: No accessory muscle use. No crackles. No wheezes. No rhonchi. CV: Regular rate. Regular rhythm. No murmur or rub. Skin: Warm and dry. No bleeding observed   M/S: No cyanosis. No obvious joint deformity. Neuro: Awake. Alert. Moves all four extremities. Psych: Alert and oriented. No anxiety. Sleep Medicine 6/10/2021 4/22/2021   Sitting and reading 0 0   Watching TV 0 0   Sitting, inactive in a public place (e.g. a theatre or a meeting) 0 0   As a passenger in a car for an hour without a break 0 0   Lying down to rest in the afternoon when circumstances permit 3 0   Sitting and talking to someone 0 0   Sitting quietly after a lunch without alcohol 1 0   In a car, while stopped for a few minutes in traffic 0 0   Total score 4 0   Neck circumference - 16     Sleep Study Results:    Split Night Sleep Study:   Study date: 5/23/2021  AHI:  66.2   Oxygen Derick: 81.0        Assessment:      Odalys Hughes was seen today for sleep apnea. Diagnoses and all orders for this visit:    Obstructive sleep apnea  -     DME Order for CPAP as OP    Insomnia, unspecified type    ·    Plan:       1. TREVIN. Treatment options were discussed with the patient, APAP trial, oral appliance evaluation, and possible surgical options. After discussion, patient elected to proceed with APAP trial. Will start APAP 4-15 cwp and follow up in 2-3 months. - Aware of insurance compliance obectives    2. Insomnia.  Initiation and maintenance type  - Insomnia is likely multifactorial, due to untreated sleep disordered breathing, chronic pain, lower extremity neuropathy  - No changes or worsening in symptoms, will reassess after the treatment of sleep apnea      Follow up: Return in about

## 2021-06-23 ENCOUNTER — OFFICE VISIT (OUTPATIENT)
Dept: FAMILY MEDICINE CLINIC | Age: 81
End: 2021-06-23
Payer: MEDICARE

## 2021-06-23 VITALS
TEMPERATURE: 97.5 F | SYSTOLIC BLOOD PRESSURE: 124 MMHG | BODY MASS INDEX: 24.8 KG/M2 | HEART RATE: 81 BPM | DIASTOLIC BLOOD PRESSURE: 70 MMHG | RESPIRATION RATE: 16 BRPM | OXYGEN SATURATION: 97 % | WEIGHT: 158 LBS | HEIGHT: 67 IN

## 2021-06-23 DIAGNOSIS — E11.9 TYPE 2 DIABETES MELLITUS WITHOUT COMPLICATION, WITHOUT LONG-TERM CURRENT USE OF INSULIN (HCC): ICD-10-CM

## 2021-06-23 DIAGNOSIS — R53.83 FATIGUE, UNSPECIFIED TYPE: ICD-10-CM

## 2021-06-23 DIAGNOSIS — E03.9 ACQUIRED HYPOTHYROIDISM: ICD-10-CM

## 2021-06-23 DIAGNOSIS — Z87.81 HISTORY OF RIB FRACTURE: Primary | ICD-10-CM

## 2021-06-23 DIAGNOSIS — R10.84 GENERALIZED ABDOMINAL PAIN: ICD-10-CM

## 2021-06-23 DIAGNOSIS — R20.0 BILATERAL LEG NUMBNESS: ICD-10-CM

## 2021-06-23 DIAGNOSIS — E78.5 DYSLIPIDEMIA: ICD-10-CM

## 2021-06-23 DIAGNOSIS — G89.4 CHRONIC PAIN SYNDROME: ICD-10-CM

## 2021-06-23 DIAGNOSIS — N18.31 STAGE 3A CHRONIC KIDNEY DISEASE (HCC): ICD-10-CM

## 2021-06-23 DIAGNOSIS — R07.89 LEFT-SIDED CHEST WALL PAIN: ICD-10-CM

## 2021-06-23 PROCEDURE — G8427 DOCREV CUR MEDS BY ELIG CLIN: HCPCS | Performed by: FAMILY MEDICINE

## 2021-06-23 PROCEDURE — 4040F PNEUMOC VAC/ADMIN/RCVD: CPT | Performed by: FAMILY MEDICINE

## 2021-06-23 PROCEDURE — 99214 OFFICE O/P EST MOD 30 MIN: CPT | Performed by: FAMILY MEDICINE

## 2021-06-23 PROCEDURE — 1123F ACP DISCUSS/DSCN MKR DOCD: CPT | Performed by: FAMILY MEDICINE

## 2021-06-23 PROCEDURE — 4004F PT TOBACCO SCREEN RCVD TLK: CPT | Performed by: FAMILY MEDICINE

## 2021-06-23 PROCEDURE — G8420 CALC BMI NORM PARAMETERS: HCPCS | Performed by: FAMILY MEDICINE

## 2021-06-23 NOTE — PROGRESS NOTES
years    CT abdomen 8/2020  Impression   1. There is no acute intra-or intrapelvic pathology.  Specifically, there are   no findings of diverticulitis and there is no appreciable colonic mass or   bowel obstruction. 2. Trace left pleural effusion.  This trace pleural effusion is chronic and   may be secondary to loculated effusion secondary to the previous old chest   wall trauma. CT chest 4/12/21  Impression   Interval healing of left 11th rib posteriorly.       No change in alignment of healed bilateral ribs.       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                   Review of Systems   Constitutional: Positive for fatigue. Negative for chills and fever. Respiratory: Negative for cough and shortness of breath. Cardiovascular: Negative for chest pain. Gastrointestinal: Positive for abdominal pain. Negative for constipation, diarrhea, nausea and vomiting. Musculoskeletal: Positive for arthralgias, back pain, gait problem and myalgias. Prior to Visit Medications    Medication Sig Taking?  Authorizing Provider   citalopram (CELEXA) 40 MG tablet TAKE 1 TABLET BY MOUTH EVERY DAY Yes Arnaud Arreola DO   sucralfate (CARAFATE) 1 GM tablet Take 1 tablet by mouth 2 times daily Yes Arnaud Arreola DO   pantoprazole (PROTONIX) 40 MG tablet TAKE 1 TABLET BY MOUTH EVERY DAY IN THE MORNING BEFORE BREAKFAST Yes Teodoro Zayas, DO   donepezil (ARICEPT) 5 MG tablet Take 1 tablet by mouth nightly Yes Arnaud Arreola DO   simvastatin (ZOCOR) 20 MG tablet Take 1 tablet by mouth nightly Yes Arnaud Arreola DO   famotidine (PEPCID) 40 MG tablet Take 1 tablet by mouth every evening Yes Arnaud Arreola DO   linaGLIPtin-metFORMIN HCl ER (JENTADUETO XR) 2.5-1000 MG TB24 Take 1 tablet by mouth 2 times daily Yes Arnaud Arreola DO   Lidocaine (ZTLIDO) 1.8 % PTCH 12 hours on 12 hours off  Patient taking differently: Apply 1 patch topically daily as needed 12 hours on 12 hours off to back Yes shoulder injection #2    NERVE BLOCK Right 08/22/2016    shoulder    NERVE BLOCK Right 08/29/2016    shoulder injection #2    NERVE BLOCK Bilateral 09/10/2019    BILATERAL LUMBAR FACET MEDIAL BRANCH NERVE BLOCK AT L3,L4 MEDIAL BRANCH AND L5 DORSAL RAMI    NERVE BLOCK Bilateral 10/29/2019    lumbar facet, meidal branch    NERVE BLOCK N/A 07/21/2020    Lumbar epidural steriod injection L4-L5    NERVE BLOCK Right 09/15/2020    transforaminal L4,5    NERVE BLOCK Right 9/15/2020    TRANSFORAMINAL EPIDURAL STEROID INJECTION RIGHT L4 AND L5 UNDER FLUOROSCOPIC GUIDANCE (CPT 99958,62588) performed by Inge Hawkins MD at [address] [place] [address]. Left 10/23/2018    LEFT CLAVICLE OPEN REDUCTION INTERNAL FIXATION performed by Osbaldo Guajardo DO at [place] [address] Left 101/10/2014    repair left hand, finger laceration    OTHER SURGICAL HISTORY Bilateral 01/05/2021    lumbar facet medial branch dorsal rami radiofrequency    PAIN MANAGEMENT PROCEDURE Bilateral 1/5/2021    BILATERAL LUMBAR FACET L3,L4 MEDIAL BRANCH L5 DORSAL RAMI RADIOFREQUENCY ABLATION WITH IV SEDATION (CPT 28875,53235) performed by Inge Hawkins MD at [place] [address] Left 8/6/2018    LEFT SIDED VIDEO ASSISTED THORACOSCOPY WITH RIB PLATING performed by Sally Stone MD at [address] [place] [address] d/t a fall    RADIOFREQUENCY ABLATION NERVES Bilateral 10/29/2019    BILATERAL LUMBAR FACET L3 L4 MEDIAL BRANCH  L5 DORSAL RAMI  RADIOFREQUENCY ABLATION SEDATION (CPT [id number]) performed by Inge Hawkins MD at [address] [place] [address] Bilateral     SHOULDER ARTHROSCOPY Right 12/08/2016    repair rotator cuff, bursectomy, debridement glenohumerol     SKIN BIOPSY         Vitals:    06/23/21 1623   BP: 124/70   Site: Left Upper Arm   Position: Sitting   Cuff Size: Medium Adult   Pulse: 81   Resp: 16   Temp: 97.5 °F (36.4 °C)   TempSrc: Temporal   SpO2: 97%   Weight: 158 lb (71.7 kg)   Height: 5' 7\" (1.702 m)     Estimated body mass index is 24.75 kg/m² as calculated from the following:    Height as of this encounter: 5' 7\" (1.702 m). Weight as of this encounter: 158 lb (71.7 kg). Physical Exam  Constitutional:       General: He is not in acute distress. HENT:      Head: Normocephalic. Eyes:      Extraocular Movements: Extraocular movements intact. Conjunctiva/sclera: Conjunctivae normal.   Cardiovascular:      Rate and Rhythm: Normal rate and regular rhythm. Pulmonary:      Effort: Pulmonary effort is normal. No respiratory distress. Chest:      Chest wall: Tenderness present. Abdominal:      General: There is no distension. Tenderness: There is abdominal tenderness. There is no guarding. Neurological:      Mental Status: He is alert. Mental status is at baseline. ASSESSMENT/PLAN:  Margaret Zelaya was seen today for abdominal pain. Diagnoses and all orders for this visit:    History of rib fracture    Chronic pain syndrome  -     EMG; Future    Left-sided chest wall pain    Generalized abdominal pain    Bilateral leg numbness  -     EMG; Future    Fatigue, unspecified type  -     Comprehensive Metabolic Panel; Future  -     CBC Auto Differential; Future  -     HEMOGLOBIN A1C; Future  -     LIPID PANEL; Future  -     TSH; Future  -     Vitamin D 25 Hydroxy; Future    Dyslipidemia  -     Comprehensive Metabolic Panel; Future  -     LIPID PANEL; Future    Type 2 diabetes mellitus without complication, without long-term current use of insulin (HCC)  -     Comprehensive Metabolic Panel; Future  -     CBC Auto Differential; Future  -     HEMOGLOBIN A1C; Future  -     LIPID PANEL; Future  -     TSH; Future  -     Vitamin D 25 Hydroxy; Future    Acquired hypothyroidism  -     TSH; Future    Stage 3a chronic kidney disease (HCC)  -     Vitamin D 25 Hydroxy; Future    Additional plan and future considerations: As above.   RTO in 2 to 4 weeks    Future Appointments   Date Time Provider Lobito Frey   7/1/2021  9:15 AM SCHEDULE, PAVAN [facility] [place] [facility]   7/20/2021 10:00 AM VIKRAM Sanabria CNP [place] [facility]   7/27/2021  2:00 PM [facility]   8/11/2021 11:15 AM [facility]   8/17/2021  2:00 PM VIKRAM Finch - CNP [facility] SLEEP [facility]         --Triston Hung DO on 6/23/2021 at 4:42 PM

## 2021-06-24 ENCOUNTER — TELEPHONE (OUTPATIENT)
Dept: PAIN MANAGEMENT | Age: 81
End: 2021-06-24

## 2021-06-24 DIAGNOSIS — M51.36 DDD (DEGENERATIVE DISC DISEASE), LUMBAR: ICD-10-CM

## 2021-06-24 DIAGNOSIS — Z98.1 STATUS POST CERVICAL SPINAL FUSION: Chronic | ICD-10-CM

## 2021-06-24 DIAGNOSIS — M43.02 CERVICAL SPONDYLOLYSIS: Chronic | ICD-10-CM

## 2021-06-24 DIAGNOSIS — M47.817 LUMBOSACRAL SPONDYLOSIS WITHOUT MYELOPATHY: ICD-10-CM

## 2021-06-24 DIAGNOSIS — M48.02 NEURAL FORAMINAL STENOSIS OF CERVICAL SPINE: Chronic | ICD-10-CM

## 2021-06-24 DIAGNOSIS — M50.30 DDD (DEGENERATIVE DISC DISEASE), CERVICAL: Chronic | ICD-10-CM

## 2021-06-24 DIAGNOSIS — N18.31 STAGE 3A CHRONIC KIDNEY DISEASE (HCC): ICD-10-CM

## 2021-06-24 DIAGNOSIS — G89.4 CHRONIC PAIN SYNDROME: ICD-10-CM

## 2021-06-24 DIAGNOSIS — M96.1 CERVICAL POSTLAMINECTOMY SYNDROME: ICD-10-CM

## 2021-06-24 RX ORDER — HYDROCODONE BITARTRATE AND ACETAMINOPHEN 5; 325 MG/1; MG/1
1 TABLET ORAL EVERY 12 HOURS PRN
Qty: 60 TABLET | Refills: 0 | Status: SHIPPED | OUTPATIENT
Start: 2021-06-24 | End: 2021-07-24

## 2021-06-24 NOTE — TELEPHONE ENCOUNTER
Roger Mariscal phoned in this morning to get a refill on [employer]  Next visit is July 20, 2021. Last visit 5-19-21.

## 2021-06-25 ASSESSMENT — ENCOUNTER SYMPTOMS
BACK PAIN: 1
ABDOMINAL PAIN: 1
NAUSEA: 0
DIARRHEA: 0
CONSTIPATION: 0
COUGH: 0
VOMITING: 0
SHORTNESS OF BREATH: 0

## 2021-07-01 ENCOUNTER — OFFICE VISIT (OUTPATIENT)
Dept: PHYSICAL MEDICINE AND REHAB | Age: 81
End: 2021-07-01
Payer: MEDICARE

## 2021-07-01 VITALS — BODY MASS INDEX: 24.8 KG/M2 | WEIGHT: 158 LBS | HEIGHT: 67 IN

## 2021-07-01 DIAGNOSIS — E78.5 DYSLIPIDEMIA: ICD-10-CM

## 2021-07-01 DIAGNOSIS — G89.4 CHRONIC PAIN SYNDROME: ICD-10-CM

## 2021-07-01 DIAGNOSIS — E03.9 ACQUIRED HYPOTHYROIDISM: ICD-10-CM

## 2021-07-01 DIAGNOSIS — N18.31 STAGE 3A CHRONIC KIDNEY DISEASE (HCC): ICD-10-CM

## 2021-07-01 DIAGNOSIS — E11.9 TYPE 2 DIABETES MELLITUS WITHOUT COMPLICATION, WITHOUT LONG-TERM CURRENT USE OF INSULIN (HCC): ICD-10-CM

## 2021-07-01 DIAGNOSIS — R53.83 FATIGUE, UNSPECIFIED TYPE: ICD-10-CM

## 2021-07-01 DIAGNOSIS — R20.0 BILATERAL LEG NUMBNESS: ICD-10-CM

## 2021-07-01 LAB
ALBUMIN SERPL-MCNC: 4.3 G/DL (ref 3.5–5.2)
ALP BLD-CCNC: 53 U/L (ref 40–129)
ALT SERPL-CCNC: 15 U/L (ref 0–40)
ANION GAP SERPL CALCULATED.3IONS-SCNC: 15 MMOL/L (ref 7–16)
AST SERPL-CCNC: 24 U/L (ref 0–39)
BASOPHILS ABSOLUTE: 0.05 E9/L (ref 0–0.2)
BASOPHILS RELATIVE PERCENT: 0.9 % (ref 0–2)
BILIRUB SERPL-MCNC: 0.2 MG/DL (ref 0–1.2)
BUN BLDV-MCNC: 23 MG/DL (ref 6–23)
CALCIUM SERPL-MCNC: 9.7 MG/DL (ref 8.6–10.2)
CHLORIDE BLD-SCNC: 103 MMOL/L (ref 98–107)
CHOLESTEROL, TOTAL: 145 MG/DL (ref 0–199)
CO2: 22 MMOL/L (ref 22–29)
CREAT SERPL-MCNC: 1.6 MG/DL (ref 0.7–1.2)
EOSINOPHILS ABSOLUTE: 0.31 E9/L (ref 0.05–0.5)
EOSINOPHILS RELATIVE PERCENT: 5.3 % (ref 0–6)
GFR AFRICAN AMERICAN: 50
GFR NON-AFRICAN AMERICAN: 41 ML/MIN/1.73
GLUCOSE BLD-MCNC: 131 MG/DL (ref 74–99)
HBA1C MFR BLD: 6 % (ref 4–5.6)
HCT VFR BLD CALC: 39.7 % (ref 37–54)
HDLC SERPL-MCNC: 39 MG/DL
HEMOGLOBIN: 12.7 G/DL (ref 12.5–16.5)
IMMATURE GRANULOCYTES #: 0.02 E9/L
IMMATURE GRANULOCYTES %: 0.3 % (ref 0–5)
LDL CHOLESTEROL CALCULATED: 71 MG/DL (ref 0–99)
LYMPHOCYTES ABSOLUTE: 1.71 E9/L (ref 1.5–4)
LYMPHOCYTES RELATIVE PERCENT: 29.1 % (ref 20–42)
MCH RBC QN AUTO: 34 PG (ref 26–35)
MCHC RBC AUTO-ENTMCNC: 32 % (ref 32–34.5)
MCV RBC AUTO: 106.1 FL (ref 80–99.9)
MONOCYTES ABSOLUTE: 0.59 E9/L (ref 0.1–0.95)
MONOCYTES RELATIVE PERCENT: 10 % (ref 2–12)
NEUTROPHILS ABSOLUTE: 3.2 E9/L (ref 1.8–7.3)
NEUTROPHILS RELATIVE PERCENT: 54.4 % (ref 43–80)
PDW BLD-RTO: 13.2 FL (ref 11.5–15)
PLATELET # BLD: 176 E9/L (ref 130–450)
PMV BLD AUTO: 11.2 FL (ref 7–12)
POTASSIUM SERPL-SCNC: 4.7 MMOL/L (ref 3.5–5)
RBC # BLD: 3.74 E12/L (ref 3.8–5.8)
SODIUM BLD-SCNC: 140 MMOL/L (ref 132–146)
TOTAL PROTEIN: 7.1 G/DL (ref 6.4–8.3)
TRIGL SERPL-MCNC: 173 MG/DL (ref 0–149)
TSH SERPL DL<=0.05 MIU/L-ACNC: 2.57 UIU/ML (ref 0.27–4.2)
VITAMIN D 25-HYDROXY: 63 NG/ML (ref 30–100)
VLDLC SERPL CALC-MCNC: 35 MG/DL
WBC # BLD: 5.9 E9/L (ref 4.5–11.5)

## 2021-07-01 PROCEDURE — G8420 CALC BMI NORM PARAMETERS: HCPCS | Performed by: PHYSICAL MEDICINE & REHABILITATION

## 2021-07-01 PROCEDURE — 95886 MUSC TEST DONE W/N TEST COMP: CPT | Performed by: PHYSICAL MEDICINE & REHABILITATION

## 2021-07-01 PROCEDURE — 95909 NRV CNDJ TST 5-6 STUDIES: CPT | Performed by: PHYSICAL MEDICINE & REHABILITATION

## 2021-07-01 PROCEDURE — G8428 CUR MEDS NOT DOCUMENT: HCPCS | Performed by: PHYSICAL MEDICINE & REHABILITATION

## 2021-07-01 PROCEDURE — 99212 OFFICE O/P EST SF 10 MIN: CPT | Performed by: PHYSICAL MEDICINE & REHABILITATION

## 2021-07-01 NOTE — PATIENT INSTRUCTIONS
Electrodiagnotic Laboratory  Accredited by the [facility] with Exemplary status  ALEX Goodrich D.O. [facility]  [address] [place]  Phone: [phone]  Fax: [phone]        Today you had an electrodiagnostic exam which included nerve conduction studies (NCS) and electromyography (EMG). This test evaluated the electrical activity of your nerves and muscles to help determine if you have a nerve or muscle disease. This test can help determine the location and type of a nerve or muscle problem. This will help your referring doctor diagnose your condition and determine the appropriate next step in your treatment plan. After your test:    1. There are no long lasting side effects of the test.     2. You may resume your normal activities without restrictions. 3.  Resume any medications that were stopped for the test.     4  If you have sore areas or bruising in your muscles where the needle was placed, apply a cold pack to the sore area for 15-20 minutes three to four times a day as needed for pain. The soreness should go away in about 1-2 days. 5. Your results were provided  Briefly at the end of your test and the final detailed report will be provided to your referring physician, and/or primary care physician and any other parties you requested within 1-2 days of the examination. You may wish to contact your referring provider after a few days to determine what they would like you to do next. 6.  Please call [phone] with any questions or concerns and if you develop increased body temperature/fever, swelling, tenderness, increased pain and/or drainage from the sites where the needle was placed. Thank you for choosing us for your health care needs.

## 2021-07-01 NOTE — PROGRESS NOTES
[facility]  Electrodiagnostic Laboratory  *Accredited by the [address] [place] [address] with exemplary status  [address] [place]  Phone: [phone]  Fax: [phone]      Date of Examination: 07/01/21  Patient Name: Luis Elizondo  is a 78y.o. year old male who was seen due to complaints of   Chief Complaint   Patient presents with    Extremity Pain     lower back pain down the leg. pain in feet up to knee. right is worse than left. 8-9/10 pain. 2+ years of symp.  Numbness     numbness and tingling from knees down to feet.  Extremity Weakness     decrease strength in legs. Physical Exam: MSK: There is no joint effusion, deformity, instability, swelling, erythema or warmth. AROM is full in the spine and extremities. + SLR bilaterally. Neurologic:  Distal sensorimotor deficit. Reflexes 2+ and symmetric except bilateral achilles negative. Gait is normal.    Impression:     1. Chronic pain syndrome    2. Bilateral leg numbness        Plan:   · EMG is indicated to evaluate the above diagnosis. 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 The patient was educated about the diagnosis and the prognosis. · A work up for the cause of peripheral polyneuropathy. This pattern of neuropathy in a diabetic patient is most likely due to diabetes. · Advised patient to follow up with referring provider. Thank you for allowing me to participate in the care of your patient.       Sincerely,     Marbella Briggs, DO

## 2021-07-01 NOTE — PROGRESS NOTES
[address] [facility]  Electrodiagnostic Laboratory  *Accredited by the [address] [place] [address] with exemplary status  [address] Jad Mcfarlane  Phone: [phone]  Fax: [phone]    Referring Provider: Nena Craig DO  Primary Care Physician: DO Dawson  Patient Name: Ebony Freitas  Patient YOB: 1940  Gender: male  BMI: Body mass index is 24.75 kg/m². Height 5' 7\" (1.702 m), weight 158 lb (71.7 kg). 7/1/2021    Description of clinical problem:   Chief Complaint   Patient presents with    Extremity Pain     lower back pain down the leg. pain in feet up to knee. right is worse than left. 8-9/10 pain. 2+ years of symp.  Numbness     numbness and tingling from knees down to feet.  Extremity Weakness     decrease strength in legs. Sensory NCS      Nerve / Sites Rec. Site Peak Lat PP Amp Segments Distance Velocity Temp.      ms µV  cm m/s °C   R Sural - Ankle (Calf)      Calf Ankle 1.98 2.7* Calf - Ankle 14 87 32   R Superficial peroneal - Ankle      Lat leg Ankle 3.02 3.2 Lat leg - Ankle 10 39 32   L Superficial peroneal - Ankle      Lat leg Ankle 2.92 4.4 Lat leg - Ankle 10 42 31.3       Motor NCS      Nerve / Sites Muscle Onset Amplitude Segments Distance Velocity Temp.     ms mV  cm m/s °C   R Peroneal - EDB      Ankle EDB 4.01 4.6 Ankle - EDB 8  32      Fib head EDB 10.94 4.4 Fib head - Ankle 28 40 32.1      Above Knee EDB 13.07 3.6 Above Knee - Fib head 10 47 31.1   L Peroneal - EDB      Ankle EDB 4.95 4.4 Ankle - EDB 8  31.5      Fib head EDB 10.89 4.2 Fib head - Ankle 28 47 31.6      Above Knee EDB 13.70 3.9 Above Knee - Fib head 10 36 31.6   R Tibial - AH      Ankle AH 3.80 10.3 Ankle - AH 8  32      Pop fossa AH 14.17 7.7 Pop fossa - Ankle 41 40 32.1       F  Wave      Nerve Fmin % F    ms %   R Peroneal - EDB 53.85 100   R Tibial - AH 59.22* 100   L Peroneal - EDB 54.53 90       H Reflex      Nerve H Lat    ms   R Tibial - [place] 33.02   L Tibial - [place] 33.49       EMG      EMG Summary Table     Spontaneous MUAP Recruitment   Muscle Nerve Roots IA Fib PSW Fasc Amp Dur. PPP Pattern   L. Vastus medialis Femoral L2-L4 N None None None N N N N   L. Tibialis anterior Deep peroneal (Fibular) L4-L5 2+, myotonic discharges 2+ 2+ None N N N Reduced   L. Gastrocnemius (Medial head) Tibial S1-S2 2+,  myotonic discharges 1+ 1+ None N N N N   L. Extensor hallucis longus Deep peroneal (Fibular) L5-S1 N None None None N N N N   R. Vastus medialis Femoral L2-L4 N None None None N N N N   R. Tibialis anterior Deep peroneal (Fibular) L4-L5 N 2+ 2+ None N N N N   R. Gastrocnemius (Medial head) Tibial S1-S2 N 2+ 2+ None N N N N   R. Extensor hallucis longus Deep peroneal (Fibular) L5-S1 N None None None N N N N   R. Gluteus migel Inferior gluteal L5-S2 N None None None N N N N   R. Gluteus medius Superior gluteal L4-S1 N None None None N N N N   R. Lumbar paraspinals (low)  - N 2+ 2+ None N N N N   R. Lumbar paraspinals (mid)  - N None None None N N N N   L. Lumbar paraspinals (low)  - N 2+ 2+ None N N N N   L. Lumbar paraspinals (mid)  - N None None None N N N N   L. Gluteus migel Inferior gluteal L5-S2 N None None None N N N N   L. Gluteus medius Superior gluteal L4-S1 N None None None N N N N        Study Limitations:  none    Summary of Findings:   Nerve conduction studies:   · The following nerve conduction studies were abnormal:   · The right sural sensory amplitude was mildly small. · The left tibial F wave was prolonged  · All other nerve conduction studies listed in the table above were normal in latency, amplitude and conduction velocity. Needle EMG:   · Needle EMG was performed using a concentric needle. · The following abnormalities were seen on needle EMG: increased insertional activity including myotonic discharges were present it the left medial gastrocnemius and tibialis anterior.   Myotonic discharges can be seen in chronic denervation and given that they were focal and not widespread it is likely due to denervation in this case and not due to myotonic dystrophy. There was also positive sharp waves and fibrillation potentials in distal lower extremity muscles with decreased motor unit recruitment. Lumbar paraspinal positive sharp waves and fibrillation potentials were also present.  All other muscles tested, as listed in the table above demonstrated normal amplitude, duration, phases and recruitment and no active denervation signs were seen. Diagnostic Interpretation: This study was complexly abnormal.   Electrodiagnosis: Myotonic discharges can be seen in chronic denervation and given that they were focal and not widespread the myotonic discharges are likely due to denervation in this case and not due to myotonic dystrophy. The active denervations observed in the lumbar paraspinals were not considered in the electrodiangosis due to the patient's history of lumbar spine surgery. Denervation potentials in the paraspinals can persist indefinitely following spine surgery and are not necessarily an indication of pathophysiology. 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 The polyneuropathy is mild, sensorimotor, axonal, subacute with active denervation signs. The prognosis for recovery is poor given the axonal nature. Previous Study: no      Follow up EMG is recommended if clinically warranted. Technologist: Naun Vergara  Physician:    Delaney Mcclure D.O., P.T.   Board Certified Physical Medicine and Rehabilitation  Board Certified Electrodiagnostic Medicine      Nerve conduction studies and electromyography were performed according to our laboratory policies and procedures which can be provided upon request. All abnormal values are identified in the table.  Laboratory normal values can also be provided upon request.       Cc: DO Ferny Alcantar DO

## 2021-07-20 ENCOUNTER — OFFICE VISIT (OUTPATIENT)
Dept: PAIN MANAGEMENT | Age: 81
End: 2021-07-20
Payer: MEDICARE

## 2021-07-20 ENCOUNTER — PREP FOR PROCEDURE (OUTPATIENT)
Dept: PAIN MANAGEMENT | Age: 81
End: 2021-07-20

## 2021-07-20 VITALS
RESPIRATION RATE: 16 BRPM | DIASTOLIC BLOOD PRESSURE: 70 MMHG | WEIGHT: 158 LBS | OXYGEN SATURATION: 98 % | SYSTOLIC BLOOD PRESSURE: 122 MMHG | BODY MASS INDEX: 24.8 KG/M2 | HEIGHT: 67 IN | HEART RATE: 80 BPM | TEMPERATURE: 97.5 F

## 2021-07-20 DIAGNOSIS — M51.36 DDD (DEGENERATIVE DISC DISEASE), LUMBAR: ICD-10-CM

## 2021-07-20 DIAGNOSIS — M50.30 DDD (DEGENERATIVE DISC DISEASE), CERVICAL: Chronic | ICD-10-CM

## 2021-07-20 DIAGNOSIS — Z98.1 STATUS POST CERVICAL SPINAL FUSION: Chronic | ICD-10-CM

## 2021-07-20 DIAGNOSIS — N18.31 STAGE 3A CHRONIC KIDNEY DISEASE (HCC): ICD-10-CM

## 2021-07-20 DIAGNOSIS — M54.16 LUMBAR RADICULOPATHY: Primary | ICD-10-CM

## 2021-07-20 DIAGNOSIS — M48.02 NEURAL FORAMINAL STENOSIS OF CERVICAL SPINE: Chronic | ICD-10-CM

## 2021-07-20 DIAGNOSIS — M43.02 CERVICAL SPONDYLOLYSIS: Chronic | ICD-10-CM

## 2021-07-20 DIAGNOSIS — M47.817 LUMBOSACRAL SPONDYLOSIS WITHOUT MYELOPATHY: ICD-10-CM

## 2021-07-20 DIAGNOSIS — M96.1 CERVICAL POSTLAMINECTOMY SYNDROME: ICD-10-CM

## 2021-07-20 DIAGNOSIS — G89.4 CHRONIC PAIN SYNDROME: ICD-10-CM

## 2021-07-20 PROCEDURE — 4040F PNEUMOC VAC/ADMIN/RCVD: CPT | Performed by: NURSE PRACTITIONER

## 2021-07-20 PROCEDURE — 1123F ACP DISCUSS/DSCN MKR DOCD: CPT | Performed by: NURSE PRACTITIONER

## 2021-07-20 PROCEDURE — G8420 CALC BMI NORM PARAMETERS: HCPCS | Performed by: NURSE PRACTITIONER

## 2021-07-20 PROCEDURE — 99213 OFFICE O/P EST LOW 20 MIN: CPT | Performed by: NURSE PRACTITIONER

## 2021-07-20 PROCEDURE — 4004F PT TOBACCO SCREEN RCVD TLK: CPT | Performed by: NURSE PRACTITIONER

## 2021-07-20 PROCEDURE — G8427 DOCREV CUR MEDS BY ELIG CLIN: HCPCS | Performed by: NURSE PRACTITIONER

## 2021-07-20 RX ORDER — OXYCODONE HYDROCHLORIDE AND ACETAMINOPHEN 5; 325 MG/1; MG/1
0.5 TABLET ORAL 3 TIMES DAILY
Qty: 11 TABLET | Refills: 0 | Status: SHIPPED
Start: 2021-07-20 | End: 2021-07-23 | Stop reason: SDUPTHER

## 2021-07-20 RX ORDER — HYDROCODONE BITARTRATE AND ACETAMINOPHEN 5; 325 MG/1; MG/1
1 TABLET ORAL EVERY 12 HOURS PRN
Qty: 60 TABLET | Refills: 0 | Status: CANCELLED | OUTPATIENT
Start: 2021-07-20 | End: 2021-08-19

## 2021-07-20 NOTE — PROGRESS NOTES
[address] [facility], [address] [place] [address]  [phone]    Follow up Note      Belgica Cochran     Date of Visit:  7/20/2021    CC:  Patient presents for follow up neck and low back pain     Pertinent Information:  78 y. o.  pleasant gentleman with prior h/o cervical spine surgery- ACDF C3-7 having neck pain with features of myofacial pain and also facet pain. He has undergone lumbar spine surgery X 2 in the 1980's    HPI:  Pt here today for low back pain radiating down right lateral posterior leg to ankle that is gradually getting worse. Pt notes he is taking 2 norco at a time to help control pain. Discussed repeat LESI since last injection in Sept 2020 provided >70% relief 8-9 months. Pt agrees and will also notes tolerance to norco and will trial opiate rotation. Appropriate analgesia with current medications regimen: yes  Change in quality of symptoms: worsening low back   Medication side effects:none. Recent diagnostic testing:none. Recent interventional procedures: none     He is on ASA-81 mg. H/o CAD s/p stent.      IImaging studies:  Left Shoulder: 6/14/2020:      Impression   No acute osseous abnormality.       Degenerative changes as above.      XR Lumbar spine: 6/2020: Impression   Diffuse osteopenia with moderate to advanced degenerative changes of the   lumbar spine, as above.  No convincing evidence of an acute fracture.      CT cervical spine: 6/14/2020:      FINDINGS:   BONES/ALIGNMENT: No evidence of cervical fracture or traumatic subluxation.    Stable T3 wedge compression.  There has been fusion of C3 through C7.  There   is an anterior plate with screws at C3, C4, and C7.  There is a bone block   graft at C3-C4.  There is a block graft extending from C4 through C7.  The   hardware and grafts are intact.       DEGENERATIVE CHANGES: Degenerative change at C1-C.  Degenerative disc disease   C7-T1.  Diffuse facet osteoarthritis with stable several mm anterolisthesis   of C7.       SOFT TISSUES: No prevertebral soft tissue swelling.  Maxillary sinusitis   changes noted           Impression   No acute abnormality of the cervical spine.          Xray thoracic spine: 6/14/2020:      Impression   Limited examination, as described above.  Within these limitations, no   convincing evidence of an acute fracture of the thoracic spine.             X ray L/S spine: 3/2018:      Lumbar spine: There is slight levoconvex curvature with normal lumbar lordosis. Vertebral bodies maintain normal height. There is trace retrolisthesis   of L2 on L3 and L3 on L4. There is trace anterolisthesis of L4 on L5. There is multilevel intervertebral disc space narrowing with   hypertrophic endplate changes, most pronounced at L4-L5. No acute   fractures identified. There is lower lumbar facet arthrosis.       Sacrum and coccyx:   Overlying stool obscures the and coccyx on the frontal projections. No   acute displaced sacral or coccygeal fractures identified. Bilateral   sacroiliac joints and the pubic symphysis are maintained. There is   lower lumbar spondylosis.         Impression       Lumbar spine: Moderate multilevel lumbar spondylosis.       Sacrum and coccyx:   Unremarkable sacrum and coccyx radiographs.         CT Lumbar Spine: 8/2018:      Findings: There are bilateral pars defects noted at L4. There is a   spondylolisthesis of L4 on L5. There is wedging of T12 which may be physiologic   Changes seen throughout the discs are abnormal. There are findings to   suggest  degenerative disc disease.       Changes seen throughout the bone marrow are abnormal. Findings are   compatible with degenerative disc disease       Changes within the facets are abnormal. Findings are compatible with   degenerative facet disease.           At L5-S1: There is a mild broad-based disc herniation or bony   spurring. There is a laminectomy defect. There is facet hypertrophy. FACET MEDIAL BRANCH BLOCK L3 L4 AND L5 DORSAL RAMI (CPT [id number]) performed by Mk Amezcua MD at [facility] N/A 7/21/2020    LUMBAR EPIDURAL STEROID INJECTION L4-L5 X-RAY performed by Mk Amezcua MD at [address]      x2 lumbar    CATARACT REMOVAL WITH IMPLANT Left 10/08/13    CATARACT REMOVAL WITH IMPLANT Right 12/10/13    CERVICAL SPINE SURGERY  2012    cervical fusion    COLON SURGERY      due to diverticulitis colon resection    CORONARY ANGIOPLASTY WITH STENT PLACEMENT  2005    x2 stents    DILATATION, ESOPHAGUS      FRACTURE SURGERY      Lt. Jaw Plate    NERVE BLOCK Left 3/7/16    cervical transforaminal #1    NERVE BLOCK Left 3/28/16    cervical transforaminal #2    NERVE BLOCK Left 04 04 2016    transforaminal nerve block left cervical #3    NERVE BLOCK Left 07/25/2016    shoulder    NERVE BLOCK Left 08/15/2016    left shoulder injection #2    NERVE BLOCK Right 08/22/2016    shoulder    NERVE BLOCK Right 08/29/2016    shoulder injection #2    NERVE BLOCK Bilateral 09/10/2019    BILATERAL LUMBAR FACET MEDIAL BRANCH NERVE BLOCK AT L3,L4 MEDIAL BRANCH AND L5 DORSAL RAMI    NERVE BLOCK Bilateral 10/29/2019    lumbar facet, meidal branch    NERVE BLOCK N/A 07/21/2020    Lumbar epidural steriod injection L4-L5    NERVE BLOCK Right 09/15/2020    transforaminal L4,5    NERVE BLOCK Right 9/15/2020    TRANSFORAMINAL EPIDURAL STEROID INJECTION RIGHT L4 AND L5 UNDER FLUOROSCOPIC GUIDANCE (CPT 50581,19477) performed by Mk Amezcua MD at [address] [place] [address]. Left 10/23/2018    LEFT CLAVICLE OPEN REDUCTION INTERNAL FIXATION performed by Princess Barrow DO at [address] [place] [address] Left 101/10/2014    repair left hand, finger laceration    OTHER SURGICAL HISTORY Bilateral 01/05/2021    lumbar facet medial branch dorsal rami radiofrequency    PAIN MANAGEMENT PROCEDURE Bilateral 1/5/2021    BILATERAL LUMBAR FACET L3,L4 MEDIAL BRANCH L5 DORSAL RAMI RADIOFREQUENCY ABLATION WITH IV SEDATION (CPT 81287,72813) performed by Jeannie Burnette MD at [place] [id number] Left 8/6/2018    LEFT SIDED VIDEO ASSISTED THORACOSCOPY WITH RIB PLATING performed by Ana Lilia Sears MD at [address] d/t a fall    RADIOFREQUENCY ABLATION NERVES Bilateral 10/29/2019    BILATERAL LUMBAR FACET L3 L4 MEDIAL BRANCH  L5 DORSAL RAMI  RADIOFREQUENCY ABLATION SEDATION (CPT [id number]) performed by Jeannie Burnette MD at [facility] Bilateral     SHOULDER ARTHROSCOPY Right 12/08/2016    repair rotator cuff, bursectomy, debridement glenohumerol     SKIN BIOPSY         Prior to Admission medications    Medication Sig Start Date End Date Taking? Authorizing Provider   HYDROcodone-acetaminophen (NORCO) 5-325 MG per tablet Take 1 tablet by mouth every 12 hours as needed for Pain for up to 30 days.  6/24/21 7/24/21  Kaylee Almeida, APRN - CNP   citalopram (CELEXA) 40 MG tablet TAKE 1 TABLET BY MOUTH EVERY DAY 6/1/21   Arnaud Arreola,    sucralfate (CARAFATE) 1 GM tablet Take 1 tablet by mouth 2 times daily 6/1/21   Arnaud Arreola, DO   pantoprazole (PROTONIX) 40 MG tablet TAKE 1 TABLET BY MOUTH EVERY DAY IN THE MORNING BEFORE BREAKFAST 4/1/21   Hamshire, DO   donepezil (ARICEPT) 5 MG tablet Take 1 tablet by mouth nightly 3/22/21   Hamshire, DO   levothyroxine (SYNTHROID) 25 MCG tablet Take 1.5 tablets by mouth daily 2/10/21 6/10/21  Hamshire, DO   simvastatin (ZOCOR) 20 MG tablet Take 1 tablet by mouth nightly 2/10/21   Hamshire, DO   famotidine (PEPCID) 40 MG tablet Take 1 tablet by mouth every evening 2/10/21   Arnaud Arreola, DO   linaGLIPtin-metFORMIN HCl ER (JENTADUETO XR) 2.5-1000 MG TB24 Take 1 tablet by mouth 2 times daily 2/10/21   Arnaud Arreola, DO   Lidocaine (ZTLIDO) 1.8 % PTCH 12 hours on 12 hours off  Patient taking differently: Apply 1 patch topically daily as needed 12 hours on 12 hours off to back 21   Robert Vaughan, APRN - CNP   vitamin B-12 (CYANOCOBALAMIN) 1000 MCG tablet Take 0.5 tablets by mouth daily 10/16/19   Jae Denise DO   ferrous sulfate 325 (65 Fe) MG tablet Take 325 mg by mouth daily (with breakfast)    Historical Provider, MD   meclizine (ANTIVERT) 25 MG tablet Take 25 mg by mouth 3 times daily as needed    Historical Provider, MD   vitamin D (CHOLECALCIFEROL) 1000 UNIT TABS tablet Take 1,000 Units by mouth daily    Historical Provider, MD   aspirin EC 81 MG EC tablet Take 81 mg by mouth daily    Historical Provider, MD       Allergies   Allergen Reactions    Sulfa Antibiotics Hives       Social History     Socioeconomic History    Marital status:      Spouse name: Not on file    Number of children: Not on file    Years of education: Not on file    Highest education level: Not on file   Occupational History    Not on file   Tobacco Use    Smoking status: Former Smoker     Years: 40.00     Types: Cigarettes     Quit date: 3/19/1991     Years since quittin.3    Smokeless tobacco: Current User     Types: Chew   Vaping Use    Vaping Use: Never used   Substance and Sexual Activity    Alcohol use: No    Drug use: No    Sexual activity: Not Currently   Other Topics Concern    Not on file   Social History Narrative    ** Merged History Encounter **          Social Determinants of Health     Financial Resource Strain:     Difficulty of Paying Living Expenses:    Food Insecurity:     Worried About Running Out of Food in the Last Year:     Ran Out of Food in the Last Year:    Transportation Needs:     Lack of Transportation (Medical):      Lack of Transportation (Non-Medical):    Physical Activity:     Days of Exercise per Week:     Minutes of Exercise per Session:    Stress:     Feeling of Stress :    Social Connections:     Frequency of Communication with Friends and Family:     Frequency of Social Gatherings with Friends and Family:     Attends Je[employer] witness Services:     Active Member of Clubs or Organizations:     Attends Club or Organization Meetings:     Marital Status:    Intimate Partner Violence:     Fear of Current or Ex-Partner:     Emotionally Abused:     Physically Abused:     Sexually Abused:        Family History   Problem Relation Age of Onset    Diabetes Mother        REVIEW OF SYSTEMS:     Jennifer Rater denies fever/chills, chest pain, shortness of breath, new bowel or bladder complaints or suicidal ideations. All other review of systems wasnegative. Review of Systems    PHYSICAL EXAMINATION:      General:    General appearance:  Pleasant and well-hydrated, in mild to moderate distress and A & O x3  Build: Normal WeightFunction:Rises from a seated position with some difficulty.     HEENT:   Head:normocephalic, atraumatic  Pupils:regular, round, equalSclera: icterus absent     Lungs:   Breathing:normal breathing pattern      CVS:  clear and non labored      Abdomen:  Shape:non-distended and normal  Tenderness:none  Guarding:none     Cervical spine:  Inspection:normal  Palpation: tenderness over lower cervical paraspinal muscles, tenderness trapezium, left positive. Range of motion: limitations of ROM noted. Facet tenderness noted over the mid and lower cervical facets.     Lumbar Spine:   Scar from the prior surgery noted, healed well, ROM reduced, Lumbar facet loading + bilaterally, Sensory and motor in b/l LE intact, DTR;'s in b/l LE equal.    Musculoskeletal:   Trigger points noted over the cervical paraspinal muscle, trapezius muscles mainly on the left side. He also has tenderness over the left occipital nerve.     Extremities:   Yes dorsal aspect of thumb to left hand and left wrist     Neurological:  Sensory: normal to light touch   Motor: No focal deficits, generalized weakness noted.   Reflexes: B/l equal  Gait:normal     Dermatology:     Skin:no rashes or lesions noted    Assessment/Plan:.      1. DDD (degenerative disc disease), lumbar      2. Lumbosacral spondylosis without myelopathy      3. Spinal stenosis of lumbar region, unspecified whether neurogenic claudication present      4. Postlaminectomy syndrome, lumbar      5. Cervical postlaminectomy syndrome      6. DDD (degenerative disc disease), cervical      7. Cervical spondylolysis        09/2020:  Lumbar LUCILA/ TFESI had helped the lower extremity pain significantly, >70% relief         Plan:   Followed for neck pain with myofascial component and axial low back pain primarily axial   BILATERAL Lumbar RFA >50% relief in Jan 2021,  DC Norco tolerance noted, last dose yesterday   Trial Percocet 5/325mg po 1/2 tab po bid same MME  Schedule for repeat Transforaminal epidural steroid injection Right L4 and L5 without sedation   Buccal today for compliance, last done 10/2020  OARRS consistent   He is on ASA-81 mg. H/o CAD s/p stent.   Continue with HEP as tolerated        We discussed with the patient that combining opioids, benzodiazepines, alcohol, illicit drugs or sleep aids increases the risk of respiratory depression including death. We discussed that these medications may cause drowsiness, sedation or dizziness and have counseled the patient not to drive or operate machinery. We have discussed that these medications will be prescribed only by one provider. We have discussed with the patient about age related risk factors and have thoroughly discussed the importance of taking these medications as prescribed. The patient verbalizes understanding.       ccreferring physic    Electronically signed by VIKRAM Hartley CNP on 7/20/21 at 10:29 AM EST

## 2021-07-20 NOTE — PROGRESS NOTES
Do you currently have any of the following:    Fever: No  Headache:  No  Cough: No  Shortness of breath: No  Exposed to anyone with these symptoms: No                                                                                                                Kalen Molina presents to the [facility] on 7/20/2021. Emma Guerra is complaining of pain in his lower back. . The pain is constant. The pain is described as aching, throbbing, shooting, stabbing and sharp. Pain is rated on his best day at a 4, on his worst day at a 9, and on average at a 6 on the VAS scale. He took his last dose of Neftali Cola does not have issues with constipation. Any procedures since your last visit: No,    He is not on NSAIDS and  is  on anticoagulation medications to include ASA and is managed by Lori Baez DO  . Pacemaker or defibrillator: No Physician managing device is NA. Medication Contract and Consent for Opioid Use Documents Filed     Patient Documents       Type of Document Status Date Received Received By Description     Medication Contract Received 3/15/2019  1:18 PM AALIYAH HOPE PAIN MANAGEMENT PT AGREEMENT 3/15/2019     Medication Contract Received 2/12/2020  3:12 PM Emma RIBERA pain management patient agreement 02/12/2020                   /70   Pulse 80   Temp 97.5 °F (36.4 °C) (Infrared)   Resp 16   Ht 5' 7\" (1.702 m)   Wt 158 lb (71.7 kg)   SpO2 98%   BMI 24.75 kg/m²      No LMP for male patient.

## 2021-07-23 DIAGNOSIS — M50.30 DDD (DEGENERATIVE DISC DISEASE), CERVICAL: Chronic | ICD-10-CM

## 2021-07-23 DIAGNOSIS — N18.31 STAGE 3A CHRONIC KIDNEY DISEASE (HCC): ICD-10-CM

## 2021-07-23 DIAGNOSIS — G89.4 CHRONIC PAIN SYNDROME: ICD-10-CM

## 2021-07-23 DIAGNOSIS — M43.02 CERVICAL SPONDYLOLYSIS: Chronic | ICD-10-CM

## 2021-07-23 DIAGNOSIS — M47.817 LUMBOSACRAL SPONDYLOSIS WITHOUT MYELOPATHY: ICD-10-CM

## 2021-07-23 DIAGNOSIS — M48.02 NEURAL FORAMINAL STENOSIS OF CERVICAL SPINE: Chronic | ICD-10-CM

## 2021-07-23 DIAGNOSIS — M51.36 DDD (DEGENERATIVE DISC DISEASE), LUMBAR: ICD-10-CM

## 2021-07-23 DIAGNOSIS — Z98.1 STATUS POST CERVICAL SPINAL FUSION: Chronic | ICD-10-CM

## 2021-07-23 DIAGNOSIS — M96.1 CERVICAL POSTLAMINECTOMY SYNDROME: ICD-10-CM

## 2021-07-23 RX ORDER — OXYCODONE HYDROCHLORIDE AND ACETAMINOPHEN 5; 325 MG/1; MG/1
0.5 TABLET ORAL 3 TIMES DAILY
Qty: 35 TABLET | Refills: 0 | Status: SHIPPED | OUTPATIENT
Start: 2021-07-27 | End: 2021-08-19

## 2021-07-27 ENCOUNTER — OFFICE VISIT (OUTPATIENT)
Dept: FAMILY MEDICINE CLINIC | Age: 81
End: 2021-07-27
Payer: MEDICARE

## 2021-07-27 VITALS
OXYGEN SATURATION: 95 % | HEIGHT: 67 IN | BODY MASS INDEX: 24.96 KG/M2 | SYSTOLIC BLOOD PRESSURE: 120 MMHG | TEMPERATURE: 97.6 F | DIASTOLIC BLOOD PRESSURE: 70 MMHG | WEIGHT: 159 LBS | HEART RATE: 75 BPM | RESPIRATION RATE: 16 BRPM

## 2021-07-27 DIAGNOSIS — G62.9 PERIPHERAL POLYNEUROPATHY: ICD-10-CM

## 2021-07-27 DIAGNOSIS — E11.40 TYPE 2 DIABETES MELLITUS WITH DIABETIC NEUROPATHY, WITHOUT LONG-TERM CURRENT USE OF INSULIN (HCC): ICD-10-CM

## 2021-07-27 DIAGNOSIS — M51.36 DDD (DEGENERATIVE DISC DISEASE), LUMBAR: ICD-10-CM

## 2021-07-27 DIAGNOSIS — M54.81 CERVICO-OCCIPITAL NEURALGIA: ICD-10-CM

## 2021-07-27 DIAGNOSIS — E11.22 TYPE 2 DIABETES MELLITUS WITH STAGE 3B CHRONIC KIDNEY DISEASE, WITHOUT LONG-TERM CURRENT USE OF INSULIN (HCC): Primary | ICD-10-CM

## 2021-07-27 DIAGNOSIS — M96.1 CERVICAL POSTLAMINECTOMY SYNDROME: ICD-10-CM

## 2021-07-27 DIAGNOSIS — N18.32 TYPE 2 DIABETES MELLITUS WITH STAGE 3B CHRONIC KIDNEY DISEASE, WITHOUT LONG-TERM CURRENT USE OF INSULIN (HCC): Primary | ICD-10-CM

## 2021-07-27 PROCEDURE — 99214 OFFICE O/P EST MOD 30 MIN: CPT | Performed by: FAMILY MEDICINE

## 2021-07-27 PROCEDURE — 4040F PNEUMOC VAC/ADMIN/RCVD: CPT | Performed by: FAMILY MEDICINE

## 2021-07-27 PROCEDURE — 4004F PT TOBACCO SCREEN RCVD TLK: CPT | Performed by: FAMILY MEDICINE

## 2021-07-27 PROCEDURE — G8427 DOCREV CUR MEDS BY ELIG CLIN: HCPCS | Performed by: FAMILY MEDICINE

## 2021-07-27 PROCEDURE — G8420 CALC BMI NORM PARAMETERS: HCPCS | Performed by: FAMILY MEDICINE

## 2021-07-27 PROCEDURE — 1123F ACP DISCUSS/DSCN MKR DOCD: CPT | Performed by: FAMILY MEDICINE

## 2021-07-27 RX ORDER — VITAMIN B COMPLEX
1 CAPSULE ORAL DAILY
Qty: 30 CAPSULE | Refills: 2 | Status: SHIPPED
Start: 2021-07-27 | End: 2021-10-18

## 2021-07-27 NOTE — PROGRESS NOTES
2021     Vijay Montes (:  1940) is a 78 y.o. male, with a:  Past Medical History:   Diagnosis Date    Accident caused by farm tractor     Arthritis     CAD (coronary artery disease)     Chronic pain     Clavicle fracture     Concussion with no loss of consciousness     Depression     Diabetes mellitus ([facility]     GERD (gastroesophageal reflux disease)     Headache(784.0)     History of blood transfusion     Hyperlipidemia     Kidney stone     Laceration of flexor tendon of hand, not in \"no man's land\" 10/10/2014    Multiple closed fractures of ribs of left side     Neck pain     Osteoarthritis     Rib fracture     SBO (small bowel obstruction) ([facility] 2019    Skin cancer     Thyroid disease     Trauma 2018    Traumatic hemopneumothorax, initial encounter        Here for evaluation of the following medical concerns:  Chief Complaint   Patient presents with    Follow-up     had EMG and labs completed 2021     He also follows with pain management, ENT, cardiology, GI    F/U of chronic problem(s) and recent complaint of lower extremity numbness   Chronic problems reviewed today include:  DM, CAD, HLD, Hypothyroidism, chronic pain  Current status of this/these condition(s):  stable  Tolerating meds:         Yes    Diabetes Mellitus Type 2   Current treatment: linagliptin-metformin 2.5-1000 twice daily  Recent medication changes: none  Last HbA1c: 6.0      Lab Results   Component Value Date    LABA1C 6.0 (H) 2021    LABA1C 6.1 (H) 2021    LABA1C 5.8 (H) 2020     Lab Results   Component Value Date    LABMICR 25.0 (H) 10/09/2019    CREATININE 1.6 (H) 2021     Lab Results   Component Value Date    ALT 15 2021    AST 24 2021     Lab Results   Component Value Date    CHOL 145 2021    TRIG 173 (H) 2021    HDL 39 2021    LDLCALC 71 2021        Hyperlipidemia / CAD  Current treatment: Simvastatin 20 mg daily and aspirin 81 mg daily  Recent medication changes: None   S/p PCI years prior  Following with Cardiology    BP Readings from Last 3 Encounters:   07/27/21 120/70   07/20/21 122/70   06/23/21 124/70                                          Sodium (mmol/L)   Date Value   07/01/2021 140    BUN (mg/dL)   Date Value   07/01/2021 23    Glucose (mg/dL)   Date Value   07/01/2021 131 (H)      Potassium (mmol/L)   Date Value   07/01/2021 4.7     Potassium reflex Magnesium (mmol/L)   Date Value   04/24/2019 4.2    CREATININE (mg/dL)   Date Value   07/01/2021 1.6 (H)         Hypothyroidism  Current treatment: Levothyroxine 37.5 mg daily  Recent medication changes: none    No results found for: TSHREFLEX  Lab Results   Component Value Date    TSH 2.570 07/01/2021    TSH 2.300 01/28/2021    TSH 1.600 05/28/2020     Chronic pain due to cervical and lumbar DJD  History of multiple surgical interventions in the past to his cervical and lumbar spine  Following with pain management  Recently had his medication adjusted and recent symptoms have improved    EMG 7/1/21:  Procedures    CO NEEDLE EMG EA EXTREMTY W/PARASPINL AREA COMPLETE    CO MOTOR &/SENS 5-6 NRV CNDJ PRECONF ELTRODE LIMB      · EMG was done today and showed peripheral polyneuropathy. The patient was educated about the diagnosis and the prognosis. · A work up for the cause of peripheral polyneuropathy. This pattern of neuropathy in a diabetic patient is most likely due to diabetes. · Advised patient to follow up with referring provider.        EGD 4/27/2021 with small hiatal hernia  Colonoscopy 5/3/2021 with colonic polyp removal x 3 (tubular adenomas), mild right-sided diverticulosis, small internal hemorrhoids, and evidence of previous sigmoid resection and recommendation for repeat in 3 years    Review of Systems   Constitutional: Negative for chills and fever. Respiratory: Negative for shortness of breath. Cardiovascular: Negative for chest pain. Gastrointestinal: Negative for nausea and vomiting. Genitourinary: Negative for dysuria. Musculoskeletal: Positive for arthralgias, back pain and gait problem. Neurological: Positive for numbness (improved). Negative for headaches. Prior to Visit Medications    Medication Sig Taking? Authorizing Provider   oxyCODONE-acetaminophen (PERCOCET) 5-325 MG per tablet Take 0.5 tablets by mouth three times daily for 23 days. Intended supply: 23 days.  Take lowest dose possible to manage pain Yes Luke Garcia, APRN - CNP   citalopram (CELEXA) 40 MG tablet TAKE 1 TABLET BY MOUTH EVERY DAY Yes Arnaud Arreola,    sucralfate (CARAFATE) 1 GM tablet Take 1 tablet by mouth 2 times daily Yes Arnaud Arreola DO   pantoprazole (PROTONIX) 40 MG tablet TAKE 1 TABLET BY MOUTH EVERY DAY IN THE MORNING BEFORE BREAKFAST Yes Caroline Denson, DO   donepezil (ARICEPT) 5 MG tablet Take 1 tablet by mouth nightly Yes Arnaud Arreola DO   levothyroxine (SYNTHROID) 25 MCG tablet Take 1.5 tablets by mouth daily Yes Arnaud Arreola DO   simvastatin (ZOCOR) 20 MG tablet Take 1 tablet by mouth nightly Yes Arnaud Arreola DO   famotidine (PEPCID) 40 MG tablet Take 1 tablet by mouth every evening Yes Arnaud Arreola DO   linaGLIPtin-metFORMIN HCl ER (JENTADUETO XR) 2.5-1000 MG TB24 Take 1 tablet by mouth 2 times daily Yes Arnaud Arreola,    vitamin B-12 (CYANOCOBALAMIN) 1000 MCG tablet Take 0.5 tablets by mouth daily Yes Jae Denise, DO   ferrous sulfate 325 (65 Fe) MG tablet Take 325 mg by mouth daily (with breakfast) Yes Historical Provider, MD   meclizine (ANTIVERT) 25 MG tablet Take 25 mg by mouth 3 times daily as needed Yes Historical Provider, MD   vitamin D (CHOLECALCIFEROL) 1000 UNIT TABS tablet Take 1,000 Units by mouth daily Yes Historical Provider, MD   aspirin EC 81 MG EC tablet Take 81 mg by mouth daily Yes Historical Provider, MD   Lidocaine (ZTLIDO) 1.8 % PTCH 12 hours on 12 hours off  Patient taking differently: Apply 1 patch topically daily as needed 12 hours on 12 hours off to back  Kaylee Almeida, APRN - CNP        Social History     Tobacco Use    Smoking status: Former Smoker     Years: 40.00     Types: Cigarettes     Quit date: 3/19/1991     Years since quittin.3    Smokeless tobacco: Current User     Types: Chew   Substance Use Topics    Alcohol use: No        Past Surgical History:   Procedure Laterality Date    ANESTHESIA NERVE BLOCK Bilateral 9/10/2019    BILATERAL LUMBAR FACET MEDIAL BRANCH BLOCK L3 L4 AND L5 DORSAL RAMI (CPT L4439930) performed by Jeannie Burnette MD at [address] N/A 2020    LUMBAR EPIDURAL STEROID INJECTION L4-L5 X-RAY performed by Jeannie Burnette MD at [address]      x2 lumbar    CATARACT REMOVAL WITH IMPLANT Left 10/08/13    CATARACT REMOVAL WITH IMPLANT Right 12/10/13    CERVICAL SPINE SURGERY      cervical fusion    COLON SURGERY      due to diverticulitis colon resection    CORONARY ANGIOPLASTY WITH STENT PLACEMENT  2005    x2 stents    DILATATION, ESOPHAGUS      FRACTURE SURGERY      Lt. Jaw Plate    NERVE BLOCK Left 3/7/16    cervical transforaminal #1    NERVE BLOCK Left 3/28/16    cervical transforaminal #2    NERVE BLOCK Left 2016    transforaminal nerve block left cervical #3    NERVE BLOCK Left 2016    shoulder    NERVE BLOCK Left 08/15/2016    left shoulder injection #2    NERVE BLOCK Right 2016    shoulder    NERVE BLOCK Right 2016    shoulder injection #2    NERVE BLOCK Bilateral 09/10/2019    BILATERAL LUMBAR FACET MEDIAL BRANCH NERVE BLOCK AT L3,L4 MEDIAL BRANCH AND L5 DORSAL RAMI    NERVE BLOCK Bilateral 10/29/2019    lumbar facet, meidal branch    NERVE BLOCK N/A 2020    Lumbar epidural steriod injection L4-L5    NERVE BLOCK Right 09/15/2020    transforaminal L4,5    NERVE BLOCK Right 9/15/2020    TRANSFORAMINAL EPIDURAL STEROID INJECTION RIGHT L4 AND L5 UNDER FLUOROSCOPIC GUIDANCE (CPT 63119,97699) performed by Adolfo Singh MD at [facility]. Left 10/23/2018    LEFT CLAVICLE OPEN REDUCTION INTERNAL FIXATION performed by Josr Amin DO at [facility] Left 101/10/2014    repair left hand, finger laceration    OTHER SURGICAL HISTORY Bilateral 01/05/2021    lumbar facet medial branch dorsal rami radiofrequency    PAIN MANAGEMENT PROCEDURE Bilateral 1/5/2021    BILATERAL LUMBAR FACET L3,L4 MEDIAL BRANCH L5 DORSAL RAMI RADIOFREQUENCY ABLATION WITH IV SEDATION (CPT 10667,67152) performed by Adolfo Singh MD at [place] [address] Left 8/6/2018    LEFT SIDED VIDEO ASSISTED THORACOSCOPY WITH RIB PLATING performed by Merry Newman MD at [address] [place] [address] d/t a fall    RADIOFREQUENCY ABLATION NERVES Bilateral 10/29/2019    BILATERAL LUMBAR FACET L3 L4 MEDIAL BRANCH  L5 DORSAL RAMI  RADIOFREQUENCY ABLATION SEDATION (CPT [id number]) performed by Adolfo Singh MD at [facility] Bilateral     SHOULDER ARTHROSCOPY Right 12/08/2016    repair rotator cuff, bursectomy, debridement glenohumerol     SKIN BIOPSY         Vitals:    07/27/21 1409   BP: 120/70   Pulse: 75   Resp: 16   Temp: 97.6 °F (36.4 °C)   TempSrc: Temporal   SpO2: 95%   Weight: 159 lb (72.1 kg)   Height: 5' 7\" (1.702 m)     Estimated body mass index is 24.9 kg/m² as calculated from the following:    Height as of this encounter: 5' 7\" (1.702 m). Weight as of this encounter: 159 lb (72.1 kg). Physical Exam  Constitutional:       General: He is not in acute distress. Appearance: He is well-developed. HENT:      Head: Normocephalic and atraumatic. Eyes:      Extraocular Movements: Extraocular movements intact. Conjunctiva/sclera: Conjunctivae normal.   Cardiovascular:      Rate and Rhythm: Normal rate and regular rhythm.    Pulmonary: Effort: Pulmonary effort is normal. No respiratory distress. Breath sounds: Normal breath sounds. No wheezing, rhonchi or rales. Abdominal:      General: There is no distension. Musculoskeletal:      Right lower leg: No edema. Left lower leg: No edema. Neurological:      Mental Status: He is alert. Mental status is at baseline. ASSESSMENT/PLAN:  Enzo Barnhart was seen today for follow-up. Diagnoses and all orders for this visit:    Type 2 diabetes mellitus with stage 3b chronic kidney disease, without long-term current use of insulin ([address]  -     b complex vitamins capsule; Take 1 capsule by mouth daily    Type 2 diabetes mellitus with diabetic neuropathy, without long-term current use of insulin ([address]  -     b complex vitamins capsule; Take 1 capsule by mouth daily    Peripheral polyneuropathy  -     b complex vitamins capsule; Take 1 capsule by mouth daily    Cervical postlaminectomy syndrome    Cervico-occipital neuralgia    DDD (degenerative disc disease), lumbar    Additional plan and future considerations:   As above. Recent labs and EMG reviewed in office. Continue current medications. Symptoms have improved, will monitor for now. Can discuss neuropathy treatment with pain management if symptoms return/worsen. RTO in 3 months or sooner if needed.     Future Appointments   Date Time Provider Lobito Frey   8/17/2021  2:00 PM VIKRAM Lara - CNP [facility]   9/1/2021 10:15 AM Talita Penaloza MD [facility]   10/28/2021 10:00 AM Emi Blas DO [facility]         --Emi Blas DO on 7/27/2021 at 2:29 PM

## 2021-07-28 ASSESSMENT — ENCOUNTER SYMPTOMS
NAUSEA: 0
SHORTNESS OF BREATH: 0
BACK PAIN: 1
VOMITING: 0

## 2021-08-11 ENCOUNTER — TELEPHONE (OUTPATIENT)
Dept: PAIN MANAGEMENT | Age: 81
End: 2021-08-11

## 2021-08-11 NOTE — TELEPHONE ENCOUNTER
8/11/2021-upon review of Rachid's chart, it is noted that he takes ASA . Medical clearance obtained from Dr. Mil Denise OK to hold ASA  for 7 days. Call to [facility], spoke to his wife, advised him to hold his ASA  for 7 days before his 08/17/2021 procedure, last dose to be 08/10/2021. he shows an understanding to not take it before his procedure. Instructed him that the surgery center should call him a few days before for the pre op call and after 3:00 PM the business day before with the arrival time. [facility] Avilez verbalized understanding.      COVID-19 symptom and exposure screening:    Fever: No  Headache:  No  Cough: No  Shortness of breath: No  Exposed to anyone with these symptoms: No     Di Hoyt RN  Pain Management

## 2021-08-17 ENCOUNTER — HOSPITAL ENCOUNTER (OUTPATIENT)
Age: 81
Setting detail: OUTPATIENT SURGERY
Discharge: HOME | End: 2021-08-17
Attending: ANESTHESIOLOGY | Admitting: ANESTHESIOLOGY
Payer: MEDICARE

## 2021-08-17 VITALS
OXYGEN SATURATION: 95 % | HEIGHT: 67 IN | SYSTOLIC BLOOD PRESSURE: 130 MMHG | BODY MASS INDEX: 25.11 KG/M2 | HEART RATE: 70 BPM | WEIGHT: 160 LBS | DIASTOLIC BLOOD PRESSURE: 68 MMHG | RESPIRATION RATE: 18 BRPM

## 2021-08-17 DIAGNOSIS — M48.061 NEUROFORAMINAL STENOSIS OF LUMBAR SPINE: ICD-10-CM

## 2021-08-17 PROCEDURE — 64484 NJX AA&/STRD TFRM EPI L/S EA: CPT | Performed by: ANESTHESIOLOGY

## 2021-08-17 PROCEDURE — 2500000003 HC RX 250 WO HCPCS: Performed by: ANESTHESIOLOGY

## 2021-08-17 PROCEDURE — 6360000002 HC RX W HCPCS: Performed by: ANESTHESIOLOGY

## 2021-08-17 PROCEDURE — 3600000015 HC SURGERY LEVEL 5 ADDTL 15MIN: Performed by: ANESTHESIOLOGY

## 2021-08-17 PROCEDURE — 2709999900 HC NON-CHARGEABLE SUPPLY: Performed by: ANESTHESIOLOGY

## 2021-08-17 PROCEDURE — 7100000011 HC PHASE II RECOVERY - ADDTL 15 MIN: Performed by: ANESTHESIOLOGY

## 2021-08-17 PROCEDURE — 64483 NJX AA&/STRD TFRM EPI L/S 1: CPT | Performed by: ANESTHESIOLOGY

## 2021-08-17 PROCEDURE — 6360000004 HC RX CONTRAST MEDICATION: Performed by: ANESTHESIOLOGY

## 2021-08-17 PROCEDURE — 7100000010 HC PHASE II RECOVERY - FIRST 15 MIN: Performed by: ANESTHESIOLOGY

## 2021-08-17 PROCEDURE — 3600000005 HC SURGERY LEVEL 5 BASE: Performed by: ANESTHESIOLOGY

## 2021-08-17 RX ORDER — DEXAMETHASONE SODIUM PHOSPHATE 10 MG/ML
INJECTION, SOLUTION INTRAMUSCULAR; INTRAVENOUS PRN
Status: DISCONTINUED | OUTPATIENT
Start: 2021-08-17 | End: 2021-08-17 | Stop reason: ALTCHOICE

## 2021-08-17 RX ORDER — LIDOCAINE HYDROCHLORIDE 5 MG/ML
INJECTION, SOLUTION INFILTRATION; INTRAVENOUS PRN
Status: DISCONTINUED | OUTPATIENT
Start: 2021-08-17 | End: 2021-08-17 | Stop reason: ALTCHOICE

## 2021-08-17 ASSESSMENT — PAIN DESCRIPTION - DESCRIPTORS: DESCRIPTORS: DISCOMFORT

## 2021-08-17 ASSESSMENT — PAIN SCALES - GENERAL
PAINLEVEL_OUTOF10: 0
PAINLEVEL_OUTOF10: 0

## 2021-08-17 ASSESSMENT — PAIN - FUNCTIONAL ASSESSMENT: PAIN_FUNCTIONAL_ASSESSMENT: 0-10

## 2021-08-17 NOTE — H&P
[address]  [facility], [address] [place] [address] [place]  [phone]    Procedure History & Physical      Juan Mitchell     HPI:    Patient  is here for Lumbar TFESI for low back pain. Labs/imaging studies reviewed   All question and concerns addressed including R/B/A associated with the procedure    Past Medical History:   Diagnosis Date    Accident caused by farm tractor    [address] CAD (coronary artery disease)     Chronic pain     Clavicle fracture     Concussion with no loss of consciousness     Depression     Diabetes mellitus ([address]     GERD (gastroesophageal reflux disease)     Headache(784.0)     History of blood transfusion     Hyperlipidemia     Kidney stone     Laceration of flexor tendon of hand, not in \"[place] man's land\" 10/10/2014    Multiple closed fractures of ribs of left side     Neck pain     Osteoarthritis     Rib fracture     SBO (small bowel obstruction) ([facility] 4/23/2019    Skin cancer     Thyroid disease     Trauma 8/4/2018    Traumatic hemopneumothorax, initial encounter        Past Surgical History:   Procedure Laterality Date    ANESTHESIA NERVE BLOCK Bilateral 9/10/2019    BILATERAL LUMBAR FACET MEDIAL BRANCH BLOCK L3 L4 AND L5 DORSAL RAMI (CPT [id number]) performed by Adolfo Singh MD at [address] [facility] N/A 7/21/2020    LUMBAR EPIDURAL STEROID INJECTION L4-L5 X-RAY performed by Adolfo Singh MD at [address]       x2 lumbar    CATARACT REMOVAL WITH IMPLANT Left 10/08/13    CATARACT REMOVAL WITH IMPLANT Right 12/10/13    CERVICAL SPINE SURGERY  2012    cervical fusion    COLON SURGERY      due to diverticulitis colon resection    CORONARY ANGIOPLASTY WITH STENT PLACEMENT  2005    x2 stents    DILATATION, ESOPHAGUS      FRACTURE SURGERY      Lt. Jaw Plate    NERVE BLOCK Left 3/7/16    cervical transforaminal #1    NERVE BLOCK Left 3/28/16    cervical transforaminal #2    NERVE BLOCK Left 04 04 2016    transforaminal nerve block left cervical #3    NERVE BLOCK Left 07/25/2016    shoulder    NERVE BLOCK Left 08/15/2016    left shoulder injection #2    NERVE BLOCK Right 08/22/2016    shoulder    NERVE BLOCK Right 08/29/2016    shoulder injection #2    NERVE BLOCK Bilateral 09/10/2019    BILATERAL LUMBAR FACET MEDIAL BRANCH NERVE BLOCK AT L3,L4 MEDIAL BRANCH AND L5 DORSAL RAMI    NERVE BLOCK Bilateral 10/29/2019    lumbar facet, meidal branch    NERVE BLOCK N/A 07/21/2020    Lumbar epidural steriod injection L4-L5    NERVE BLOCK Right 09/15/2020    transforaminal L4,5    NERVE BLOCK Right 9/15/2020    TRANSFORAMINAL EPIDURAL STEROID INJECTION RIGHT L4 AND L5 UNDER FLUOROSCOPIC GUIDANCE (CPT [id number]) performed by Debbe Lennox, MD at [address] [place] [address]. Left 10/23/2018    LEFT CLAVICLE OPEN REDUCTION INTERNAL FIXATION performed by Ania Flores DO at [place] [address]. Left 101/10/2014    repair left hand, finger laceration    OTHER SURGICAL HISTORY Bilateral 01/05/2021    lumbar facet medial branch dorsal rami radiofrequency    PAIN MANAGEMENT PROCEDURE Bilateral 1/5/2021    BILATERAL LUMBAR FACET L3,L4 MEDIAL BRANCH L5 DORSAL RAMI RADIOFREQUENCY ABLATION WITH IV SEDATION (CPT 43719,11248) performed by Debbe Lennox, MD at [address] [place], [place] [address]ISTED Left 8/6/2018    LEFT SIDED VIDEO ASSISTED THORACOSCOPY WITH RIB PLATING performed by Larry Garcia MD at [address] [place] [address] d/t a fall    RADIOFREQUENCY ABLATION NERVES Bilateral 10/29/2019    BILATERAL LUMBAR FACET L3 L4 MEDIAL BRANCH  L5 DORSAL RAMI  RADIOFREQUENCY ABLATION SEDATION (CPT [id number]) performed by Debbe Lennox, MD at [address] [place] [address] Bilateral     SHOULDER ARTHROSCOPY Right 12/08/2016    repair rotator cuff, bursectomy, debridement glenohumerol     SKIN BIOPSY         Prior to Admission medications    Medication Sig Start Date End Date Taking? Authorizing Provider   b complex vitamins capsule Take 1 capsule by mouth daily 7/27/21  Yes Arnaud Arreola DO   oxyCODONE-acetaminophen (PERCOCET) 5-325 MG per tablet Take 0.5 tablets by mouth three times daily for 23 days. Intended supply: 23 days.  Take lowest dose possible to manage pain 7/27/21 8/19/21 Yes VIKRAM Hernandez - CNP   citalopram (CELEXA) 40 MG tablet TAKE 1 TABLET BY MOUTH EVERY DAY 6/1/21  Yes Arnaud Arreola DO   sucralfate (CARAFATE) 1 GM tablet Take 1 tablet by mouth 2 times daily 6/1/21  Yes Arnaud Arreola DO   pantoprazole (PROTONIX) 40 MG tablet TAKE 1 TABLET BY MOUTH EVERY DAY IN THE MORNING BEFORE BREAKFAST 4/1/21  Yes Jesse Gaffney DO   donepezil (ARICEPT) 5 MG tablet Take 1 tablet by mouth nightly 3/22/21  Yes Jesse Gaffney DO   levothyroxine (SYNTHROID) 25 MCG tablet Take 1.5 tablets by mouth daily 2/10/21 8/12/21 Yes Jesse Gaffney DO   simvastatin (ZOCOR) 20 MG tablet Take 1 tablet by mouth nightly 2/10/21  Yes Jesse Gaffney DO   famotidine (PEPCID) 40 MG tablet Take 1 tablet by mouth every evening 2/10/21  Yes Arnaud Arreola DO   linaGLIPtin-metFORMIN HCl ER (JENTADUETO XR) 2.5-1000 MG TB24 Take 1 tablet by mouth 2 times daily 2/10/21  Yes Arnaud Arreola DO   ferrous sulfate 325 (65 Fe) MG tablet Take 325 mg by mouth daily (with breakfast)   Yes Historical Provider, MD   meclizine (ANTIVERT) 25 MG tablet Take 25 mg by mouth 3 times daily as needed   Yes Historical Provider, MD   vitamin D (CHOLECALCIFEROL) 1000 UNIT TABS tablet Take 1,000 Units by mouth daily   Yes Historical Provider, MD   aspirin EC 81 MG EC tablet Take 81 mg by mouth daily   Yes Historical Provider, MD       Allergies   Allergen Reactions    Sulfa Antibiotics Hives       Social History     Socioeconomic History    Marital status:      Spouse name: Not on file    Number of children: Not on file    Years of education: Not on file    Highest education level: Not on file   Occupational History    Not on file   Tobacco Use    Smoking status: Former Smoker     Years: 40.00     Types: Cigarettes     Quit date: 3/19/1991     Years since quittin.4    Smokeless tobacco: Current User     Types: Chew   Vaping Use    Vaping Use: Never used   Substance and Sexual Activity    Alcohol use: No    Drug use: No    Sexual activity: Not Currently   Other Topics Concern    Not on file   Social History Narrative    ** Merged History Encounter **          Social Determinants of Health     Financial Resource Strain:     Difficulty of Paying Living Expenses:    Food Insecurity:     Worried About Running Out of Food in the Last Year:     [address] in the Last Year:    Transportation Needs:     Lack of Transportation (Medical):      Lack of Transportation (Non-Medical):    Physical Activity:     Days of Exercise per Week:     Minutes of Exercise per Session:    Stress:     Feeling of Stress :    Social Connections:     Frequency of Communication with Friends and Family:     Frequency of Social Gatherings with Friends and Family:     Attends [employer] Services:     Active Member of Clubs or Organizations:     Attends Club or Organization Meetings:     Marital Status:    Intimate Partner Violence:     Fear of Current or Ex-Partner:     Emotionally Abused:     Physically Abused:     Sexually Abused:        Family History   Problem Relation Age of Onset    Diabetes Mother          REVIEW OF SYSTEMS:    CONSTITUTIONAL:  negative for  fevers, chills, sweats and fatigue    RESPIRATORY:  negative for  dry cough, cough with sputum, dyspnea, wheezing and chest pain    CARDIOVASCULAR:  negative for chest pain, dyspnea, palpitations, syncope    GASTROINTESTINAL:  negative for nausea, vomiting, change in bowel habits, diarrhea, constipation and abdominal pain    MUSCULOSKELETAL: negative for muscle weakness    SKIN: negative for itching or rashes. BEHAVIOR/PSYCH:  negative for poor appetite, increased appetite, decreased sleep and poor concentration    All other systems negative      PHYSICAL EXAM:    VITALS:  /74   Pulse 73   Resp 16   Ht 5' 7\" (1.702 m)   Wt 160 lb (72.6 kg)   SpO2 97%   BMI 25.06 kg/m²     CONSTITUTIONAL:  awake, alert, cooperative, no apparent distress, and appears stated age    EYES: PERRLA, EOMI    LUNGS:  No increased work of breathing, no audible wheezing    CARDIOVASCULAR:  regular rate and rhythm    ABDOMEN:  Soft non tender non distended     EXTREMITIES: no signs of clubbing or cyanosis. MUSCULOSKELETAL: negative for flaccid muscle tone or spastic movements. SKIN: gross examination reveals no signs of rashes, or diaphoresis. NEURO: Cranial nerves II-XII grossly intact. No signs of agitated mood. Assessment/Plan:    Patient  is here for Lumbar TFESI for low back pain. The patient was counseled at length about the risks of osmani Covid-19 during their perioperative period and any recovery window from their procedure. The patient was made aware that osmani Covid-19  may worsen their prognosis for recovering from their procedure  and lend to a higher morbidity and/or mortality risk. All material risks, benefits, and reasonable alternatives including postponing the procedure were discussed. The patient does wish to proceed with the procedure at this time.     Isatu Gomez MD

## 2021-08-17 NOTE — OP NOTE
Operative Note      Patient: Jennifer Gordon  YOB: 1940  MRN: [id number]    Date of Procedure: 2021    Pre-Op Diagnosis: LUMBAR RADICULOPATHY, lumbar DDD    Post-Op Diagnosis: Same       Procedure(s):  L4 L5 RIGHT TRANSFORAMINAL EPIDURAL STEROID INJECTION X-RAY     Surgeon(s):  Toño Mohan MD    Assistant:   * No surgical staff found *    Anesthesia: Local    Estimated Blood Loss (mL): Minimal    Complications: None    Specimens:   * No specimens in log *    Implants:  * No implants in log *      Drains: * No LDAs found *    Findings: good needle placement    Detailed Description of Procedure:   2021    Patient: Jennifer Gordon  :  1940  Age:  78 y.o. Sex:  male     PRE-OPERATIVE DIAGNOSIS: Lumbar disc displacement, lumbar neural foraminal stenosis, lumbar radiculopathy. POST-OPERATIVE DIAGNOSIS: Same. PROCEDURE: Right Transforaminal epidural steroid injection under fluoroscopic guidance at foraminal level L4 and L5.    SURGEON: Toño Mohan MD    ANESTHESIA: Local    ESTIMATED BLOOD LOSS: None.  ______________________________________________________________________  BRIEF HISTORY: Jennifer Gordon comes in today for the  Right transforaminal epidural steroid injection under fluoroscopic guidance at foraminal level L4 and L5 . The potential complications of this procedure were discussed with him again today. He has elected to undergo the aforementioned procedureBenson Dickerson complete History & Physical examination were reviewed in depth, a copy of which is in the chart. DESCRIPTION OF PROCEDURE:    After confirming written and informed consent, a time-out was performed and Tuan name and date of birth, the procedure to be performed as well as the plan for the location of the needle insertion were confirmed. The patient was brought into the procedure room and placed in the prone position on the fluoroscopy table.  Standard monitors were placed and vital signs were observed throughout the procedure. The area of the lumbar spine was prepped with chloraprep and draped in a sterile manner. The vertebral body was identified with AP fluoroscopy. An oblique view was obtained to better visualize the inferior junction of the pedicle and transverse process . The 6 o'clock position of the pedicle was marked and identified. The skin and subcutaneous tissue were anesthetized with 0.5% lidocaine. TWO # 22 gauge 3.5 inch pencil point needles was directed toward the targeted point under fluoroscopy until bone was contacted. The needle was then walked inferiorly until the neural foramen was entered. A lateral fluoroscopic view was then used to place the needle tip in the middle of the foramen. Negative aspiration was confirmed for blood and CSF and 0.5-1 cc of Omnipaque 240 contrast was injected at each level under live fluoroscopy. Appropriate neurograms were observed under AP fluoroscopy. Then after negative aspiration, a solution of the 3 cc of 0.5% lidocaine and 6 mg Dexamethasone was easily injected at each level. The needles were removed with constant aspiration technique. The patient back was cleaned and a bandage was placed over the needle insertion points    Disposition the patient tolerated the procedure well and there were no complications . Vital signs remained stable throughout the procedure. The patient was escorted to the recovery area where they remained until discharge and written discharge instructions for the procedure were given. Plan: Parminder Gates will return to our pain management center as scheduled.      Erika Maurice MD

## 2021-08-19 ENCOUNTER — OFFICE VISIT (OUTPATIENT)
Dept: SLEEP MEDICINE | Age: 81
End: 2021-08-19
Payer: MEDICARE

## 2021-08-19 VITALS
SYSTOLIC BLOOD PRESSURE: 148 MMHG | HEIGHT: 67 IN | HEART RATE: 71 BPM | RESPIRATION RATE: 16 BRPM | WEIGHT: 159.1 LBS | BODY MASS INDEX: 24.97 KG/M2 | OXYGEN SATURATION: 98 % | DIASTOLIC BLOOD PRESSURE: 82 MMHG

## 2021-08-19 DIAGNOSIS — G47.00 INSOMNIA, UNSPECIFIED TYPE: ICD-10-CM

## 2021-08-19 DIAGNOSIS — G47.33 OBSTRUCTIVE SLEEP APNEA: Primary | ICD-10-CM

## 2021-08-19 PROCEDURE — 99213 OFFICE O/P EST LOW 20 MIN: CPT | Performed by: NURSE PRACTITIONER

## 2021-08-19 PROCEDURE — 4040F PNEUMOC VAC/ADMIN/RCVD: CPT | Performed by: NURSE PRACTITIONER

## 2021-08-19 PROCEDURE — G8420 CALC BMI NORM PARAMETERS: HCPCS | Performed by: NURSE PRACTITIONER

## 2021-08-19 PROCEDURE — G8427 DOCREV CUR MEDS BY ELIG CLIN: HCPCS | Performed by: NURSE PRACTITIONER

## 2021-08-19 PROCEDURE — 1123F ACP DISCUSS/DSCN MKR DOCD: CPT | Performed by: NURSE PRACTITIONER

## 2021-08-19 PROCEDURE — 4004F PT TOBACCO SCREEN RCVD TLK: CPT | Performed by: NURSE PRACTITIONER

## 2021-08-19 ASSESSMENT — SLEEP AND FATIGUE QUESTIONNAIRES
HOW LIKELY ARE YOU TO NOD OFF OR FALL ASLEEP WHILE SITTING QUIETLY AFTER LUNCH WITHOUT ALCOHOL: 0
HOW LIKELY ARE YOU TO NOD OFF OR FALL ASLEEP WHILE WATCHING TV: 0
HOW LIKELY ARE YOU TO NOD OFF OR FALL ASLEEP WHILE LYING DOWN TO REST IN THE AFTERNOON WHEN CIRCUMSTANCES PERMIT: 0
HOW LIKELY ARE YOU TO NOD OFF OR FALL ASLEEP WHILE SITTING AND TALKING TO SOMEONE: 0
HOW LIKELY ARE YOU TO NOD OFF OR FALL ASLEEP WHILE SITTING INACTIVE IN A PUBLIC PLACE: 0
ESS TOTAL SCORE: 0
HOW LIKELY ARE YOU TO NOD OFF OR FALL ASLEEP IN A CAR, WHILE STOPPED FOR A FEW MINUTES IN TRAFFIC: 0
HOW LIKELY ARE YOU TO NOD OFF OR FALL ASLEEP WHEN YOU ARE A PASSENGER IN A CAR FOR AN HOUR WITHOUT A BREAK: 0
HOW LIKELY ARE YOU TO NOD OFF OR FALL ASLEEP WHILE SITTING AND READING: 0

## 2021-08-19 NOTE — PATIENT INSTRUCTIONS
It was a pleasure meeting you today Kayley Ready! Summary of Today's Visit     - Doing great on CPAP!  - Will adjust setting to CPAP to 7-15 cm H2O. Please call our sleep nurses to schedule a follow up at - [phone] option 2              1. Continue using your machine nightly        -Request all data downloads be sent to my nurse        2. Contact your DME company about supplies or issues with your machine. As a general guideline, please replace your:  -CPAP mask every 3-6 months  -CPAP hose  every 3-6 months  -Filter every 2-4 weeks  -CPAP/BiPAP/ASV replacements every 5-7+ years     3. Continue healthy weight loss/stabilization, as this is recommended as a long-term intervention. 4. Please do not drive or operate machinery when you feel fatigued/sleepy/drowsy      5. Please try to sleep between 6-8 hours nightly with the CPAP mask    6. Please avoid sedatives, alcohol and caffeinated drinks at/near bed time. 7. Please call your supply (DME) company with any issues with your PAP device. Please call our office with any issues pertaining to the therapy.   ----Please note that complications of TREVIN if not treated can increase risk for: systemic hypertension, pulmonary hypertension, cardiovascular morbidities (heart attack and stroke), car accidents and all cause mortality. 8. Please note that complications of TREVIN if not treated can increase risk for: systemic hypertension , pulmonary hypertension (high blood pressure in the lungs), cardiovascular morbidities (heart attack and stroke), car accidents and all cause mortality (increased risk of death).       For general questions or scheduling issues, call my nurse at [phone] option Catrachita Sanchez [phone] option 2  W- [phone]         ----------------------------------------------------------    Common Issues and Solutions     Pillow is dislodging mask - Consider getting a CPAP pillow or switching to a mask with the hose at the top. Dry Mouth - Adjust Humidity and/or try Biotene Gel. (Excessive leak can also cause this)     Leak - Please call your equipment provider  as they may need to adjust your mask. Difficulty tolerating the PAP mask - Contact your equipment company to try a different mask, we can order a \"mini sleep study\"with the sleep tech to try different masks/settings of pressure, also we have a sleep psychologist to help with anxiety related to wearing the PAP mask      ----------------------------------------------------------     For Questions and Concerns: In case of difficulty with your PAP device or mask interface, please FIRST contact your 2 [address] (The company who provides you the CPAP/BiPAP/ASV machine/supplies). If you need the number for any other DME company, please call my Nurse at [phone] option 2     For questions concerning your [place] appointment: Call [phone] option 2  SLEEP LAB SCHEDULING:        ----------------------------------------------------------    Cognitive Behavioral Therapy for Insomnia Resources    Book: Say Ana to Insomnia by Dr. Holden Osman ($12 on [place])    [address] [place] [address],Sixth Floor! To Sleep Module ($40): [email or web address]

## 2021-08-19 NOTE — PROGRESS NOTES
[facility] Sleep Medicine    Patient Name: Vijay Montes  Age: 78 y.o.   : 1940    Date of Visit: 21        Review of Last Visit Summary:    The patient was last seen on 6/10/2021 by Dr. Jada Guevara for  Obstructive Sleep Apnea and Obesity. Interim History:     Vijay Montes is a 78 y.o. male that  has a past medical history of Accident caused by farm tractor, Arthritis, CAD (coronary artery disease), Chronic pain, Clavicle fracture, Concussion with no loss of consciousness, Depression, Diabetes mellitus ([address] GERD (gastroesophageal reflux disease), Headache(784.0), History of blood transfusion, Hyperlipidemia, Kidney stone, Laceration of flexor tendon of hand, not in \"[place] man's land\" (10/10/2014), Multiple closed fractures of ribs of left side, Neck pain, Osteoarthritis, Rib fracture, SBO (small bowel obstruction) ([facility] [address] (2019), Skin cancer, Thyroid disease, Trauma (2018), and Traumatic hemopneumothorax, initial encounter. He presents in follow up to Sleep Clinic to review CPAP adherence and efficacy. Patient is doing well on his new APAP and finds benefits of more refreshed sleep and resolution of snoring. He is on average using his PAP device about 6.5 hours per night. The device is currently set to 4-15 cm H2O. He is currently using a full face mask and feels that he experiences leaking with mask quite frequently, especially when he changes sleeping positions. He is motivated to continue CPAP use, but would like to trial a nasal Dreamwear mask instead of the full face mask. Patient is comfortable with pressures. Patient continues to take around 45 minutes to initiate sleep at night, stating that his mind races when he lays down and prevents him from sleeping. This started about 1 year ago. He will attempt to go to bed around 11:30pm- midnight and is sleeping about 6-7 hours. He usually sleeps in a dark room. He cannot recall a trigger event that started the insomnia. There are times that his chronic back and leg pain keep him from falling asleep. He does not nap during the day. Still taking 45 min to 1 hour to fall asleep. Dark in room. Mind races, what he wants to do the next day. Been bothered by this for a year. No known triggers, Sometimes pain in back and leg keep him from sleeping. He is sleeping 6-7. No napping in the day. Goes to bed at 1130- midnight and wakes up around 8 am. He usually will consume coffee with caffeine until about 5 pm, as he drinks it with dinner.      DME: [facility]  Benefits: Feeling more refreshed in the morning, decreased snoring      Sleep Study History: Split Night Sleep Study:   Study date: 5/23/2021  AHI:  66.2   Oxygen Derick: 81.0    Past Medical History:  Past Medical History:   Diagnosis Date    Accident caused by farm tractor     Arthritis     CAD (coronary artery disease)     Chronic pain     Clavicle fracture     Concussion with no loss of consciousness     Depression     Diabetes mellitus ([address]     GERD (gastroesophageal reflux disease)     Headache(784.0)     History of blood transfusion     Hyperlipidemia     Kidney stone     Laceration of flexor tendon of hand, not in \"no man's land\" 10/10/2014    Multiple closed fractures of ribs of left side     Neck pain     Osteoarthritis     Rib fracture     SBO (small bowel obstruction) ([facility] [address] 4/23/2019    Skin cancer     Thyroid disease     Trauma 8/4/2018    Traumatic hemopneumothorax, initial encounter        Past Surgical History:        Procedure Laterality Date    ANESTHESIA NERVE BLOCK Bilateral 9/10/2019    BILATERAL LUMBAR FACET MEDIAL BRANCH BLOCK L3 L4 AND L5 DORSAL RAMI (CPT E920934) performed by Dorinda Barker MD at [address] N/A 7/21/2020    LUMBAR EPIDURAL STEROID INJECTION L4-L5 X-RAY performed by Dorinda Barker MD at [address]      x2 lumbar    CATARACT REMOVAL WITH IMPLANT Left 10/08/13  CATARACT REMOVAL WITH IMPLANT Right 12/10/13    CERVICAL SPINE SURGERY  2012    cervical fusion    COLON SURGERY      due to diverticulitis colon resection    CORONARY ANGIOPLASTY WITH STENT PLACEMENT  2005    x2 stents    DILATATION, ESOPHAGUS      FRACTURE SURGERY      Lt. Jaw Plate    NERVE BLOCK Left 3/7/16    cervical transforaminal #1    NERVE BLOCK Left 3/28/16    cervical transforaminal #2    NERVE BLOCK Left 04 04 2016    transforaminal nerve block left cervical #3    NERVE BLOCK Left 07/25/2016    shoulder    NERVE BLOCK Left 08/15/2016    left shoulder injection #2    NERVE BLOCK Right 08/22/2016    shoulder    NERVE BLOCK Right 08/29/2016    shoulder injection #2    NERVE BLOCK Bilateral 09/10/2019    BILATERAL LUMBAR FACET MEDIAL BRANCH NERVE BLOCK AT L3,L4 MEDIAL BRANCH AND L5 DORSAL RAMI    NERVE BLOCK Bilateral 10/29/2019    lumbar facet, meidal branch    NERVE BLOCK N/A 07/21/2020    Lumbar epidural steriod injection L4-L5    NERVE BLOCK Right 09/15/2020    transforaminal L4,5    NERVE BLOCK Right 9/15/2020    TRANSFORAMINAL EPIDURAL STEROID INJECTION RIGHT L4 AND L5 UNDER FLUOROSCOPIC GUIDANCE (CPT 88461,12761) performed by Charissa Talamantes MD at [address] [place] [address]. Left 10/23/2018    LEFT CLAVICLE OPEN REDUCTION INTERNAL FIXATION performed by Colette Weeks DO at [place] [address] Left 101/10/2014    repair left hand, finger laceration    OTHER SURGICAL HISTORY Bilateral 01/05/2021    lumbar facet medial branch dorsal rami radiofrequency    PAIN MANAGEMENT PROCEDURE Bilateral 1/5/2021    BILATERAL LUMBAR FACET L3,L4 MEDIAL BRANCH L5 DORSAL RAMI RADIOFREQUENCY ABLATION WITH IV SEDATION (CPT 68685,45496) performed by Charissa Talamantes MD at [address] [place] [address] [address] Right 8/17/2021    L4 L5 RIGHT TRANSFORAMINAL EPIDURAL STEROID INJECTION X-RAY (CPT 61088) performed by Nayely Aguiar Chucky Vu MD at [address] UNLISTED Left 2018    LEFT SIDED VIDEO ASSISTED THORACOSCOPY WITH RIB PLATING performed by Jigna Singleton MD at [address] d/t a fall    RADIOFREQUENCY ABLATION NERVES Bilateral 10/29/2019    BILATERAL LUMBAR FACET L3 L4 MEDIAL BRANCH  L5 DORSAL RAMI  RADIOFREQUENCY ABLATION SEDATION (CPT G7051854) performed by Rohit Hermosillo MD at [address] [facility] Bilateral     SHOULDER ARTHROSCOPY Right 2016    repair rotator cuff, bursectomy, debridement glenohumerol     SKIN BIOPSY         Allergies:  is allergic to sulfa antibiotics. Social History:    Social History     Tobacco Use    Smoking status: Former Smoker     Years: 40.00     Types: Cigarettes     Quit date: 3/19/1991     Years since quittin.4    Smokeless tobacco: Current User     Types: Chew   Vaping Use    Vaping Use: Never used   Substance Use Topics    Alcohol use: No    Drug use: No        Family History:       Problem Relation Age of Onset    Diabetes Mother        Current Medications:    Current Outpatient Medications:     b complex vitamins capsule, Take 1 capsule by mouth daily, Disp: 30 capsule, Rfl: 2    oxyCODONE-acetaminophen (PERCOCET) 5-325 MG per tablet, Take 0.5 tablets by mouth three times daily for 23 days. Intended supply: 23 days.  Take lowest dose possible to manage pain, Disp: 35 tablet, Rfl: 0    citalopram (CELEXA) 40 MG tablet, TAKE 1 TABLET BY MOUTH EVERY DAY, Disp: 30 tablet, Rfl: 2    sucralfate (CARAFATE) 1 GM tablet, Take 1 tablet by mouth 2 times daily, Disp: 60 tablet, Rfl: 1    pantoprazole (PROTONIX) 40 MG tablet, TAKE 1 TABLET BY MOUTH EVERY DAY IN THE MORNING BEFORE BREAKFAST, Disp: 90 tablet, Rfl: 1    donepezil (ARICEPT) 5 MG tablet, Take 1 tablet by mouth nightly, Disp: 30 tablet, Rfl: 5    levothyroxine (SYNTHROID) 25 MCG tablet, Take 1.5 tablets by mouth daily, Disp: 135 tablet, Rfl: 1    simvastatin (ZOCOR) 20 MG tablet, Take 1 tablet by mouth nightly, Disp: 90 tablet, Rfl: 1    famotidine (PEPCID) 40 MG tablet, Take 1 tablet by mouth every evening, Disp: 90 tablet, Rfl: 1    linaGLIPtin-metFORMIN HCl ER (JENTADUETO XR) 2.5-1000 MG TB24, Take 1 tablet by mouth 2 times daily, Disp: 180 tablet, Rfl: 1    ferrous sulfate 325 (65 Fe) MG tablet, Take 325 mg by mouth daily (with breakfast), Disp: , Rfl:     meclizine (ANTIVERT) 25 MG tablet, Take 25 mg by mouth 3 times daily as needed, Disp: , Rfl:     vitamin D (CHOLECALCIFEROL) 1000 UNIT TABS tablet, Take 1,000 Units by mouth daily, Disp: , Rfl:     aspirin EC 81 MG EC tablet, Take 81 mg by mouth daily, Disp: , Rfl:     Sleep Medicine 8/19/2021 6/10/2021 4/22/2021   Sitting and reading 0 0 0   Watching TV 0 0 0   Sitting, inactive in a public place (e.g. a theatre or a meeting) 0 0 0   As a passenger in a car for an hour without a break 0 0 0   Lying down to rest in the afternoon when circumstances permit 0 3 0   Sitting and talking to someone 0 0 0   Sitting quietly after a lunch without alcohol 0 1 0   In a car, while stopped for a few minutes in traffic 0 0 0   Total score 0 4 0   Neck circumference - - 16       Review of Systems:    Constitutional: no chills, no fever   Eyes: no blurred vision and no eyesight problems. ENT: no hoarseness and no sore throat. Cardiovascular: no chest pain,   Respiratory: no cough, no shortness of breath   Gastrointestinal:  no nausea,  no vomiting, no diarrhea. Musculoskeletal: no arthralgias, no back pain and no myalgias. Integumentary: no rashes or lacerations. Neurological:  no diplopia, no dizziness,  no headache, no memory changes,  and no tingling.    Endocrine: No chills        Objective:   PHYSICAL EXAM:    BP (!) 148/82 (Site: Left Upper Arm, Position: Sitting, Cuff Size: Medium Adult)   Pulse 71   Resp 16   Ht 5' 7\" (1.702 m)   Wt 159 lb 1.6 oz (72.2 kg)   SpO2 98%   BMI 24.92 kg/m²     Physical exam:  Gen: No acute distress. BMI of Body mass index is 24.92 kg/m². Eyes: PERRL. No sclera icterus. No conjunctival injection. ENT: No nasal/oral discharge. Pharynx clear. Neck: Trachea midline. No obvious mass. Neck circumference     Resp: No accessory muscle use. No crackles. No wheezes. No rhonchi. CV: Regular rate. Regular rhythm. No murmur or rub. Skin: Warm and dry. No bleeding observed   M/S: No cyanosis. No obvious joint deformity. Neuro: Awake. Alert. Moves all four extremities. Psych: Alert and oriented. No anxiety. CPAP Compliance Download -- accessed via SET on 8/19/2021            Assessment:      Nitin Valencia was seen today for sleep apnea. Diagnoses and all orders for this visit:    Obstructive sleep apnea  -     DME Order for CPAP as OP  -     DME Order for CPAP as OP  -     DME Order for CPAP as OP    Insomnia, unspecified type    ·    Plan:     Bandar Bo is a 78 y.o. male that  has a past medical history of Accident caused by farm tractor, Arthritis, CAD (coronary artery disease), Chronic pain, Clavicle fracture, Concussion with no loss of consciousness, Depression, Diabetes mellitus ([address] GERD (gastroesophageal reflux disease), Headache(784.0), History of blood transfusion, Hyperlipidemia, Kidney stone, Laceration of flexor tendon of hand, not in \"[place] man's land\" (10/10/2014), Multiple closed fractures of ribs of left side, Neck pain, Osteoarthritis, Rib fracture, SBO (small bowel obstruction) ([address] (4/23/2019), Skin cancer, Thyroid disease, Trauma (8/4/2018), and Traumatic hemopneumothorax, initial encounter. He presents in follow up to Sleep Clinic to review CPAP adherence and efficacy. He demonstrates excellent adherence with resolution of respiratory events (residual AHI 4.1). He notes benefit with CPAP therapy (namely, more refreshing sleep, waking up refreshed, and resolution of snoring) and is motivated to continue use.      1. Obstructive Sleep Apnea    -Based on sleep study results, review of device remote download, and discussion with patient, will continue auto-CPAP therapy at adjusted settings of 7-15 cm of water. - Settings remotely adjusted in [facility]. Prescription will be sent to DME. Advised patient to turn machine on and off prior to use tonight.  - DME is [facility].  - Patient is using a full face mask. He does appreciate a significant leak and would like to switch to a nasal mask. -Previously discussed pathophysiology of TREVIN and its impact on daily well-being, as well as cardiometabolic and neurocognitive health (particularly in moderate-severe cases). -Patient understands that CPAP should be worn every night for the duration of the night (in order to not miss therapy during early-morning REM period) for maximum benefit. 2. Insomnia    - Briefly discussed first line treatment for insomnia, CBT-I, including the components including cognitive restructuring, sleep scheduling, stimulus control, relaxation techniques, and sleep hygiene.   -Patient was given self guided methods of participating in CBT-I.  -Sleep hygiene also discussed, including recommendation of eliminating evening coffee (reviewed half life of caffeine). -Also, if lifestyle permits, push bedtime back a half hour, as to build further sleepiness prior to laying down. -Try to keep chronic pain under control, may also be a contributing factor in insomnia. Follow up: Return in about 6 months (around 2/19/2022) for Follow up for sleep apnea, Follow up for insomnia.     VIKRAM Martin-CNP  [address] [facility] -[phone] option 2  - [phone]

## 2021-09-01 ENCOUNTER — PREP FOR PROCEDURE (OUTPATIENT)
Dept: PAIN MANAGEMENT | Age: 81
End: 2021-09-01

## 2021-09-01 ENCOUNTER — OFFICE VISIT (OUTPATIENT)
Dept: PAIN MANAGEMENT | Age: 81
End: 2021-09-01
Payer: MEDICARE

## 2021-09-01 VITALS
SYSTOLIC BLOOD PRESSURE: 138 MMHG | HEIGHT: 67 IN | RESPIRATION RATE: 16 BRPM | WEIGHT: 160 LBS | DIASTOLIC BLOOD PRESSURE: 68 MMHG | HEART RATE: 72 BPM | TEMPERATURE: 96.8 F | BODY MASS INDEX: 25.11 KG/M2 | OXYGEN SATURATION: 98 %

## 2021-09-01 DIAGNOSIS — M48.061 SPINAL STENOSIS OF LUMBAR REGION, UNSPECIFIED WHETHER NEUROGENIC CLAUDICATION PRESENT: ICD-10-CM

## 2021-09-01 DIAGNOSIS — M51.36 DDD (DEGENERATIVE DISC DISEASE), LUMBAR: ICD-10-CM

## 2021-09-01 DIAGNOSIS — M43.02 CERVICAL SPONDYLOLYSIS: Chronic | ICD-10-CM

## 2021-09-01 DIAGNOSIS — M47.817 LUMBOSACRAL SPONDYLOSIS WITHOUT MYELOPATHY: ICD-10-CM

## 2021-09-01 DIAGNOSIS — M96.1 POSTLAMINECTOMY SYNDROME, LUMBAR: ICD-10-CM

## 2021-09-01 DIAGNOSIS — M50.30 DDD (DEGENERATIVE DISC DISEASE), CERVICAL: Primary | ICD-10-CM

## 2021-09-01 DIAGNOSIS — M47.817 LUMBOSACRAL SPONDYLOSIS WITHOUT MYELOPATHY: Primary | ICD-10-CM

## 2021-09-01 PROCEDURE — 99213 OFFICE O/P EST LOW 20 MIN: CPT | Performed by: ANESTHESIOLOGY

## 2021-09-01 PROCEDURE — 99214 OFFICE O/P EST MOD 30 MIN: CPT | Performed by: ANESTHESIOLOGY

## 2021-09-01 PROCEDURE — 1123F ACP DISCUSS/DSCN MKR DOCD: CPT | Performed by: ANESTHESIOLOGY

## 2021-09-01 PROCEDURE — 4040F PNEUMOC VAC/ADMIN/RCVD: CPT | Performed by: ANESTHESIOLOGY

## 2021-09-01 PROCEDURE — G8417 CALC BMI ABV UP PARAM F/U: HCPCS | Performed by: ANESTHESIOLOGY

## 2021-09-01 PROCEDURE — G8428 CUR MEDS NOT DOCUMENT: HCPCS | Performed by: ANESTHESIOLOGY

## 2021-09-01 PROCEDURE — 4004F PT TOBACCO SCREEN RCVD TLK: CPT | Performed by: ANESTHESIOLOGY

## 2021-09-01 RX ORDER — HYDROCODONE BITARTRATE AND ACETAMINOPHEN 5; 325 MG/1; MG/1
1 TABLET ORAL 2 TIMES DAILY PRN
Qty: 60 TABLET | Refills: 0 | Status: SHIPPED | OUTPATIENT
Start: 2021-09-01 | End: 2021-10-01

## 2021-09-01 NOTE — PROGRESS NOTES
[address] [place] [address], [address] [place] [id number]  [phone]    Follow up Note      Esthela Goldstein     Date of Visit:  9/1/2021    CC:    Chief Complaint   Patient presents with    Follow Up After Procedure     L4 L5 RIGHT TRANSFORAMINAL EPIDURAL STEROID INJECTION X-RAY     Follow-up    Back Pain         Pertinent Information:  78 y. Madelainevernell Wesley gentleman with prior h/o cervical spine surgery- ACDF C3-7 having neck pain with features of myofacial pain and also facet pain. He has undergone lumbar spine surgery X 2 in the 1980's    HPI:  Pt here today for low back pain. Recent Lumbar TFESI with very good pain relief of the LE pain. Continues to have predominantly axial low back pain. Prior lumbar facet MB RFA in Jan 2021 with > 70% relief until recently. He was switched from Yazmin Pacini to percocet- states that norco was better and wants to go back on norco.    Appropriate analgesia with current medications regimen: yes   Medication side effects:none. Recent diagnostic testing:none. Nursing notes and details of the pain history reviewed. Please see intake notes for details.     He is on ASA-81 mg. H/o CAD s/p stent.      Imaging studies:  Left Shoulder: 6/14/2020:      Impression   No acute osseous abnormality.       Degenerative changes as above.      XR Lumbar spine: 6/2020: Impression   Diffuse osteopenia with moderate to advanced degenerative changes of the   lumbar spine, as above.  No convincing evidence of an acute fracture.      CT cervical spine: 6/14/2020:      FINDINGS:   BONES/ALIGNMENT: No evidence of cervical fracture or traumatic subluxation.    Stable T3 wedge compression.  There has been fusion of C3 through C7.  There   is an anterior plate with screws at C3, C4, and C7.  There is a bone block   graft at C3-C4.  There is a block graft extending from C4 through C7.  The   hardware and grafts are intact.       DEGENERATIVE CHANGES: Degenerative change at [facility].  Degenerative disc disease   C7-T1.  Diffuse facet osteoarthritis with stable several mm anterolisthesis   of C7.       SOFT TISSUES: No prevertebral soft tissue swelling.  Maxillary sinusitis   changes noted           Impression   No acute abnormality of the cervical spine.          Xray thoracic spine: 2020:      Impression   Limited examination, as described above.  Within these limitations, no   convincing evidence of an acute fracture of the thoracic spine.              CT Lumbar Spine: 2018:      Findings: There are bilateral pars defects noted at L4. There is a   spondylolisthesis of L4 on L5. There is wedging of T12 which may be physiologic   Changes seen throughout the discs are abnormal. There are findings to   suggest  degenerative disc disease.       Changes seen throughout the bone marrow are abnormal. Findings are   compatible with degenerative disc disease       Changes within the facets are abnormal. Findings are compatible with   degenerative facet disease.           At L5-S1: There is a mild broad-based disc herniation or bony   spurring. There is a laminectomy defect. There is facet hypertrophy.    There is mild narrowing of the neural foramina       At L4-5: There is a moderate broad-based disc herniation there is   moderate stenosis       At L3-4: There is a large broad-based disc herniation, there is severe   canal stenosis.       At L2-3: There is a mild broad-based disc herniation, there is mild   canal stenosis       At L1-2: There is a mild broad-based disc herniation, there is mild   canal stenosis               Impression   Findings compatible with degenerative changes   Bilateral L4 pars defect   Severe canal stenosis at L3-4 and moderate canal stenosis at L4-5                                            Potential Aberrant Drug-Related Behavior:  No     Drug Screenin: COnsistent     Pain agreement updated: 2021      OARRS report[de-identified] 21: Consistent     Past Medical History:   Diagnosis Date    Accident caused by farm tractor     Arthritis     CAD (coronary artery disease)     Chronic pain     Clavicle fracture     Concussion with no loss of consciousness     Depression     Diabetes mellitus ([facility]     GERD (gastroesophageal reflux disease)     Headache(784.0)     History of blood transfusion     Hyperlipidemia     Kidney stone     Laceration of flexor tendon of hand, not in \"[place]\" 10/10/2014    Multiple closed fractures of ribs of left side     Neck pain     Osteoarthritis     Rib fracture     SBO (small bowel obstruction) ([facility] 4/23/2019    Skin cancer     Thyroid disease     Trauma 8/4/2018    Traumatic hemopneumothorax, initial encounter        Past Surgical History:   Procedure Laterality Date    ANESTHESIA NERVE BLOCK Bilateral 9/10/2019    BILATERAL LUMBAR FACET MEDIAL BRANCH BLOCK L3 L4 AND L5 DORSAL RAMI (CPT T4346774) performed by Rohit Hermosillo MD at [facility] N/A 7/21/2020    LUMBAR EPIDURAL STEROID INJECTION L4-L5 X-RAY performed by Rohit Hermosillo MD at [address]      x2 lumbar    CATARACT REMOVAL WITH IMPLANT Left 10/08/13    CATARACT REMOVAL WITH IMPLANT Right 12/10/13    CERVICAL SPINE SURGERY  2012    cervical fusion    COLON SURGERY      due to diverticulitis colon resection    CORONARY ANGIOPLASTY WITH STENT PLACEMENT  2005    x2 stents    DILATATION, ESOPHAGUS      FRACTURE SURGERY      Lt. Jaw Plate    NERVE BLOCK Left 3/7/16    cervical transforaminal #1    NERVE BLOCK Left 3/28/16    cervical transforaminal #2    NERVE BLOCK Left 04 04 2016    transforaminal nerve block left cervical #3    NERVE BLOCK Left 07/25/2016    shoulder    NERVE BLOCK Left 08/15/2016    left shoulder injection #2    NERVE BLOCK Right 08/22/2016    shoulder    NERVE BLOCK Right 08/29/2016    shoulder injection #2    NERVE BLOCK Bilateral 09/10/2019    BILATERAL LUMBAR FACET MEDIAL BRANCH NERVE BLOCK AT L3,L4 MEDIAL BRANCH AND L5 DORSAL RAMI    NERVE BLOCK Bilateral 10/29/2019    lumbar facet, meidal branch    NERVE BLOCK N/A 07/21/2020    Lumbar epidural steriod injection L4-L5    NERVE BLOCK Right 09/15/2020    transforaminal L4,5    NERVE BLOCK Right 9/15/2020    TRANSFORAMINAL EPIDURAL STEROID INJECTION RIGHT L4 AND L5 UNDER FLUOROSCOPIC GUIDANCE (CPT 99930,62937) performed by Debbe Lennox, MD at [facility]. Left 10/23/2018    LEFT CLAVICLE OPEN REDUCTION INTERNAL FIXATION performed by Ania Flores DO at [address] [facility] Left 101/10/2014    repair left hand, finger laceration    OTHER SURGICAL HISTORY Bilateral 01/05/2021    lumbar facet medial branch dorsal rami radiofrequency    PAIN MANAGEMENT PROCEDURE Bilateral 1/5/2021    BILATERAL LUMBAR FACET L3,L4 MEDIAL BRANCH L5 DORSAL RAMI RADIOFREQUENCY ABLATION WITH IV SEDATION (CPT 21983,10402) performed by Debbe Lennox, MD at [id number] [place] [address] [address] Right 8/17/2021    L4 L5 RIGHT TRANSFORAMINAL EPIDURAL STEROID INJECTION X-RAY (CPT 04384) performed by Debbe Lennox, MD at [place] [address] Left 8/6/2018    LEFT SIDED VIDEO ASSISTED THORACOSCOPY WITH RIB PLATING performed by Larry Garcia MD at [address] [place] [address] d/t a fall    RADIOFREQUENCY ABLATION NERVES Bilateral 10/29/2019    BILATERAL LUMBAR FACET L3 L4 MEDIAL BRANCH  L5 DORSAL RAMI  RADIOFREQUENCY ABLATION SEDATION (CPT [id number]) performed by Debbe Lennox, MD at [facility] Bilateral     SHOULDER ARTHROSCOPY Right 12/08/2016    repair rotator cuff, bursectomy, debridement glenohumerol     SKIN BIOPSY         Prior to Admission medications    Medication Sig Start Date End Date Taking?  Authorizing Provider   b complex vitamins capsule Take 1 capsule by mouth daily 21  Yes Arnaud Arreola DO   citalopram (CELEXA) 40 MG tablet TAKE 1 TABLET BY MOUTH EVERY DAY 21  Yes Arnaud Arreoal DO   sucralfate (CARAFATE) 1 GM tablet Take 1 tablet by mouth 2 times daily 21  Yes Arnaud Arreola DO   pantoprazole (PROTONIX) 40 MG tablet TAKE 1 TABLET BY MOUTH EVERY DAY IN THE MORNING BEFORE BREAKFAST 21  Yes Vincent Abbott DO   donepezil (ARICEPT) 5 MG tablet Take 1 tablet by mouth nightly 3/22/21  Yes Vincent Abbott DO   simvastatin (ZOCOR) 20 MG tablet Take 1 tablet by mouth nightly 2/10/21  Yes Vincent Abbott DO   famotidine (PEPCID) 40 MG tablet Take 1 tablet by mouth every evening 2/10/21  Yes Arnaud Arreola DO   linaGLIPtin-metFORMIN HCl ER (JENTADUETO XR) 2.5-1000 MG TB24 Take 1 tablet by mouth 2 times daily 2/10/21  Yes Arnaud Arreola DO   ferrous sulfate 325 (65 Fe) MG tablet Take 325 mg by mouth daily (with breakfast)   Yes Historical Provider, MD   meclizine (ANTIVERT) 25 MG tablet Take 25 mg by mouth 3 times daily as needed   Yes Historical Provider, MD   vitamin D (CHOLECALCIFEROL) 1000 UNIT TABS tablet Take 1,000 Units by mouth daily   Yes Historical Provider, MD   aspirin EC 81 MG EC tablet Take 81 mg by mouth daily   Yes Historical Provider, MD   levothyroxine (SYNTHROID) 25 MCG tablet Take 1.5 tablets by mouth daily 2/10/21 8/19/21  Vincent Abbott DO       Allergies   Allergen Reactions    Sulfa Antibiotics Hives       Social History     Socioeconomic History    Marital status:      Spouse name: Not on file    Number of children: Not on file    Years of education: Not on file    Highest education level: Not on file   Occupational History    Not on file   Tobacco Use    Smoking status: Former Smoker     Years: 40.00     Types: Cigarettes     Quit date: 3/19/1991     Years since quittin.4    Smokeless tobacco: Current User     Types: Chew   Vaping Use    Vaping Use: Never used   Substance and Sexual Activity    Alcohol use: No    Drug use: No    Sexual activity: Not Currently   Other Topics Concern    Not on file   Social History Narrative    ** Merged History Encounter **          Social Determinants of Health     Financial Resource Strain:     Difficulty of Paying Living Expenses:    Food Insecurity:     Worried About Running Out of Food in the Last Year:     Ran Out of Food in the Last Year:    Transportation Needs:     Lack of Transportation (Medical):  Lack of Transportation (Non-Medical):    Physical Activity:     Days of Exercise per Week:     Minutes of Exercise per Session:    Stress:     Feeling of Stress :    Social Connections:     Frequency of Communication with Friends and Family:     Frequency of Social Gatherings with Friends and Family:     Attends [employer] Services:     Active Member of Clubs or Organizations:     Attends Club or Organization Meetings:     Marital Status:    Intimate Partner Violence:     Fear of Current or Ex-Partner:     Emotionally Abused:     Physically Abused:     Sexually Abused:        Family History   Problem Relation Age of Onset    Diabetes Mother        REVIEW OF SYSTEMS:     Parminder Gates denies fever/chills, chest pain, shortness of breath, new bowel or bladder complaints or suicidal ideations. All other review of systems wasnegative.     PHYSICAL EXAMINATION:      General:    General appearance:  Pleasant and well-hydrated, in mild to moderate distress and A & O x3  Build: Normal Weight Function: Rises from a seated position with some difficulty.     HEENT:  Head:normocephalic, atraumatic    Lungs:  Breathing:normal breathing pattern      CVS:  clear and non labored      Abdomen:  Shape:non-distended and normal     Cervical spine:  Inspection:normal  Palpation: tenderness over lower cervical paraspinal muscles,   Range of motion: limitations of ROM noted. Facet tenderness noted over the mid and lower cervical facets.     Lumbar Spine:   Scar from the prior surgery machinery. We have discussed that these medications will be prescribed only by one provider. We have discussed with the patient about age related risk factors and have thoroughly discussed the importance of taking these medications as prescribed. The patient verbalizes understanding.     Laureano Davenport MD

## 2021-09-01 NOTE — PROGRESS NOTES
Do you currently have any of the following:    Fever: No  Headache: yes  Cough: No  Shortness of breath: No  Exposed to anyone with these symptoms: No                                                                                                                Bandar Bo presents to the [facility] on 9/1/2021. Nitin Valencia is complaining of pain lower back and right leg,n back of left leg. The pain is persistent. The pain is described as very bad when he gets it. . Pain is rated on his best day at a 5, on his worst day at a 8, and on average at a 5 on the VAS scale. He took his last dose of Percocet today. Nitin Valencia does not have issues with constipation. Any procedures since your last visit: Yes, with 50 % relief. He is not on NSAIDS and  is  on anticoagulation medications to include ASA and is managed by Edson Styles DO  . Pacemaker or defibrillator: No.    Medication Contract and Consent for Opioid Use Documents Filed     Patient Documents       Type of Document Status Date Received Received By Description     Medication Contract Received 3/15/2019  1:18 PM AALIYAH HOPE PAIN MANAGEMENT PT AGREEMENT 3/15/2019     Medication Contract Received 2/12/2020  3:12 PM Yoel CARRANZA pain management patient agreement 02/12/2020                   /68   Pulse 72   Temp 96.8 °F (36 °C) (Infrared)   Resp 16   Ht 5' 7\" (1.702 m)   Wt 160 lb (72.6 kg)   SpO2 98%   BMI 25.06 kg/m²      No LMP for male patient.

## 2021-09-01 NOTE — PATIENT INSTRUCTIONS
Pain Management Department    Facet Medial Branch Radiofreguency Ablation:  Radiofrequency ablation is an outpatient procedure for treating back pain originating from facet joint. It uses radiofrequency energy to disrupt nerve function. When this is done to a facet medial branch nerve, the nerve can no longer transmit pain from injured facet joint. It is an effective treatment for patients who have experienced short term pain relief from facet medial branch block. Although the nerve usually grows back, this can take several months to a year. What are the different types of facet medial branch iniections;  Cervical facet medial branch block, if symptoms are in the neck, shoulder blades and upper extremities. Thoracic facet medial branch block, if symptoms are in the mid-back. Lumbar facet medial branch block, if symptoms are in the low back, buttocks, groin and lower extremities. Pre-procedure Instructions:  1- Your current medications will be reviewed and you will be instructed on which medications to discontinue before the procedure. 2- lf sedation is administered you will be required to fast before procedure (eight hours). 3- A  will be required if sedation is administered. Procedure:  A local anesthetic will be used to numb your skin. A needle will be advanced under X-ray to the target area. Once needle is near the facet joint, stimulation of the nerve will be done to ensure proper positioning of the needle. This may cause muscle twitching and provoke some of your pain. Radiofrequency will be used to disrupt medial branch nerve. The needle will then be removed and Band-Aid will be applied. Post-procedure: You will be monitored in the recovery room for up to 30 minutes after the injection, when you are ready to leave, the staff will give you the discharge instructions. You may feel sore for one week. This is normal and may be caused by muscle and nerve irritation.  Your neck or lower back may feel numb, itchy for a couple of weeks, full pain relief normally takes two to three weeks. While it varies from patient to patient, nerves can take up to 18 months to regenerate after radiofrequency ablation.

## 2021-09-14 DIAGNOSIS — E78.5 DYSLIPIDEMIA: ICD-10-CM

## 2021-09-14 DIAGNOSIS — Z76.0 MEDICATION REFILL: ICD-10-CM

## 2021-09-14 DIAGNOSIS — E11.9 TYPE 2 DIABETES MELLITUS WITHOUT COMPLICATION, WITHOUT LONG-TERM CURRENT USE OF INSULIN (HCC): ICD-10-CM

## 2021-09-14 RX ORDER — SIMVASTATIN 20 MG
20 TABLET ORAL NIGHTLY
Qty: 30 TABLET | Refills: 1 | Status: SHIPPED
Start: 2021-09-14 | End: 2021-10-11

## 2021-09-14 RX ORDER — LINAGLIPTIN AND METFORMIN HYDROCHLORIDE 2.5; 1 MG/1; MG/1
1 TABLET, FILM COATED, EXTENDED RELEASE ORAL 2 TIMES DAILY
Qty: 60 TABLET | Refills: 1 | Status: SHIPPED
Start: 2021-09-14 | End: 2021-11-01

## 2021-09-14 RX ORDER — CITALOPRAM 40 MG/1
TABLET ORAL
Qty: 30 TABLET | Refills: 1 | Status: SHIPPED | OUTPATIENT
Start: 2021-09-14 | End: 2021-10-28 | Stop reason: SDUPTHER

## 2021-09-14 NOTE — TELEPHONE ENCOUNTER
----- Message from Farhan Bardales sent at 9/14/2021  7:14 AM EDT -----  Subject: Refill Request    QUESTIONS  Name of Medication? linaGLIPtin-metFORMIN HCl ER (JENTADUETO XR) 2.5-1000   MG TB24  Patient-reported dosage and instructions? 2.5-1000MG two tablets daily  How many days do you have left? 10  Preferred Pharmacy? [id number]  Pharmacy phone number (if available)? [phone]  ---------------------------------------------------------------------------  --------------,  Name of Medication? simvastatin (ZOCOR) 20 MG tablet  Patient-reported dosage and instructions? 20 Mg one tablet daily  How many days do you have left? 12  Preferred Pharmacy? [id number]  Pharmacy phone number (if available)? [phone]  ---------------------------------------------------------------------------  --------------  CALL BACK INFO  What is the best way for the office to contact you? OK to leave message on   voicemail  Preferred Call Back Phone Number?  [phone]

## 2021-09-14 NOTE — TELEPHONE ENCOUNTER
----- Message from Farhan Bardales sent at 9/14/2021  7:13 AM EDT -----  Subject: Refill Request    QUESTIONS  Name of Medication? citalopram (CELEXA) 40 MG tablet  Patient-reported dosage and instructions? 40 Mg tablet one tablet daily  How many days do you have left? 5  Preferred Pharmacy? [id number]  Pharmacy phone number (if available)? [phone]  ---------------------------------------------------------------------------  --------------  CALL BACK INFO  What is the best way for the office to contact you? OK to leave message on   voicemail  Preferred Call Back Phone Number?  [phone]

## 2021-09-17 ENCOUNTER — HOSPITAL ENCOUNTER (EMERGENCY)
Age: 81
Discharge: HOME | End: 2021-09-17
Payer: MEDICARE

## 2021-09-17 ENCOUNTER — APPOINTMENT (OUTPATIENT)
Dept: GENERAL RADIOLOGY | Age: 81
End: 2021-09-17
Payer: MEDICARE

## 2021-09-17 VITALS
DIASTOLIC BLOOD PRESSURE: 71 MMHG | RESPIRATION RATE: 18 BRPM | TEMPERATURE: 97.8 F | OXYGEN SATURATION: 97 % | SYSTOLIC BLOOD PRESSURE: 114 MMHG | BODY MASS INDEX: 24.75 KG/M2 | WEIGHT: 158 LBS | HEART RATE: 86 BPM

## 2021-09-17 DIAGNOSIS — J06.9 UPPER RESPIRATORY TRACT INFECTION, UNSPECIFIED TYPE: Primary | ICD-10-CM

## 2021-09-17 LAB — SARS-COV-2, NAAT: NOT DETECTED

## 2021-09-17 PROCEDURE — 71046 X-RAY EXAM CHEST 2 VIEWS: CPT

## 2021-09-17 PROCEDURE — 99211 OFF/OP EST MAY X REQ PHY/QHP: CPT

## 2021-09-17 PROCEDURE — 87635 SARS-COV-2 COVID-19 AMP PRB: CPT

## 2021-09-17 RX ORDER — BROMPHENIRAMINE MALEATE, PSEUDOEPHEDRINE HYDROCHLORIDE, AND DEXTROMETHORPHAN HYDROBROMIDE 2; 30; 10 MG/5ML; MG/5ML; MG/5ML
5 SYRUP ORAL 4 TIMES DAILY PRN
Qty: 120 ML | Refills: 0 | Status: SHIPPED | OUTPATIENT
Start: 2021-09-17 | End: 2021-09-22

## 2021-09-17 RX ORDER — AMOXICILLIN 500 MG/1
500 CAPSULE ORAL 3 TIMES DAILY
Qty: 21 CAPSULE | Refills: 0 | Status: SHIPPED | OUTPATIENT
Start: 2021-09-17 | End: 2021-09-22

## 2021-09-17 ASSESSMENT — PAIN SCALES - GENERAL: PAINLEVEL_OUTOF10: 3

## 2021-09-17 ASSESSMENT — PAIN DESCRIPTION - DESCRIPTORS: DESCRIPTORS: ACHING

## 2021-09-17 ASSESSMENT — PAIN DESCRIPTION - PAIN TYPE: TYPE: ACUTE PAIN

## 2021-09-17 ASSESSMENT — PAIN DESCRIPTION - ORIENTATION: ORIENTATION: MID

## 2021-09-17 ASSESSMENT — PAIN DESCRIPTION - LOCATION: LOCATION: CHEST

## 2021-09-17 NOTE — ED PROVIDER NOTES
HPI: Esthela Goldstein is a 78 y.o. male with a past medical history of  has a past medical history of Accident caused by farm tractor, Arthritis, CAD (coronary artery disease), Chronic pain, Clavicle fracture, Concussion with no loss of consciousness, Depression, Diabetes mellitus ([facility] [address] GERD (gastroesophageal reflux disease), Headache(784.0), History of blood transfusion, Hyperlipidemia, Kidney stone, Laceration of flexor tendon of hand, not in \"no man's land\", Multiple closed fractures of ribs of left side, Neck pain, Osteoarthritis, Rib fracture, SBO (small bowel obstruction) ([facility] [address] Skin cancer, Thyroid disease, Trauma, and Traumatic hemopneumothorax, initial encounter. presenting with complaints of a cough with nasal congestion. The patient states that these symptoms began gradually. The history is obtained from the patient. The patient states that he has had some subjective chills at home. Patient does complain of a mild cough associated with it that is nonproductive. Patient denies excessive fatigue or sleeping greater than 18 hours a day. Patient denies exposure to mononucleosis. The patient denies any abdominal pain, left upper quadrant fullness, or early satiety. The patient also denies difficulty breathing, hemoptysis, neck pain/stiffness, or blurry vision. Sx have persisted and are mildly worse which is what prompted the visit today. unkown exposure to sick contacts, presents for complaints of a cough, headache with chest congestion which is been present for the last several days. Denies any chest pain or shortness of breath. Denies any fever, body aches or chills.   He is fully vaccinated against COVID-19.        ROS:   Pertinent positives and negatives are stated within HPI, all other systems reviewed and are negative.      --------------------------------------------- PAST HISTORY ---------------------------------------------  Past Medical History:  has a past medical history of Accident caused by farm tractor, Arthritis, CAD (coronary artery disease), Chronic pain, Clavicle fracture, Concussion with no loss of consciousness, Depression, Diabetes mellitus ([address] GERD (gastroesophageal reflux disease), Headache(784.0), History of blood transfusion, Hyperlipidemia, Kidney stone, Laceration of flexor tendon of hand, not in \"[place] man's land\", Multiple closed fractures of ribs of left side, Neck pain, Osteoarthritis, Rib fracture, SBO (small bowel obstruction) ([address] Skin cancer, Thyroid disease, Trauma, and Traumatic hemopneumothorax, initial encounter. Past Surgical History:  has a past surgical history that includes Coronary angioplasty with stent (2005); Cervical spine surgery (2012); back surgery; fracture surgery; Cataract removal with implant (Left, 10/08/13); Colon surgery; Cataract removal with implant (Right, 12/10/13); other surgical history (Left, 101/10/2014); Nerve Block (Left, 3/7/16); Nerve Block (Left, 3/28/16); Nerve Block (Left, 04 04 2016); Nerve Block (Left, 07/25/2016); Nerve Block (Left, 08/15/2016); Nerve Block (Right, 08/22/2016); Nerve Block (Right, 08/29/2016); Rotator cuff repair (Bilateral); Shoulder arthroscopy (Right, 12/08/2016); pr chest surgery procedure unlisted (Left, 8/6/2018); open prox humeral fracture proshetic replacement (Left, 10/23/2018); Dilatation, esophagus; Nerve Block (Bilateral, 09/10/2019); Anesthesia Nerve Block (Bilateral, 9/10/2019); skin biopsy; Nerve Block (Bilateral, 10/29/2019); RADIOFREQUENCY ABLATION NERVES (Bilateral, 10/29/2019); Nerve Block (N/A, 07/21/2020); Anesthesia Nerve Block (N/A, 7/21/2020); Nerve Block (Right, 09/15/2020); Nerve Block (Right, 9/15/2020); other surgical history (Bilateral, 01/05/2021); Pain management procedure (Bilateral, 1/5/2021); and Pain management procedure (Right, 8/17/2021). Social History:  reports that he quit smoking about 30 years ago. His smoking use included cigarettes. He quit after 40.00 years of use.  His smokeless tobacco use includes chew. He reports that he does not drink alcohol and does not use drugs. Family History: family history includes Diabetes in his mother. The patients home medications have been reviewed. Allergies: Sulfa antibiotics    ------------------------- NURSING NOTES AND VITALS REVIEWED ---------------------------   The nursing notes within the ED encounter and vital signs as below have been reviewed by myself. /71   Pulse 86   Temp 97.8 °F (36.6 °C)   Resp 18   Wt 158 lb (71.7 kg)   SpO2 97%   BMI 24.75 kg/m²   Oxygen Saturation Interpretation: Normal    The patients available past medical records and past encounters were reviewed. Physical exam:  Constitutional: Vital signs are reviewed the patient is comfortable. The patient is alert and oriented and conversant. Head: The head is atraumatic and normocephalic. Eyes: No discharge is present from the eyes. The sclera are normal.  ENT: The oropharynx demonstrates a small amount of erythema bilaterally. There is no tonsillar enlargement nor is there any exudate present. No uvular deviation or edema. No tonsillary asymmetry.  Floor of the mouth soft, no trismus, handling secretions. TMs bilaterally demonstrate no evidence of infection. Neck: Normal range of motion is achieved in the neck. There is no JVD present. No meningeal signs are present   Anterior cervical adenopathy is nromal.  Respiratory/chest: The chest is nontender. Breath sounds are normal. There is no respiratory distress.   Cardiovascular: Heart shows a regular rate and rhythm without murmurs clicks or gallops. Abdominal exam: The abdomen is non tender without evidence of peritoneal signs.  Specific attention to the left upper quadrant with palpation of the spleen demonstrates no organomegaly or tenderness  Skin: warm and dry, without rash  Neurologic: GCS 15   Psych: Normal Affect  -------------------------------------------------- RESULTS -------------------------------------------------    LABS:  Results for orders placed or performed during the hospital encounter of 09/17/21   COVID-19, Rapid    Specimen: Nasopharyngeal Swab   Result Value Ref Range    SARS-CoV-2, NAAT Not Detected Not Detected       RADIOLOGY:  Interpreted by Radiologist.  XR CHEST (2 VW)   Final Result   No consolidations concerning for pneumonia.               ------------------------------ ED COURSE/MEDICAL DECISION MAKING----------------------  Medications - No data to display          Medical Decision Making:         Upper respiratory infection likely viral in etiology. Not hypoxic, nothing to suggests pneumonia. Well appearing, non toxic, appropriate for outpatient management. Plan is for symptom management and PCP follow up.         This patient's ED course included: a personal history and physicial eaxmination and re-evaluation prior to disposition    This patient has remained hemodynamically stable during their ED course. Counseling: The emergency provider has spoken with the patient and discussed todays results, in addition to providing specific details for the plan of care and counseling regarding the diagnosis and prognosis. Questions are answered at this time and they are agreeable with the plan.       --------------------------------- IMPRESSION AND DISPOSITION ---------------------------------    IMPRESSION  1.  Upper respiratory tract infection, unspecified type        DISPOSITION  Disposition: Discharge to home  Patient condition is good             Dory Merritt, VIKRAM - SHAWNA  09/17/21 [id number]

## 2021-09-27 ENCOUNTER — TELEPHONE (OUTPATIENT)
Dept: PAIN MANAGEMENT | Age: 81
End: 2021-09-27

## 2021-09-27 NOTE — TELEPHONE ENCOUNTER
9-27-21-call to Jaison Cadena to verify he has held his aspirin, his wife states he has held it for 4 days, last dose was 9-22-21. She shows an understanding to not have him take the aspirin before the procedure.     Ara Giron RN  Pain Management

## 2021-09-28 ENCOUNTER — HOSPITAL ENCOUNTER (OUTPATIENT)
Dept: OPERATING ROOM | Age: 81
Setting detail: OUTPATIENT SURGERY
End: 2021-09-28
Attending: ANESTHESIOLOGY | Admitting: ANESTHESIOLOGY
Payer: MEDICARE

## 2021-09-28 ENCOUNTER — HOSPITAL ENCOUNTER (OUTPATIENT)
Age: 81
Setting detail: OUTPATIENT SURGERY
Discharge: HOME | End: 2021-09-28
Attending: ANESTHESIOLOGY | Admitting: ANESTHESIOLOGY
Payer: MEDICARE

## 2021-09-28 VITALS
TEMPERATURE: 97 F | RESPIRATION RATE: 16 BRPM | DIASTOLIC BLOOD PRESSURE: 59 MMHG | HEART RATE: 89 BPM | SYSTOLIC BLOOD PRESSURE: 128 MMHG | OXYGEN SATURATION: 94 % | WEIGHT: 158 LBS | BODY MASS INDEX: 24.8 KG/M2 | HEIGHT: 67 IN

## 2021-09-28 DIAGNOSIS — M47.896 OTHER OSTEOARTHRITIS OF SPINE, LUMBAR REGION: ICD-10-CM

## 2021-09-28 PROCEDURE — 3209999900 FLUORO FOR SURGICAL PROCEDURES

## 2021-09-28 PROCEDURE — 7100000010 HC PHASE II RECOVERY - FIRST 15 MIN: Performed by: ANESTHESIOLOGY

## 2021-09-28 PROCEDURE — C1713 ANCHOR/SCREW BN/BN,TIS/BN: HCPCS | Performed by: ANESTHESIOLOGY

## 2021-09-28 PROCEDURE — 6360000002 HC RX W HCPCS: Performed by: ANESTHESIOLOGY

## 2021-09-28 PROCEDURE — 2500000003 HC RX 250 WO HCPCS: Performed by: ANESTHESIOLOGY

## 2021-09-28 PROCEDURE — 3600000005 HC SURGERY LEVEL 5 BASE: Performed by: ANESTHESIOLOGY

## 2021-09-28 PROCEDURE — 7100000011 HC PHASE II RECOVERY - ADDTL 15 MIN: Performed by: ANESTHESIOLOGY

## 2021-09-28 PROCEDURE — 3600000015 HC SURGERY LEVEL 5 ADDTL 15MIN: Performed by: ANESTHESIOLOGY

## 2021-09-28 PROCEDURE — 64636 DESTROY L/S FACET JNT ADDL: CPT | Performed by: ANESTHESIOLOGY

## 2021-09-28 PROCEDURE — 64635 DESTROY LUMB/SAC FACET JNT: CPT | Performed by: ANESTHESIOLOGY

## 2021-09-28 PROCEDURE — 2709999900 HC NON-CHARGEABLE SUPPLY: Performed by: ANESTHESIOLOGY

## 2021-09-28 RX ORDER — METHYLPREDNISOLONE ACETATE 40 MG/ML
INJECTION, SUSPENSION INTRA-ARTICULAR; INTRALESIONAL; INTRAMUSCULAR; SOFT TISSUE PRN
Status: DISCONTINUED | OUTPATIENT
Start: 2021-09-28 | End: 2021-09-28 | Stop reason: ALTCHOICE

## 2021-09-28 RX ORDER — LIDOCAINE HYDROCHLORIDE 10 MG/ML
INJECTION, SOLUTION EPIDURAL; INFILTRATION; INTRACAUDAL; PERINEURAL PRN
Status: DISCONTINUED | OUTPATIENT
Start: 2021-09-28 | End: 2021-09-28 | Stop reason: ALTCHOICE

## 2021-09-28 RX ORDER — LIDOCAINE HYDROCHLORIDE 5 MG/ML
INJECTION, SOLUTION INFILTRATION; INTRAVENOUS PRN
Status: DISCONTINUED | OUTPATIENT
Start: 2021-09-28 | End: 2021-09-28 | Stop reason: ALTCHOICE

## 2021-09-28 RX ORDER — BUPIVACAINE HYDROCHLORIDE 2.5 MG/ML
INJECTION, SOLUTION EPIDURAL; INFILTRATION; INTRACAUDAL PRN
Status: DISCONTINUED | OUTPATIENT
Start: 2021-09-28 | End: 2021-09-28 | Stop reason: ALTCHOICE

## 2021-09-28 ASSESSMENT — PAIN DESCRIPTION - PAIN TYPE: TYPE: CHRONIC PAIN

## 2021-09-28 ASSESSMENT — PAIN SCALES - GENERAL: PAINLEVEL_OUTOF10: 6

## 2021-09-28 ASSESSMENT — PAIN DESCRIPTION - DESCRIPTORS: DESCRIPTORS: ACHING

## 2021-09-28 ASSESSMENT — PAIN DESCRIPTION - LOCATION: LOCATION: BACK

## 2021-09-28 ASSESSMENT — PAIN - FUNCTIONAL ASSESSMENT: PAIN_FUNCTIONAL_ASSESSMENT: 0-10

## 2021-09-28 NOTE — H&P
[address]  [address] [facility], [address] [place] [address] Edvin  [phone]    Procedure History & Physical      [facility]     HPI:    Patient  is here for Lumbar MB RFA for low back pain. Labs/imaging studies reviewed   All question and concerns addressed including R/B/A associated with the procedure    Past Medical History:   Diagnosis Date    Accident caused by farm tractor    [address] CAD (coronary artery disease)     Chronic pain     Clavicle fracture     Concussion with no loss of consciousness     Depression     Diabetes mellitus ([facility] [address]     GERD (gastroesophageal reflux disease)     Headache(784.0)     History of blood transfusion     Hyperlipidemia     Kidney stone     Laceration of flexor tendon of hand, not in \"no man's land\" 10/10/2014    Multiple closed fractures of ribs of left side     Neck pain     Osteoarthritis     Rib fracture     SBO (small bowel obstruction) ([facility] [address] 4/23/2019    Skin cancer     Thyroid disease     Trauma 8/4/2018    Traumatic hemopneumothorax, initial encounter        Past Surgical History:   Procedure Laterality Date    ANESTHESIA NERVE BLOCK Bilateral 9/10/2019    BILATERAL LUMBAR FACET MEDIAL BRANCH BLOCK L3 L4 AND L5 DORSAL RAMI (CPT [id number]) performed by Zainab Underwood MD at [facility] N/A 7/21/2020    LUMBAR EPIDURAL STEROID INJECTION L4-L5 X-RAY performed by Zainab Underwood MD at [address]      x2 lumbar    CATARACT REMOVAL WITH IMPLANT Left 10/08/13    CATARACT REMOVAL WITH IMPLANT Right 12/10/13    CERVICAL SPINE SURGERY  2012    cervical fusion    COLON SURGERY      due to diverticulitis colon resection    CORONARY ANGIOPLASTY WITH STENT PLACEMENT  2005    x2 stents    DILATATION, ESOPHAGUS      FRACTURE SURGERY      Lt. Jaw Plate    NERVE BLOCK Left 3/7/16    cervical transforaminal #1    NERVE BLOCK Left 3/28/16    cervical transforaminal #2    NERVE BLOCK Left 04 04 2016    transforaminal nerve block left cervical #3    NERVE BLOCK Left 07/25/2016    shoulder    NERVE BLOCK Left 08/15/2016    left shoulder injection #2    NERVE BLOCK Right 08/22/2016    shoulder    NERVE BLOCK Right 08/29/2016    shoulder injection #2    NERVE BLOCK Bilateral 09/10/2019    BILATERAL LUMBAR FACET MEDIAL BRANCH NERVE BLOCK AT L3,L4 MEDIAL BRANCH AND L5 DORSAL RAMI    NERVE BLOCK Bilateral 10/29/2019    lumbar facet, meidal branch    NERVE BLOCK N/A 07/21/2020    Lumbar epidural steriod injection L4-L5    NERVE BLOCK Right 09/15/2020    transforaminal L4,5    NERVE BLOCK Right 9/15/2020    TRANSFORAMINAL EPIDURAL STEROID INJECTION RIGHT L4 AND L5 UNDER FLUOROSCOPIC GUIDANCE (CPT 70935,71127) performed by Donald Ortega MD at [address] [place] [address]. Left 10/23/2018    LEFT CLAVICLE OPEN REDUCTION INTERNAL FIXATION performed by Curt Randall DO at [facility] Left 101/10/2014    repair left hand, finger laceration    OTHER SURGICAL HISTORY Bilateral 01/05/2021    lumbar facet medial branch dorsal rami radiofrequency    PAIN MANAGEMENT PROCEDURE Bilateral 1/5/2021    BILATERAL LUMBAR FACET L3,L4 MEDIAL BRANCH L5 DORSAL RAMI RADIOFREQUENCY ABLATION WITH IV SEDATION (CPT 73039,47123) performed by Donald Ortega MD at [address] [place] [address] Right 8/17/2021    L4 L5 RIGHT TRANSFORAMINAL EPIDURAL STEROID INJECTION X-RAY (CPT 94959) performed by Donald Ortega MD at [place] [address] Left 8/6/2018    LEFT SIDED VIDEO ASSISTED THORACOSCOPY WITH RIB PLATING performed by Yina Barnes MD at [address] [place] [address] d/t a fall    RADIOFREQUENCY ABLATION NERVES Bilateral 10/29/2019    BILATERAL LUMBAR FACET L3 L4 MEDIAL BRANCH  L5 DORSAL RAMI  RADIOFREQUENCY ABLATION SEDATION (CPT [id number]) performed by [facility] Shoshana Kan MD at [facility] Bilateral     SHOULDER ARTHROSCOPY Right 12/08/2016    repair rotator cuff, bursectomy, debridement glenohumerol     SKIN BIOPSY         Prior to Admission medications    Medication Sig Start Date End Date Taking? Authorizing Provider   levothyroxine (SYNTHROID) 25 MCG tablet Take 1.5 tablets by mouth daily 2/10/21 9/22/21 Yes Arnaud Arreola, DO   aspirin EC 81 MG EC tablet Take 81 mg by mouth daily   Yes Historical Provider, MD   citalopram (CELEXA) 40 MG tablet TAKE 1 TABLET BY MOUTH EVERY DAY 9/14/21   Arnaud Arreola, DO   simvastatin (ZOCOR) 20 MG tablet Take 1 tablet by mouth nightly 9/14/21   Arnaud Arreola, DO   linaGLIPtin-metFORMIN HCl ER (JENTADUETO XR) 2.5-1000 MG TB24 Take 1 tablet by mouth 2 times daily 9/14/21   Arnaud Arreola DO   HYDROcodone-acetaminophen (NORCO) 5-325 MG per tablet Take 1 tablet by mouth 2 times daily as needed for Pain for up to 30 days.  Take lowest dose possible to manage pain 9/1/21 10/1/21  Deanna Mena MD   b complex vitamins capsule Take 1 capsule by mouth daily 7/27/21   Darl Pride, DO   sucralfate (CARAFATE) 1 GM tablet Take 1 tablet by mouth 2 times daily 6/1/21   Arnaud Arreola, DO   pantoprazole (PROTONIX) 40 MG tablet TAKE 1 TABLET BY MOUTH EVERY DAY IN THE MORNING BEFORE BREAKFAST 4/1/21   Arnaud Arreola, DO   donepezil (ARICEPT) 5 MG tablet Take 1 tablet by mouth nightly 3/22/21   Markl Pride, DO   famotidine (PEPCID) 40 MG tablet Take 1 tablet by mouth every evening 2/10/21   Arnaud Arreola, DO   ferrous sulfate 325 (65 Fe) MG tablet Take 325 mg by mouth daily (with breakfast)    Historical Provider, MD   meclizine (ANTIVERT) 25 MG tablet Take 25 mg by mouth 3 times daily as needed    Historical Provider, MD   vitamin D (CHOLECALCIFEROL) 1000 UNIT TABS tablet Take 1,000 Units by mouth daily    Historical Provider, MD       Allergies   Allergen Reactions    Sulfa Antibiotics Hives       Social History     Socioeconomic History    Marital status:      Spouse name: Not on file    Number of children: Not on file    Years of education: Not on file    Highest education level: Not on file   Occupational History    Not on file   Tobacco Use    Smoking status: Former Smoker     Years: 40.00     Types: Cigarettes     Quit date: 3/19/1991     Years since quittin.5    Smokeless tobacco: Current User     Types: Chew   Vaping Use    Vaping Use: Never used   Substance and Sexual Activity    Alcohol use: No    Drug use: No    Sexual activity: Not Currently   Other Topics Concern    Not on file   Social History Narrative    ** Merged History Encounter **          Social Determinants of Health     Financial Resource Strain:     Difficulty of Paying Living Expenses:    Food Insecurity:     Worried About Running Out of Food in the Last Year:     [address] in the Last Year:    Transportation Needs:     Lack of Transportation (Medical):      Lack of Transportation (Non-Medical):    Physical Activity:     Days of Exercise per Week:     Minutes of Exercise per Session:    Stress:     Feeling of Stress :    Social Connections:     Frequency of Communication with Friends and Family:     Frequency of Social Gatherings with Friends and Family:     Attends Taoist Services:     Active Member of Clubs or Organizations:     Attends Club or Organization Meetings:     Marital Status:    Intimate Partner Violence:     Fear of Current or Ex-Partner:     Emotionally Abused:     Physically Abused:     Sexually Abused:        Family History   Problem Relation Age of Onset    Diabetes Mother          REVIEW OF SYSTEMS:    CONSTITUTIONAL:  negative for  fevers, chills, sweats and fatigue    RESPIRATORY:  negative for  dry cough, cough with sputum, dyspnea, wheezing and chest pain    CARDIOVASCULAR:  negative for chest pain, dyspnea, palpitations, syncope    GASTROINTESTINAL:  negative for nausea, vomiting, change in bowel habits, diarrhea, constipation and abdominal pain    MUSCULOSKELETAL: negative for muscle weakness    SKIN: negative for itching or rashes. BEHAVIOR/PSYCH:  negative for poor appetite, increased appetite, decreased sleep and poor concentration    All other systems negative      PHYSICAL EXAM:    VITALS:  /84   Pulse 84   Temp 97 °F (36.1 °C) (Skin)   Resp 16   Ht 5' 7\" (1.702 m)   Wt 158 lb (71.7 kg)   SpO2 96%   BMI 24.75 kg/m²     CONSTITUTIONAL:  awake, alert, cooperative, no apparent distress, and appears stated age    EYES: PERRLA, EOMI    LUNGS:  No increased work of breathing, no audible wheezing    CARDIOVASCULAR:  regular rate and rhythm    ABDOMEN:  Soft non tender non distended     EXTREMITIES: no signs of clubbing or cyanosis. MUSCULOSKELETAL: negative for flaccid muscle tone or spastic movements. SKIN: gross examination reveals no signs of rashes, or diaphoresis. NEURO: Cranial nerves II-XII grossly intact. No signs of agitated mood. Assessment/Plan:    Patient  is here for Lumbar MB RFA for low back pain. The patient was counseled at length about the risks of osmani Covid-19 during their perioperative period and any recovery window from their procedure. The patient was made aware that osmani Covid-19  may worsen their prognosis for recovering from their procedure  and lend to a higher morbidity and/or mortality risk. All material risks, benefits, and reasonable alternatives including postponing the procedure were discussed. The patient does wish to proceed with the procedure at this time.     Bradly Ga MD

## 2021-09-28 NOTE — OP NOTE
Operative Note      Patient: Evangelina Plata  YOB: 1940  MRN: [id number]    Date of Procedure: 2021    Pre-Op Diagnosis: LUMBOSACRAL SPONDYLOSIS    Post-Op Diagnosis: Same       Procedure(s):  BILATERAL LUMBAR RADIOFREQUENCY ABLATION UNDER FLUOROSCOPIC GUIDANCE AT L3, L4, L5 AND DORSAL RAMI UNDER FLUOROSCOPY     Surgeon(s):  Renee Chand MD    Assistant:   * No surgical staff found *    Anesthesia: Local    Estimated Blood Loss (mL): Minimal    Complications: None    Specimens:   * No specimens in log *    Implants:  * No implants in log *      Drains: * No LDAs found *    Findings: good needle placement    Detailed Description of Procedure:   2021    Patient: Evangelina Plata  :  1940  Age:  78 y.o. Sex:  male     PRE-OPERATIVE DIAGNOSIS: Bilateral Lumbar spondylosis, lumbar facet arthropathy. POST-OPERATIVE DIAGNOSIS: Same. PROCEDURE:  Fluoroscopic-guided Bilateral  Lumbar facet medial branch radiofrequency ablation at levels L3, L4, L5  SURGEON:  Renee Chand MD    ANESTHESIA: Local    ESTIMATED BLOOD LOSS: None.  ______________________________________________________________________  HISTORY & PHYSICAL: Evangelina Plata presents today for fluoroscopic-guided Bilateral lumbar facet medial branch radiofrequency ablation at levels L3, L4, L5  The potential complications of the procedure were explained to [facility] again today and he has elected to undergo the aforementioned procedure. Tuan complete History & Physical examination were reviewed in depth, a copy of which is in the chart. DESCRIPTION OF PROCEDURE:    After confirming written and informed consent, a time-out was performed and Tuan name and date of birth, the procedure to be performed as well as the plan for the location of the needle insertion were confirmed. The patient was brought into the procedure room and placed in the prone position on the fluoroscopy table. Standard monitors were placed and vital signs were observed throughout the procedure. The area of the lumbar spine and upper buttocks was sterilely prepped with chloraprep and draped in a sterile manner. AP fluoroscopy was used to identify and darryl bartons point at the targeted area. A 22 gauge 10 mm radiofrequency probe was advanced toward each of these points. Once bone was contacted, negative aspiration for blood and CSF was confirmed, sensory stimulation was performed at 50 Hz and at 0.4 volts generating a pressure sensation. Motor stimulation < 2.0 volts elicited multifidus twitching without any radicular symptoms. 0.5-1 ml of 1% lidocaine was injected prior to lesioning at each level, which was performed for 90 seconds at 90 degrees centigrade. Once the lesions were complete, a solution of 0.25% marcaine 3 cc and 30 mg DepoMedrol was injected and distributed equally through each probe. The probes were removed . The patient's back was cleaned and bandages were placed over the needle insertion sites. Disposition the patient tolerated the procedure well and there were no complications . Vital signs remained stable throughout the procedure. The patient was escorted to the recovery area where they remained until discharge and written discharge instructions for the procedure were given. Plan: Yanelis Jacobo will return to our pain management center as scheduled.      Yamilex Sterling MD

## 2021-10-08 ENCOUNTER — OFFICE VISIT (OUTPATIENT)
Dept: PAIN MANAGEMENT | Age: 81
End: 2021-10-08
Payer: MEDICARE

## 2021-10-08 VITALS
RESPIRATION RATE: 16 BRPM | OXYGEN SATURATION: 95 % | TEMPERATURE: 97.5 F | BODY MASS INDEX: 24.8 KG/M2 | DIASTOLIC BLOOD PRESSURE: 80 MMHG | HEIGHT: 67 IN | HEART RATE: 72 BPM | SYSTOLIC BLOOD PRESSURE: 132 MMHG | WEIGHT: 158 LBS

## 2021-10-08 DIAGNOSIS — M47.812 CERVICAL SPONDYLOSIS: ICD-10-CM

## 2021-10-08 DIAGNOSIS — M96.1 LUMBAR POSTLAMINECTOMY SYNDROME: Primary | ICD-10-CM

## 2021-10-08 DIAGNOSIS — M47.817 LUMBOSACRAL SPONDYLOSIS WITHOUT MYELOPATHY: ICD-10-CM

## 2021-10-08 DIAGNOSIS — M48.061 SPINAL STENOSIS OF LUMBAR REGION, UNSPECIFIED WHETHER NEUROGENIC CLAUDICATION PRESENT: ICD-10-CM

## 2021-10-08 DIAGNOSIS — M51.36 DDD (DEGENERATIVE DISC DISEASE), LUMBAR: ICD-10-CM

## 2021-10-08 DIAGNOSIS — M50.30 DDD (DEGENERATIVE DISC DISEASE), CERVICAL: ICD-10-CM

## 2021-10-08 PROCEDURE — 99213 OFFICE O/P EST LOW 20 MIN: CPT | Performed by: ANESTHESIOLOGY

## 2021-10-08 PROCEDURE — 99024 POSTOP FOLLOW-UP VISIT: CPT | Performed by: ANESTHESIOLOGY

## 2021-10-08 RX ORDER — HYDROCODONE BITARTRATE AND ACETAMINOPHEN 5; 325 MG/1; MG/1
1 TABLET ORAL 2 TIMES DAILY PRN
Qty: 60 TABLET | Refills: 0 | Status: SHIPPED | OUTPATIENT
Start: 2021-10-08 | End: 2021-11-07

## 2021-10-08 NOTE — PROGRESS NOTES
[address] [place] [address], [address] [place] [id number]  [phone]    Follow up Note      Cora Valencia     Date of Visit:  10/8/2021    CC:    Chief Complaint   Patient presents with    Follow Up After Procedure     BILATERAL LUMBAR RADIOFREQUENCY ABLATION UNDER FLUOROSCOPIC GUIDANCE AT L3, L4, L5 AND DORSAL RAMI UNDER FLUOROSCOPY          Pertinent Information:  78 y. Valerie Sifuentes gentleman with prior h/o cervical spine surgery- ACDF C3-7 having neck pain with features of myofacial pain and also facet pain. He has undergone lumbar spine surgery X 2 in the 1980's    HPI:  Pt here today for low back pain. Recent Lumbar TFESI with very good pain relief of the LE pain. Repeat lumbar facet MB RFA on 9/28/2021. Good relief, but has some soreness at the procedure site. Teo Clarke for as needed use,  Appropriate analgesia with current medications regimen: yes   Medication side effects:none. Medication help with pain and functionality, no side effects, no signs of misuse abuse or diversion, he is compliant with medication use. Nursing notes and details of the pain history reviewed. Please see intake notes for details.     He is on ASA-81 mg. H/o CAD s/p stent.      Imaging studies:       XR Lumbar spine: 6/2020: Impression   Diffuse osteopenia with moderate to advanced degenerative changes of the   lumbar spine, as above.  No convincing evidence of an acute fracture.      CT cervical spine: 6/14/2020:      FINDINGS:   BONES/ALIGNMENT: No evidence of cervical fracture or traumatic subluxation.    Stable T3 wedge compression.  There has been fusion of C3 through C7.  There   is an anterior plate with screws at C3, C4, and C7.  There is a bone block   graft at C3-C4.  There is a block graft extending from C4 through C7.  The   hardware and grafts are intact.       DEGENERATIVE CHANGES: Degenerative change at C1-C.  Degenerative disc disease   C7-T1.  Diffuse facet osteoarthritis with stable several mm anterolisthesis   of C7.       SOFT TISSUES: No prevertebral soft tissue swelling.  Maxillary sinusitis   changes noted           Impression   No acute abnormality of the cervical spine.          Xray thoracic spine: 2020:      Impression   Limited examination, as described above.  Within these limitations, no   convincing evidence of an acute fracture of the thoracic spine.              CT Lumbar Spine: 2018:      Findings: There are bilateral pars defects noted at L4. There is a   spondylolisthesis of L4 on L5. There is wedging of T12 which may be physiologic   Changes seen throughout the discs are abnormal. There are findings to   suggest  degenerative disc disease.       Changes seen throughout the bone marrow are abnormal. Findings are   compatible with degenerative disc disease       Changes within the facets are abnormal. Findings are compatible with   degenerative facet disease.           At L5-S1: There is a mild broad-based disc herniation or bony   spurring. There is a laminectomy defect. There is facet hypertrophy.    There is mild narrowing of the neural foramina       At L4-5: There is a moderate broad-based disc herniation there is   moderate stenosis       At L3-4: There is a large broad-based disc herniation, there is severe   canal stenosis.       At L2-3: There is a mild broad-based disc herniation, there is mild   canal stenosis       At L1-2: There is a mild broad-based disc herniation, there is mild   canal stenosis               Impression   Findings compatible with degenerative changes   Bilateral L4 pars defect   Severe canal stenosis at L3-4 and moderate canal stenosis at L4-5                                            Potential Aberrant Drug-Related Behavior:  No     Drug Screenin: Consistent     Pain agreement updated: 2021      OARRS report[de-identified]   10/8/2021: Consistent     Past Medical History:   Diagnosis Date    Accident caused by farm tractor    [facility] CAD (coronary artery disease)     Chronic pain     Clavicle fracture     Concussion with no loss of consciousness     Depression     Diabetes mellitus ([facility] [address]     GERD (gastroesophageal reflux disease)     Headache(784.0)     History of blood transfusion     Hyperlipidemia     Kidney stone     Laceration of flexor tendon of hand, not in \"[place]\" 10/10/2014    Multiple closed fractures of ribs of left side     Neck pain     Osteoarthritis     Rib fracture     SBO (small bowel obstruction) ([facility] [address] 4/23/2019    Skin cancer     Thyroid disease     Trauma 8/4/2018    Traumatic hemopneumothorax, initial encounter        Past Surgical History:   Procedure Laterality Date    ANESTHESIA NERVE BLOCK Bilateral 9/10/2019    BILATERAL LUMBAR FACET MEDIAL BRANCH BLOCK L3 L4 AND L5 DORSAL RAMI (CPT [id number]) performed by Ashleigh Paige MD at [facility] N/A 7/21/2020    LUMBAR EPIDURAL STEROID INJECTION L4-L5 X-RAY performed by Ashleigh Paige MD at [address]      x2 lumbar    CATARACT REMOVAL WITH IMPLANT Left 10/08/13    CATARACT REMOVAL WITH IMPLANT Right 12/10/13    CERVICAL SPINE SURGERY  2012    cervical fusion    COLON SURGERY      due to diverticulitis colon resection    CORONARY ANGIOPLASTY WITH STENT PLACEMENT  2005    x2 stents    DILATATION, ESOPHAGUS      FRACTURE SURGERY      Lt. Jaw Plate    NERVE BLOCK Left 3/7/16    cervical transforaminal #1    NERVE BLOCK Left 3/28/16    cervical transforaminal #2    NERVE BLOCK Left 04 04 2016    transforaminal nerve block left cervical #3    NERVE BLOCK Left 07/25/2016    shoulder    NERVE BLOCK Left 08/15/2016    left shoulder injection #2    NERVE BLOCK Right 08/22/2016    shoulder    NERVE BLOCK Right 08/29/2016    shoulder injection #2    NERVE BLOCK Bilateral 09/10/2019    BILATERAL LUMBAR FACET MEDIAL BRANCH NERVE BLOCK AT L3,L4 MEDIAL BRANCH AND L5 DORSAL RAMI    NERVE BLOCK Bilateral 10/29/2019    lumbar facet, meidal branch    NERVE BLOCK N/A 07/21/2020    Lumbar epidural steriod injection L4-L5    NERVE BLOCK Right 09/15/2020    transforaminal L4,5    NERVE BLOCK Right 9/15/2020    TRANSFORAMINAL EPIDURAL STEROID INJECTION RIGHT L4 AND L5 UNDER FLUOROSCOPIC GUIDANCE (CPT 68936,13505) performed by Isaias John MD at [address] [place] [address]. Left 10/23/2018    LEFT CLAVICLE OPEN REDUCTION INTERNAL FIXATION performed by Katy Mcallister DO at [facility] Left 101/10/2014    repair left hand, finger laceration    OTHER SURGICAL HISTORY Bilateral 01/05/2021    lumbar facet medial branch dorsal rami radiofrequency    PAIN MANAGEMENT PROCEDURE Bilateral 1/5/2021    BILATERAL LUMBAR FACET L3,L4 MEDIAL BRANCH L5 DORSAL RAMI RADIOFREQUENCY ABLATION WITH IV SEDATION (CPT 18065,15870) performed by Isaias John MD at [address] Right 8/17/2021    L4 L5 RIGHT TRANSFORAMINAL EPIDURAL STEROID INJECTION X-RAY (CPT 74128) performed by Isaias John MD at [address] Bilateral 9/28/2021    BILATERAL LUMBAR RADIOFREQUENCY ABLATION UNDER FLUOROSCOPIC GUIDANCE AT L3, L4, L5 AND DORSAL RAMI UNDER FLUOROSCOPY (CPR 54715) performed by Isaias John MD at [facility] Left 8/6/2018    LEFT SIDED VIDEO ASSISTED THORACOSCOPY WITH RIB PLATING performed by Heaven Guzman MD at [address] d/t a fall    RADIOFREQUENCY ABLATION NERVES Bilateral 10/29/2019    BILATERAL LUMBAR FACET L3 L4 MEDIAL BRANCH  L5 DORSAL RAMI  RADIOFREQUENCY ABLATION SEDATION (CPT [id number]) performed by Isaias John MD at [facility] Bilateral     SHOULDER ARTHROSCOPY Right 12/08/2016    repair rotator cuff, bursectomy, debridement glenohumerol     SKIN BIOPSY         Prior to Admission medications    Medication Sig Start Date End Date Taking?  Authorizing Provider   citalopram (CELEXA) 40 MG tablet TAKE 1 TABLET BY MOUTH EVERY DAY 9/14/21  Yes Blayne Belle DO   simvastatin (ZOCOR) 20 MG tablet Take 1 tablet by mouth nightly 9/14/21  Yes Arnaud Arreola DO   linaGLIPtin-metFORMIN HCl ER (JENTADUETO XR) 2.5-1000 MG TB24 Take 1 tablet by mouth 2 times daily 9/14/21  Yes Blayne Belle DO   b complex vitamins capsule Take 1 capsule by mouth daily 7/27/21  Yes Blayne Belle DO   sucralfate (CARAFATE) 1 GM tablet Take 1 tablet by mouth 2 times daily 6/1/21  Yes Anraud Arreola DO   pantoprazole (PROTONIX) 40 MG tablet TAKE 1 TABLET BY MOUTH EVERY DAY IN THE MORNING BEFORE BREAKFAST 4/1/21  Yes Blayne Belle DO   donepezil (ARICEPT) 5 MG tablet Take 1 tablet by mouth nightly 3/22/21  Yes Arnaud Arreola DO   famotidine (PEPCID) 40 MG tablet Take 1 tablet by mouth every evening 2/10/21  Yes Arnaud Arreola DO   ferrous sulfate 325 (65 Fe) MG tablet Take 325 mg by mouth daily (with breakfast)   Yes Historical Provider, MD   meclizine (ANTIVERT) 25 MG tablet Take 25 mg by mouth 3 times daily as needed   Yes Historical Provider, MD   vitamin D (CHOLECALCIFEROL) 1000 UNIT TABS tablet Take 1,000 Units by mouth daily   Yes Historical Provider, MD   aspirin EC 81 MG EC tablet Take 81 mg by mouth daily   Yes Historical Provider, MD   levothyroxine (SYNTHROID) 25 MCG tablet Take 1.5 tablets by mouth daily 2/10/21 9/22/21  Blayne Belle DO       Allergies   Allergen Reactions    Sulfa Antibiotics Hives       Social History     Socioeconomic History    Marital status:      Spouse name: Not on file    Number of children: Not on file    Years of education: Not on file    Highest education level: Not on file   Occupational History    Not on file   Tobacco Use    Smoking status: Former Smoker     Years: 40.00     Types: Cigarettes     Quit date: 3/19/1991 Years since quittin.5    Smokeless tobacco: Current User     Types: Chew   Vaping Use    Vaping Use: Never used   Substance and Sexual Activity    Alcohol use: No    Drug use: No    Sexual activity: Not Currently   Other Topics Concern    Not on file   Social History Narrative    ** Merged History Encounter **          Social Determinants of Health     Financial Resource Strain:     Difficulty of Paying Living Expenses:    Food Insecurity:     Worried About Running Out of Food in the Last Year:     Ran Out of Food in the Last Year:    Transportation Needs:     Lack of Transportation (Medical):  Lack of Transportation (Non-Medical):    Physical Activity:     Days of Exercise per Week:     Minutes of Exercise per Session:    Stress:     Feeling of Stress :    Social Connections:     Frequency of Communication with Friends and Family:     Frequency of Social Gatherings with Friends and Family:     Attends Zoroastrian Services:     Active Member of Clubs or Organizations:     Attends Club or Organization Meetings:     Marital Status:    Intimate Partner Violence:     Fear of Current or Ex-Partner:     Emotionally Abused:     Physically Abused:     Sexually Abused:        Family History   Problem Relation Age of Onset    Diabetes Mother        REVIEW OF SYSTEMS:     Stacie Sharpe denies fever/chills, chest pain, shortness of breath, new bowel or bladder complaints or suicidal ideations. All other review of systems wasnegative.     PHYSICAL EXAMINATION:      General:    General appearance:  Pleasant and well-hydrated, in mild to moderate distress and A & O x3  Build: Normal Weight Function: Rises from a seated position with some difficulty.     HEENT:  Head:normocephalic, atraumatic    Lungs:  Breathing:normal breathing pattern      CVS:  clear and non labored      Abdomen:  Shape:non-distended and normal     Cervical spine:  Inspection:normal  Palpation: tenderness over lower cervical paraspinal muscles,   Range of motion: limitations of ROM noted. Facet tenderness noted over the mid and lower cervical facets.     Lumbar Spine:   Scar from the prior surgery noted, healed well, ROM reduced, Lumbar facet loading improved pain, Sensory and motor in B/l LE intact, DTR;'s in b/l LE equal.    Musculoskeletal:   Trigger points + cervical paraspinal muscles     Extremities:  NO tremors     Neurological:  Sensory: normal to light touch   Motor: No focal deficits, generalized weakness noted.   Reflexes: B/l equal  Gait:normal     Dermatology:     Skin:no rashes or lesions noted    Assessment/Plan:.      1. DDD (degenerative disc disease), lumbar      2. Lumbosacral spondylosis without myelopathy      3. Spinal stenosis of lumbar region, unspecified whether neurogenic claudication present      4. Postlaminectomy syndrome, lumbar      5. Cervical postlaminectomy syndrome      6. DDD (degenerative disc disease), cervical      7. Cervical spondylolysis        Lumbar LUCILA/ TFESI had helped the lower extremity pain significantly, >70% relief    Recent RFA for facet pain on 9/28/2021. Some soreness over the procedure site. Doing HEP    Takes Norco for prn use.       Plan:     Refill Norco 5/325 1 po bid prn pain # 60 tabs filled today-10/8/2021. Pain meds helps with pain and functionality. No side effects. No signs of abuse/ misuse or diversion. Patient is compliant with medication use. Buccal on 7/23/2021 result reviewed: Consistent    OARRS reviewed today consistent. He is on ASA-81 mg. H/o CAD s/p stent.     Continue with HEP as tolerated     If LE pain bothers, consider MRI of LS spine. F/U      We discussed with the patient that combining opioids, benzodiazepines, alcohol, illicit drugs or sleep aids increases the risk of respiratory depression including death. We discussed that these medications may cause drowsiness, sedation or dizziness and have counseled the patient not to drive or operate machinery.  We have discussed that these medications will be prescribed only by one provider. We have discussed with the patient about age related risk factors and have thoroughly discussed the importance of taking these medications as prescribed. The patient verbalizes understanding.     Billy Hung MD

## 2021-10-08 NOTE — PROGRESS NOTES
Do you currently have any of the following:    Fever: No  Headache:  No  Cough: No  Shortness of breath: No  Exposed to anyone with these symptoms: No                                                                                                                Zulay Barnard presents to the [facility] on 10/8/2021. Kamini Mcclelland is complaining of pain in the lower back and right leg. . The pain is constant. The pain is described as aching, throbbing, shooting and sharp. Pain is rated on his best day at a 6, on his worst day at a 9, and on average at a 5 on the VAS scale. He took his last dose of Norco and Tylenol . Kamini Mcclelland does not have issues with constipation. Any procedures since your last visit: Yes, he is still having trouble walking, it start from the lower back right side, into the hip and down his leg. He just noticed a slight improvement for a couple days 20% relief. He is not on NSAIDS and  is on anticoagulation medications to include ASA and is managed by Dulce Benitez DO  . Pacemaker or defibrillator: No Physician managing device is NA. Medication Contract and Consent for Opioid Use Documents Filed     Patient Documents       Type of Document Status Date Received Received By Description     Medication Contract Received 3/15/2019  1:18 PM AALIYAH HOPE PAIN MANAGEMENT PT AGREEMENT 3/15/2019     Medication Contract Received 2/12/2020  3:12 PM LAURA RIBERA pain management patient agreement 02/12/2020     Medication Contract Received 9/1/2021 10:54 AM ZOË ROBERSON PAIN AGREEMENT SEPTEMBER 2021                   /80   Pulse 72   Temp 97.5 °F (36.4 °C) (Infrared)   Resp 16   Ht 5' 7\" (1.702 m)   Wt 158 lb (71.7 kg)   SpO2 95%   BMI 24.75 kg/m²      No LMP for male patient.

## 2021-10-18 DIAGNOSIS — E11.22 TYPE 2 DIABETES MELLITUS WITH STAGE 3B CHRONIC KIDNEY DISEASE, WITHOUT LONG-TERM CURRENT USE OF INSULIN (HCC): ICD-10-CM

## 2021-10-18 DIAGNOSIS — N18.32 TYPE 2 DIABETES MELLITUS WITH STAGE 3B CHRONIC KIDNEY DISEASE, WITHOUT LONG-TERM CURRENT USE OF INSULIN (HCC): ICD-10-CM

## 2021-10-18 DIAGNOSIS — G62.9 PERIPHERAL POLYNEUROPATHY: ICD-10-CM

## 2021-10-18 DIAGNOSIS — E11.40 TYPE 2 DIABETES MELLITUS WITH DIABETIC NEUROPATHY, WITHOUT LONG-TERM CURRENT USE OF INSULIN (HCC): ICD-10-CM

## 2021-10-18 RX ORDER — VITAMIN B COMPLEX
CAPSULE ORAL
Qty: 30 CAPSULE | Refills: 2 | Status: SHIPPED | OUTPATIENT
Start: 2021-10-18 | End: 2021-10-28 | Stop reason: SDUPTHER

## 2021-10-21 DIAGNOSIS — K21.9 GASTROESOPHAGEAL REFLUX DISEASE, UNSPECIFIED WHETHER ESOPHAGITIS PRESENT: ICD-10-CM

## 2021-10-21 DIAGNOSIS — G31.84 MILD COGNITIVE IMPAIRMENT: ICD-10-CM

## 2021-10-21 DIAGNOSIS — R10.84 GENERALIZED ABDOMINAL PAIN: ICD-10-CM

## 2021-10-22 RX ORDER — SUCRALFATE 1 G/1
1 TABLET ORAL 2 TIMES DAILY
Qty: 60 TABLET | Refills: 5 | Status: SHIPPED
Start: 2021-10-22 | End: 2022-03-02 | Stop reason: SDUPTHER

## 2021-10-22 RX ORDER — PANTOPRAZOLE SODIUM 40 MG/1
TABLET, DELAYED RELEASE ORAL
Qty: 30 TABLET | Refills: 5 | Status: SHIPPED
Start: 2021-10-22 | End: 2022-03-02 | Stop reason: SDUPTHER

## 2021-10-22 RX ORDER — DONEPEZIL HYDROCHLORIDE 5 MG/1
5 TABLET, FILM COATED ORAL NIGHTLY
Qty: 30 TABLET | Refills: 5 | Status: SHIPPED
Start: 2021-10-22 | End: 2022-03-02 | Stop reason: SDUPTHER

## 2021-10-22 RX ORDER — FAMOTIDINE 40 MG/1
40 TABLET, FILM COATED ORAL EVERY EVENING
Qty: 30 TABLET | Refills: 5 | Status: SHIPPED
Start: 2021-10-22 | End: 2022-02-02

## 2021-10-28 ENCOUNTER — OFFICE VISIT (OUTPATIENT)
Dept: FAMILY MEDICINE CLINIC | Age: 81
End: 2021-10-28
Payer: MEDICARE

## 2021-10-28 VITALS
OXYGEN SATURATION: 97 % | RESPIRATION RATE: 16 BRPM | HEART RATE: 76 BPM | TEMPERATURE: 97.9 F | BODY MASS INDEX: 25.27 KG/M2 | SYSTOLIC BLOOD PRESSURE: 120 MMHG | WEIGHT: 161 LBS | HEIGHT: 67 IN | DIASTOLIC BLOOD PRESSURE: 70 MMHG

## 2021-10-28 DIAGNOSIS — G62.9 PERIPHERAL POLYNEUROPATHY: ICD-10-CM

## 2021-10-28 DIAGNOSIS — F32.A MILD DEPRESSION: ICD-10-CM

## 2021-10-28 DIAGNOSIS — E11.40 TYPE 2 DIABETES MELLITUS WITH DIABETIC NEUROPATHY, WITHOUT LONG-TERM CURRENT USE OF INSULIN (HCC): ICD-10-CM

## 2021-10-28 DIAGNOSIS — E11.22 TYPE 2 DIABETES MELLITUS WITH STAGE 3B CHRONIC KIDNEY DISEASE, WITHOUT LONG-TERM CURRENT USE OF INSULIN (HCC): Primary | ICD-10-CM

## 2021-10-28 DIAGNOSIS — Z76.0 MEDICATION REFILL: ICD-10-CM

## 2021-10-28 DIAGNOSIS — F03.A0 MILD DEMENTIA: ICD-10-CM

## 2021-10-28 DIAGNOSIS — M46.1 SACROILIITIS (HCC): ICD-10-CM

## 2021-10-28 DIAGNOSIS — E03.9 ACQUIRED HYPOTHYROIDISM: ICD-10-CM

## 2021-10-28 DIAGNOSIS — L82.0 SEBORRHEIC KERATOSIS, INFLAMED: ICD-10-CM

## 2021-10-28 DIAGNOSIS — N18.32 TYPE 2 DIABETES MELLITUS WITH STAGE 3B CHRONIC KIDNEY DISEASE, WITHOUT LONG-TERM CURRENT USE OF INSULIN (HCC): Primary | ICD-10-CM

## 2021-10-28 LAB — HBA1C MFR BLD: 5.6 %

## 2021-10-28 PROCEDURE — 83036 HEMOGLOBIN GLYCOSYLATED A1C: CPT | Performed by: FAMILY MEDICINE

## 2021-10-28 PROCEDURE — 1123F ACP DISCUSS/DSCN MKR DOCD: CPT | Performed by: FAMILY MEDICINE

## 2021-10-28 PROCEDURE — G8484 FLU IMMUNIZE NO ADMIN: HCPCS | Performed by: FAMILY MEDICINE

## 2021-10-28 PROCEDURE — G8427 DOCREV CUR MEDS BY ELIG CLIN: HCPCS | Performed by: FAMILY MEDICINE

## 2021-10-28 PROCEDURE — 4004F PT TOBACCO SCREEN RCVD TLK: CPT | Performed by: FAMILY MEDICINE

## 2021-10-28 PROCEDURE — 4040F PNEUMOC VAC/ADMIN/RCVD: CPT | Performed by: FAMILY MEDICINE

## 2021-10-28 PROCEDURE — 99214 OFFICE O/P EST MOD 30 MIN: CPT | Performed by: FAMILY MEDICINE

## 2021-10-28 PROCEDURE — 17110 DESTRUCTION B9 LES UP TO 14: CPT | Performed by: FAMILY MEDICINE

## 2021-10-28 PROCEDURE — G8417 CALC BMI ABV UP PARAM F/U: HCPCS | Performed by: FAMILY MEDICINE

## 2021-10-28 RX ORDER — LANOLIN ALCOHOL/MO/W.PET/CERES
3 CREAM (GRAM) TOPICAL NIGHTLY PRN
Qty: 30 TABLET | Refills: 3 | Status: SHIPPED
Start: 2021-10-28 | End: 2022-03-02

## 2021-10-28 RX ORDER — LEVOTHYROXINE SODIUM 0.03 MG/1
37.5 TABLET ORAL DAILY
Qty: 45 TABLET | Refills: 3 | Status: SHIPPED
Start: 2021-10-28 | End: 2022-03-02 | Stop reason: SDUPTHER

## 2021-10-28 RX ORDER — MECLIZINE HYDROCHLORIDE 25 MG/1
25 TABLET ORAL 3 TIMES DAILY PRN
Qty: 30 TABLET | Refills: 3 | Status: SHIPPED
Start: 2021-10-28 | End: 2022-09-21 | Stop reason: SDUPTHER

## 2021-10-28 RX ORDER — VITAMIN B COMPLEX
CAPSULE ORAL
Qty: 30 CAPSULE | Refills: 3 | Status: SHIPPED | OUTPATIENT
Start: 2021-10-28

## 2021-10-28 RX ORDER — CITALOPRAM 40 MG/1
TABLET ORAL
Qty: 30 TABLET | Refills: 3 | Status: SHIPPED
Start: 2021-10-28 | End: 2022-03-02 | Stop reason: SDUPTHER

## 2021-10-28 ASSESSMENT — ENCOUNTER SYMPTOMS
BACK PAIN: 1
VOMITING: 0
SHORTNESS OF BREATH: 0
NAUSEA: 0

## 2021-10-28 NOTE — PROGRESS NOTES
10/28/2021     Aden Hinojosa (:  1940) is a 78 y.o. male, with a:  Past Medical History:   Diagnosis Date    Accident caused by farm tractor     Arthritis     CAD (coronary artery disease)     Chronic pain     Clavicle fracture     Concussion with no loss of consciousness     Depression     Diabetes mellitus ([facility]     GERD (gastroesophageal reflux disease)     Headache(784.0)     History of blood transfusion     Hyperlipidemia     Kidney stone     Laceration of flexor tendon of hand, not in \"[place] man's land\" 10/10/2014    Multiple closed fractures of ribs of left side     Neck pain     Osteoarthritis     Rib fracture     SBO (small bowel obstruction) ([facility] 2019    Skin cancer     Thyroid disease     Trauma 2018    Traumatic hemopneumothorax, initial encounter        Here for evaluation of the following medical concerns:  Chief Complaint   Patient presents with    Diabetes     He also follows with pain management, ENT, cardiology, GI, sleep medicine    F/U of chronic problem(s) and recent complaint of irritated skin lesion  Chronic problems reviewed today include:  DM, CAD, HLD, Hypothyroidism, sleep disturbance  Current status of this/these condition(s):  stable  Tolerating meds:         Yes    Diabetes Mellitus Type 2   Current treatment: linagliptin-metformin 2.5-1000 twice daily  Recent medication changes: none  Poct HbA1c: 5.6      Lab Results   Component Value Date    LABA1C 5.6 10/28/2021    LABA1C 6.0 (H) 2021    LABA1C 6.1 (H) 2021     Lab Results   Component Value Date    LABMICR 25.0 (H) 10/09/2019    CREATININE 1.6 (H) 2021     Lab Results   Component Value Date    ALT 15 2021    AST 24 2021     Lab Results   Component Value Date    CHOL 145 2021    TRIG 173 (H) 2021    HDL 39 2021    LDLCALC 71 2021        Hyperlipidemia / CAD  Current treatment: Simvastatin 20 mg daily and aspirin 81 mg daily  Recent medication changes: None   S/p PCI years prior  Following with Cardiology    BP Readings from Last 3 Encounters:   10/28/21 120/70   10/08/21 132/80   09/28/21 (!) 128/59                                          Sodium (mmol/L)   Date Value   07/01/2021 140    BUN (mg/dL)   Date Value   07/01/2021 23    Glucose (mg/dL)   Date Value   07/01/2021 131 (H)      Potassium (mmol/L)   Date Value   07/01/2021 4.7     Potassium reflex Magnesium (mmol/L)   Date Value   04/24/2019 4.2    CREATININE (mg/dL)   Date Value   07/01/2021 1.6 (H)         Hypothyroidism  Current treatment: Levothyroxine 37.5 mg daily  Recent medication changes: none    No results found for: TSHREFLEX  Lab Results   Component Value Date    TSH 2.570 07/01/2021    TSH 2.300 01/28/2021    TSH 1.600 05/28/2020     Depression   Current treatment: Citalopram 40 mg daily  Recent medication changes: none  Symptoms are stable    Chronic pain due to cervical and lumbar DJD  History of multiple surgical interventions in the past to his cervical and lumbar spine  Following with pain management    EMG 7/1/21:  Procedures    GA NEEDLE EMG EA EXTREMTY W/PARASPINL AREA COMPLETE    GA MOTOR &/SENS 5-6 NRV CNDJ PRECONF ELTRODE LIMB      · EMG was done today and showed peripheral polyneuropathy. The patient was educated about the diagnosis and the prognosis. · A work up for the cause of peripheral polyneuropathy. This pattern of neuropathy in a diabetic patient is most likely due to diabetes. · Advised patient to follow up with referring provider.        EGD 4/27/2021 with small hiatal hernia    Colonoscopy 5/3/2021 with colonic polyp removal x 3 (tubular adenomas), mild right-sided diverticulosis, small internal hemorrhoids, and evidence of previous sigmoid resection and recommendation for repeat in 3 years    Review of Systems   Constitutional: Negative for chills and fever. Respiratory: Negative for shortness of breath.     Cardiovascular: Negative for chest pain.   Gastrointestinal: Negative for nausea and vomiting. Genitourinary: Negative for dysuria. Musculoskeletal: Positive for arthralgias, back pain and gait problem. Skin:        +lesion   Neurological: Positive for numbness (improved). Negative for headaches. Psychiatric/Behavioral: Positive for sleep disturbance. Prior to Visit Medications    Medication Sig Taking? Authorizing Provider   donepezil (ARICEPT) 5 MG tablet Take 1 tablet by mouth nightly Yes Arnaud Arreola DO   famotidine (PEPCID) 40 MG tablet Take 1 tablet by mouth every evening Yes Arnaud Arreola DO   pantoprazole (PROTONIX) 40 MG tablet TAKE 1 TABLET BY MOUTH EVERY DAY IN THE MORNING BEFORE BREAKFAST Yes Arnaud Arreola DO   sucralfate (CARAFATE) 1 GM tablet Take 1 tablet by mouth 2 times daily Yes Arnaud Arreola DO   b complex vitamins capsule TAKE 1 CAPSULE BY MOUTH EVERY DAY Yes Arnaud Arreola DO   simvastatin (ZOCOR) 20 MG tablet TAKE 1 TABLET BY MOUTH EVERY DAY AT NIGHT Yes Arnaud Arreola DO   HYDROcodone-acetaminophen (NORCO) 5-325 MG per tablet Take 1 tablet by mouth 2 times daily as needed for Pain (once or twice a day as needed) for up to 30 days.  Take lowest dose possible to manage pain Yes Herve Arias MD   citalopram (CELEXA) 40 MG tablet TAKE 1 TABLET BY MOUTH EVERY DAY Yes Arnaud Arreola DO   linaGLIPtin-metFORMIN HCl ER (JENTADUETO XR) 2.5-1000 MG TB24 Take 1 tablet by mouth 2 times daily Yes Arnaud Arreola DO   levothyroxine (SYNTHROID) 25 MCG tablet Take 1.5 tablets by mouth daily Yes Arnaud Arreola DO   meclizine (ANTIVERT) 25 MG tablet Take 25 mg by mouth 3 times daily as needed Yes Historical Provider, MD   vitamin D (CHOLECALCIFEROL) 1000 UNIT TABS tablet Take 1,000 Units by mouth daily Yes Historical Provider, MD   aspirin EC 81 MG EC tablet Take 81 mg by mouth daily Yes Historical Provider, MD   ferrous sulfate 325 (65 Fe) MG tablet Take 325 mg by mouth daily (with breakfast)  Historical MD Demian        Social History     Tobacco Use    Smoking status: Former Smoker     Years: 40.00     Types: Cigarettes     Quit date: 3/19/1991     Years since quittin.6    Smokeless tobacco: Current User     Types: Chew   Substance Use Topics    Alcohol use: No        Past Surgical History:   Procedure Laterality Date    ANESTHESIA NERVE BLOCK Bilateral 9/10/2019    BILATERAL LUMBAR FACET MEDIAL BRANCH BLOCK L3 L4 AND L5 DORSAL RAMI (CPT Q2627123) performed by Wilfredo James MD at [address] [facility] N/A 2020    LUMBAR EPIDURAL STEROID INJECTION L4-L5 X-RAY performed by Wilfredo James MD at [address]      x2 lumbar    CATARACT REMOVAL WITH IMPLANT Left 10/08/13    CATARACT REMOVAL WITH IMPLANT Right 12/10/13    CERVICAL SPINE SURGERY      cervical fusion    COLON SURGERY      due to diverticulitis colon resection    CORONARY ANGIOPLASTY WITH STENT PLACEMENT  2005    x2 stents    DILATATION, ESOPHAGUS      FRACTURE SURGERY      Lt. Jaw Plate    NERVE BLOCK Left 3/7/16    cervical transforaminal #1    NERVE BLOCK Left 3/28/16    cervical transforaminal #2    NERVE BLOCK Left 2016    transforaminal nerve block left cervical #3    NERVE BLOCK Left 2016    shoulder    NERVE BLOCK Left 08/15/2016    left shoulder injection #2    NERVE BLOCK Right 2016    shoulder    NERVE BLOCK Right 2016    shoulder injection #2    NERVE BLOCK Bilateral 09/10/2019    BILATERAL LUMBAR FACET MEDIAL BRANCH NERVE BLOCK AT L3,L4 MEDIAL BRANCH AND L5 DORSAL RAMI    NERVE BLOCK Bilateral 10/29/2019    lumbar facet, meidal branch    NERVE BLOCK N/A 2020    Lumbar epidural steriod injection L4-L5    NERVE BLOCK Right 09/15/2020    transforaminal L4,5    NERVE BLOCK Right 9/15/2020    TRANSFORAMINAL EPIDURAL STEROID INJECTION RIGHT L4 AND L5 UNDER FLUOROSCOPIC GUIDANCE (CPT 25929,88667) performed by Wilfredo James MD General: He is not in acute distress. Appearance: He is well-developed. HENT:      Head: Normocephalic. Eyes:      Extraocular Movements: Extraocular movements intact. Conjunctiva/sclera: Conjunctivae normal.   Cardiovascular:      Rate and Rhythm: Normal rate and regular rhythm. Pulmonary:      Effort: Pulmonary effort is normal. No respiratory distress. Breath sounds: Normal breath sounds. No wheezing, rhonchi or rales. Abdominal:      General: There is no distension. Musculoskeletal:      Right lower leg: No edema. Left lower leg: No edema. Neurological:      Mental Status: He is alert. Mental status is at baseline. ASSESSMENT/PLAN:  Contreras Nazario was seen today for diabetes. Diagnoses and all orders for this visit:    Type 2 diabetes mellitus with stage 3b chronic kidney disease, without long-term current use of insulin ([facility])  -     POCT glycosylated hemoglobin (Hb A1C)  -     b complex vitamins capsule; TAKE 1 CAPSULE BY MOUTH EVERY DAY  -     LIPID PANEL; Future  -     HEMOGLOBIN A1C; Future  -     Comprehensive Metabolic Panel; Future  -     CBC Auto Differential; Future  -     VITAMIN B12 & FOLATE; Future    Type 2 diabetes mellitus with diabetic neuropathy, without long-term current use of insulin ([facility])  -     POCT glycosylated hemoglobin (Hb A1C)  -     b complex vitamins capsule; TAKE 1 CAPSULE BY MOUTH EVERY DAY  -     LIPID PANEL; Future  -     HEMOGLOBIN A1C; Future  -     Comprehensive Metabolic Panel; Future  -     CBC Auto Differential; Future  -     VITAMIN B12 & FOLATE; Future    Peripheral polyneuropathy  -     b complex vitamins capsule; TAKE 1 CAPSULE BY MOUTH EVERY DAY  -     VITAMIN B12 & FOLATE; Future    Acquired hypothyroidism  -     levothyroxine (SYNTHROID) 25 MCG tablet; Take 1.5 tablets by mouth daily  -     TSH;  Future    Mild depression (HCC)  -     citalopram (CELEXA) 40 MG tablet; TAKE 1 TABLET BY MOUTH EVERY DAY    Seborrheic keratosis, inflamed  -     33347 - MO DESTRUCTION BENIGN LESIONS UP TO 14    Medication refill  -     meclizine (ANTIVERT) 25 MG tablet; Take 1 tablet by mouth 3 times daily as needed for Dizziness    Sacroiliitis (HCC)    Mild dementia ([address]    Other orders  -     melatonin (RA MELATONIN) 3 MG TABS tablet; Take 1 tablet by mouth nightly as needed (sleep)    Additional plan and future considerations:   As above. RTO in 3-4 months for AWV / diabetes, HTN, HLD, hypothyroidism follow up or sooner if needed. Cryotherapy Procedure Note    Pre-operative Diagnosis: Seborrheic keratosis    Post-operative Diagnosis: Same    Locations: Left temporal area     Indications: Pain, discomfort, irritation    Procedure Details     Patient informed of risks (permanent scarring, infection, light or dark discoloration, bleeding, infection, weakness, numbness and recurrence of the lesion) and benefits of the procedure and verbal informed consent obtained. The areas are treated with liquid nitrogen therapy, frozen until ice ball extended 1 mm beyond lesion, allowed to thaw, and treated again. The patient tolerated procedure well. The patient was instructed on post-op care, warned that there may be blister formation, redness and pain. Recommend OTC analgesia as needed for pain. Condition:  Stable    Complications:  none. Plan:  1. Instructed to keep the area dry and covered for 24-48h and clean thereafter. 2. Warning signs of infection were reviewed.         Future Appointments   Date Time Provider Lobito Frey   11/19/2021  2:30 PM Renee Chand MD [facility]   1/27/2022 11:00 AM VIKRAM Salvador - CNP [facility] SLEEP [facility]   3/2/2022  9:45 AM David Cespedes DO [facility]         --David Cespedes DO on 10/28/2021 at 10:34 AM

## 2021-10-29 ASSESSMENT — ENCOUNTER SYMPTOMS: ROS SKIN COMMENTS: +LESION

## 2021-11-19 ENCOUNTER — OFFICE VISIT (OUTPATIENT)
Dept: PAIN MANAGEMENT | Age: 81
End: 2021-11-19
Payer: MEDICARE

## 2021-11-19 ENCOUNTER — PREP FOR PROCEDURE (OUTPATIENT)
Dept: PAIN MANAGEMENT | Age: 81
End: 2021-11-19

## 2021-11-19 VITALS
HEIGHT: 67 IN | HEART RATE: 78 BPM | SYSTOLIC BLOOD PRESSURE: 124 MMHG | OXYGEN SATURATION: 95 % | TEMPERATURE: 97.1 F | DIASTOLIC BLOOD PRESSURE: 60 MMHG | BODY MASS INDEX: 25.11 KG/M2 | WEIGHT: 160 LBS | RESPIRATION RATE: 20 BRPM

## 2021-11-19 DIAGNOSIS — M53.3 SACROILIAC DYSFUNCTION: Primary | ICD-10-CM

## 2021-11-19 DIAGNOSIS — M96.1 POSTLAMINECTOMY SYNDROME, LUMBAR: ICD-10-CM

## 2021-11-19 DIAGNOSIS — M48.061 SPINAL STENOSIS OF LUMBAR REGION, UNSPECIFIED WHETHER NEUROGENIC CLAUDICATION PRESENT: ICD-10-CM

## 2021-11-19 DIAGNOSIS — M50.30 DDD (DEGENERATIVE DISC DISEASE), CERVICAL: ICD-10-CM

## 2021-11-19 DIAGNOSIS — M51.36 DDD (DEGENERATIVE DISC DISEASE), LUMBAR: Primary | ICD-10-CM

## 2021-11-19 DIAGNOSIS — M54.2 CERVICALGIA: ICD-10-CM

## 2021-11-19 DIAGNOSIS — M53.3 SACROILIAC DYSFUNCTION: ICD-10-CM

## 2021-11-19 PROCEDURE — 4040F PNEUMOC VAC/ADMIN/RCVD: CPT | Performed by: ANESTHESIOLOGY

## 2021-11-19 PROCEDURE — G8427 DOCREV CUR MEDS BY ELIG CLIN: HCPCS | Performed by: ANESTHESIOLOGY

## 2021-11-19 PROCEDURE — G8484 FLU IMMUNIZE NO ADMIN: HCPCS | Performed by: ANESTHESIOLOGY

## 2021-11-19 PROCEDURE — 99214 OFFICE O/P EST MOD 30 MIN: CPT | Performed by: ANESTHESIOLOGY

## 2021-11-19 PROCEDURE — G8417 CALC BMI ABV UP PARAM F/U: HCPCS | Performed by: ANESTHESIOLOGY

## 2021-11-19 PROCEDURE — 4004F PT TOBACCO SCREEN RCVD TLK: CPT | Performed by: ANESTHESIOLOGY

## 2021-11-19 PROCEDURE — 99213 OFFICE O/P EST LOW 20 MIN: CPT | Performed by: ANESTHESIOLOGY

## 2021-11-19 PROCEDURE — 1123F ACP DISCUSS/DSCN MKR DOCD: CPT | Performed by: ANESTHESIOLOGY

## 2021-11-19 RX ORDER — HYDROCODONE BITARTRATE AND ACETAMINOPHEN 5; 325 MG/1; MG/1
1 TABLET ORAL 2 TIMES DAILY PRN
Qty: 60 TABLET | Refills: 0 | Status: SHIPPED | OUTPATIENT
Start: 2021-11-19 | End: 2021-12-19

## 2021-11-19 RX ORDER — HYDROCODONE BITARTRATE AND ACETAMINOPHEN 5; 325 MG/1; MG/1
1 TABLET ORAL 2 TIMES DAILY PRN
COMMUNITY
End: 2021-11-19 | Stop reason: SDUPTHER

## 2021-11-19 NOTE — PROGRESS NOTES
Do you currently have any of the following:    Fever: No  Headache:  Yes  Cough: No  Shortness of breath: No  Exposed to anyone with these symptoms: No                                                                                                                Deyanira Nino presents to the [address] [address] on 11/19/2021. Kathryn Quintanilla is complaining of pain in his lower back which radiates to hip and right leg to thigh. The pain is constant. The pain is described as aching. Pain is rated on his best day at a 4, on his worst day at a 10, and on average at a 4 on the VAS scale. He took his last dose of Norco today. Kathryn Quintanilla does not have issues with constipation. Any procedures since your last visit: No,     He is not on NSAIDS and  is  on anticoagulation medications to include ASA and is managed by  . Cardiologist    Pacemaker or defibrillator: No    Medication Contract and Consent for Opioid Use Documents Filed     Patient Documents     Type of Document Status Date Received Received By Description    Medication Contract Received 3/15/2019  1:18 PM AALIYAH HOPE PAIN MANAGEMENT PT AGREEMENT 3/15/2019    Medication Contract Received 2/12/2020  3:12 PM Tom RIBERA pain management patient agreement 02/12/2020    Medication Contract Received 9/1/2021 10:54 AM ZOË ROBERSON PAIN AGREEMENT SEPTEMBER 2021                   /60   Pulse 78   Temp 97.1 °F (36.2 °C)   Resp 20   Ht 5' 7\" (1.702 m)   Wt 160 lb (72.6 kg)   SpO2 95%   BMI 25.06 kg/m²      No LMP for male patient.

## 2021-11-19 NOTE — PROGRESS NOTES
[address] [facility], [address] [place] [id number]  [phone]    Follow up Note      Carvel Nearing     Date of Visit:  11/19/2021    CC:    Chief Complaint   Patient presents with    Follow-up    Back Pain     lower radiates down right leg to thigh       Pertinent Information:  78 y. o.  pleasant gentleman with prior h/o cervical spine surgery- ACDF C3-7. He has undergone lumbar spine surgery X 2 in the 1980's    HPI:  Pt here today for low back pain. Prior Lumbar TFESI with very good pain relief of the LE pain. Repeat lumbar facet MB RFA on 9/28/2021. Good relief, but has pain over the right SIJ    Takes Norco for as needed use,  Appropriate analgesia with current medications regimen: yes   Medication side effects:none. Medication help with pain and functionality, no side effects, no signs of misuse abuse or diversion, he is compliant with medication use. Nursing notes and details of the pain history reviewed. Please see intake notes for details.     He is on ASA-81 mg. H/o CAD s/p stent.      Imaging studies:     XR Lumbar spine: 6/2020: Impression   Diffuse osteopenia with moderate to advanced degenerative changes of the   lumbar spine, as above.  No convincing evidence of an acute fracture.      CT cervical spine: 6/14/2020:      FINDINGS:   BONES/ALIGNMENT: No evidence of cervical fracture or traumatic subluxation.    Stable T3 wedge compression.  There has been fusion of C3 through C7.  There   is an anterior plate with screws at C3, C4, and C7.  There is a bone block   graft at C3-C4.  There is a block graft extending from C4 through C7.  The   hardware and grafts are intact.       DEGENERATIVE CHANGES: Degenerative change at C1-C.  Degenerative disc disease   C7-T1.  Diffuse facet osteoarthritis with stable several mm anterolisthesis   of C7.       SOFT TISSUES: No prevertebral soft tissue swelling.  Maxillary sinusitis   changes noted         Impression   No acute abnormality of the cervical spine.          Xray thoracic spine: 2020:      Impression   Limited examination, as described above.  Within these limitations, no   convincing evidence of an acute fracture of the thoracic spine.              CT Lumbar Spine: 2018:      Findings: There are bilateral pars defects noted at L4. There is a   spondylolisthesis of L4 on L5. There is wedging of T12 which may be physiologic   Changes seen throughout the discs are abnormal. There are findings to   suggest  degenerative disc disease.       Changes seen throughout the bone marrow are abnormal. Findings are   compatible with degenerative disc disease       Changes within the facets are abnormal. Findings are compatible with   degenerative facet disease.           At L5-S1: There is a mild broad-based disc herniation or bony   spurring. There is a laminectomy defect. There is facet hypertrophy.    There is mild narrowing of the neural foramina       At L4-5: There is a moderate broad-based disc herniation there is   moderate stenosis       At L3-4: There is a large broad-based disc herniation, there is severe   canal stenosis.       At L2-3: There is a mild broad-based disc herniation, there is mild   canal stenosis       At L1-2: There is a mild broad-based disc herniation, there is mild   canal stenosis               Impression   Findings compatible with degenerative changes   Bilateral L4 pars defect   Severe canal stenosis at L3-4 and moderate canal stenosis at L4-5                                            Potential Aberrant Drug-Related Behavior:  No     Drug Screenin: Consistent     Pain agreement updated: 2021      OARRS report[de-identified]   Reviewed today: Consistent     Past Medical History:   Diagnosis Date    Accident caused by farm tractor     Arthritis     CAD (coronary artery disease)     Chronic pain     Clavicle fracture     Concussion with no loss of consciousness     5-325 MG per tablet Take 1 tablet by mouth 2 times daily as needed for Pain for up to 30 days.  11/19/21 12/19/21 Yes Kimmie Mahoney MD   JENTADUETO XR 2.5-1000 MG TB24 TAKE 1 TABLET BY MOUTH TWICE A DAY 11/1/21  Yes Mikaela Nuñez,    b complex vitamins capsule TAKE 1 CAPSULE BY MOUTH EVERY DAY 10/28/21  Yes Arnaud Arreola DO   citalopram (CELEXA) 40 MG tablet TAKE 1 TABLET BY MOUTH EVERY DAY 10/28/21  Yes Arnaud Arreola DO   levothyroxine (SYNTHROID) 25 MCG tablet Take 1.5 tablets by mouth daily 10/28/21 1/26/22 Yes Arnaud Arreola DO   meclizine (ANTIVERT) 25 MG tablet Take 1 tablet by mouth 3 times daily as needed for Dizziness 10/28/21  Yes Mikaela Nuñez DO   melatonin (RA MELATONIN) 3 MG TABS tablet Take 1 tablet by mouth nightly as needed (sleep) 10/28/21  Yes Mikaela Nuñez DO   donepezil (ARICEPT) 5 MG tablet Take 1 tablet by mouth nightly 10/22/21  Yes Mikaela Nuñez DO   famotidine (PEPCID) 40 MG tablet Take 1 tablet by mouth every evening 10/22/21  Yes Arnaud Arreola DO   pantoprazole (PROTONIX) 40 MG tablet TAKE 1 TABLET BY MOUTH EVERY DAY IN THE MORNING BEFORE BREAKFAST 10/22/21  Yes Arnaud Arreola DO   sucralfate (CARAFATE) 1 GM tablet Take 1 tablet by mouth 2 times daily 10/22/21  Yes Mikaela Nuñez DO   simvastatin (ZOCOR) 20 MG tablet TAKE 1 TABLET BY MOUTH EVERY DAY AT NIGHT 10/11/21  Yes Arnaud Arreola DO   vitamin D (CHOLECALCIFEROL) 1000 UNIT TABS tablet Take 1,000 Units by mouth daily   Yes Historical Provider, MD   aspirin EC 81 MG EC tablet Take 81 mg by mouth daily   Yes Historical Provider, MD       Allergies   Allergen Reactions    Sulfa Antibiotics Hives       Social History     Socioeconomic History    Marital status:      Spouse name: Not on file    Number of children: Not on file    Years of education: Not on file    Highest education level: Not on file   Occupational History    Not on file   Tobacco Use    Smoking status: Former Smoker     Years: 40.00 Types: Cigarettes     Quit date: 3/19/1991     Years since quittin.6    Smokeless tobacco: Current User     Types: Chew   Vaping Use    Vaping Use: Never used   Substance and Sexual Activity    Alcohol use: No    Drug use: No    Sexual activity: Not Currently   Other Topics Concern    Not on file   Social History Narrative    ** Merged History Encounter **          Social Determinants of Health     Financial Resource Strain:     Difficulty of Paying Living Expenses: Not on file   Food Insecurity:     Worried About Running Out of Food in the Last Year: Not on file    Reva of Food in the Last Year: Not on file   Transportation Needs:     Lack of Transportation (Medical): Not on file    Lack of Transportation (Non-Medical): Not on file   Physical Activity:     Days of Exercise per Week: Not on file    Minutes of Exercise per Session: Not on file   Stress:     Feeling of Stress : Not on file   Social Connections:     Frequency of Communication with Friends and Family: Not on file    Frequency of Social Gatherings with Friends and Family: Not on file    Attends Yazidi Services: Not on file    Active Member of [address] [place] [address] or Organizations: Not on file    Attends Club or Organization Meetings: Not on file    Marital Status: Not on file   Intimate Partner Violence:     Fear of Current or Ex-Partner: Not on file    Emotionally Abused: Not on file    Physically Abused: Not on file    Sexually Abused: Not on file   Housing Stability:     Unable to Pay for Housing in the Last Year: Not on file    Number of [place] in the Last Year: Not on file    Unstable Housing in the Last Year: Not on file       Family History   Problem Relation Age of Onset    Diabetes Mother        REVIEW OF SYSTEMS:     Susan Cheng denies fever/chills, chest pain, shortness of breath, new bowel or bladder complaints or suicidal ideations. All other review of systems wasnegative.     PHYSICAL EXAMINATION:    /60 Pulse 78   Temp 97.1 °F (36.2 °C)   Resp 20   Ht 5' 7\" (1.702 m)   Wt 160 lb (72.6 kg)   SpO2 95%   BMI 25.06 kg/m²     General:    General appearance:  Pleasant and well-hydrated, in mild to moderate distress and A & O x3     HEENT:  Head:normocephalic, atraumatic    Lungs:  Breathing:normal breathing pattern      CVS:  clear and non labored      Abdomen:  Shape:non-distended and normal     Cervical spine:  Inspection:normal  Palpation: paraspinal tenderness +  Range of motion: limitations of ROM noted. Facet tenderness noted over the mid and lower cervical facets.     Lumbar Spine:  Scar from the prior surgery noted, healed well, ROM reduced, Lumbar facet loading improved pain,   Sensory and motor in B/l LE no new focal deficits  Right SI joint tenderness + Soha's + Gaenslen +    Musculoskeletal:   Trigger points + cervical paraspinal muscles     Extremities:  NO tremors     Neurological:  Sensory: normal to light touch   Motor: No focal deficits, generalized weakness noted.   Reflexes: B/l equal  Gait:normal     Dermatology:     Skin:no rashes or lesions noted    Assessment/Plan:.      1. DDD (degenerative disc disease), lumbar      2. Lumbosacral spondylosis without myelopathy      3. Spinal stenosis of lumbar region, unspecified whether neurogenic claudication present      4. Postlaminectomy syndrome, lumbar      5. Cervical postlaminectomy syndrome      6. DDD (degenerative disc disease), cervical      7. Cervical spondylolysis        Lumbar LUCILA/ TFESI had helped the lower extremity pain significantly >70% relief    Recent RFA for facet pain on 9/28/2021. Has pain over the right SI joint. Doing HEP    Takes Norco for prn use- helps with pain and functionality.       Plan:     Right SI joint injection under fluoroscopy. OK to continue ASA. He is on ASA-81 mg. H/o CAD s/p stent.     Refill Norco 5/325 1 po bid prn pain # 60 tabs filled. Pain meds helps with pain and functionality. No side effects.  No signs of abuse/ misuse or diversion. Patient is compliant with medication use. Buccal on 7/23/2021 result reviewed: Consistent    OARRS reviewed today consistent. Opioid agreement: 9/1/2021    Continue with HEP as tolerated     If pain bothers, consider MRI of LS spine.     We discussed with the patient that combining opioids, benzodiazepines, alcohol, illicit drugs or sleep aids increases the risk of respiratory depression including death. We discussed that these medications may cause drowsiness, sedation or dizziness and have counseled the patient not to drive or operate machinery. We have discussed that these medications will be prescribed only by one provider. We have discussed with the patient about age related risk factors and have thoroughly discussed the importance of taking these medications as prescribed. The patient verbalizes understanding.     Billy Hung MD

## 2021-11-23 ENCOUNTER — HOSPITAL ENCOUNTER (OUTPATIENT)
Dept: OPERATING ROOM | Age: 81
Setting detail: OUTPATIENT SURGERY
Discharge: HOME | End: 2021-11-23
Attending: ANESTHESIOLOGY
Payer: MEDICARE

## 2021-11-23 ENCOUNTER — HOSPITAL ENCOUNTER (OUTPATIENT)
Age: 81
Setting detail: OUTPATIENT SURGERY
Discharge: HOME | End: 2021-11-23
Attending: ANESTHESIOLOGY | Admitting: ANESTHESIOLOGY
Payer: MEDICARE

## 2021-11-23 VITALS
DIASTOLIC BLOOD PRESSURE: 74 MMHG | SYSTOLIC BLOOD PRESSURE: 142 MMHG | WEIGHT: 155 LBS | OXYGEN SATURATION: 94 % | HEIGHT: 68 IN | BODY MASS INDEX: 23.49 KG/M2 | RESPIRATION RATE: 14 BRPM | HEART RATE: 84 BPM

## 2021-11-23 DIAGNOSIS — M46.1 SACROILIITIS (HCC): ICD-10-CM

## 2021-11-23 PROCEDURE — 3209999900 FLUORO FOR SURGICAL PROCEDURES

## 2021-11-23 PROCEDURE — 2500000003 HC RX 250 WO HCPCS: Performed by: ANESTHESIOLOGY

## 2021-11-23 PROCEDURE — 2709999900 HC NON-CHARGEABLE SUPPLY: Performed by: ANESTHESIOLOGY

## 2021-11-23 PROCEDURE — 6360000002 HC RX W HCPCS: Performed by: ANESTHESIOLOGY

## 2021-11-23 PROCEDURE — 3600000005 HC SURGERY LEVEL 5 BASE: Performed by: ANESTHESIOLOGY

## 2021-11-23 PROCEDURE — 6360000004 HC RX CONTRAST MEDICATION: Performed by: ANESTHESIOLOGY

## 2021-11-23 PROCEDURE — 7100000011 HC PHASE II RECOVERY - ADDTL 15 MIN: Performed by: ANESTHESIOLOGY

## 2021-11-23 PROCEDURE — 7100000010 HC PHASE II RECOVERY - FIRST 15 MIN: Performed by: ANESTHESIOLOGY

## 2021-11-23 PROCEDURE — 27096 INJECT SACROILIAC JOINT: CPT | Performed by: ANESTHESIOLOGY

## 2021-11-23 RX ORDER — METHYLPREDNISOLONE ACETATE 40 MG/ML
INJECTION, SUSPENSION INTRA-ARTICULAR; INTRALESIONAL; INTRAMUSCULAR; SOFT TISSUE PRN
Status: DISCONTINUED | OUTPATIENT
Start: 2021-11-23 | End: 2021-11-23 | Stop reason: ALTCHOICE

## 2021-11-23 RX ORDER — BUPIVACAINE HYDROCHLORIDE 2.5 MG/ML
INJECTION, SOLUTION EPIDURAL; INFILTRATION; INTRACAUDAL PRN
Status: DISCONTINUED | OUTPATIENT
Start: 2021-11-23 | End: 2021-11-23 | Stop reason: ALTCHOICE

## 2021-11-23 ASSESSMENT — PAIN SCALES - GENERAL
PAINLEVEL_OUTOF10: 0
PAINLEVEL_OUTOF10: 0

## 2021-11-23 ASSESSMENT — PAIN DESCRIPTION - DESCRIPTORS: DESCRIPTORS: ACHING;BURNING

## 2021-11-23 ASSESSMENT — PAIN - FUNCTIONAL ASSESSMENT: PAIN_FUNCTIONAL_ASSESSMENT: 0-10

## 2021-11-23 NOTE — H&P
[address]  [address] [facility], [address] [place] [address] Edvin  [phone]    Procedure History & Physical      [facility]     HPI:    Patient  is here for SIJ injection  for low back pain. Labs/imaging studies reviewed   All question and concerns addressed including R/B/A associated with the procedure    Past Medical History:   Diagnosis Date    Accident caused by farm tractor    [address] CAD (coronary artery disease)     Chronic pain     Clavicle fracture     Concussion with no loss of consciousness     Depression     Diabetes mellitus ([address]     GERD (gastroesophageal reflux disease)     Headache(784.0)     History of blood transfusion     Hyperlipidemia     Kidney stone     Laceration of flexor tendon of hand, not in \"[place] man[place] land\" 10/10/2014    Multiple closed fractures of ribs of left side     Neck pain     Osteoarthritis     Rib fracture     SBO (small bowel obstruction) ([facility] 4/23/2019    Skin cancer     Thyroid disease     Trauma 8/4/2018    Traumatic hemopneumothorax, initial encounter        Past Surgical History:   Procedure Laterality Date    ANESTHESIA NERVE BLOCK Bilateral 9/10/2019    BILATERAL LUMBAR FACET MEDIAL BRANCH BLOCK L3 L4 AND L5 DORSAL RAMI (CPT [id number]) performed by Winsome Gutierrez MD at [address] N/A 7/21/2020    LUMBAR EPIDURAL STEROID INJECTION L4-L5 X-RAY performed by Winsome Gutierrez MD at [address]      x2 lumbar    CATARACT REMOVAL WITH IMPLANT Left 10/08/13    CATARACT REMOVAL WITH IMPLANT Right 12/10/13    CERVICAL SPINE SURGERY  2012    cervical fusion    COLON SURGERY      due to diverticulitis colon resection    CORONARY ANGIOPLASTY WITH STENT PLACEMENT  2005    x2 stents    DILATATION, ESOPHAGUS      FRACTURE SURGERY      Lt. Jaw Plate    NERVE BLOCK Left 3/7/16    cervical transforaminal #1    NERVE BLOCK Left 3/28/16    cervical transforaminal #2    NERVE BLOCK Left 04 04 2016    transforaminal nerve block left cervical #3    NERVE BLOCK Left 07/25/2016    shoulder    NERVE BLOCK Left 08/15/2016    left shoulder injection #2    NERVE BLOCK Right 08/22/2016    shoulder    NERVE BLOCK Right 08/29/2016    shoulder injection #2    NERVE BLOCK Bilateral 09/10/2019    BILATERAL LUMBAR FACET MEDIAL BRANCH NERVE BLOCK AT L3,L4 MEDIAL BRANCH AND L5 DORSAL RAMI    NERVE BLOCK Bilateral 10/29/2019    lumbar facet, meidal branch    NERVE BLOCK N/A 07/21/2020    Lumbar epidural steriod injection L4-L5    NERVE BLOCK Right 09/15/2020    transforaminal L4,5    NERVE BLOCK Right 9/15/2020    TRANSFORAMINAL EPIDURAL STEROID INJECTION RIGHT L4 AND L5 UNDER FLUOROSCOPIC GUIDANCE (CPT 54411,28534) performed by Harper Cisneros MD at [address] [place] [address]. Left 10/23/2018    LEFT CLAVICLE OPEN REDUCTION INTERNAL FIXATION performed by Bryant Lees DO at [place] [address] Left 101/10/2014    repair left hand, finger laceration    OTHER SURGICAL HISTORY Bilateral 01/05/2021    lumbar facet medial branch dorsal rami radiofrequency    PAIN MANAGEMENT PROCEDURE Bilateral 1/5/2021    BILATERAL LUMBAR FACET L3,L4 MEDIAL BRANCH L5 DORSAL RAMI RADIOFREQUENCY ABLATION WITH IV SEDATION (CPT 22006,60628) performed by Harper Cisneros MD at [address] Right 8/17/2021    L4 L5 RIGHT TRANSFORAMINAL EPIDURAL STEROID INJECTION X-RAY (CPT 28812) performed by Harper Cisneros MD at [address] Bilateral 9/28/2021    BILATERAL LUMBAR RADIOFREQUENCY ABLATION UNDER FLUOROSCOPIC GUIDANCE AT L3, L4, L5 AND DORSAL RAMI UNDER FLUOROSCOPY (CPR 86030) performed by Harper Cisneros MD at [address] [place] [address] UNLISTED Left 8/6/2018    LEFT SIDED VIDEO ASSISTED THORACOSCOPY WITH RIB PLATING performed by Morro Dos Santos MD at [address] a fall    RADIOFREQUENCY ABLATION NERVES Bilateral 10/29/2019    BILATERAL LUMBAR FACET L3 L4 MEDIAL BRANCH  L5 DORSAL RAMI  RADIOFREQUENCY ABLATION SEDATION (CPT 17451) performed by Zully Kerr MD at [facility] Bilateral     SHOULDER ARTHROSCOPY Right 12/08/2016    repair rotator cuff, bursectomy, debridement glenohumerol     SKIN BIOPSY         Prior to Admission medications    Medication Sig Start Date End Date Taking? Authorizing Provider   HYDROcodone-acetaminophen (NORCO) 5-325 MG per tablet Take 1 tablet by mouth 2 times daily as needed for Pain for up to 30 days.  11/19/21 12/19/21 Yes Zully Kerr MD   JENTADUETO XR 2.5-1000 MG TB24 TAKE 1 TABLET BY MOUTH TWICE A DAY 11/1/21  Yes Ephraim Ho, DO   b complex vitamins capsule TAKE 1 CAPSULE BY MOUTH EVERY DAY 10/28/21  Yes Arnaud Arreola DO   citalopram (CELEXA) 40 MG tablet TAKE 1 TABLET BY MOUTH EVERY DAY 10/28/21  Yes Arnaud Arreola DO   levothyroxine (SYNTHROID) 25 MCG tablet Take 1.5 tablets by mouth daily 10/28/21 1/26/22 Yes Arnaud Arreola DO   meclizine (ANTIVERT) 25 MG tablet Take 1 tablet by mouth 3 times daily as needed for Dizziness 10/28/21  Yes Ephraim Ho DO   melatonin (RA MELATONIN) 3 MG TABS tablet Take 1 tablet by mouth nightly as needed (sleep) 10/28/21  Yes Ephraim Ho DO   donepezil (ARICEPT) 5 MG tablet Take 1 tablet by mouth nightly 10/22/21  Yes Ephraim Ho DO   famotidine (PEPCID) 40 MG tablet Take 1 tablet by mouth every evening 10/22/21  Yes Arnaud Arreola DO   pantoprazole (PROTONIX) 40 MG tablet TAKE 1 TABLET BY MOUTH EVERY DAY IN THE MORNING BEFORE BREAKFAST 10/22/21  Yes Arnaud Arreola DO   sucralfate (CARAFATE) 1 GM tablet Take 1 tablet by mouth 2 times daily 10/22/21  Yes Arnaud Arreola DO   simvastatin (ZOCOR) 20 MG tablet TAKE 1 TABLET BY MOUTH EVERY DAY AT NIGHT 10/11/21  Yes Arnaud Arreola DO   vitamin D (CHOLECALCIFEROL) 1000 UNIT TABS tablet Take 1,000 Units by mouth daily   Yes Historical Provider, MD   aspirin EC 81 MG EC tablet Take 81 mg by mouth daily   Yes Historical Provider, MD       Allergies   Allergen Reactions    Sulfa Antibiotics Hives       Social History     Socioeconomic History    Marital status:      Spouse name: Not on file    Number of children: Not on file    Years of education: Not on file    Highest education level: Not on file   Occupational History    Not on file   Tobacco Use    Smoking status: Former Smoker     Years: 40.00     Types: Cigarettes     Quit date: 3/19/1991     Years since quittin.7    Smokeless tobacco: Current User     Types: Chew   Vaping Use    Vaping Use: Never used   Substance and Sexual Activity    Alcohol use: No    Drug use: No    Sexual activity: Not Currently   Other Topics Concern    Not on file   Social History Narrative    ** Merged History Encounter **          Social Determinants of Health     Financial Resource Strain:     Difficulty of Paying Living Expenses: Not on file   Food Insecurity:     Worried About [address] in the Last Year: Not on file    Reva of Food in the Last Year: Not on file   Transportation Needs:     Lack of Transportation (Medical): Not on file    Lack of Transportation (Non-Medical):  Not on file   Physical Activity:     Days of Exercise per Week: Not on file    Minutes of Exercise per Session: Not on file   Stress:     Feeling of Stress : Not on file   Social Connections:     Frequency of Communication with Friends and Family: Not on file    Frequency of Social Gatherings with Friends and Family: Not on file    Attends Scientology Services: Not on file    Active Member of Clubs or Organizations: Not on file    Attends Club or Organization Meetings: Not on file    Marital Status: Not on file   Intimate Partner Violence:     Fear of Current or Ex-Partner: Not on file   [employer] Abused: Not on file    Physically Abused: Not on file    Sexually Abused: Not on file   Housing Stability:     Unable to Pay for Housing in the Last Year: Not on file    Number of Places Lived in the Last Year: Not on file    Unstable Housing in the Last Year: Not on file       Family History   Problem Relation Age of Onset    Diabetes Mother          REVIEW OF SYSTEMS:    CONSTITUTIONAL:  negative for  fevers, chills, sweats and fatigue    RESPIRATORY:  negative for  dry cough, cough with sputum, dyspnea, wheezing and chest pain    CARDIOVASCULAR:  negative for chest pain, dyspnea, palpitations, syncope    GASTROINTESTINAL:  negative for nausea, vomiting, change in bowel habits, diarrhea, constipation and abdominal pain    MUSCULOSKELETAL: negative for muscle weakness    SKIN: negative for itching or rashes. BEHAVIOR/PSYCH:  negative for poor appetite, increased appetite, decreased sleep and poor concentration    All other systems negative      PHYSICAL EXAM:    VITALS:  /83   Pulse 74   Resp 16   Ht 5' 8\" (1.727 m)   Wt 155 lb (70.3 kg)   SpO2 96%   BMI 23.57 kg/m²     CONSTITUTIONAL:  awake, alert, cooperative, no apparent distress, and appears stated age    EYES: PERRLA, EOMI    LUNGS:  No increased work of breathing, no audible wheezing    CARDIOVASCULAR:  regular rate and rhythm    ABDOMEN:  Soft non tender non distended     EXTREMITIES: no signs of clubbing or cyanosis. MUSCULOSKELETAL: negative for flaccid muscle tone or spastic movements. SKIN: gross examination reveals no signs of rashes, or diaphoresis. NEURO: Cranial nerves II-XII grossly intact. No signs of agitated mood. Assessment/Plan:    Patient  is here for SIJ injection  for low back pain. The patient was counseled at length about the risks of osmani Covid-19 during their perioperative period and any recovery window from their procedure.   The patient was made aware that osmani Covid-19  may worsen their prognosis for recovering from their procedure  and lend to a higher morbidity and/or mortality risk. All material risks, benefits, and reasonable alternatives including postponing the procedure were discussed. The patient does wish to proceed with the procedure at this time.       Isaias John MD

## 2021-11-23 NOTE — OP NOTE
Operative Note      Patient: Aden Hinojosa  YOB: 1940  MRN: [id number]    Date of Procedure: 2021    Pre-Op Diagnosis: SACROILIAC DYSFUNCTION    Post-Op Diagnosis: Same       Procedure(s):  RIGHT SACROILIAC JOINT INJECTION UNDER FLUOROSCOPY     Surgeon(s):  Ashleigh Paige MD    Assistant:   * No surgical staff found *    Anesthesia: Local    Estimated Blood Loss (mL): Minimal    Complications: None    Specimens:   * No specimens in log *    Implants:  * No implants in log *      Drains: * No LDAs found *    Findings: good needle placement    Detailed Description of Procedure:   2021    Patient: Aden Hinojosa  :  1940  Age:  78 y.o. Sex:  male     PRE-OPERATIVE DIAGNOSIS:    Sacroiliitis, somatic dysfunction of the lumbosacral spine. POST-OPERATIVE DIAGNOSIS: Same. PROCEDURE:  Fluoroscopic guided Right   sacroiliac joint injection with steroid. SURGEON:  Ashleigh Paige MD    ANESTHESIA: Local    ESTIMATED BLOOD LOSS: None.  ______________________________________________________________________  BRIEF HISTORY:  Aden Hinojosa comes in today for  Right sacroiliac joint injection under fluoroscopic guidance. The potential complications as well as the procedure in detail were explained to him today. He has elected to undergo the aforementioned procedure. Rachid's complete History & Physical examination were reviewed in depth, a copy of which is in the chart. DESCRIPTION OF PROCEDURE:    After confirming written and informed consent, a time-out was performed and Tuan name and date of birth, the procedure to be performed as well as the plan for the location of the needle insertion were confirmed. The patient was brought into the procedure room and placed in the prone position on the fluoroscopy table. Standard monitors were placed and vital signs were observed throughout the procedure.  The low back and upper buttocks area was prepped with chloraprep and draped in a sterile manner. AP fluoroscopy was used to visualize the sacroiliac joint. The fluoroscopic beam was then obliqued until the anterior and posterior margins of the joint were aligned. The inferior margin of the joint was identified and marked. The skin and subcutaneous tissue about this identified point were anesthestized with 0.5% lidocaine. A 22 gauge 3-1/2 spinal needle was advanced toward the the identified point under fluoroscopic guidance. Once the targeted point was reached and the joint space was entered, negative aspiration was confirmed, and 0.5 cc of 300 omnipaque was injected. The  Joint space was appropriately outlined. Then, after negative aspiration, a solution consisiting of 0.25% marcaine 2 cc and 30 mg DepoMedrol was easily injected. The needle was then removed and the needle insertion site was covered with Band-Aid. Disposition the patient tolerated the procedure well and there were no complications. Vital signs remained stable throughout the procedure. The patient was escorted to the recovery area where they remained until discharge and written discharge instructions for the procedure were given. Plan: Gregory Gamma will return to our pain management center as scheduled.      Jesi Mantilla MD

## 2022-01-14 ENCOUNTER — OFFICE VISIT (OUTPATIENT)
Dept: ORTHOPEDIC SURGERY | Age: 82
End: 2022-01-14
Payer: MEDICARE

## 2022-01-14 DIAGNOSIS — M51.36 DEGENERATION, INTERVERTEBRAL DISC, LUMBAR: ICD-10-CM

## 2022-01-14 DIAGNOSIS — M54.16 LUMBAR NERVE ROOT IMPINGEMENT: ICD-10-CM

## 2022-01-14 DIAGNOSIS — M25.551 CHRONIC RIGHT HIP PAIN: ICD-10-CM

## 2022-01-14 DIAGNOSIS — G89.29 CHRONIC RIGHT HIP PAIN: ICD-10-CM

## 2022-01-14 DIAGNOSIS — M25.551 RIGHT HIP PAIN: Primary | ICD-10-CM

## 2022-01-14 PROCEDURE — G8427 DOCREV CUR MEDS BY ELIG CLIN: HCPCS | Performed by: ORTHOPAEDIC SURGERY

## 2022-01-14 PROCEDURE — 4004F PT TOBACCO SCREEN RCVD TLK: CPT | Performed by: ORTHOPAEDIC SURGERY

## 2022-01-14 PROCEDURE — G8420 CALC BMI NORM PARAMETERS: HCPCS | Performed by: ORTHOPAEDIC SURGERY

## 2022-01-14 PROCEDURE — 1123F ACP DISCUSS/DSCN MKR DOCD: CPT | Performed by: ORTHOPAEDIC SURGERY

## 2022-01-14 PROCEDURE — 99214 OFFICE O/P EST MOD 30 MIN: CPT | Performed by: ORTHOPAEDIC SURGERY

## 2022-01-14 PROCEDURE — 4040F PNEUMOC VAC/ADMIN/RCVD: CPT | Performed by: ORTHOPAEDIC SURGERY

## 2022-01-14 PROCEDURE — G8484 FLU IMMUNIZE NO ADMIN: HCPCS | Performed by: ORTHOPAEDIC SURGERY

## 2022-01-14 NOTE — PROGRESS NOTES
Deepak Zambrano is a 78 y.o. male, who presents No chief complaint on file. HPI[de-identified] Son Niece had right lower extremity discomfort and what he describes as hip pain for some time. He limps when he is walks and is also decreased his activity as result of that. His leg feels a little bit weak and like it might occasionally give out. He does have a history of treatment through the pain clinic and has had numerous injections in the lumbar area. He does not feel that those have really helped him with these present complaints. His wife was present throughout the evaluation and she supplements history and also listens for him since he is very hard of hearing. Allergies; medications; past medical, surgical, family, and social history; and problem list have been reviewed today and updated as indicated in this encounter - see below following Ortho specifics. Musculoskeletal: Skin condition and circulation is grossly good in the right lower extremity. Single-leg stance is not reasonably possible on the right. His gait is very slow and deliberate with limping favoring the right lower extremity. Exam on the table in the seated 9090 position he has decreased muscle mass in the right calf and right thigh. Right patellar deep tendon reflexes absent. He has a fairly good Achilles DTR on that side. There is weakness in the right lower extremity and knee flexion and extension as well as flexion and extension as compared to the left lower extremity. Reflexes are intact on the left. Radiologic Studies: Imaging of the right hip and 2 view shows very mild degenerative changes with maintenance of joint space and pretty good contours. There are degenerative changes in the sacroiliac joint. There was a little of the lumbar spine visible on these films. ASSESSMENT:  Diagnoses and all orders for this visit:    Right hip pain  -     XR HIP RIGHT (2-3 VIEWS);  Future    Chronic right hip pain    Degeneration, intervertebral disc, lumbar    Lumbar nerve root impingement     Treatment alternatives were reviewed including medical and physical therapies, injections, and surgical options, expected risks benefits and likely outcome of each were discussed in detail, questions asked and answered and understood. We discussed the symptoms as well as physical findings and imaging results. I think this is a problem related to his low back degenerative disease and nerve impingement. PLAN: We will refer Fernanda Hoff for a spine surgery evaluation which does not commit him to any aggressive treatment.         Patient Active Problem List   Diagnosis    Mild dementia ([address]    Occipital neuralgia    Resting tremor    Right cataract    Cervico-occipital neuralgia    Chronic myofascial pain    Orthostatic hypotension    Injury of digital nerve of left index finger    DDD (degenerative disc disease), cervical    Status post cervical spinal fusion    Cervical spondylolysis    Osteoarthritis thoracic spine    Osteoarthritis of lumbosacral spine    Cervical facet syndrome    Thoracic facet syndrome    Facet syndrome, lumbar    Osteoarthritis of both shoulders    Postlaminectomy syndrome, lumbar    Sacroiliac dysfunction    Neural foraminal stenosis of cervical spine    Protruded cervical disc    Cervical radiculopathy    Arthritis right shoulder    Bilateral renal cysts    Displaced fracture of shaft of left clavicle    Chronic pain syndrome    Cervicalgia    Spinal stenosis of lumbar region    Lumbosacral spondylosis without myelopathy    DDD (degenerative disc disease), lumbar    Type 2 diabetes mellitus without complication, without long-term current use of insulin (HCC)    Mild cognitive impairment    Cervical postlaminectomy syndrome    Stage 3 chronic kidney disease (HCC)    Macrocytosis without anemia    Insomnia    Coronary atherosclerosis    History of coronary artery stent placement    Lumbar cervical transforaminal #1    NERVE BLOCK Left 3/28/16    cervical transforaminal #2    NERVE BLOCK Left 04 04 2016    transforaminal nerve block left cervical #3    NERVE BLOCK Left 07/25/2016    shoulder    NERVE BLOCK Left 08/15/2016    left shoulder injection #2    NERVE BLOCK Right 08/22/2016    shoulder    NERVE BLOCK Right 08/29/2016    shoulder injection #2    NERVE BLOCK Bilateral 09/10/2019    BILATERAL LUMBAR FACET MEDIAL BRANCH NERVE BLOCK AT L3,L4 MEDIAL BRANCH AND L5 DORSAL RAMI    NERVE BLOCK Bilateral 10/29/2019    lumbar facet, meidal branch    NERVE BLOCK N/A 07/21/2020    Lumbar epidural steriod injection L4-L5    NERVE BLOCK Right 09/15/2020    transforaminal L4,5    NERVE BLOCK Right 9/15/2020    TRANSFORAMINAL EPIDURAL STEROID INJECTION RIGHT L4 AND L5 UNDER FLUOROSCOPIC GUIDANCE (CPT 07981,58033) performed by Gemma Donald MD at [address] [place] [address]. Left 10/23/2018    LEFT CLAVICLE OPEN REDUCTION INTERNAL FIXATION performed by Silvia Castañeda DO at [address] [facility] [address] Left 101/10/2014    repair left hand, finger laceration    OTHER SURGICAL HISTORY Bilateral 01/05/2021    lumbar facet medial branch dorsal rami radiofrequency    PAIN MANAGEMENT PROCEDURE Bilateral 1/5/2021    BILATERAL LUMBAR FACET L3,L4 MEDIAL BRANCH L5 DORSAL RAMI RADIOFREQUENCY ABLATION WITH IV SEDATION (CPT 11022,47772) performed by Gemma Donald MD at [address] Right 8/17/2021    L4 L5 RIGHT TRANSFORAMINAL EPIDURAL STEROID INJECTION X-RAY (CPT 08533) performed by Gemma Donald MD at [address] Bilateral 9/28/2021    BILATERAL LUMBAR RADIOFREQUENCY ABLATION UNDER FLUOROSCOPIC GUIDANCE AT L3, L4, L5 AND DORSAL RAMI UNDER FLUOROSCOPY (CPR 01939) performed by Gemma Donald MD at [place] [address] Left 8/6/2018 LEFT SIDED VIDEO ASSISTED THORACOSCOPY WITH RIB PLATING performed by Meredith Travis MD at [address]/t a fall    RADIOFREQUENCY ABLATION NERVES Bilateral 10/29/2019    BILATERAL LUMBAR FACET L3 L4 MEDIAL BRANCH  L5 DORSAL RAMI  RADIOFREQUENCY ABLATION SEDATION (CPT 26230) performed by Marcelo Lutz MD at [facility] Bilateral     SHOULDER ARTHROSCOPY Right 12/08/2016    repair rotator cuff, bursectomy, debridement glenohumerol     SKIN BIOPSY         Current Outpatient Medications   Medication Sig Dispense Refill    JENTADUETO XR 2.5-1000 MG TB24 TAKE 1 TABLET BY MOUTH TWICE A DAY 60 tablet 3    b complex vitamins capsule TAKE 1 CAPSULE BY MOUTH EVERY DAY 30 capsule 3    citalopram (CELEXA) 40 MG tablet TAKE 1 TABLET BY MOUTH EVERY DAY 30 tablet 3    levothyroxine (SYNTHROID) 25 MCG tablet Take 1.5 tablets by mouth daily 45 tablet 3    meclizine (ANTIVERT) 25 MG tablet Take 1 tablet by mouth 3 times daily as needed for Dizziness 30 tablet 3    melatonin (RA MELATONIN) 3 MG TABS tablet Take 1 tablet by mouth nightly as needed (sleep) 30 tablet 3    donepezil (ARICEPT) 5 MG tablet Take 1 tablet by mouth nightly 30 tablet 5    famotidine (PEPCID) 40 MG tablet Take 1 tablet by mouth every evening 30 tablet 5    pantoprazole (PROTONIX) 40 MG tablet TAKE 1 TABLET BY MOUTH EVERY DAY IN THE MORNING BEFORE BREAKFAST 30 tablet 5    sucralfate (CARAFATE) 1 GM tablet Take 1 tablet by mouth 2 times daily 60 tablet 5    simvastatin (ZOCOR) 20 MG tablet TAKE 1 TABLET BY MOUTH EVERY DAY AT NIGHT 30 tablet 5    vitamin D (CHOLECALCIFEROL) 1000 UNIT TABS tablet Take 1,000 Units by mouth daily      aspirin EC 81 MG EC tablet Take 81 mg by mouth daily       No current facility-administered medications for this visit.        Allergies   Allergen Reactions    Sulfa Antibiotics Hives       Social History     Socioeconomic History    Marital status:      Spouse name: None    Number of children: None    Years of education: None    Highest education level: None   Occupational History    None   Tobacco Use    Smoking status: Former Smoker     Years: 40.00     Types: Cigarettes     Quit date: 3/19/1991     Years since quittin.8    Smokeless tobacco: Current User     Types: Chew   Vaping Use    Vaping Use: Never used   Substance and Sexual Activity    Alcohol use: No    Drug use: No    Sexual activity: Not Currently   Other Topics Concern    None   Social History Narrative    ** Merged History Encounter **          Social Determinants of Health     Financial Resource Strain:     Difficulty of Paying Living Expenses: Not on file   Food Insecurity:     Worried About Running Out of Food in the Last Year: Not on file    Reva of Food in the Last Year: Not on file   Transportation Needs:     Lack of Transportation (Medical): Not on file    Lack of Transportation (Non-Medical):  Not on file   Physical Activity:     Days of Exercise per Week: Not on file    Minutes of Exercise per Session: Not on file   Stress:     Feeling of Stress : Not on file   Social Connections:     Frequency of Communication with Friends and Family: Not on file    Frequency of Social Gatherings with Friends and Family: Not on file    Attends Mormonism Services: Not on file    Active Member of [address] [place] [address] [employer] or Organizations: Not on file    Attends Club or Organization Meetings: Not on file    Marital Status: Not on file   Intimate Partner Violence:     Fear of Current or Ex-Partner: Not on file    Emotionally Abused: Not on file    Physically Abused: Not on file    Sexually Abused: Not on file   Housing Stability:     Unable to Pay for Housing in the Last Year: Not on file    Number of [place] in the Last Year: Not on file    Unstable Housing in the Last Year: Not on file       Family History   Problem Relation Age of Onset    Diabetes Mother          Review of Systems:   As follows except as previously noted in HPI:  Constitutional: Negative for chills, diaphoresis,  fever   Respiratory: Negative for cough, shortness of breath and wheezing. Cardiovascular: Negative for chest pain and palpitations. Neurological: Negative for dizziness, syncope,   GI / : abdominal pain or cramping  Musculoskeletal: see HPI       Objective:   Physical Exam   Constitutional: Oriented to person, place, and time. and appears well-developed and well-nourished. :   Head: Normocephalic and atraumatic. Neck: Neck supple. Eyes: EOM are normal.   Pulmonary/Chest: Effort normal.  No respiratory distress, no wheezes. Neurological: Alert and oriented to person  Skin: Skin is warm and dry. Niraj Hagen DO    1/14/22  10:45 AM    All reasonable efforts have been made to minimize the risk of errors that may occur in the use of voice recognition and other electronic means of charting.

## 2022-01-21 ENCOUNTER — OFFICE VISIT (OUTPATIENT)
Dept: PAIN MANAGEMENT | Age: 82
End: 2022-01-21
Payer: MEDICARE

## 2022-01-21 VITALS
WEIGHT: 155 LBS | SYSTOLIC BLOOD PRESSURE: 130 MMHG | OXYGEN SATURATION: 94 % | HEIGHT: 67 IN | HEART RATE: 84 BPM | BODY MASS INDEX: 24.33 KG/M2 | TEMPERATURE: 96.9 F | DIASTOLIC BLOOD PRESSURE: 72 MMHG | RESPIRATION RATE: 16 BRPM

## 2022-01-21 DIAGNOSIS — M54.12 CERVICAL RADICULOPATHY: Chronic | ICD-10-CM

## 2022-01-21 DIAGNOSIS — M43.02 CERVICAL SPONDYLOLYSIS: Chronic | ICD-10-CM

## 2022-01-21 DIAGNOSIS — M53.3 SACROILIAC DYSFUNCTION: ICD-10-CM

## 2022-01-21 DIAGNOSIS — M51.36 DDD (DEGENERATIVE DISC DISEASE), LUMBAR: Primary | ICD-10-CM

## 2022-01-21 DIAGNOSIS — M47.817 LUMBOSACRAL SPONDYLOSIS WITHOUT MYELOPATHY: ICD-10-CM

## 2022-01-21 DIAGNOSIS — M96.1 POSTLAMINECTOMY SYNDROME, LUMBAR: ICD-10-CM

## 2022-01-21 DIAGNOSIS — M48.061 SPINAL STENOSIS OF LUMBAR REGION, UNSPECIFIED WHETHER NEUROGENIC CLAUDICATION PRESENT: ICD-10-CM

## 2022-01-21 PROCEDURE — 1123F ACP DISCUSS/DSCN MKR DOCD: CPT | Performed by: ANESTHESIOLOGY

## 2022-01-21 PROCEDURE — 99213 OFFICE O/P EST LOW 20 MIN: CPT | Performed by: ANESTHESIOLOGY

## 2022-01-21 PROCEDURE — 4004F PT TOBACCO SCREEN RCVD TLK: CPT | Performed by: ANESTHESIOLOGY

## 2022-01-21 PROCEDURE — G8420 CALC BMI NORM PARAMETERS: HCPCS | Performed by: ANESTHESIOLOGY

## 2022-01-21 PROCEDURE — G8484 FLU IMMUNIZE NO ADMIN: HCPCS | Performed by: ANESTHESIOLOGY

## 2022-01-21 PROCEDURE — G8428 CUR MEDS NOT DOCUMENT: HCPCS | Performed by: ANESTHESIOLOGY

## 2022-01-21 PROCEDURE — 4040F PNEUMOC VAC/ADMIN/RCVD: CPT | Performed by: ANESTHESIOLOGY

## 2022-01-21 RX ORDER — HYDROCODONE BITARTRATE AND ACETAMINOPHEN 5; 325 MG/1; MG/1
1 TABLET ORAL 2 TIMES DAILY PRN
Qty: 60 TABLET | Refills: 0 | Status: SHIPPED
Start: 2022-01-21 | End: 2022-03-22 | Stop reason: SDUPTHER

## 2022-01-21 NOTE — PROGRESS NOTES
Do you currently have any of the following:    Fever: No  Headache:  No  Cough: No  Shortness of breath: No  Exposed to anyone with these symptoms: No                                                                                                                Hayden Tran presents to the [facility] on 1/21/2022. Dennis Chauhan is complaining of pain lower back. The pain is constant. The pain is described as aching. Pain is rated on his best day at a 4, on his worst day at a 9, and on average at a 4 on the VAS scale. He took his last dose of Fredda Rosanna does not have issues with constipation. Any procedures since your last visit: Yes, with 75 % relief. He is not on NSAIDS and  is not on anticoagulation medications to include none. Pacemaker or defibrillator: No .    Medication Contract and Consent for Opioid Use Documents Filed     Patient Documents     Type of Document Status Date Received Received By Description    Medication Contract Received 3/15/2019  1:18 PM AALIYAH HOPE PAIN MANAGEMENT PT AGREEMENT 3/15/2019    Medication Contract Received 2/12/2020  3:12 PM Anthony RIBERA pain management patient agreement 02/12/2020    Medication Contract Received 9/1/2021 10:54 AM ZOË ROBERSON PAIN AGREEMENT SEPTEMBER 2021                   /72   Pulse 84   Temp 96.9 °F (36.1 °C) (Infrared)   Resp 16   Ht 5' 7\" (1.702 m)   Wt 155 lb (70.3 kg)   SpO2 94%   BMI 24.28 kg/m²      No LMP for male patient.

## 2022-01-21 NOTE — PROGRESS NOTES
[address] [facility], [address] [place] [id number]  [phone]    Follow up Note      Kelly Snyder     Date of Visit:  1/21/2022    CC:    Chief Complaint   Patient presents with    Follow Up After Procedure     RIGHT SACROILIAC JOINT INJECTION UNDER FLUOROSCOPY     Back Pain           HPI:  78 y.o.  pleasant gentleman with prior h/o cervical spine surgery- ACDF C3-7. He has undergone lumbar spine surgery X 2 in the 1980's  Pt here today for low back pain. S/P lumbar spine intervention with good pain relief. Tiana Frazier for as needed use,  Appropriate analgesia with current medications regimen: yes   Medication side effects:none. Medication help with pain and functionality, no side effects, no signs of misuse abuse or diversion, he is compliant with medication use. Nursing notes and details of the pain history reviewed. Please see intake notes for details.     He is on ASA-81 mg. H/o CAD s/p stent.      Imaging studies:     XR Lumbar spine: 6/2020: Impression   Diffuse osteopenia with moderate to advanced degenerative changes of the   lumbar spine, as above.  No convincing evidence of an acute fracture.      CT cervical spine: 6/14/2020:      FINDINGS:   BONES/ALIGNMENT: No evidence of cervical fracture or traumatic subluxation.    Stable T3 wedge compression.  There has been fusion of C3 through C7.  There   is an anterior plate with screws at C3, C4, and C7.  There is a bone block   graft at C3-C4.  There is a block graft extending from C4 through C7.  The   hardware and grafts are intact.       DEGENERATIVE CHANGES: Degenerative change at C1-C.  Degenerative disc disease   C7-T1.  Diffuse facet osteoarthritis with stable several mm anterolisthesis   of C7.       SOFT TISSUES: No prevertebral soft tissue swelling.  Maxillary sinusitis   changes noted           Impression   No acute abnormality of the cervical spine.          Xray thoracic spine: 2020:      Impression   Limited examination, as described above.  Within these limitations, no   convincing evidence of an acute fracture of the thoracic spine.              CT Lumbar Spine: 2018:      Findings: There are bilateral pars defects noted at L4. There is a   spondylolisthesis of L4 on L5. There is wedging of T12 which may be physiologic   Changes seen throughout the discs are abnormal. There are findings to   suggest  degenerative disc disease.       Changes seen throughout the bone marrow are abnormal. Findings are   compatible with degenerative disc disease       Changes within the facets are abnormal. Findings are compatible with   degenerative facet disease.           At L5-S1: There is a mild broad-based disc herniation or bony   spurring. There is a laminectomy defect. There is facet hypertrophy.    There is mild narrowing of the neural foramina       At L4-5: There is a moderate broad-based disc herniation there is   moderate stenosis       At L3-4: There is a large broad-based disc herniation, there is severe   canal stenosis.       At L2-3: There is a mild broad-based disc herniation, there is mild   canal stenosis       At L1-2: There is a mild broad-based disc herniation, there is mild   canal stenosis               Impression   Findings compatible with degenerative changes   Bilateral L4 pars defect   Severe canal stenosis at L3-4 and moderate canal stenosis at L4-5                                            Potential Aberrant Drug-Related Behavior:  No     Drug Screenin: Consistent     Pain agreement updated: 2021      OARRS report[de-identified]   Reviewed today: Consistent     Past Medical History:   Diagnosis Date    Accident caused by farm tractor     Arthritis     CAD (coronary artery disease)     Chronic pain     Clavicle fracture     Concussion with no loss of consciousness     Depression     Diabetes mellitus ([facility] [address]     GERD (gastroesophageal reflux disease)     Headache(784.0)     History of blood transfusion     Hyperlipidemia     Kidney stone     Laceration of flexor tendon of hand, not in \"[place] man[place] land\" 10/10/2014    Multiple closed fractures of ribs of left side     Neck pain     Osteoarthritis     Rib fracture     SBO (small bowel obstruction) ([facility] [address] 4/23/2019    Skin cancer     Thyroid disease     Trauma 8/4/2018    Traumatic hemopneumothorax, initial encounter        Past Surgical History:   Procedure Laterality Date    ANESTHESIA NERVE BLOCK Bilateral 9/10/2019    BILATERAL LUMBAR FACET MEDIAL BRANCH BLOCK L3 L4 AND L5 DORSAL RAMI (CPT [id number]) performed by Guanakito Kohli MD at [address] N/A 7/21/2020    LUMBAR EPIDURAL STEROID INJECTION L4-L5 X-RAY performed by Guanakito Kohli MD at [address] Right 11/23/2021    RIGHT SACROILIAC JOINT INJECTION UNDER FLUOROSCOPY (CPT 73623) performed by Guanakito Kohli MD at [address]      x2 lumbar    CATARACT REMOVAL WITH IMPLANT Left 10/08/13    CATARACT REMOVAL WITH IMPLANT Right 12/10/13    CERVICAL SPINE SURGERY  2012    cervical fusion    COLON SURGERY      due to diverticulitis colon resection    CORONARY ANGIOPLASTY WITH STENT PLACEMENT  2005    x2 stents    DILATATION, ESOPHAGUS      FRACTURE SURGERY      Lt. Jaw Plate    NERVE BLOCK Left 3/7/16    cervical transforaminal #1    NERVE BLOCK Left 3/28/16    cervical transforaminal #2    NERVE BLOCK Left 04 04 2016    transforaminal nerve block left cervical #3    NERVE BLOCK Left 07/25/2016    shoulder    NERVE BLOCK Left 08/15/2016    left shoulder injection #2    NERVE BLOCK Right 08/22/2016    shoulder    NERVE BLOCK Right 08/29/2016    shoulder injection #2    NERVE BLOCK Bilateral 09/10/2019    BILATERAL LUMBAR FACET MEDIAL BRANCH NERVE BLOCK AT L3,L4 MEDIAL BRANCH AND L5 DORSAL RAMI    NERVE BLOCK Bilateral 10/29/2019    lumbar facet, meidal branch    NERVE BLOCK N/A 07/21/2020    Lumbar epidural steriod injection L4-L5    NERVE BLOCK Right 09/15/2020    transforaminal L4,5    NERVE BLOCK Right 9/15/2020    TRANSFORAMINAL EPIDURAL STEROID INJECTION RIGHT L4 AND L5 UNDER FLUOROSCOPIC GUIDANCE (CPT 52439,33148) performed by Marcelo Lutz MD at [address] [place] [address]. Left 10/23/2018    LEFT CLAVICLE OPEN REDUCTION INTERNAL FIXATION performed by Ronit Crandall DO at [facility] Left 101/10/2014    repair left hand, finger laceration    OTHER SURGICAL HISTORY Bilateral 01/05/2021    lumbar facet medial branch dorsal rami radiofrequency    PAIN MANAGEMENT PROCEDURE Bilateral 1/5/2021    BILATERAL LUMBAR FACET L3,L4 MEDIAL BRANCH L5 DORSAL RAMI RADIOFREQUENCY ABLATION WITH IV SEDATION (CPT 35964,93110) performed [facility]utz [facility] [address] Right 8/17/2021    L4 L5 RIGHT TRANSFORAMINAL EPIDURAL STEROID INJECTION X-RAY (CPT 51745) performed by Marcelo Lutz MD at [address] [facility] [address] Bilateral 9/28/2021    BILATERAL LUMBAR RADIOFREQUENCY ABLATION UNDER FLUOROSCOPIC GUIDANCE AT L3, L4, L5 AND DORSAL RAMI UNDER FLUOROSCOPY (CPR 33154) performed by Marcelo Lutz MD at [place] [address] Left 8/6/2018    LEFT SIDED VIDEO ASSISTED THORACOSCOPY WITH RIB PLATING performed by Meredith Travis MD at [address] [place] [address] d/t a fall    RADIOFREQUENCY ABLATION NERVES Bilateral 10/29/2019    BILATERAL LUMBAR FACET L3 L4 MEDIAL BRANCH  L5 DORSAL RAMI  RADIOFREQUENCY ABLATION SEDATION (CPT [id number]) performed by Marcelo Lutz MD at [address] [place] [address] Bilateral     SHOULDER ARTHROSCOPY Right 12/08/2016    repair rotator cuff, bursectomy, debridement glenohumerol     SKIN BIOPSY         Prior to Admission medications    Medication Sig Start Date End Date Taking?  Authorizing Provider   Handicap Placard MISC by Does not apply route Above patient unable to ambulate more than 200 feet without stopping to rest.  Expires: 1- 1/21/22  Yes MD LIBERTAD HastingsETO XR 2.5-1000 MG TB24 TAKE 1 TABLET BY MOUTH TWICE A DAY 11/1/21  Yes Tiffanie Geiger, DO   b complex vitamins capsule TAKE 1 CAPSULE BY MOUTH EVERY DAY 10/28/21  Yes Arnaud Arreola DO   citalopram (CELEXA) 40 MG tablet TAKE 1 TABLET BY MOUTH EVERY DAY 10/28/21  Yes Arnaud Arreola DO   levothyroxine (SYNTHROID) 25 MCG tablet Take 1.5 tablets by mouth daily 10/28/21 1/26/22 Yes Arnaud Arreola DO   meclizine (ANTIVERT) 25 MG tablet Take 1 tablet by mouth 3 times daily as needed for Dizziness 10/28/21  Yes Tiffanie Geiger, DO   melatonin (RA MELATONIN) 3 MG TABS tablet Take 1 tablet by mouth nightly as needed (sleep) 10/28/21  Yes Tiffanie Geiger DO   donepezil (ARICEPT) 5 MG tablet Take 1 tablet by mouth nightly 10/22/21  Yes Tiffanie Geiger, DO   famotidine (PEPCID) 40 MG tablet Take 1 tablet by mouth every evening 10/22/21  Yes Arnaud Arreola DO   pantoprazole (PROTONIX) 40 MG tablet TAKE 1 TABLET BY MOUTH EVERY DAY IN THE MORNING BEFORE BREAKFAST 10/22/21  Yes Arnaud Arreola DO   sucralfate (CARAFATE) 1 GM tablet Take 1 tablet by mouth 2 times daily 10/22/21  Yes Arnaud Arreola DO   simvastatin (ZOCOR) 20 MG tablet TAKE 1 TABLET BY MOUTH EVERY DAY AT NIGHT 10/11/21  Yes Arnaud Arreola DO   vitamin D (CHOLECALCIFEROL) 1000 UNIT TABS tablet Take 1,000 Units by mouth daily   Yes Historical Provider, MD   aspirin EC 81 MG EC tablet Take 81 mg by mouth daily   Yes Historical Provider, MD       Allergies   Allergen Reactions    Sulfa Antibiotics Hives       Social History     Socioeconomic History    Marital status:      Spouse name: Not on file    Number of children: Not on file    Years of education: Not on file    Highest education level: Not on file   Occupational History    Not on file   Tobacco Use    Smoking status: Former Smoker     Years: 40.00     Types: Cigarettes     Quit date: 3/19/1991     Years since quittin.8    Smokeless tobacco: Current User     Types: Chew   Vaping Use    Vaping Use: Never used   Substance and Sexual Activity    Alcohol use: No    Drug use: No    Sexual activity: Not Currently   Other Topics Concern    Not on file   Social History Narrative    ** Merged History Encounter **          Social Determinants of Health     Financial Resource Strain:     Difficulty of Paying Living Expenses: Not on file   Food Insecurity:     Worried About Running Out of Food in the Last Year: Not on file    Reva of Food in the Last Year: Not on file   Transportation Needs:     Lack of Transportation (Medical): Not on file    Lack of Transportation (Non-Medical): Not on file   Physical Activity:     Days of Exercise per Week: Not on file    Minutes of Exercise per Session: Not on file   Stress:     Feeling of Stress : Not on file   Social Connections:     Frequency of Communication with Friends and Family: Not on file    Frequency of Social Gatherings with Friends and Family: Not on file    Attends Zoroastrianism Services: Not on file    Active Member of [address] [place] [address] or Organizations: Not on file    Attends Club or Organization Meetings: Not on file    Marital Status: Not on file   Intimate Partner Violence:     Fear of Current or Ex-Partner: Not on file    Emotionally Abused: Not on file    Physically Abused: Not on file    Sexually Abused: Not on file   Housing Stability:     Unable to Pay for Housing in the Last Year: Not on file    Number of [place] in the Last Year: Not on file    Unstable Housing in the Last Year: Not on file       Family History   Problem Relation Age of Onset    Diabetes Mother        REVIEW OF SYSTEMS:     David Gonzalez denies fever/chills, chest pain, shortness of breath, new bowel or bladder complaints or suicidal ideations.  All other review of systems was negative. PHYSICAL EXAMINATION:    /72   Pulse 84   Temp 96.9 °F (36.1 °C) (Infrared)   Resp 16   Ht 5' 7\" (1.702 m)   Wt 155 lb (70.3 kg)   SpO2 94%   BMI 24.28 kg/m²     General:    General appearance:  Pleasant and well-hydrated, in mild to moderate distress and A & O x3     HEENT:  Head:normocephalic, atraumatic    Lungs:  Breathing:normal breathing pattern      CVS:  clear and non labored      Abdomen:  Shape:non-distended and normal     Cervical spine:  Inspection:normal  Palpation: paraspinal tenderness +  Range of motion: limitations of ROM noted. Facet tenderness noted over the mid and lower cervical facets.     Lumbar Spine:  Scar from the prior surgery noted, healed well, ROM reduced, Lumbar facet loading improved pain,   Sensory and motor in B/l LE no new focal deficits  SI joint tenderness improved    Musculoskeletal:   Trigger points + cervical paraspinal muscles     Extremities:  NO tremors     Neurological:  Sensory: normal to light touch   Motor: No new  focal deficits, generalized weakness noted.   Reflexes: B/l equal  Gait:normal     Dermatology:     Skin:no rashes or lesions noted    Assessment/Plan:.      1. DDD (degenerative disc disease), lumbar      2. Lumbosacral spondylosis without myelopathy      3. Spinal stenosis of lumbar region, unspecified whether neurogenic claudication present      4. Postlaminectomy syndrome, lumbar      5. Cervical postlaminectomy syndrome      6. DDD (degenerative disc disease), cervical      7. Cervical spondylolysis        Lumbar LUCILA/ TFESI had helped the lower extremity pain significantly >70% relief    RFA and SIJ injection - has helped low back/ SIj pain. Doing HEP    Takes Norco for prn use- helps with pain and functionality. He is on ASA-81 mg. H/o CAD s/p stent.        Plan:     Refill Norco 5/325 1 po bid prn pain # 60 tabs filled today. Pain meds helps with pain and functionality. No side effects.  No signs of abuse/ misuse or diversion. Patient is compliant with medication use. Buccal on 7/23/2021 result reviewed: Consistent    OARRS reviewed today consistent. Opioid agreement: 9/1/2021    Continue with HEP as tolerated     If pain bothers, consider MRI of LS spine. He wanted handicap placard- letter given today. F/U in 3-4 months.     We discussed with the patient that combining opioids, benzodiazepines, alcohol, illicit drugs or sleep aids increases the risk of respiratory depression including death. We discussed that these medications may cause drowsiness, sedation or dizziness and have counseled the patient not to drive or operate machinery. We have discussed that these medications will be prescribed only by one provider. We have discussed with the patient about age related risk factors and have thoroughly discussed the importance of taking these medications as prescribed. The patient verbalizes understanding.     Tanner Martinez MD    CC:  Yasmin Gomez DO

## 2022-01-27 ENCOUNTER — OFFICE VISIT (OUTPATIENT)
Dept: SLEEP CENTER | Age: 82
End: 2022-01-27
Payer: MEDICARE

## 2022-01-27 VITALS
HEART RATE: 82 BPM | SYSTOLIC BLOOD PRESSURE: 107 MMHG | RESPIRATION RATE: 16 BRPM | WEIGHT: 166.7 LBS | DIASTOLIC BLOOD PRESSURE: 72 MMHG | HEIGHT: 67 IN | OXYGEN SATURATION: 98 % | BODY MASS INDEX: 26.16 KG/M2

## 2022-01-27 DIAGNOSIS — Z99.89 OSA ON CPAP: Primary | ICD-10-CM

## 2022-01-27 DIAGNOSIS — G47.00 INSOMNIA, UNSPECIFIED TYPE: ICD-10-CM

## 2022-01-27 DIAGNOSIS — G47.33 OSA ON CPAP: Primary | ICD-10-CM

## 2022-01-27 PROCEDURE — G8427 DOCREV CUR MEDS BY ELIG CLIN: HCPCS | Performed by: NURSE PRACTITIONER

## 2022-01-27 PROCEDURE — 99213 OFFICE O/P EST LOW 20 MIN: CPT | Performed by: NURSE PRACTITIONER

## 2022-01-27 PROCEDURE — G8484 FLU IMMUNIZE NO ADMIN: HCPCS | Performed by: NURSE PRACTITIONER

## 2022-01-27 PROCEDURE — 4040F PNEUMOC VAC/ADMIN/RCVD: CPT | Performed by: NURSE PRACTITIONER

## 2022-01-27 PROCEDURE — G8417 CALC BMI ABV UP PARAM F/U: HCPCS | Performed by: NURSE PRACTITIONER

## 2022-01-27 PROCEDURE — 1123F ACP DISCUSS/DSCN MKR DOCD: CPT | Performed by: NURSE PRACTITIONER

## 2022-01-27 PROCEDURE — 4004F PT TOBACCO SCREEN RCVD TLK: CPT | Performed by: NURSE PRACTITIONER

## 2022-01-27 ASSESSMENT — SLEEP AND FATIGUE QUESTIONNAIRES
HOW LIKELY ARE YOU TO NOD OFF OR FALL ASLEEP WHILE LYING DOWN TO REST IN THE AFTERNOON WHEN CIRCUMSTANCES PERMIT: 0
HOW LIKELY ARE YOU TO NOD OFF OR FALL ASLEEP WHILE SITTING AND TALKING TO SOMEONE: 0
HOW LIKELY ARE YOU TO NOD OFF OR FALL ASLEEP WHILE SITTING INACTIVE IN A PUBLIC PLACE: 0
HOW LIKELY ARE YOU TO NOD OFF OR FALL ASLEEP WHILE WATCHING TV: 0
HOW LIKELY ARE YOU TO NOD OFF OR FALL ASLEEP IN A CAR, WHILE STOPPED FOR A FEW MINUTES IN TRAFFIC: 0
ESS TOTAL SCORE: 0
HOW LIKELY ARE YOU TO NOD OFF OR FALL ASLEEP WHILE SITTING AND READING: 0
HOW LIKELY ARE YOU TO NOD OFF OR FALL ASLEEP WHEN YOU ARE A PASSENGER IN A CAR FOR AN HOUR WITHOUT A BREAK: 0
HOW LIKELY ARE YOU TO NOD OFF OR FALL ASLEEP WHILE SITTING QUIETLY AFTER LUNCH WITHOUT ALCOHOL: 0

## 2022-01-27 NOTE — PROGRESS NOTES
[facility] Sleep Medicine    Patient Name: Lisa Devine  Age: 78 y.o.   : 1940    Date of Visit: 22        Review of Last Visit Summary:    The patient was last seen on 2021 by Dr. Luzmaria Clifton for  Obstructive Sleep Apnea    Interim History:     Lisa Devine is a 78 y.o. male that  has a past medical history of Accident caused by farm tractor, Arthritis, CAD (coronary artery disease), Chronic pain, Clavicle fracture, Concussion with no loss of consciousness, Depression, Diabetes mellitus ([address] GERD (gastroesophageal reflux disease), Headache(784.0), History of blood transfusion, Hyperlipidemia, Kidney stone, Laceration of flexor tendon of hand, not in \"[place] man's land\" (10/10/2014), Multiple closed fractures of ribs of left side, Neck pain, Osteoarthritis, Rib fracture, SBO (small bowel obstruction) ([facility] [address] (2019), Skin cancer, Thyroid disease, Trauma (2018), and Traumatic hemopneumothorax, initial encounter. He presents in follow up to Sleep Clinic to review CPAP adherence and efficacy.     -Patient continues to be compliant with CPAP nightly.   -He has tried several different masks, including a nasal mask, but found that the nasal mask was leaking and he had difficulty switching sleeping positions with nasal mask, however, he liked the tubing at the top of his head. He tried a chin strap, but did not like the feeling of it with nasal mask.  -The snap on the mask straps is currently not functioning well on current mask.  -There are times in the early morning that patient will wake up, get out of bed for a little while then get back and go to sleep without CPAP for several hours.  -Patient continues to have a cup of coffee with dinner. He still admits to difficulty with taking about an hour to fall asleep. He admits that he doesn't get too much physical activity lately because of his back pain.  -He generally does not nap during the day.     DME: [facility]  Benefits: Resolution of NERVE BLOCK Left 04 04 2016    transforaminal nerve block left cervical #3    NERVE BLOCK Left 07/25/2016    shoulder    NERVE BLOCK Left 08/15/2016    left shoulder injection #2    NERVE BLOCK Right 08/22/2016    shoulder    NERVE BLOCK Right 08/29/2016    shoulder injection #2    NERVE BLOCK Bilateral 09/10/2019    BILATERAL LUMBAR FACET MEDIAL BRANCH NERVE BLOCK AT L3,L4 MEDIAL BRANCH AND L5 DORSAL RAMI    NERVE BLOCK Bilateral 10/29/2019    lumbar facet, meidal branch    NERVE BLOCK N/A 07/21/2020    Lumbar epidural steriod injection L4-L5    NERVE BLOCK Right 09/15/2020    transforaminal L4,5    NERVE BLOCK Right 9/15/2020    TRANSFORAMINAL EPIDURAL STEROID INJECTION RIGHT L4 AND L5 UNDER FLUOROSCOPIC GUIDANCE (CPT 59529,42366) performed by Donato Flanagan MD at [address] [place] [address]. Left 10/23/2018    LEFT CLAVICLE OPEN REDUCTION INTERNAL FIXATION performed by Arnulfo Marshall DO at [address] Left 101/10/2014    repair left hand, finger laceration    OTHER SURGICAL HISTORY Bilateral 01/05/2021    lumbar facet medial branch dorsal rami radiofrequency    PAIN MANAGEMENT PROCEDURE Bilateral 1/5/2021    BILATERAL LUMBAR FACET L3,L4 MEDIAL BRANCH L5 DORSAL RAMI RADIOFREQUENCY ABLATION WITH IV SEDATION (CPT 77216,88203) performed by Donato Flanagan MD at [address] Right 8/17/2021    L4 L5 RIGHT TRANSFORAMINAL EPIDURAL STEROID INJECTION X-RAY (CPT 00416) performed by Donato Flanagan MD at [address] Bilateral 9/28/2021    BILATERAL LUMBAR RADIOFREQUENCY ABLATION UNDER FLUOROSCOPIC GUIDANCE AT L3, L4, L5 AND DORSAL RAMI UNDER FLUOROSCOPY (CPR 33124) performed by Donato Flanagan MD at [place] [address] Left 8/6/2018    LEFT SIDED VIDEO ASSISTED THORACOSCOPY WITH RIB PLATING performed by Selena Valente MD at Rfl: 3    donepezil (ARICEPT) 5 MG tablet, Take 1 tablet by mouth nightly, Disp: 30 tablet, Rfl: 5    famotidine (PEPCID) 40 MG tablet, Take 1 tablet by mouth every evening, Disp: 30 tablet, Rfl: 5    pantoprazole (PROTONIX) 40 MG tablet, TAKE 1 TABLET BY MOUTH EVERY DAY IN THE MORNING BEFORE BREAKFAST, Disp: 30 tablet, Rfl: 5    sucralfate (CARAFATE) 1 GM tablet, Take 1 tablet by mouth 2 times daily, Disp: 60 tablet, Rfl: 5    simvastatin (ZOCOR) 20 MG tablet, TAKE 1 TABLET BY MOUTH EVERY DAY AT NIGHT, Disp: 30 tablet, Rfl: 5    vitamin D (CHOLECALCIFEROL) 1000 UNIT TABS tablet, Take 1,000 Units by mouth daily, Disp: , Rfl:     aspirin EC 81 MG EC tablet, Take 81 mg by mouth daily, Disp: , Rfl:     Sleep Medicine 1/27/2022 8/19/2021 6/10/2021 4/22/2021   Sitting and reading 0 0 0 0   Watching TV 0 0 0 0   Sitting, inactive in a public place (e.g. a theatre or a meeting) 0 0 0 0   As a passenger in a car for an hour without a break 0 0 0 0   Lying down to rest in the afternoon when circumstances permit 0 0 3 0   Sitting and talking to someone 0 0 0 0   Sitting quietly after a lunch without alcohol 0 0 1 0   In a car, while stopped for a few minutes in traffic 0 0 0 0   Total score 0 0 4 0   Neck circumference - - - 16       Review of Systems:    Constitutional: no chills, no fever   Eyes: no blurred vision and no eyesight problems. ENT: no hoarseness and no sore throat. Cardiovascular: no chest pain,   Respiratory: no cough, no shortness of breath   Gastrointestinal:  no nausea,  no vomiting, no diarrhea. Musculoskeletal: chronic back pain  Integumentary: no rashes or lacerations. Objective:   PHYSICAL EXAM:    /72 (Site: Left Upper Arm, Position: Sitting, Cuff Size: Large Adult)   Pulse 82   Resp 16   Ht 5' 7\" (1.702 m)   Wt 166 lb 11.2 oz (75.6 kg)   SpO2 98%   BMI 26.11 kg/m²     Physical exam:  Gen: No acute distress. BMI of Body mass index is 26.11 kg/m². Neck: Trachea midline. No obvious mass. Neck circumference     Resp: No accessory muscle use. No crackles. No wheezes. No rhonchi. CV: Regular rate. Regular rhythm. No murmur or rub. Skin: Warm and dry. No bleeding observed   M/S: No cyanosis. No obvious joint deformity. Neuro: Awake. Alert. Moves all four extremities. Psych: Alert and oriented. No anxiety. CPAP Compliance Download -- accessed via [employer] 1/26/2022        Assessment:      Ernestine Hamm was seen today for sleep apnea. Diagnoses and all orders for this visit:    TREVIN on CPAP  -     DME Order for CPAP as OP    ·    Plan:     Joseline Merritt is a 78 y.o. male that  has a past medical history of Accident caused by farm tractor, Arthritis, CAD (coronary artery disease), Chronic pain, Clavicle fracture, Concussion with no loss of consciousness, Depression, Diabetes mellitus ([facility] [address] GERD (gastroesophageal reflux disease), Headache(784.0), History of blood transfusion, Hyperlipidemia, Kidney stone, Laceration of flexor tendon of hand, not in \"[place] man[place] land\" (10/10/2014), Multiple closed fractures of ribs of left side, Neck pain, Osteoarthritis, Rib fracture, SBO (small bowel obstruction) ([facility] [address] (4/23/2019), Skin cancer, Thyroid disease, Trauma (8/4/2018), and Traumatic hemopneumothorax, initial encounter. He presents in follow up to Sleep Clinic to review CPAP adherence and efficacy. He demonstrates excellent adherence with resolution of respiratory events (residual AHI 5.4). He notes benefit with CPAP therapy (namely, morerefreshing sleep, resolution of snoring) and is motivated to continue use. He notes that he would like to change masks to Dreamwear full face mask. 1. Obstructive Sleep Apnea    -Based on sleep study results, review of device remote download, and discussion with patient, will continue auto-CPAP therapy at settings of 7-15 cm of water.   - Settings are appropriate, with residual AHI 5.4 and maximum and average pressures within prescribed range.  - DME is [facility].  - Patient is using a full face mask. No significant leak, however, clip on strap is broken. -Supply order placed. Recommend a Dream Wear full face mask. -Previously discussed pathophysiology of TREVIN and its impact on daily well-being, as well as cardiometabolic and neurocognitive health (particularly in moderate-severe cases). -Patient understands that CPAP should be worn every night for the duration of the night (in order to not miss therapy during early-morning REM period) for maximum benefit. 2. Chronic Sleep Initiation Insomnia     -Patient reports continued difficulty with falling asleep.   -Continues to drink caffeine with dinner, advised him again to refrain.  -Try to increase physical activity through the day, as to increase homeostatic sleep drive.  - Not interested in CBT-I, basic sleep hygiene techniques discussed. Follow up: No follow-ups on file.     Kimberly Osorio, VIKRAM-[facility]  [address] [facility]  P -[phone] option 2  - [phone]

## 2022-02-01 DIAGNOSIS — K21.9 GASTROESOPHAGEAL REFLUX DISEASE, UNSPECIFIED WHETHER ESOPHAGITIS PRESENT: ICD-10-CM

## 2022-02-02 RX ORDER — FAMOTIDINE 40 MG/1
TABLET, FILM COATED ORAL
Qty: 90 TABLET | Refills: 1 | Status: SHIPPED
Start: 2022-02-02 | End: 2022-05-23

## 2022-02-17 DIAGNOSIS — E11.22 TYPE 2 DIABETES MELLITUS WITH STAGE 3B CHRONIC KIDNEY DISEASE, WITHOUT LONG-TERM CURRENT USE OF INSULIN (HCC): ICD-10-CM

## 2022-02-17 DIAGNOSIS — G62.9 PERIPHERAL POLYNEUROPATHY: ICD-10-CM

## 2022-02-17 DIAGNOSIS — N18.32 TYPE 2 DIABETES MELLITUS WITH STAGE 3B CHRONIC KIDNEY DISEASE, WITHOUT LONG-TERM CURRENT USE OF INSULIN (HCC): ICD-10-CM

## 2022-02-17 DIAGNOSIS — E03.9 ACQUIRED HYPOTHYROIDISM: ICD-10-CM

## 2022-02-17 DIAGNOSIS — E11.40 TYPE 2 DIABETES MELLITUS WITH DIABETIC NEUROPATHY, WITHOUT LONG-TERM CURRENT USE OF INSULIN (HCC): ICD-10-CM

## 2022-02-17 LAB
ALBUMIN SERPL-MCNC: 4.1 G/DL (ref 3.5–5.2)
ALP BLD-CCNC: 47 U/L (ref 40–129)
ALT SERPL-CCNC: 14 U/L (ref 0–40)
ANION GAP SERPL CALCULATED.3IONS-SCNC: 12 MMOL/L (ref 7–16)
AST SERPL-CCNC: 24 U/L (ref 0–39)
BASOPHILS ABSOLUTE: 0.06 E9/L (ref 0–0.2)
BASOPHILS RELATIVE PERCENT: 1 % (ref 0–2)
BILIRUB SERPL-MCNC: 0.3 MG/DL (ref 0–1.2)
BUN BLDV-MCNC: 23 MG/DL (ref 6–23)
CALCIUM SERPL-MCNC: 9.9 MG/DL (ref 8.6–10.2)
CHLORIDE BLD-SCNC: 103 MMOL/L (ref 98–107)
CHOLESTEROL, TOTAL: 127 MG/DL (ref 0–199)
CO2: 25 MMOL/L (ref 22–29)
CREAT SERPL-MCNC: 1.7 MG/DL (ref 0.7–1.2)
EOSINOPHILS ABSOLUTE: 0.27 E9/L (ref 0.05–0.5)
EOSINOPHILS RELATIVE PERCENT: 4.5 % (ref 0–6)
FOLATE: 11.4 NG/ML (ref 4.8–24.2)
GFR AFRICAN AMERICAN: 47
GFR NON-AFRICAN AMERICAN: 39 ML/MIN/1.73
GLUCOSE BLD-MCNC: 136 MG/DL (ref 74–99)
HBA1C MFR BLD: 6.9 % (ref 4–5.6)
HCT VFR BLD CALC: 39.8 % (ref 37–54)
HDLC SERPL-MCNC: 31 MG/DL
HEMOGLOBIN: 12.9 G/DL (ref 12.5–16.5)
IMMATURE GRANULOCYTES #: 0.03 E9/L
IMMATURE GRANULOCYTES %: 0.5 % (ref 0–5)
LDL CHOLESTEROL CALCULATED: 68 MG/DL (ref 0–99)
LYMPHOCYTES ABSOLUTE: 1.69 E9/L (ref 1.5–4)
LYMPHOCYTES RELATIVE PERCENT: 28.4 % (ref 20–42)
MCH RBC QN AUTO: 33.9 PG (ref 26–35)
MCHC RBC AUTO-ENTMCNC: 32.4 % (ref 32–34.5)
MCV RBC AUTO: 104.7 FL (ref 80–99.9)
MONOCYTES ABSOLUTE: 0.69 E9/L (ref 0.1–0.95)
MONOCYTES RELATIVE PERCENT: 11.6 % (ref 2–12)
NEUTROPHILS ABSOLUTE: 3.22 E9/L (ref 1.8–7.3)
NEUTROPHILS RELATIVE PERCENT: 54 % (ref 43–80)
PDW BLD-RTO: 12.8 FL (ref 11.5–15)
PLATELET # BLD: 160 E9/L (ref 130–450)
PMV BLD AUTO: 11.2 FL (ref 7–12)
POTASSIUM SERPL-SCNC: 5.2 MMOL/L (ref 3.5–5)
RBC # BLD: 3.8 E12/L (ref 3.8–5.8)
SODIUM BLD-SCNC: 140 MMOL/L (ref 132–146)
TOTAL PROTEIN: 7.1 G/DL (ref 6.4–8.3)
TRIGL SERPL-MCNC: 141 MG/DL (ref 0–149)
TSH SERPL DL<=0.05 MIU/L-ACNC: 1.28 UIU/ML (ref 0.27–4.2)
VITAMIN B-12: 372 PG/ML (ref 211–946)
VLDLC SERPL CALC-MCNC: 28 MG/DL
WBC # BLD: 6 E9/L (ref 4.5–11.5)

## 2022-02-26 DIAGNOSIS — F32.A MILD DEPRESSION: ICD-10-CM

## 2022-03-02 ENCOUNTER — OFFICE VISIT (OUTPATIENT)
Dept: FAMILY MEDICINE CLINIC | Age: 82
End: 2022-03-02
Payer: MEDICARE

## 2022-03-02 VITALS
WEIGHT: 171 LBS | HEIGHT: 67 IN | HEART RATE: 84 BPM | SYSTOLIC BLOOD PRESSURE: 110 MMHG | OXYGEN SATURATION: 95 % | BODY MASS INDEX: 26.84 KG/M2 | TEMPERATURE: 97.7 F | DIASTOLIC BLOOD PRESSURE: 70 MMHG | RESPIRATION RATE: 16 BRPM

## 2022-03-02 DIAGNOSIS — Z00.00 MEDICARE ANNUAL WELLNESS VISIT, SUBSEQUENT: Primary | ICD-10-CM

## 2022-03-02 DIAGNOSIS — E11.22 TYPE 2 DIABETES MELLITUS WITH STAGE 3A CHRONIC KIDNEY DISEASE, WITHOUT LONG-TERM CURRENT USE OF INSULIN (HCC): ICD-10-CM

## 2022-03-02 DIAGNOSIS — E78.5 DYSLIPIDEMIA: ICD-10-CM

## 2022-03-02 DIAGNOSIS — E11.40 TYPE 2 DIABETES MELLITUS WITH DIABETIC NEUROPATHY, WITHOUT LONG-TERM CURRENT USE OF INSULIN (HCC): ICD-10-CM

## 2022-03-02 DIAGNOSIS — L82.0 KERATOSIS, INFLAMED SEBORRHEIC: ICD-10-CM

## 2022-03-02 DIAGNOSIS — N18.31 STAGE 3A CHRONIC KIDNEY DISEASE (HCC): ICD-10-CM

## 2022-03-02 DIAGNOSIS — E03.9 ACQUIRED HYPOTHYROIDISM: ICD-10-CM

## 2022-03-02 DIAGNOSIS — F03.A0 MILD DEMENTIA: ICD-10-CM

## 2022-03-02 DIAGNOSIS — G31.84 MILD COGNITIVE IMPAIRMENT: ICD-10-CM

## 2022-03-02 DIAGNOSIS — F32.A MILD DEPRESSION: ICD-10-CM

## 2022-03-02 DIAGNOSIS — N18.31 TYPE 2 DIABETES MELLITUS WITH STAGE 3A CHRONIC KIDNEY DISEASE, WITHOUT LONG-TERM CURRENT USE OF INSULIN (HCC): ICD-10-CM

## 2022-03-02 DIAGNOSIS — E11.9 TYPE 2 DIABETES MELLITUS WITHOUT COMPLICATION, WITHOUT LONG-TERM CURRENT USE OF INSULIN (HCC): ICD-10-CM

## 2022-03-02 DIAGNOSIS — Z76.0 MEDICATION REFILL: ICD-10-CM

## 2022-03-02 PROCEDURE — G8484 FLU IMMUNIZE NO ADMIN: HCPCS | Performed by: FAMILY MEDICINE

## 2022-03-02 PROCEDURE — G0439 PPPS, SUBSEQ VISIT: HCPCS | Performed by: FAMILY MEDICINE

## 2022-03-02 PROCEDURE — 4040F PNEUMOC VAC/ADMIN/RCVD: CPT | Performed by: FAMILY MEDICINE

## 2022-03-02 PROCEDURE — 1123F ACP DISCUSS/DSCN MKR DOCD: CPT | Performed by: FAMILY MEDICINE

## 2022-03-02 RX ORDER — SIMVASTATIN 20 MG
TABLET ORAL
Qty: 30 TABLET | Refills: 5 | Status: SHIPPED
Start: 2022-03-02 | End: 2022-09-21 | Stop reason: SDUPTHER

## 2022-03-02 RX ORDER — CITALOPRAM 40 MG/1
TABLET ORAL
Qty: 30 TABLET | Refills: 3 | OUTPATIENT
Start: 2022-03-02

## 2022-03-02 RX ORDER — LINAGLIPTIN AND METFORMIN HYDROCHLORIDE 2.5; 1 MG/1; MG/1
TABLET, FILM COATED, EXTENDED RELEASE ORAL
Qty: 30 TABLET | Refills: 5 | Status: SHIPPED
Start: 2022-03-02 | End: 2022-04-21

## 2022-03-02 RX ORDER — PANTOPRAZOLE SODIUM 40 MG/1
TABLET, DELAYED RELEASE ORAL
Qty: 30 TABLET | Refills: 5 | Status: SHIPPED
Start: 2022-03-02 | End: 2022-09-21 | Stop reason: SDUPTHER

## 2022-03-02 RX ORDER — LEVOTHYROXINE SODIUM 0.03 MG/1
37.5 TABLET ORAL DAILY
Qty: 45 TABLET | Refills: 5 | Status: SHIPPED
Start: 2022-03-02 | End: 2022-06-21 | Stop reason: SDUPTHER

## 2022-03-02 RX ORDER — SUCRALFATE 1 G/1
1 TABLET ORAL 2 TIMES DAILY
Qty: 60 TABLET | Refills: 5 | Status: SHIPPED
Start: 2022-03-02 | End: 2022-06-21 | Stop reason: SDUPTHER

## 2022-03-02 RX ORDER — CITALOPRAM 40 MG/1
TABLET ORAL
Qty: 30 TABLET | Refills: 5 | Status: SHIPPED
Start: 2022-03-02 | End: 2022-09-21 | Stop reason: SDUPTHER

## 2022-03-02 RX ORDER — DONEPEZIL HYDROCHLORIDE 5 MG/1
5 TABLET, FILM COATED ORAL NIGHTLY
Qty: 30 TABLET | Refills: 5 | Status: SHIPPED
Start: 2022-03-02 | End: 2022-09-21 | Stop reason: SDUPTHER

## 2022-03-02 SDOH — ECONOMIC STABILITY: FOOD INSECURITY: WITHIN THE PAST 12 MONTHS, YOU WORRIED THAT YOUR FOOD WOULD RUN OUT BEFORE YOU GOT MONEY TO BUY MORE.: NEVER TRUE

## 2022-03-02 SDOH — ECONOMIC STABILITY: FOOD INSECURITY: WITHIN THE PAST 12 MONTHS, THE FOOD YOU BOUGHT JUST DIDN'T LAST AND YOU DIDN'T HAVE MONEY TO GET MORE.: NEVER TRUE

## 2022-03-02 ASSESSMENT — PATIENT HEALTH QUESTIONNAIRE - PHQ9
SUM OF ALL RESPONSES TO PHQ9 QUESTIONS 1 & 2: 2
SUM OF ALL RESPONSES TO PHQ QUESTIONS 1-9: 2
2. FEELING DOWN, DEPRESSED OR HOPELESS: 1
SUM OF ALL RESPONSES TO PHQ QUESTIONS 1-9: 2
SUM OF ALL RESPONSES TO PHQ QUESTIONS 1-9: 2
1. LITTLE INTEREST OR PLEASURE IN DOING THINGS: 1
SUM OF ALL RESPONSES TO PHQ QUESTIONS 1-9: 2

## 2022-03-02 ASSESSMENT — SOCIAL DETERMINANTS OF HEALTH (SDOH): HOW HARD IS IT FOR YOU TO PAY FOR THE VERY BASICS LIKE FOOD, HOUSING, MEDICAL CARE, AND HEATING?: NOT HARD AT ALL

## 2022-03-02 ASSESSMENT — LIFESTYLE VARIABLES: HOW OFTEN DO YOU HAVE A DRINK CONTAINING ALCOHOL: NEVER

## 2022-03-02 NOTE — PROGRESS NOTES
Medicare Annual Wellness Visit    Ricardo Rea is here for Medicare AWV and Discuss Labs (completed 2/17/2022)    Assessment & Plan   Shanelle Patten was seen today for medicare awv and discuss labs. Diagnoses and all orders for this visit:    Medicare annual wellness visit, subsequent    Type 2 diabetes mellitus without complication, without long-term current use of insulin ([facility])  -     linaGLIPtin-metFORMIN HCl ER (JENTADUETO XR) 2.5-1000 MG TB24; TAKE 1 TABLET BY MOUTH DAILY    Type 2 diabetes mellitus with diabetic neuropathy, without long-term current use of insulin ([facility])    Type 2 diabetes mellitus with stage 3a chronic kidney disease, without long-term current use of insulin ([facility])    Dyslipidemia  -     simvastatin (ZOCOR) 20 MG tablet; TAKE 1 TABLET BY MOUTH EVERY DAY AT NIGHT    Acquired hypothyroidism  -     levothyroxine (SYNTHROID) 25 MCG tablet; Take 1.5 tablets by mouth daily    Mild depression ([facility])  -     citalopram (CELEXA) 40 MG tablet; TAKE 1 TABLET BY MOUTH EVERY DAY    Mild dementia ([facility])    Mild cognitive impairment  -     donepezil (ARICEPT) 5 MG tablet; Take 1 tablet by mouth nightly    Stage 3a chronic kidney disease ([facility])    Medication refill  -     sucralfate (CARAFATE) 1 GM tablet; Take 1 tablet by mouth 2 times daily  -     pantoprazole (PROTONIX) 40 MG tablet; TAKE 1 TABLET BY MOUTH EVERY DAY IN THE MORNING BEFORE BREAKFAST    Keratosis, inflamed seborrheic  -     External Referral To Dermatology         Recommendations for Preventive Services Due: see orders and patient instructions/AVS.  Recommended screening schedule for the next 5-10 years is provided to the patient in written form: see Patient Instructions/AVS.     No follow-ups on file.      Subjective    He also follows with pain management, ENT, cardiology, GI, sleep medicine  He has previously seen Dermatology (Dr. Buzz Dewitt)    Interval Hx  - evaluated by orthopedics (Dr. Valente Palacios) and NS (Cinda)    The following acute and/or chronic problems were also addressed today:  DM, HLD, Hypothyroidism, Depression, MCI, CKD and skin lesions    Diabetes Mellitus Type 2   Current treatment: linagliptin-metformin 2.5-1000 twice daily  Recent medication changes: none  Last HbA1c: 6.9    Lab Results   Component Value Date    LABA1C 6.9 (H) 02/17/2022    LABA1C 5.6 10/28/2021    LABA1C 6.0 (H) 07/01/2021     Lab Results   Component Value Date    LABMICR 25.0 (H) 10/09/2019    CREATININE 1.7 (H) 02/17/2022     Lab Results   Component Value Date    ALT 14 02/17/2022    AST 24 02/17/2022     Lab Results   Component Value Date    CHOL 127 02/17/2022    TRIG 141 02/17/2022    HDL 31 02/17/2022    LDLCALC 68 02/17/2022        Hyperlipidemia / CAD  Current treatment: Simvastatin 20 mg daily and aspirin 81 mg daily  Recent medication changes: None   S/p PCI years prior  Following with Cardiology    Lab Results   Component Value Date    CHOL 127 02/17/2022    TRIG 141 02/17/2022    HDL 31 02/17/2022    LDLCALC 68 02/17/2022     Lab Results   Component Value Date    ALT 14 02/17/2022    AST 24 02/17/2022        Hypothyroidism  Current treatment: Levothyroxine 37.5 mg daily  Recent medication changes: none    No results found for: TSHREFLEX  Lab Results   Component Value Date    TSH 1.280 02/17/2022    TSH 2.570 07/01/2021    TSH 2.300 01/28/2021     Depression   Current treatment: Citalopram 40 mg daily  Recent medication changes: none  Symptoms are stable    MCI  Current treatment: donepezil 5 mg nightly  Recent medication changes: none  Symptoms are stable    Patient's complete Health Risk Assessment and screening values have been reviewed and are found in Flowsheets. The following problems were reviewed today and where indicated follow up appointments were made and/or referrals ordered.     Positive Risk Factor Screenings with Interventions:    Fall Risk:  Do you feel unsteady or are you worried about falling? : (!) yes  2 or more falls in past year?: (!) yes  Fall with injury in past year?: no     Fall Risk Interventions:    · Home safety tips provided        Tobacco Use:     Tobacco Use: High Risk    Smoking Tobacco Use: Former Smoker    Smokeless Tobacco Use: Current User     E-Cigarettes/Vaping Use     Questions Responses    E-Cigarette/Vaping Use Never User    Start Date     Passive Exposure     Quit Date     Counseling Given     Comments         Substance Abuse - Tobacco Interventions:  tobacco cessation tips and resources provided           Opioid Risk: (Low risk score <55) Opioid risk score: 13    Patient is low risk for opioid use disorder or overdose. Last PDMP Earline Sanchez as Reviewed:  Review User Review Instant Review Result   Naeem Ye 1/21/2022  1:28 PM Reviewed PDMP [1]     Last Controlled Substance Monitoring Documentation      Office Visit from 7/20/2021 in [facility]   Periodic Controlled Substance Monitoring No signs of potential drug abuse or diversion identified. , Assessed functional status., Obtaining appropriate analgesic effect of treatment.  filed at 07/20/2021 1010           General Health and ACP:  General  In general, how would you say your health is?: Fair  In the past 7 days, have you experienced any of the following: New or Increased Pain, New or Increased Fatigue, Loneliness, Social Isolation, Stress or Anger?: (!) Yes  Select all that apply: (!) New or Increased Pain  Do you get the social and emotional support that you need?: Yes  Do you have a Living Will?: Yes    Advance Directives     Power of  Living Will ACP-Advance Directive ACP-Power of     Not on File Filed on 07/16/13 Filed Not on File      General Health Risk Interventions:  · Pain issues: Continue follow-up with pain management as directed    Health Habits/Nutrition:     Physical Activity: Inactive    Days of Exercise per Week: 0 days    Minutes of Exercise per Session: 0 min     Have you lost any weight without trying in the past 3 months?: No  Body mass index: (!) 26.78  Have you seen the dentist within the past year?: (!) No    Health Habits/Nutrition Interventions:  · Dental exam overdue:  patient declines dental evaluation    Hearing/Vision:  Do you or your family notice any trouble with your hearing that hasn't been managed with hearing aids?: (!) Yes  Do you have difficulty driving, watching TV, or doing any of your daily activities because of your eyesight?: No  Have you had an eye exam within the past year?: (!) No  No exam data present    Hearing/Vision Interventions:  · Hearing concerns:  patient declines any further evaluation/treatment for hearing issues, following with Audiology, previously evaluated by ENT  · Vision concerns:  patient encouraged to make appointment with his/her eye specialist            Objective   Vitals:    03/02/22 0942   BP: 110/70   Pulse: 84   Resp: 16   Temp: 97.7 °F (36.5 °C)   TempSrc: Temporal   SpO2: 95%   Weight: 171 lb (77.6 kg)   Height: 5' 7\" (1.702 m)      Body mass index is 26.78 kg/m². Allergies   Allergen Reactions    Sulfa Antibiotics Hives     Prior to Visit Medications    Medication Sig Taking?  Authorizing Provider   simvastatin (ZOCOR) 20 MG tablet TAKE 1 TABLET BY MOUTH EVERY DAY AT NIGHT Yes Arnaud Arreola DO   sucralfate (CARAFATE) 1 GM tablet Take 1 tablet by mouth 2 times daily Yes Arnaud Arreola DO   pantoprazole (PROTONIX) 40 MG tablet TAKE 1 TABLET BY MOUTH EVERY DAY IN THE MORNING BEFORE BREAKFAST Yes Rick Chen, DO   donepezil (ARICEPT) 5 MG tablet Take 1 tablet by mouth nightly Yes Arnaud Arreola DO   levothyroxine (SYNTHROID) 25 MCG tablet Take 1.5 tablets by mouth daily Yes Arnaud Arreola DO   citalopram (CELEXA) 40 MG tablet TAKE 1 TABLET BY MOUTH EVERY DAY Yes Arnaud Arreola DO   linaGLIPtin-metFORMIN HCl ER (JENTADUETO XR) 2.5-1000 MG TB24 TAKE 1 TABLET BY MOUTH DAILY Yes Arnaud Arreola DO   famotidine (PEPCID) 40 MG tablet TAKE 1 TABLET BY MOUTH EVERY DAY IN THE EVENING Yes Derrek Rosas DO   Handicap Placard MISC by Does not apply route Above patient unable to ambulate more than 200 feet without stopping to rest.  Expires: 1- Yes Erasmo Awan MD   HYDROcodone-acetaminophen (NORCO) 5-325 MG per tablet Take 1 tablet by mouth 2 times daily as needed for Pain (once or twice a day as needed) for up to 30 days.  Take lowest dose possible to manage pain Yes Erasmo Awan MD   b complex vitamins capsule TAKE 1 CAPSULE BY MOUTH EVERY DAY Yes Arnaud Arreola DO   meclizine (ANTIVERT) 25 MG tablet Take 1 tablet by mouth 3 times daily as needed for Dizziness Yes Arnaud Arreola DO   vitamin D (CHOLECALCIFEROL) 1000 UNIT TABS tablet Take 1,000 Units by mouth daily Yes Historical Provider, MD   aspirin EC 81 MG EC tablet Take 81 mg by mouth daily Yes Historical Provider, MD       [facility] (Including outside providers/suppliers regularly involved in providing care):   Patient Care Team:  Derrek Rosas DO as PCP - General (Family Medicine)  Derrek Rosas DO as PCP - REHABILITATION [facility] Empaneled Provider  MD Evelyn Moreno MD as Surgeon (Otolaryngology)  Erasmo Awan MD as Consulting Physician (Pain Management)    Reviewed and updated this visit:  Tobacco  Allergies  Meds  Problems  Med Hx  Surg Hx  Soc Hx  Fam Hx

## 2022-03-02 NOTE — PATIENT INSTRUCTIONS
Personalized Preventive Plan for Ethan Flor - 3/2/2022  Medicare offers a range of preventive health benefits. Some of the tests and screenings are paid in full while other may be subject to a deductible, co-insurance, and/or copay. Some of these benefits include a comprehensive review of your medical history including lifestyle, illnesses that may run in your family, and various assessments and screenings as appropriate. After reviewing your medical record and screening and assessments performed today your provider may have ordered immunizations, labs, imaging, and/or referrals for you. A list of these orders (if applicable) as well as your Preventive Care list are included within your After Visit Summary for your review. Other Preventive Recommendations:    · A preventive eye exam performed by an eye specialist is recommended every 1-2 years to screen for glaucoma; cataracts, macular degeneration, and other eye disorders. · A preventive dental visit is recommended every 6 months. · Try to get at least 150 minutes of exercise per week or 10,000 steps per day on a pedometer . · Order or download the FREE \"Exercise & Physical Activity: Your Everyday Guide\" from [employer]. Call 7-105.499.9803 or search [employer] online. · You need 8894-1071 mg of calcium and 2600-6111 IU of vitamin D per day. It is possible to meet your calcium requirement with diet alone, but a vitamin D supplement is usually necessary to meet this goal.  · When exposed to the sun, use a sunscreen that protects against both UVA and UVB radiation with an SPF of 30 or greater. Reapply every 2 to 3 hours or after sweating, drying off with a towel, or swimming. · Always wear a seat belt when traveling in a car. Always wear a helmet when riding a bicycle or motorcycle.

## 2022-03-04 ENCOUNTER — TELEPHONE (OUTPATIENT)
Dept: FAMILY MEDICINE CLINIC | Age: 82
End: 2022-03-04

## 2022-03-04 NOTE — TELEPHONE ENCOUNTER
Patient's wife called stating patient had appt 3/2/22 and is needing clearance for upcoming back SX he's having with Dr. Aj Ohara on 4/1/22 at [address]. Fax [phone].     Last seen 3/2/2022  Next appt Visit date not found

## 2022-03-21 ENCOUNTER — TELEPHONE (OUTPATIENT)
Dept: PAIN MANAGEMENT | Age: 82
End: 2022-03-21

## 2022-03-22 DIAGNOSIS — M51.36 DDD (DEGENERATIVE DISC DISEASE), LUMBAR: ICD-10-CM

## 2022-03-22 RX ORDER — HYDROCODONE BITARTRATE AND ACETAMINOPHEN 5; 325 MG/1; MG/1
1 TABLET ORAL 2 TIMES DAILY PRN
Qty: 60 TABLET | Refills: 0 | Status: SHIPPED | OUTPATIENT
Start: 2022-03-22 | End: 2022-04-21

## 2022-03-29 DIAGNOSIS — E11.9 TYPE 2 DIABETES MELLITUS WITHOUT COMPLICATION, WITHOUT LONG-TERM CURRENT USE OF INSULIN (HCC): ICD-10-CM

## 2022-03-29 PROCEDURE — 3044F HG A1C LEVEL LT 7.0%: CPT | Performed by: FAMILY MEDICINE

## 2022-03-29 RX ORDER — LINAGLIPTIN AND METFORMIN HYDROCHLORIDE 2.5; 1 MG/1; MG/1
TABLET, FILM COATED, EXTENDED RELEASE ORAL
Qty: 60 TABLET | Refills: 3 | OUTPATIENT
Start: 2022-03-29

## 2022-03-30 ENCOUNTER — HOSPITAL ENCOUNTER (OUTPATIENT)
Age: 82
Discharge: HOME | End: 2022-04-01

## 2022-03-30 LAB
ABO/RH: NORMAL
ALBUMIN SERPL-MCNC: 4 G/DL (ref 3.5–5.2)
ALP BLD-CCNC: 49 U/L (ref 40–129)
ALT SERPL-CCNC: 13 U/L (ref 0–40)
ANION GAP SERPL CALCULATED.3IONS-SCNC: 8 MMOL/L (ref 7–16)
ANTIBODY SCREEN: NORMAL
AST SERPL-CCNC: 15 U/L (ref 0–39)
BILIRUB SERPL-MCNC: 0.3 MG/DL (ref 0–1.2)
BUN BLDV-MCNC: 21 MG/DL (ref 6–23)
CALCIUM SERPL-MCNC: 9.6 MG/DL (ref 8.6–10.2)
CHLORIDE BLD-SCNC: 101 MMOL/L (ref 98–107)
CO2: 27 MMOL/L (ref 22–29)
CREAT SERPL-MCNC: 1.7 MG/DL (ref 0.7–1.2)
GFR AFRICAN AMERICAN: 47
GFR NON-AFRICAN AMERICAN: 39 ML/MIN/1.73
GLUCOSE BLD-MCNC: 280 MG/DL (ref 74–99)
POTASSIUM SERPL-SCNC: 4.9 MMOL/L (ref 3.5–5)
SODIUM BLD-SCNC: 136 MMOL/L (ref 132–146)
TOTAL PROTEIN: 6.9 G/DL (ref 6.4–8.3)

## 2022-03-30 PROCEDURE — 80053 COMPREHEN METABOLIC PANEL: CPT

## 2022-03-30 PROCEDURE — 87081 CULTURE SCREEN ONLY: CPT

## 2022-03-30 PROCEDURE — 86901 BLOOD TYPING SEROLOGIC RH(D): CPT

## 2022-03-30 PROCEDURE — 86850 RBC ANTIBODY SCREEN: CPT

## 2022-03-30 PROCEDURE — 86900 BLOOD TYPING SEROLOGIC ABO: CPT

## 2022-03-31 ENCOUNTER — HOSPITAL ENCOUNTER (OUTPATIENT)
Age: 82
Discharge: HOME | End: 2022-04-02

## 2022-04-01 LAB — MRSA CULTURE ONLY: NORMAL

## 2022-04-07 ENCOUNTER — HOSPITAL ENCOUNTER (OUTPATIENT)
Age: 82
Discharge: HOME | End: 2022-04-09

## 2022-04-07 LAB
ANION GAP SERPL CALCULATED.3IONS-SCNC: 8 MMOL/L (ref 7–16)
BUN BLDV-MCNC: 20 MG/DL (ref 6–23)
CALCIUM SERPL-MCNC: 8 MG/DL (ref 8.6–10.2)
CHLORIDE BLD-SCNC: 105 MMOL/L (ref 98–107)
CO2: 25 MMOL/L (ref 22–29)
CREAT SERPL-MCNC: 1.5 MG/DL (ref 0.7–1.2)
GFR AFRICAN AMERICAN: 54
GFR NON-AFRICAN AMERICAN: 45 ML/MIN/1.73
GLUCOSE BLD-MCNC: 191 MG/DL (ref 74–99)
HCT VFR BLD CALC: 32 % (ref 37–54)
HEMOGLOBIN: 10.2 G/DL (ref 12.5–16.5)
MCH RBC QN AUTO: 33.3 PG (ref 26–35)
MCHC RBC AUTO-ENTMCNC: 31.9 % (ref 32–34.5)
MCV RBC AUTO: 104.6 FL (ref 80–99.9)
PDW BLD-RTO: 12.9 FL (ref 11.5–15)
PLATELET # BLD: 128 E9/L (ref 130–450)
PMV BLD AUTO: 11.1 FL (ref 7–12)
POTASSIUM SERPL-SCNC: 4.7 MMOL/L (ref 3.5–5)
RBC # BLD: 3.06 E12/L (ref 3.8–5.8)
SODIUM BLD-SCNC: 138 MMOL/L (ref 132–146)
WBC # BLD: 8.9 E9/L (ref 4.5–11.5)

## 2022-04-07 PROCEDURE — 85027 COMPLETE CBC AUTOMATED: CPT

## 2022-04-07 PROCEDURE — 80048 BASIC METABOLIC PNL TOTAL CA: CPT

## 2022-04-08 ENCOUNTER — HOSPITAL ENCOUNTER (OUTPATIENT)
Age: 82
Discharge: HOME | End: 2022-04-10

## 2022-04-08 LAB
ANION GAP SERPL CALCULATED.3IONS-SCNC: 10 MMOL/L (ref 7–16)
BUN BLDV-MCNC: 20 MG/DL (ref 6–23)
CALCIUM SERPL-MCNC: 8.6 MG/DL (ref 8.6–10.2)
CHLORIDE BLD-SCNC: 103 MMOL/L (ref 98–107)
CO2: 24 MMOL/L (ref 22–29)
CREAT SERPL-MCNC: 1.7 MG/DL (ref 0.7–1.2)
GFR AFRICAN AMERICAN: 47
GFR NON-AFRICAN AMERICAN: 39 ML/MIN/1.73
GLUCOSE BLD-MCNC: 138 MG/DL (ref 74–99)
HCT VFR BLD CALC: 28.2 % (ref 37–54)
HEMOGLOBIN: 9.2 G/DL (ref 12.5–16.5)
MCH RBC QN AUTO: 34.1 PG (ref 26–35)
MCHC RBC AUTO-ENTMCNC: 32.6 % (ref 32–34.5)
MCV RBC AUTO: 104.4 FL (ref 80–99.9)
PDW BLD-RTO: 13.2 FL (ref 11.5–15)
PLATELET # BLD: 117 E9/L (ref 130–450)
PMV BLD AUTO: 11.1 FL (ref 7–12)
POTASSIUM SERPL-SCNC: 4 MMOL/L (ref 3.5–5)
RBC # BLD: 2.7 E12/L (ref 3.8–5.8)
SODIUM BLD-SCNC: 137 MMOL/L (ref 132–146)
WBC # BLD: 8.4 E9/L (ref 4.5–11.5)

## 2022-04-08 PROCEDURE — 80048 BASIC METABOLIC PNL TOTAL CA: CPT

## 2022-04-08 PROCEDURE — 85027 COMPLETE CBC AUTOMATED: CPT

## 2022-04-09 ENCOUNTER — HOSPITAL ENCOUNTER (OUTPATIENT)
Age: 82
Discharge: HOME | End: 2022-04-11

## 2022-04-09 LAB
ANION GAP SERPL CALCULATED.3IONS-SCNC: 9 MMOL/L (ref 7–16)
BUN BLDV-MCNC: 21 MG/DL (ref 6–23)
CALCIUM SERPL-MCNC: 8.9 MG/DL (ref 8.6–10.2)
CHLORIDE BLD-SCNC: 100 MMOL/L (ref 98–107)
CO2: 25 MMOL/L (ref 22–29)
CREAT SERPL-MCNC: 1.4 MG/DL (ref 0.7–1.2)
GFR AFRICAN AMERICAN: 58
GFR NON-AFRICAN AMERICAN: 48 ML/MIN/1.73
GLUCOSE BLD-MCNC: 99 MG/DL (ref 74–99)
HCT VFR BLD CALC: 28.9 % (ref 37–54)
HEMOGLOBIN: 9.6 G/DL (ref 12.5–16.5)
MCH RBC QN AUTO: 34.2 PG (ref 26–35)
MCHC RBC AUTO-ENTMCNC: 33.2 % (ref 32–34.5)
MCV RBC AUTO: 102.8 FL (ref 80–99.9)
PDW BLD-RTO: 13.1 FL (ref 11.5–15)
PLATELET # BLD: 125 E9/L (ref 130–450)
PMV BLD AUTO: 11.4 FL (ref 7–12)
POTASSIUM SERPL-SCNC: 4.2 MMOL/L (ref 3.5–5)
RBC # BLD: 2.81 E12/L (ref 3.8–5.8)
SODIUM BLD-SCNC: 134 MMOL/L (ref 132–146)
WBC # BLD: 7.4 E9/L (ref 4.5–11.5)

## 2022-04-09 PROCEDURE — 80048 BASIC METABOLIC PNL TOTAL CA: CPT

## 2022-04-09 PROCEDURE — 85027 COMPLETE CBC AUTOMATED: CPT

## 2022-04-21 DIAGNOSIS — E11.9 TYPE 2 DIABETES MELLITUS WITHOUT COMPLICATION, WITHOUT LONG-TERM CURRENT USE OF INSULIN (HCC): ICD-10-CM

## 2022-04-21 RX ORDER — LINAGLIPTIN AND METFORMIN HYDROCHLORIDE 2.5; 1 MG/1; MG/1
TABLET, FILM COATED, EXTENDED RELEASE ORAL
Qty: 60 TABLET | Refills: 2 | Status: SHIPPED
Start: 2022-04-21 | End: 2022-05-16 | Stop reason: SDUPTHER

## 2022-06-21 PROBLEM — E11.40 TYPE 2 DIABETES MELLITUS WITH DIABETIC NEUROPATHY (HCC): Status: RESOLVED | Noted: 2021-07-27 | Resolved: 2022-06-21

## 2022-06-21 PROBLEM — E11.22 TYPE 2 DIABETES MELLITUS WITH CHRONIC KIDNEY DISEASE (HCC): Status: RESOLVED | Noted: 2021-07-27 | Resolved: 2022-06-21

## 2022-08-12 DIAGNOSIS — Z76.0 MEDICATION REFILL: ICD-10-CM

## 2022-08-12 RX ORDER — PANTOPRAZOLE SODIUM 40 MG/1
TABLET, DELAYED RELEASE ORAL
Qty: 90 TABLET | Refills: 1 | OUTPATIENT
Start: 2022-08-12

## 2022-08-28 DIAGNOSIS — F32.A MILD DEPRESSION: ICD-10-CM

## 2022-08-29 RX ORDER — CITALOPRAM 40 MG/1
TABLET ORAL
Qty: 30 TABLET | Refills: 5 | OUTPATIENT
Start: 2022-08-29

## 2022-09-05 DIAGNOSIS — E03.9 ACQUIRED HYPOTHYROIDISM: ICD-10-CM

## 2022-09-06 RX ORDER — LEVOTHYROXINE SODIUM 0.03 MG/1
TABLET ORAL
Qty: 45 TABLET | Refills: 3 | OUTPATIENT
Start: 2022-09-06

## 2022-09-18 DIAGNOSIS — E78.5 DYSLIPIDEMIA: ICD-10-CM

## 2022-09-18 DIAGNOSIS — G31.84 MILD COGNITIVE IMPAIRMENT: ICD-10-CM

## 2022-09-18 DIAGNOSIS — Z76.0 MEDICATION REFILL: ICD-10-CM

## 2022-09-18 DIAGNOSIS — F32.A MILD DEPRESSION: ICD-10-CM

## 2022-09-19 RX ORDER — PANTOPRAZOLE SODIUM 40 MG/1
TABLET, DELAYED RELEASE ORAL
Qty: 90 TABLET | Refills: 1 | OUTPATIENT
Start: 2022-09-19

## 2022-09-19 RX ORDER — SIMVASTATIN 20 MG
TABLET ORAL
Qty: 30 TABLET | Refills: 5 | OUTPATIENT
Start: 2022-09-19

## 2022-09-19 RX ORDER — DONEPEZIL HYDROCHLORIDE 5 MG/1
5 TABLET, FILM COATED ORAL NIGHTLY
Qty: 90 TABLET | Refills: 1 | OUTPATIENT
Start: 2022-09-19

## 2022-09-19 RX ORDER — CITALOPRAM 40 MG/1
TABLET ORAL
Qty: 30 TABLET | Refills: 5 | OUTPATIENT
Start: 2022-09-19

## 2022-09-27 ENCOUNTER — OFFICE VISIT (OUTPATIENT)
Dept: SLEEP CENTER | Age: 82
End: 2022-09-27
Payer: MEDICARE

## 2022-09-27 VITALS
WEIGHT: 165.4 LBS | RESPIRATION RATE: 20 BRPM | BODY MASS INDEX: 25.96 KG/M2 | DIASTOLIC BLOOD PRESSURE: 83 MMHG | HEART RATE: 73 BPM | HEIGHT: 67 IN | OXYGEN SATURATION: 99 % | SYSTOLIC BLOOD PRESSURE: 137 MMHG

## 2022-09-27 DIAGNOSIS — G47.33 OBSTRUCTIVE SLEEP APNEA: Primary | ICD-10-CM

## 2022-09-27 DIAGNOSIS — G47.00 INSOMNIA, UNSPECIFIED TYPE: ICD-10-CM

## 2022-09-27 PROCEDURE — G8417 CALC BMI ABV UP PARAM F/U: HCPCS | Performed by: NURSE PRACTITIONER

## 2022-09-27 PROCEDURE — 4004F PT TOBACCO SCREEN RCVD TLK: CPT | Performed by: NURSE PRACTITIONER

## 2022-09-27 PROCEDURE — G8427 DOCREV CUR MEDS BY ELIG CLIN: HCPCS | Performed by: NURSE PRACTITIONER

## 2022-09-27 PROCEDURE — 99213 OFFICE O/P EST LOW 20 MIN: CPT | Performed by: NURSE PRACTITIONER

## 2022-09-27 PROCEDURE — 1123F ACP DISCUSS/DSCN MKR DOCD: CPT | Performed by: NURSE PRACTITIONER

## 2022-09-27 ASSESSMENT — SLEEP AND FATIGUE QUESTIONNAIRES
HOW LIKELY ARE YOU TO NOD OFF OR FALL ASLEEP WHILE SITTING QUIETLY AFTER LUNCH WITHOUT ALCOHOL: 0
HOW LIKELY ARE YOU TO NOD OFF OR FALL ASLEEP IN A CAR, WHILE STOPPED FOR A FEW MINUTES IN TRAFFIC: 0
HOW LIKELY ARE YOU TO NOD OFF OR FALL ASLEEP WHILE LYING DOWN TO REST IN THE AFTERNOON WHEN CIRCUMSTANCES PERMIT: 0
HOW LIKELY ARE YOU TO NOD OFF OR FALL ASLEEP WHILE SITTING INACTIVE IN A PUBLIC PLACE: 0
HOW LIKELY ARE YOU TO NOD OFF OR FALL ASLEEP WHEN YOU ARE A PASSENGER IN A CAR FOR AN HOUR WITHOUT A BREAK: 0
HOW LIKELY ARE YOU TO NOD OFF OR FALL ASLEEP WHILE WATCHING TV: 0
HOW LIKELY ARE YOU TO NOD OFF OR FALL ASLEEP WHILE SITTING AND READING: 0
ESS TOTAL SCORE: 0
HOW LIKELY ARE YOU TO NOD OFF OR FALL ASLEEP WHILE SITTING AND TALKING TO SOMEONE: 0

## 2022-09-27 NOTE — PROGRESS NOTES
Medical History:   Diagnosis Date    Accident caused by farm tractor     Arthritis     CAD (coronary artery disease)     Chronic pain     Clavicle fracture     Concussion with no loss of consciousness     Depression     Diabetes mellitus (HCC)     GERD (gastroesophageal reflux disease)     Headache(784.0)     History of blood transfusion     Hyperlipidemia     Kidney stone     Laceration of flexor tendon of hand, not [place] \"[place] 10/10/2014    Multiple closed fractures of ribs of left side     Neck pain     Osteoarthritis     Rib fracture     SBO (small bowel obstruction) ([address] 4/23/2019    Skin cancer     Thyroid disease     Trauma 8/4/2018    Traumatic hemopneumothorax, initial encounter        Past Surgical History:        Procedure Laterality Date    ANESTHESIA NERVE BLOCK Bilateral 9/10/2019    BILATERAL LUMBAR FACET MEDIAL BRANCH BLOCK L3 L4 AND L5 DORSAL RAMI (CPT N8031991) performed by Bradly Ga MD at [facility] N/A 7/21/2020    LUMBAR EPIDURAL STEROID INJECTION L4-L5 X-RAY performed by Bradly Ga MD at [address] Right 11/23/2021    RIGHT SACROILIAC JOINT INJECTION UNDER FLUOROSCOPY (CPT 20169) performed by Bradly Ga MD at [facility]      x2 lumbar    CATARACT REMOVAL WITH IMPLANT Left 10/08/13    CATARACT REMOVAL WITH IMPLANT Right 12/10/13    CERVICAL SPINE SURGERY  2012    cervical fusion    COLON SURGERY      due to diverticulitis colon resection    CORONARY ANGIOPLASTY WITH STENT PLACEMENT  2005    x2 stents    DILATATION, ESOPHAGUS      FRACTURE SURGERY      Lt. Jaw Plate    NERVE BLOCK Left 3/7/16    cervical transforaminal #1    NERVE BLOCK Left 3/28/16    cervical transforaminal #2    NERVE BLOCK Left 04 04 2016    transforaminal nerve block left cervical #3    NERVE BLOCK Left 07/25/2016    shoulder    NERVE BLOCK Left 08/15/2016    left shoulder injection #2    NERVE BLOCK Right 08/22/2016 shoulder    NERVE BLOCK Right 08/29/2016    shoulder injection #2    NERVE BLOCK Bilateral 09/10/2019    BILATERAL LUMBAR FACET MEDIAL BRANCH NERVE BLOCK AT L3,L4 MEDIAL BRANCH AND L5 DORSAL RAMI    NERVE BLOCK Bilateral 10/29/2019    lumbar facet, meidal branch    NERVE BLOCK N/A 07/21/2020    Lumbar epidural steriod injection L4-L5    NERVE BLOCK Right 09/15/2020    transforaminal L4,5    NERVE BLOCK Right 9/15/2020    TRANSFORAMINAL EPIDURAL STEROID INJECTION RIGHT L4 AND L5 UNDER FLUOROSCOPIC GUIDANCE (CPT 55025,54272) performed by Karissa Martin MD at [address] [facility] Left 10/23/2018    LEFT CLAVICLE OPEN REDUCTION INTERNAL FIXATION performed by Dieter Izaguirre DO at [facility] Left 101/10/2014    repair left hand, finger laceration    OTHER SURGICAL HISTORY Bilateral 01/05/2021    lumbar facet medial branch dorsal rami radiofrequency    PAIN MANAGEMENT PROCEDURE Bilateral 1/5/2021    BILATERAL LUMBAR FACET L3,L4 MEDIAL BRANCH L5 DORSAL RAMI RADIOFREQUENCY ABLATION WITH IV SEDATION (CPT 11127,16241) performed by Karissa Martin MD at [address] [facility] Right 8/17/2021    L4 L5 RIGHT TRANSFORAMINAL EPIDURAL STEROID INJECTION X-RAY (CPT 15505) performed by Karissa Martin MD at [address] Bilateral 9/28/2021    BILATERAL LUMBAR RADIOFREQUENCY ABLATION UNDER FLUOROSCOPIC GUIDANCE AT L3, L4, L5 AND DORSAL RAMI UNDER FLUOROSCOPY (CPR 41348) performed by Karissa Martin MD at [address] Left 8/6/2018    LEFT SIDED VIDEO ASSISTED THORACOSCOPY WITH RIB PLATING performed by Vernon Loomis MD at [address] [place] [address] d/t a fall    RADIOFREQUENCY ABLATION NERVES Bilateral 10/29/2019    BILATERAL LUMBAR FACET L3 L4 MEDIAL BRANCH  L5 DORSAL RAMI  RADIOFREQUENCY ABLATION SEDATION (CPT 40757) performed by Karissa Martin MD at [facility]    ROTATOR CUFF REPAIR Bilateral     SHOULDER ARTHROSCOPY Right 2016    repair rotator cuff, bursectomy, debridement glenohumerol     SKIN BIOPSY         Allergies:  is allergic to sulfa antibiotics.   Social History:    Social History     Tobacco Use    Smoking status: Former     Years: 40.00     Types: Cigarettes     Quit date: 3/19/1991     Years since quittin.5    Smokeless tobacco: Current     Types: Chew   Vaping Use    Vaping Use: Never used   Substance Use Topics    Alcohol use: No    Drug use: No        Family History:       Problem Relation Age of Onset    Diabetes Mother        Current Medications:    Current Outpatient Medications:     citalopram (CELEXA) 40 MG tablet, TAKE 1 TABLET BY MOUTH EVERY DAY, Disp: 30 tablet, Rfl: 5    simvastatin (ZOCOR) 20 MG tablet, TAKE 1 TABLET BY MOUTH EVERY DAY AT NIGHT, Disp: 30 tablet, Rfl: 5    pantoprazole (PROTONIX) 40 MG tablet, TAKE 1 TABLET BY MOUTH EVERY DAY IN THE MORNING BEFORE BREAKFAST, Disp: 30 tablet, Rfl: 5    meclizine (ANTIVERT) 25 MG tablet, Take 1 tablet by mouth 3 times daily as needed for Dizziness, Disp: 30 tablet, Rfl: 3    donepezil (ARICEPT) 5 MG tablet, Take 1 tablet by mouth nightly, Disp: 30 tablet, Rfl: 5    linagliptin (TRADJENTA) 5 MG tablet, Take 1 tablet by mouth daily, Disp: 30 tablet, Rfl: 2    metFORMIN (GLUCOPHAGE) 500 MG tablet, Take 1 tablet by mouth daily (with breakfast), Disp: 30 tablet, Rfl: 2    HYDROcodone-acetaminophen (NORCO) 5-325 MG per tablet, Take 1 tablet by mouth every 6 hours as needed for Pain., Disp: , Rfl:     levothyroxine (SYNTHROID) 25 MCG tablet, Take 1.5 tablets by mouth daily, Disp: 45 tablet, Rfl: 5    famotidine (PEPCID) 40 MG tablet, TAKE 1 TABLET BY MOUTH EVERY DAY IN THE EVENING, Disp: 30 tablet, Rfl: 5    sucralfate (CARAFATE) 1 GM tablet, Take 1 tablet by mouth 2 times daily, Disp: 60 tablet, Rfl: 5    Handicap Placard MISC, by Does not apply route Above patient unable to ambulate more than 200 feet without stopping to rest. Expires: 1-, Disp: 1 each, Rfl: 0    b complex vitamins capsule, TAKE 1 CAPSULE BY MOUTH EVERY DAY, Disp: 30 capsule, Rfl: 3    vitamin D (CHOLECALCIFEROL) 1000 UNIT TABS tablet, Take 1,000 Units by mouth daily, Disp: , Rfl:     aspirin EC 81 MG EC tablet, Take 81 mg by mouth daily, Disp: , Rfl:     linaGLIPtin-metFORMIN HCl ER (JENTADUETO XR) 2.5-1000 MG TB24, TAKE 1 TABLET BY MOUTH TWICE A DAY (Patient not taking: Reported on 9/27/2022), Disp: 60 tablet, Rfl: 2    Sleep Medicine 9/27/2022 1/27/2022 8/19/2021 6/10/2021 4/22/2021   Sitting and reading 0 0 0 0 0   Watching TV 0 0 0 0 0   Sitting, inactive in a public place (e.g. a theatre or a meeting) 0 0 0 0 0   As a passenger in a car for an hour without a break 0 0 0 0 0   Lying down to rest in the afternoon when circumstances permit 0 0 0 3 0   Sitting and talking to someone 0 0 0 0 0   Sitting quietly after a lunch without alcohol 0 0 0 1 0   In a car, while stopped for a few minutes in traffic 0 0 0 0 0   [place] Sleepiness Score 0 0 0 4 0   Neck circumference (Inches) - - - - 16       Review of Systems:    Constitutional: no chills, no fever   Eyes: no blurred vision   Cardiovascular: no chest pain,   Respiratory: no cough, no shortness of breath   Gastrointestinal:  no nausea,  no vomiting, no diarrhea. Musculoskeletal: no arthralgias, no back pain   Neurological:  no dizziness,  no headache, no memory changes. Endocrine: No chills    Objective:   PHYSICAL EXAM:    /83 (Site: Left Upper Arm, Position: Sitting, Cuff Size: Large Adult)   Pulse 73   Resp 20   Ht 5' 7\" (1.702 m)   Wt 165 lb 6.4 oz (75 kg)   SpO2 99%   BMI 25.91 kg/m²     Physical exam:  Gen: No acute distress. BMI of Body mass index is 25.91 kg/m². Neck: Trachea midline. No obvious mass. Neck circumference     Resp: No accessory muscle use. No crackles. No wheezes. No rhonchi. CV: Regular rate. Regular rhythm.  No murmur or rub.   Skin: Warm and dry. M/S: No cyanosis. No obvious joint deformity. Neuro: Awake. Alert. Moves all four extremities. Psych: Alert and oriented. No anxiety. CPAP Compliance Download -- accessed via [place]         Assessment:      Ny Spaulding was seen today for sleep apnea. Diagnoses and all orders for this visit:    Obstructive sleep apnea  -     DME Order for CPAP as OP  -     DME Order for CPAP as OP    Insomnia, unspecified type     Plan:         Severe Obstructive Sleep Apnea    -Based on sleep study results, review of device remote download, and discussion with patient, will continue auto-CPAP therapy at settings of 7-15 cm of water.  - Residual AHI has increased since last visit, but leak in mask is likely contributing.  - DME is [facility].  - Patient is using a full face  mask. Significant leak noted. Given sample of Dream Wear full face mask from office. Mask fit, supply order placed.   -Previously discussed pathophysiology of TREVIN and its impact on daily well-being, as well as cardiometabolic and neurocognitive health (particularly in moderate-severe cases). -Patient understands that CPAP should be worn every night for the duration of the night (in order to not miss therapy during early-morning REM period) for maximum benefit. 2. Chronic Sleep Initiation Insomnia     -Patient reports continued difficulty with falling asleep.   -Stopped drinking caffeine with dinner, doesn't note difference in symptoms.  -Try to increase physical activity through the day, as to increase homeostatic sleep drive.  -Not interested in CBT-I, basic sleep hygiene techniques discussed. -Advised against sleeping pills at this time. Follow up: Return in about 3 months (around 12/27/2022) for Follow up for sleep apnea.     VIKRAM Walden-CNP  [address]  P -[phone] option 2  f- [phone]

## 2022-10-12 ENCOUNTER — OFFICE VISIT (OUTPATIENT)
Dept: PAIN MANAGEMENT | Age: 82
End: 2022-10-12
Payer: MEDICARE

## 2022-10-12 VITALS
TEMPERATURE: 97.7 F | SYSTOLIC BLOOD PRESSURE: 126 MMHG | DIASTOLIC BLOOD PRESSURE: 74 MMHG | BODY MASS INDEX: 25.9 KG/M2 | HEIGHT: 67 IN | HEART RATE: 72 BPM | RESPIRATION RATE: 16 BRPM | OXYGEN SATURATION: 95 % | WEIGHT: 165 LBS

## 2022-10-12 DIAGNOSIS — M96.1 POSTLAMINECTOMY SYNDROME, LUMBAR: Primary | ICD-10-CM

## 2022-10-12 DIAGNOSIS — M51.36 DDD (DEGENERATIVE DISC DISEASE), LUMBAR: ICD-10-CM

## 2022-10-12 DIAGNOSIS — M48.061 SPINAL STENOSIS OF LUMBAR REGION, UNSPECIFIED WHETHER NEUROGENIC CLAUDICATION PRESENT: ICD-10-CM

## 2022-10-12 DIAGNOSIS — M47.817 LUMBOSACRAL SPONDYLOSIS WITHOUT MYELOPATHY: ICD-10-CM

## 2022-10-12 PROCEDURE — G8427 DOCREV CUR MEDS BY ELIG CLIN: HCPCS | Performed by: ANESTHESIOLOGY

## 2022-10-12 PROCEDURE — G8484 FLU IMMUNIZE NO ADMIN: HCPCS | Performed by: ANESTHESIOLOGY

## 2022-10-12 PROCEDURE — 99213 OFFICE O/P EST LOW 20 MIN: CPT | Performed by: ANESTHESIOLOGY

## 2022-10-12 PROCEDURE — 99214 OFFICE O/P EST MOD 30 MIN: CPT | Performed by: ANESTHESIOLOGY

## 2022-10-12 PROCEDURE — 1123F ACP DISCUSS/DSCN MKR DOCD: CPT | Performed by: ANESTHESIOLOGY

## 2022-10-12 PROCEDURE — G8417 CALC BMI ABV UP PARAM F/U: HCPCS | Performed by: ANESTHESIOLOGY

## 2022-10-12 PROCEDURE — 4004F PT TOBACCO SCREEN RCVD TLK: CPT | Performed by: ANESTHESIOLOGY

## 2022-10-12 NOTE — PROGRESS NOTES
[address] [facility], [address] [place] [id number]  [phone]    Follow up Note      Keith Mansfield     Date of Visit:  10/12/2022    CC:    Chief Complaint   Patient presents with    Lower Back Pain     Radiate down his legs       HPI:  78 y.o.  pleasant gentleman with prior h/o cervical spine surgery- ACDF C3-7. He has undergone lumbar spine surgery X 2 in the 1980's. He has undergone Lumbar spine surgery by Dr. Mateo Villalobos in April 2022 at [facility].    Has helped the pain. Got pain meds after surgery by his surgical team.    Medication help with pain and functionality, no side effects, no signs of misuse abuse or diversion, he is compliant with medication use. Nursing notes and details of the pain history reviewed. Please see intake notes for details. He is on ASA-81 mg. H/o CAD s/p stent. Imaging studies:     XR Lumbar spine: 6/2020: Impression   Diffuse osteopenia with moderate to advanced degenerative changes of the   lumbar spine, as above. No convincing evidence of an acute fracture. CT cervical spine: 6/14/2020:      FINDINGS:   BONES/ALIGNMENT: No evidence of cervical fracture or traumatic subluxation. Stable T3 wedge compression. There has been fusion of C3 through C7. There   is an anterior plate with screws at C3, C4, and C7. There is a bone block   graft at C3-C4. There is a block graft extending from C4 through C7. The   hardware and grafts are intact. DEGENERATIVE CHANGES: Degenerative change at C1-C. Degenerative disc disease   C7-T1. Diffuse facet osteoarthritis with stable several mm anterolisthesis   of C7. SOFT TISSUES: No prevertebral soft tissue swelling. Maxillary sinusitis   changes noted           Impression   No acute abnormality of the cervical spine. Xray thoracic spine: 6/14/2020: Impression   Limited examination, as described above.   Within these limitations, no convincing evidence of an acute fracture of the thoracic spine. CT Lumbar Spine: 8/2018: Findings: There are bilateral pars defects noted at L4. There is a   spondylolisthesis of L4 on L5. There is wedging of T12 which may be physiologic   Changes seen throughout the discs are abnormal. There are findings to   suggest  degenerative disc disease. Changes seen throughout the bone marrow are abnormal. Findings are   compatible with degenerative disc disease       Changes within the facets are abnormal. Findings are compatible with   degenerative facet disease. At L5-S1: There is a mild broad-based disc herniation or bony   spurring. There is a laminectomy defect. There is facet hypertrophy. There is mild narrowing of the neural foramina       At L4-5: There is a moderate broad-based disc herniation there is   moderate stenosis       At L3-4: There is a large broad-based disc herniation, there is severe   canal stenosis.        At L2-3: There is a mild broad-based disc herniation, there is mild   canal stenosis       At L1-2: There is a mild broad-based disc herniation, there is mild   canal stenosis               Impression   Findings compatible with degenerative changes   Bilateral L4 pars defect   Severe canal stenosis at L3-4 and moderate canal stenosis at L4-5                                              OARRS report[de-identified]   Reviewed today: Consistent     Past Medical History:   Diagnosis Date    Accident caused by farm tractor     Arthritis     CAD (coronary artery disease)     Chronic pain     Clavicle fracture     Concussion with no loss of consciousness     Depression     Diabetes mellitus (HCC)     GERD (gastroesophageal reflux disease)     Headache(784.0)     History of blood transfusion     Hyperlipidemia     Kidney stone     Laceration of flexor tendon of hand, not in \"[place]\" 10/10/2014    Multiple closed fractures of ribs of left side     Neck pain Osteoarthritis     Rib fracture     SBO (small bowel obstruction) ([facility] 4/23/2019    Skin cancer     Thyroid disease     Trauma 8/4/2018    Traumatic hemopneumothorax, initial encounter        Past Surgical History:   Procedure Laterality Date    ANESTHESIA NERVE BLOCK Bilateral 9/10/2019    BILATERAL LUMBAR FACET MEDIAL BRANCH BLOCK L3 L4 AND L5 DORSAL RAMI (CPT [id number]) performed by Michael Dominguez MD at [facility] N/A 7/21/2020    LUMBAR EPIDURAL STEROID INJECTION L4-L5 X-RAY performed by Michael Dominguez MD at [address] Right 11/23/2021    RIGHT SACROILIAC JOINT INJECTION UNDER FLUOROSCOPY (CPT 92633) performed by Michael Dominguez MD at [address]      x2 lumbar    CATARACT REMOVAL WITH IMPLANT Left 10/08/13    CATARACT REMOVAL WITH IMPLANT Right 12/10/13    CERVICAL SPINE SURGERY  2012    cervical fusion    COLON SURGERY      due to diverticulitis colon resection    CORONARY ANGIOPLASTY WITH STENT PLACEMENT  2005    x2 stents    DILATATION, ESOPHAGUS      FRACTURE SURGERY      Lt. Jaw Plate    NERVE BLOCK Left 3/7/16    cervical transforaminal #1    NERVE BLOCK Left 3/28/16    cervical transforaminal #2    NERVE BLOCK Left 04 04 2016    transforaminal nerve block left cervical #3    NERVE BLOCK Left 07/25/2016    shoulder    NERVE BLOCK Left 08/15/2016    left shoulder injection #2    NERVE BLOCK Right 08/22/2016    shoulder    NERVE BLOCK Right 08/29/2016    shoulder injection #2    NERVE BLOCK Bilateral 09/10/2019    BILATERAL LUMBAR FACET MEDIAL BRANCH NERVE BLOCK AT L3,L4 MEDIAL BRANCH AND L5 DORSAL RAMI    NERVE BLOCK Bilateral 10/29/2019    lumbar facet, meidal branch    NERVE BLOCK N/A 07/21/2020    Lumbar epidural steriod injection L4-L5    NERVE BLOCK Right 09/15/2020    transforaminal L4,5    NERVE BLOCK Right 9/15/2020    TRANSFORAMINAL EPIDURAL STEROID INJECTION RIGHT L4 AND L5 UNDER FLUOROSCOPIC GUIDANCE (CPT 62286,69408) [facility] 10/23/2018    LEFT CLAVICLE OPEN REDUCTION INTERNAL FIXATION performed by Mark Carrera DO at [address] Left 101/10/2014    repair left hand, finger laceration    OTHER SURGICAL HISTORY Bilateral 01/05/2021    lumbar facet medial branch dorsal rami radiofrequency    PAIN MANAGEMENT PROCEDURE Bilateral 1/5/2021    BILATERAL LUMBAR FACET L3,L4 MEDIAL BRANCH L5 DORSAL RAMI RADIOFREQUENCY ABLATION WITH IV SEDATION (CPT 29203,98415) performed by Mirna Henderson MD at [address] [place] [address] Right 8/17/2021    L4 L5 RIGHT TRANSFORAMINAL EPIDURAL STEROID INJECTION X-RAY (CPT 77508) performed by Mirna Henderson MD at [address] [place] [address] Bilateral 9/28/2021    BILATERAL LUMBAR RADIOFREQUENCY ABLATION UNDER FLUOROSCOPIC GUIDANCE AT L3, L4, L5 AND DORSAL RAMI UNDER FLUOROSCOPY (CPR 22897) performed by Mirna Henderson MD at [address] [facility] Left 8/6/2018    LEFT SIDED VIDEO ASSISTED THORACOSCOPY WITH RIB PLATING performed by Douglas Burns MD at [address] [place] [address] d/t [facility] Bilateral 10/29/2019    BILATERAL LUMBAR FACET L3 L4 MEDIAL BRANCH  L5 DORSAL RAMI  RADIOFREQUENCY ABLATION SEDATION (CPT [id number]) performed by Mirna Henderson MD at [facility] Bilateral     SHOULDER ARTHROSCOPY Right 12/08/2016    repair rotator cuff, bursectomy, debridement glenohumerol     SKIN BIOPSY         Prior to Admission medications    Medication Sig Start Date End Date Taking?  Authorizing Provider   citalopram (CELEXA) 40 MG tablet TAKE 1 TABLET BY MOUTH EVERY DAY 9/21/22  Yes Arnaud Arreola DO   simvastatin (ZOCOR) 20 MG tablet TAKE 1 TABLET BY MOUTH EVERY DAY AT NIGHT 9/21/22  Yes Arnaud Arreola DO   pantoprazole (PROTONIX) 40 MG tablet TAKE 1 TABLET BY MOUTH EVERY DAY IN THE MORNING BEFORE BREAKFAST 9/21/22  Yes Arnaud Arreola DO   meclizine (ANTIVERT) 25 MG tablet Take 1 tablet by mouth 3 times daily as needed for Dizziness 9/21/22  Yes Jayshree Garcia DO   donepezil (ARICEPT) 5 MG tablet Take 1 tablet by mouth nightly 9/21/22  Yes Arnaud Arreola DO   linagliptin (TRADJENTA) 5 MG tablet Take 1 tablet by mouth daily 9/21/22  Yes Arnaud Arreola DO   metFORMIN (GLUCOPHAGE) 500 MG tablet Take 1 tablet by mouth daily (with breakfast) 9/21/22  Yes Arnaud Arreola DO   HYDROcodone-acetaminophen (NORCO) 5-325 MG per tablet Take 1 tablet by mouth every 6 hours as needed for Pain.    Yes Historical Provider, MD   famotidine (PEPCID) 40 MG tablet TAKE 1 TABLET BY MOUTH EVERY DAY IN THE EVENING 6/21/22  Yes Arnaud Arreola DO   sucralfate (CARAFATE) 1 GM tablet Take 1 tablet by mouth 2 times daily 6/21/22  Yes Arnaud Arreola DO   linaGLIPtin-metFORMIN HCl ER (JENTADUETO XR) 2.5-1000 MG TB24 TAKE 1 TABLET BY MOUTH TWICE A DAY 5/16/22  Yes Jayshree Garcia DO   Handicap Placard MISC by Does not apply route Above patient unable to ambulate more than 200 feet without stopping to rest.  Expires: 1- 1/21/22  Yes Mirna Henderson MD   b complex vitamins capsule TAKE 1 CAPSULE BY MOUTH EVERY DAY 10/28/21  Yes Jayshree Garcia DO   vitamin D (CHOLECALCIFEROL) 1000 UNIT TABS tablet Take 1,000 Units by mouth daily   Yes Historical Provider, MD   aspirin EC 81 MG EC tablet Take 81 mg by mouth daily   Yes Historical Provider, MD   levothyroxine (SYNTHROID) 25 MCG tablet Take 1.5 tablets by mouth daily 6/21/22 9/27/22  Jayshree Garcia DO       Allergies   Allergen Reactions    Sulfa Antibiotics Hives and Rash       Social History     Socioeconomic History    Marital status:      Spouse name: Not on file    Number of children: Not on file    Years of education: Not on file    Highest education level: Not on file   Occupational History    Not on file Tobacco Use    Smoking status: Former     Years: 40.00     Types: Cigarettes     Quit date: 3/19/1991     Years since quittin.5    Smokeless tobacco: Current     Types: Chew   Vaping Use    Vaping Use: Never used   Substance and Sexual Activity    Alcohol use: No    Drug use: No    Sexual activity: Not Currently   Other Topics Concern    Not on file   Social History Narrative    ** Merged History Encounter **          Social Determinants of Health     Financial Resource Strain: Low Risk     Difficulty of Paying Living Expenses: Not hard at all   Food Insecurity: No Food Insecurity    Worried About Running Out of Food in the Last Year: Never true    Ran Out of Food in the Last Year: Never true   Transportation Needs: Not on file   Physical Activity: Inactive    Days of Exercise per Week: 0 days    Minutes of Exercise per Session: 0 min   Stress: Not on file   Social Connections: Not on file   Intimate Partner Violence: Not on file   Housing Stability: Not on file       Family History   Problem Relation Age of Onset    Diabetes Mother        REVIEW OF SYSTEMS:     Janine Stinson denies fever/chills, chest pain, shortness of breath, new bowel or bladder complaints or suicidal ideations. All other review of systems was negative. PHYSICAL EXAMINATION:    /74   Pulse 72   Temp 97.7 °F (36.5 °C) (Infrared)   Resp 16   Ht 5' 7\" (1.702 m)   Wt 165 lb (74.8 kg)   SpO2 95%   BMI 25.84 kg/m²     General:    General appearance:  Pleasant and well-hydrated, in mild to moderate distress and A & O x3     HEENT:  Head:normocephalic, atraumatic    Lungs:  Breathing:normal breathing pattern      CVS:  clear and non labored      Abdomen:  Shape:non-distended and normal     Cervical spine:  Inspection:normal  Palpation:paraspinal tenderness +  Range of motion: limitations of ROM noted. Facet tenderness noted over the mid and lower cervical facets.      Lumbar Spine:  Scar from the prior surgery noted, healed well, ROM reduced, Lumbar facet loading +  Sensory and motor in B/l LE no new focal deficits  SI joint tenderness improved    Musculoskeletal:   Trigger points + cervical paraspinal muscles     Extremities:  NO tremors     Neurological:  Sensory: normal to light touch   Motor: No new  focal deficits, generalized weakness noted. Dermatology:     Skin:no rashes or lesions noted    Assessment/Plan:.      1. DDD (degenerative disc disease), lumbar      2. Lumbosacral spondylosis without myelopathy      3. Spinal stenosis of lumbar region, unspecified whether neurogenic claudication present      4. Postlaminectomy syndrome, lumbar      5. Cervical postlaminectomy syndrome      6. DDD (degenerative disc disease), cervical      7. Cervical spondylolysis        Chronic low back pain- Prior lumbar spine surgeries. Recent repeat surgery in April 2022 by Doll Seat [facility]. Did PT after the surgery. Need to review the records. Has significant facet tenderness    Will get Xray of LS spine. Consider facet interventions after the X-ray. ZT lido patch samples. He is on ASA-81 mg. H/o CAD s/p stent. Drug screen on 7/23/2021 result reviewed: Consistent    OARRS reviewed today consistent. Opioid agreement: 9/1/2021    Continue with HEP as tolerated      We discussed with the patient that combining opioids, benzodiazepines, alcohol, illicit drugs or sleep aids increases the risk of respiratory depression including death. We discussed that these medications may cause drowsiness, sedation or dizziness and have counseled the patient not to drive or operate machinery. We have discussed that these medications will be prescribed only by one provider. We have discussed with the patient about age related risk factors and have thoroughly discussed the importance of taking these medications as prescribed. The patient verbalizes understanding.     Barbara Tai MD    CC:  Sis Sanchez DO

## 2022-10-12 NOTE — PROGRESS NOTES
Do you currently have any of the following:    Fever: No  Headache:  No  Cough: No  Shortness of breath: No  Exposed to anyone with these symptoms: No                                                                                                                [place] D[place] presents to the [facility] on 10/12/2022. Chad Dean is complaining of pain in his lower back and legs. . The pain is constant. The pain is described as aching, throbbing, shooting, stabbing, and sharp. Pain is rated on his best day at a 5, on his worst day at a 10, and on average at a 7 on the VAS scale. He took his last dose of Candi Ashish does not have issues with constipation. Any procedures since your last visit: No,     He is not on NSAIDS and  is not on anticoagulation medications to include none and is managed by >pcp  . Pacemaker or defibrillator: No Physician managing device is NA. Medication Contract and Consent for Opioid Use Documents Filed       Patient Documents       Type of Document Status Date Received Received By Description    Medication Contract Received 3/15/2019  1:18 PM AALIYAH HOPE PAIN MANAGEMENT PT AGREEMENT 3/15/2019    Medication Contract Received 2/12/2020  3:12 PM LAURA RIBERA pain management patient agreement 02/12/2020    Medication Contract Received 9/1/2021 10:54 AM ZOË ROBERSON PAIN AGREEMENT SEPTEMBER 2021                       /74   Pulse 72   Temp 97.7 °F (36.5 °C) (Infrared)   Resp 16   Ht 5' 7\" (1.702 m)   Wt 165 lb (74.8 kg)   SpO2 95%   BMI 25.84 kg/m²      No LMP for male patient.

## 2022-11-15 ENCOUNTER — TELEPHONE (OUTPATIENT)
Dept: PAIN MANAGEMENT | Age: 82
End: 2022-11-15

## 2022-11-15 ENCOUNTER — OFFICE VISIT (OUTPATIENT)
Dept: PAIN MANAGEMENT | Age: 82
End: 2022-11-15
Payer: MEDICARE

## 2022-11-15 ENCOUNTER — PREP FOR PROCEDURE (OUTPATIENT)
Dept: PAIN MANAGEMENT | Age: 82
End: 2022-11-15

## 2022-11-15 VITALS
BODY MASS INDEX: 25.9 KG/M2 | OXYGEN SATURATION: 95 % | TEMPERATURE: 97.1 F | HEART RATE: 81 BPM | WEIGHT: 165 LBS | HEIGHT: 67 IN | DIASTOLIC BLOOD PRESSURE: 74 MMHG | SYSTOLIC BLOOD PRESSURE: 132 MMHG

## 2022-11-15 DIAGNOSIS — M47.817 LUMBOSACRAL SPONDYLOSIS WITHOUT MYELOPATHY: Primary | ICD-10-CM

## 2022-11-15 DIAGNOSIS — M96.1 POSTLAMINECTOMY SYNDROME, LUMBAR: Primary | ICD-10-CM

## 2022-11-15 DIAGNOSIS — M47.817 LUMBOSACRAL SPONDYLOSIS WITHOUT MYELOPATHY: ICD-10-CM

## 2022-11-15 DIAGNOSIS — M48.061 SPINAL STENOSIS OF LUMBAR REGION, UNSPECIFIED WHETHER NEUROGENIC CLAUDICATION PRESENT: ICD-10-CM

## 2022-11-15 DIAGNOSIS — M51.36 DDD (DEGENERATIVE DISC DISEASE), LUMBAR: ICD-10-CM

## 2022-11-15 PROCEDURE — G8427 DOCREV CUR MEDS BY ELIG CLIN: HCPCS | Performed by: ANESTHESIOLOGY

## 2022-11-15 PROCEDURE — G8417 CALC BMI ABV UP PARAM F/U: HCPCS | Performed by: ANESTHESIOLOGY

## 2022-11-15 PROCEDURE — 4004F PT TOBACCO SCREEN RCVD TLK: CPT | Performed by: ANESTHESIOLOGY

## 2022-11-15 PROCEDURE — G8484 FLU IMMUNIZE NO ADMIN: HCPCS | Performed by: ANESTHESIOLOGY

## 2022-11-15 PROCEDURE — 99213 OFFICE O/P EST LOW 20 MIN: CPT | Performed by: ANESTHESIOLOGY

## 2022-11-15 PROCEDURE — 1123F ACP DISCUSS/DSCN MKR DOCD: CPT | Performed by: ANESTHESIOLOGY

## 2022-11-15 PROCEDURE — 99215 OFFICE O/P EST HI 40 MIN: CPT | Performed by: ANESTHESIOLOGY

## 2022-11-15 RX ORDER — LATANOPROST 50 UG/ML
SOLUTION/ DROPS OPHTHALMIC
COMMUNITY
Start: 2022-10-17

## 2022-11-15 RX ORDER — SODIUM CHLORIDE 0.9 % (FLUSH) 0.9 %
5-40 SYRINGE (ML) INJECTION EVERY 12 HOURS SCHEDULED
Status: CANCELLED | OUTPATIENT
Start: 2022-11-15

## 2022-11-15 RX ORDER — SODIUM CHLORIDE 0.9 % (FLUSH) 0.9 %
5-40 SYRINGE (ML) INJECTION PRN
Status: CANCELLED | OUTPATIENT
Start: 2022-11-15

## 2022-11-15 RX ORDER — SODIUM CHLORIDE 9 MG/ML
INJECTION, SOLUTION INTRAVENOUS PRN
Status: CANCELLED | OUTPATIENT
Start: 2022-11-15

## 2022-11-15 RX ORDER — HYDROCODONE BITARTRATE AND ACETAMINOPHEN 5; 325 MG/1; MG/1
0.5 TABLET ORAL 2 TIMES DAILY PRN
Qty: 30 TABLET | Refills: 0 | Status: SHIPPED | OUTPATIENT
Start: 2022-11-15 | End: 2022-12-15

## 2022-11-15 NOTE — TELEPHONE ENCOUNTER
Call to Thai Carrera that procedure was approved for 11/22/2022 and that [facility] should call him a few days before for the pre op call and after 3:00 PM the business day before with the arrival time. Instructed Vanita Santiago to hold ibuprofen for 24 hours, naprosyn for 4 days and any aspirin containing products or fish oil for 3 days, no clearance needed, last dose 11/18/2022. Instructed to call office back if any questions. Vanita Santiago verbalized understanding.     Electronically signed by Paula Rey RN on 11/15/2022 at 4:05 PM

## 2022-11-15 NOTE — PROGRESS NOTES
Do you currently have any of the following:    Fever: No  Headache:  No  Cough: No  Shortness of breath: No  Exposed to anyone with these symptoms: Brenda Medina presents to the [facility] on 11/15/2022. Afia Mclean is complaining of pain in back and legs. The pain is constant. The pain is described as aching and nagging. Pain is rated on his best day at a 5, on his worst day at a 10, and on average at a 6 on the VAS scale. He took his last dose of Norco last week. Afia Mclean does not have issues with constipation. Any procedures since your last visit: No    He is  on NSAIDS and  is  on anticoagulation medications to include ASA and is managed by Jayshree Garcia DO  . Pacemaker or defibrillator: No.    Medication Contract and Consent for Opioid Use Documents Filed       Patient Documents       Type of Document Status Date Received Received By Description    Medication Contract Received 3/15/2019  1:18 PM AALIYAH HOPE PAIN MANAGEMENT PT AGREEMENT 3/15/2019    Medication Contract Received 2/12/2020  3:12 PM Js CARRANZA pain management patient agreement 02/12/2020    Medication Contract Received 9/1/2021 10:54 AM ZOË ROBERSON PAIN AGREEMENT SEPTEMBER 2021                       There were no vitals taken for this visit. No LMP for male patient.
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
## 2022-11-15 NOTE — H&P (VIEW-ONLY)
[facility], [address] [place] [address]  [phone]    Follow up Note      She Tobias     Date of Visit:  11/15/2022    CC:    Chief Complaint   Patient presents with    Back Pain       HPI:  78 y.o.  pleasant gentleman with prior h/o cervical spine surgery- ACDF C3-7. He has undergone X 2 lumbar spine surgeryin the past. Recent S/P Lumbar spine surgery by Dr. Kvng Garcia in April 2022 at [facility]- L3-5 interbody fusion, posterior instrumented fusion L3-5, Laminectomy L3-S1. Delayed wound healing/ superficial infection- was on prolonged antibiotics. Off antibiotics since July 2022. Currently having low back pain in the lower lumbar area. Intermittent Le symptoms but back pain predominant. Has helped the pain. Got pain meds after surgery by his surgical team.    Medication help with pain and functionality, no side effects, no signs of misuse abuse or diversion, he is compliant with medication use. Nursing notes and details of the pain history reviewed. Please see intake notes for details. Prior treatment;  PT/ HEP  Medications  Interventions  Surgery     He is on ASA-81 mg. H/o CAD s/p stent. Imaging studies:    Xray LS spein: 10/12/2022:  FINDINGS:   AP, lateral, bilateral oblique, and coned-down lateral views of the lumbar   spine were obtained. There are bilateral pedicle screws at L3, L4, and L5 with intervening   posterior fusion bars. There are intervertebral disc prostheses at L3-4 and   L4-5. There is grade 1 retrolisthesis at L2-3. Grade 1 anterolisthesis at   L4-5 and L5-S1. There is sclerosis at the endplates about the W7-4 and L5-S1   intervertebral discs. There is narrowing of the intervertebral disc space   heights at L2-3 and L5-S1. There is endplate osteophyte formation at all   levels throughout the lumbar spine. The paravertebral soft tissue structures   are unremarkable.   There is arteriosclerosis with probable abdominal aortic   aneurysm. The abdominal aorta measures approximately 3.3 cm in   anteroposterior dimension on the lateral view. Impression   1. Diffuse degenerative changes throughout the lumbar spine with discectomy   and posterior fusion L3 through L5.   2. Probable abdominal aortic aneurysm.                                                   OARRS report[de-identified]   Reviewed today: Consistent     Past Medical History:   Diagnosis Date    Accident caused by farm tractor     Arthritis     CAD (coronary artery disease)     Chronic pain     Clavicle fracture     Concussion with no loss of consciousness     Depression     Diabetes mellitus ([address]     GERD (gastroesophageal reflux disease)     Headache(784.0)     History of blood transfusion     Hyperlipidemia     Kidney stone     Laceration of flexor tendon of hand, not in \"[place] 10/10/2014    Multiple closed fractures of ribs of left side     Neck pain     Osteoarthritis     Rib fracture     SBO (small bowel obstruction) ([facility] 4/23/2019    Skin cancer     Thyroid disease     Trauma 8/4/2018    Traumatic hemopneumothorax, initial encounter        Past Surgical History:   Procedure Laterality Date    ANESTHESIA NERVE BLOCK Bilateral 9/10/2019    BILATERAL LUMBAR FACET MEDIAL BRANCH BLOCK L3 L4 AND L5 DORSAL RAMI (CPT C7281486) performed by Shell Haji MD at [facility] N/A 7/21/2020    LUMBAR EPIDURAL STEROID INJECTION L4-L5 X-RAY performed by Shell Haji MD at [address] Right 11/23/2021    RIGHT SACROILIAC JOINT INJECTION UNDER FLUOROSCOPY (CPT 58205) performed by Shell Haji MD at [address]      x2 lumbar    CATARACT REMOVAL WITH IMPLANT Left 10/08/13    CATARACT REMOVAL WITH IMPLANT Right 12/10/13    CERVICAL SPINE SURGERY  2012    cervical fusion    COLON SURGERY      due to diverticulitis colon resection    CORONARY ANGIOPLASTY WITH STENT PLACEMENT  2005    x2 stents    DILATATION, ESOPHAGUS      FRACTURE SURGERY      Lt. Jaw Plate    NERVE BLOCK Left 3/7/16    cervical transforaminal #1    NERVE BLOCK Left 3/28/16    cervical transforaminal #2    NERVE BLOCK Left 04 04 2016    transforaminal nerve block left cervical #3    NERVE BLOCK Left 07/25/2016    shoulder    NERVE BLOCK Left 08/15/2016    left shoulder injection #2    NERVE BLOCK Right 08/22/2016    shoulder    NERVE BLOCK Right 08/29/2016    shoulder injection #2    NERVE BLOCK Bilateral 09/10/2019    BILATERAL LUMBAR FACET MEDIAL BRANCH NERVE BLOCK AT L3,L4 MEDIAL BRANCH AND L5 DORSAL RAMI    NERVE BLOCK Bilateral 10/29/2019    lumbar facet, meidal branch    NERVE BLOCK N/A 07/21/2020    Lumbar epidural steriod injection L4-L5    NERVE BLOCK Right 09/15/2020    transforaminal L4,5    NERVE BLOCK Right 9/15/2020    TRANSFORAMINAL EPIDURAL STEROID INJECTION RIGHT L4 AND L5 UNDER FLUOROSCOPIC GUIDANCE (CPT 32173,66101) performed by Kelly Miranda MD at [address] [place] [address] Left 10/23/2018    LEFT CLAVICLE OPEN REDUCTION INTERNAL FIXATION performed by Norma Javier DO at [facility] Left 101/10/2014    repair left hand, finger laceration    OTHER SURGICAL HISTORY Bilateral 01/05/2021    lumbar facet medial branch dorsal rami radiofrequency    PAIN MANAGEMENT PROCEDURE Bilateral 1/5/2021    BILATERAL LUMBAR FACET L3,L4 MEDIAL BRANCH L5 DORSAL RAMI RADIOFREQUENCY ABLATION WITH IV SEDATION (CPT 35656,10956) performed by Kelly Miranda MD at [address] [place] [address] Right 8/17/2021    L4 L5 RIGHT TRANSFORAMINAL EPIDURAL STEROID INJECTION X-RAY (CPT 22998) performed by Kelly Miranda MD at [address] [place] [address] Bilateral 9/28/2021    BILATERAL LUMBAR RADIOFREQUENCY ABLATION UNDER FLUOROSCOPIC GUIDANCE AT L3, L4, L5 AND DORSAL RAMI UNDER FLUOROSCOPY (CPR [id number]) performed by Arjun Pedraza MD at [address] Left 8/6/2018    LEFT SIDED VIDEO ASSISTED THORACOSCOPY WITH RIB PLATING performed by Eduarda Mcgrath MD at [address] [place] [facility] Bilateral 10/29/2019    BILATERAL LUMBAR FACET L3 L4 MEDIAL BRANCH  L5 DORSAL RAMI  RADIOFREQUENCY ABLATION SEDATION (CPT 69030) performed by Arjun Pedraza MD at [facility] Bilateral     SHOULDER ARTHROSCOPY Right 12/08/2016    repair rotator cuff, bursectomy, debridement glenohumerol     SKIN BIOPSY         Prior to Admission medications    Medication Sig Start Date End Date Taking?  Authorizing Provider   levothyroxine (SYNTHROID) 25 MCG tablet TAKE 1 AND 1/2 TABLETS BY MOUTH EVERY DAY 10/17/22  Yes Arnaud Arreola DO   citalopram (CELEXA) 40 MG tablet TAKE 1 TABLET BY MOUTH EVERY DAY 9/21/22  Yes Arnaud Arreola DO   simvastatin (ZOCOR) 20 MG tablet TAKE 1 TABLET BY MOUTH EVERY DAY AT NIGHT 9/21/22  Yes Arnaud Arreola DO   pantoprazole (PROTONIX) 40 MG tablet TAKE 1 TABLET BY MOUTH EVERY DAY IN THE MORNING BEFORE BREAKFAST 9/21/22  Yes Arnaud Arreola DO   meclizine (ANTIVERT) 25 MG tablet Take 1 tablet by mouth 3 times daily as needed for Dizziness 9/21/22  Yes Jahaira Nash, DO   donepezil (ARICEPT) 5 MG tablet Take 1 tablet by mouth nightly 9/21/22  Yes Arnaud Arreola DO   linagliptin (TRADJENTA) 5 MG tablet Take 1 tablet by mouth daily 9/21/22  Yes Jahaira Nash,    metFORMIN (GLUCOPHAGE) 500 MG tablet Take 1 tablet by mouth daily (with breakfast) 9/21/22  Yes Arnaud Arreola DO   famotidine (PEPCID) 40 MG tablet TAKE 1 TABLET BY MOUTH EVERY DAY IN THE EVENING 6/21/22  Yes Arnaud Arreola DO   sucralfate (CARAFATE) 1 GM tablet Take 1 tablet by mouth 2 times daily 6/21/22  Yes Arnaud Arreola, DO   linaGLIPtin-metFORMIN HCl ER (JENTADUETO XR) 2.5-1000 MG TB24 TAKE 1 TABLET BY MOUTH TWICE A DAY 22  Yes Nena Rodriguez, DO   Handicap Placard MISC by Does not apply route Above patient unable to ambulate more than 200 feet without stopping to rest.  Expires: 2024  Yes Hedy Anderson MD   b complex vitamins capsule TAKE 1 CAPSULE BY MOUTH EVERY DAY 10/28/21  Yes Arnaud Arreola,    vitamin D (CHOLECALCIFEROL) 1000 UNIT TABS tablet Take 1,000 Units by mouth daily   Yes Historical Provider, MD   aspirin EC 81 MG EC tablet Take 81 mg by mouth daily   Yes Historical Provider, MD   latanoprost (XALATAN) 0.005 % ophthalmic solution  10/17/22   Historical Provider, MD       Allergies   Allergen Reactions    Sulfa Antibiotics Hives and Rash       Social History     Socioeconomic History    Marital status:      Spouse name: Not on file    Number of children: Not on file    Years of education: Not on file    Highest education level: Not on file   Occupational History    Not on file   Tobacco Use    Smoking status: Former     Years: 40.00     Types: Cigarettes     Quit date: 3/19/1991     Years since quittin.6    Smokeless tobacco: Current     Types: Chew   Vaping Use    Vaping Use: Never used   Substance and Sexual Activity    Alcohol use: No    Drug use: No    Sexual activity: Not Currently   Other Topics Concern    Not on file   Social History Narrative    ** Merged History Encounter **          Social Determinants of Health     Financial Resource Strain: Low Risk     Difficulty of Paying Living Expenses: Not hard at all   Food Insecurity: No Food Insecurity    Worried About Running Out of Food in the Last Year: Never true    Ran Out of Food in the Last Year: Never true   Transportation Needs: Not on file   Physical Activity: Inactive    Days of Exercise per Week: 0 days    Minutes of Exercise per Session: 0 min   Stress: Not on file   Social Connections: Not on file   Intimate Partner Violence: Not on file   Housing Stability: Not on file       Family History   Problem Relation Age of Onset    Diabetes Mother        REVIEW OF SYSTEMS:     Keyla Thompson denies fever/chills, chest pain, shortness of breath, new bowel or bladder complaints or suicidal ideations. All other review of systems was negative. PHYSICAL EXAMINATION:    /74   Pulse 81   Temp 97.1 °F (36.2 °C) (Infrared)   Ht 5' 7\" (1.702 m)   Wt 165 lb (74.8 kg)   SpO2 95%   BMI 25.84 kg/m²     General:    General appearance:  Pleasant and well-hydrated, in mild to moderate distress and A & O x3     HEENT:  Head:normocephalic, atraumatic    Lungs:  Breathing:normal breathing pattern      CVS:  clear and non labored      Abdomen:  Shape:non-distended and normal     Cervical spine:  Inspection:normal  Palpation:paraspinal tenderness +  Range of motion: limitations of ROM noted. Facet tenderness noted over the mid and lower cervical facets. Lumbar Spine:  Scar from the prior surgery noted, healed well, ROM reduced, Lumbar facet loading + below the fusion  Sensory and motor in B/l LE no new focal deficits  SI joint tenderness improved    Musculoskeletal:   Trigger points + cervical paraspinal muscles     Extremities:  NO tremors     Neurological:  Sensory: normal to light touch   Motor: No new  focal deficits, generalized weakness noted. Dermatology:     Skin:no rashes or lesions noted    Assessment/Plan:.      1. DDD (degenerative disc disease), lumbar      2. Lumbosacral spondylosis without myelopathy      3. Spinal stenosis of lumbar region, unspecified whether neurogenic claudication present      4. Postlaminectomy syndrome, lumbar      5. Cervical postlaminectomy syndrome      6. DDD (degenerative disc disease), cervical      7. Cervical spondylolysis        Chronic low back pain- Prior lumbar spine surgeries. Recent repeat surgery in April 2022 by Kvng Garcia at [facility].   S/P Lumbar spine surgery [facility] Dr. K[facility]ng [facility]- L3-5 interbody fusion, posterior instrumented fusion L3-5, Laminectomy L3-S1. Delayed wound healing/ superficial infection- was on prolonged antibiotics- stopped since July 2022. Extensive records form [facility] reviewed. Did PT after the surgery. Has significant facet tenderness and intermittent LE symptoms. Recent Xray of LS spine- reviewed personally and discussed the finding. B/l Lumbar facet injection L5-S1 under fluoroscopy. He is on ASA-81 mg. H/o CAD s/p stent. Can hold aspirin for 3 days for facet interventions. If continued LE pain, consider MRI and possible trial of LUCILA in future. ZT lido patch samples- not much relief. Low dose Norco for prn use. To take 0.5 tab po bid prn # 30 given. Pain meds help with pain and functionality, he is compliant with the medication regime, no signs of misuse abuse or diversion, no side effects. RBA of chronic opioid therapy discussed. [place] [facility] screen today on 11/15/2022    OARRS reviewed today consistent. Opioid agreement: to be done today 11/15/2022. Discussed salient features of opioid agreement. Continue with HEP as tolerated      We discussed with the patient that combining opioids, benzodiazepines, alcohol, illicit drugs or sleep aids increases the risk of respiratory depression including death. We discussed that these medications may cause drowsiness, sedation or dizziness and have counseled the patient not to drive or operate machinery. We have discussed that these medications will be prescribed only by one provider. We have discussed with the patient about age related risk factors and have thoroughly discussed the importance of taking these medications as prescribed. The patient verbalizes understanding. > 40 mins spent for the visit including the time taken to review extensive outside records, recent image findings, discussing the treatment plan, counseling coordination of care, detailed discussion about opioid therapy and documentation.     Zelpha Books, MD    CC: Sis Sanchez DO    NOTE: The above documentation was prepared using voice recognition software. Every attempt was made to ensure accuracy but there may be spelling, grammatical, and contextual errors.

## 2022-11-17 NOTE — PROGRESS NOTES
Patient not using CPAP, cannot tolerate mask. Will arrange for in home mask fitting with DME around Dec 12 when he is due for new mask.

## 2022-11-22 ENCOUNTER — HOSPITAL ENCOUNTER (OUTPATIENT)
Age: 82
Setting detail: OUTPATIENT SURGERY
Discharge: HOME | End: 2022-11-22
Attending: ANESTHESIOLOGY | Admitting: ANESTHESIOLOGY
Payer: MEDICARE

## 2022-11-22 ENCOUNTER — HOSPITAL ENCOUNTER (OUTPATIENT)
Dept: OPERATING ROOM | Age: 82
Setting detail: OUTPATIENT SURGERY
Discharge: HOME | End: 2022-11-22
Attending: ANESTHESIOLOGY
Payer: MEDICARE

## 2022-11-22 VITALS
DIASTOLIC BLOOD PRESSURE: 82 MMHG | HEIGHT: 67 IN | WEIGHT: 165 LBS | TEMPERATURE: 97.6 F | HEART RATE: 78 BPM | OXYGEN SATURATION: 98 % | BODY MASS INDEX: 25.9 KG/M2 | SYSTOLIC BLOOD PRESSURE: 157 MMHG | RESPIRATION RATE: 16 BRPM

## 2022-11-22 DIAGNOSIS — M47.896 OTHER OSTEOARTHRITIS OF SPINE, LUMBAR REGION: ICD-10-CM

## 2022-11-22 PROCEDURE — 64493 INJ PARAVERT F JNT L/S 1 LEV: CPT | Performed by: ANESTHESIOLOGY

## 2022-11-22 PROCEDURE — 6360000004 HC RX CONTRAST MEDICATION: Performed by: ANESTHESIOLOGY

## 2022-11-22 PROCEDURE — 3600000005 HC SURGERY LEVEL 5 BASE: Performed by: ANESTHESIOLOGY

## 2022-11-22 PROCEDURE — 6360000002 HC RX W HCPCS: Performed by: ANESTHESIOLOGY

## 2022-11-22 PROCEDURE — 2500000003 HC RX 250 WO HCPCS: Performed by: ANESTHESIOLOGY

## 2022-11-22 PROCEDURE — 2709999900 HC NON-CHARGEABLE SUPPLY: Performed by: ANESTHESIOLOGY

## 2022-11-22 PROCEDURE — 3209999900 FLUORO FOR SURGICAL PROCEDURES

## 2022-11-22 PROCEDURE — 7100000011 HC PHASE II RECOVERY - ADDTL 15 MIN: Performed by: ANESTHESIOLOGY

## 2022-11-22 PROCEDURE — 7100000010 HC PHASE II RECOVERY - FIRST 15 MIN: Performed by: ANESTHESIOLOGY

## 2022-11-22 RX ORDER — LIDOCAINE HYDROCHLORIDE 5 MG/ML
INJECTION, SOLUTION INFILTRATION; INTRAVENOUS PRN
Status: DISCONTINUED | OUTPATIENT
Start: 2022-11-22 | End: 2022-11-22 | Stop reason: ALTCHOICE

## 2022-11-22 RX ORDER — BUPIVACAINE HYDROCHLORIDE 2.5 MG/ML
INJECTION, SOLUTION EPIDURAL; INFILTRATION; INTRACAUDAL PRN
Status: DISCONTINUED | OUTPATIENT
Start: 2022-11-22 | End: 2022-11-22 | Stop reason: ALTCHOICE

## 2022-11-22 RX ORDER — SODIUM CHLORIDE 0.9 % (FLUSH) 0.9 %
5-40 SYRINGE (ML) INJECTION EVERY 12 HOURS SCHEDULED
Status: DISCONTINUED | OUTPATIENT
Start: 2022-11-22 | End: 2022-11-22 | Stop reason: HOSPADM

## 2022-11-22 RX ORDER — METHYLPREDNISOLONE ACETATE 40 MG/ML
INJECTION, SUSPENSION INTRA-ARTICULAR; INTRALESIONAL; INTRAMUSCULAR; SOFT TISSUE PRN
Status: DISCONTINUED | OUTPATIENT
Start: 2022-11-22 | End: 2022-11-22 | Stop reason: ALTCHOICE

## 2022-11-22 RX ORDER — SODIUM CHLORIDE 9 MG/ML
INJECTION, SOLUTION INTRAVENOUS PRN
Status: DISCONTINUED | OUTPATIENT
Start: 2022-11-22 | End: 2022-11-22 | Stop reason: HOSPADM

## 2022-11-22 RX ORDER — SODIUM CHLORIDE 0.9 % (FLUSH) 0.9 %
5-40 SYRINGE (ML) INJECTION PRN
Status: DISCONTINUED | OUTPATIENT
Start: 2022-11-22 | End: 2022-11-22 | Stop reason: HOSPADM

## 2022-11-22 ASSESSMENT — PAIN - FUNCTIONAL ASSESSMENT: PAIN_FUNCTIONAL_ASSESSMENT: 0-10

## 2022-11-22 NOTE — OP NOTE
Operative Note      Patient: Tianna Feliciano  YOB: 1940  MRN: [id number]    Date of Procedure: 2022    Pre-Op Diagnosis: Lumbar spondylosis [M47.816]    Post-Op Diagnosis: Same       Procedure(s):  BILATERAL LUMBAR FACET BLOCK UNDER FLUOROSCOPIC GUIDANCE AT L5-S1    Surgeon(s):  Adrian Lomax MD    Assistant:   * No surgical staff found *    Anesthesia: Local    Estimated Blood Loss (mL): Minimal    Complications: None    Specimens:   * No specimens in log *    Implants:  * No implants in log *      Drains: * No LDAs found *    Findings: good needle placement    Detailed Description of Procedure:   2022    Patient: Tianna Feliciano  :  1940  Age:  78 y.o. Sex:  male     PRE-OPERATIVE DIAGNOSIS:  Lumbar spondylosis, lumbar facet syndrome. POST-OPERATIVE DIAGNOSIS: Same. PROCEDURE:  #  Fluoroscopic guided lumbar facet blocks Bilateral at Levels: L5-S1. SURGEON: Adrian Lomax MD    ANESTHESIA: Local    ESTIMATED BLOOD LOSS: None.  ______________________________________________________________________  BRIEF HISTORY:  Tianna Feliciano comes in today for the above procedure. The potential complications of this procedure were discussed with him again today. He has elected to undergo the aforementioned procedure. Tuan complete History & Physical examination were reviewed in depth, a copy of which is in the chart. DESCRIPTION OF PROCEDURE:   After confirming written and informed consent, a time-out was performed and Tuan name and date of birth, the procedure to be performed as well as the plan for the location of the needle insertion were confirmed. The patient was brought into the procedure room and placed in the prone position on the fluoroscopy table. Standard monitors were placed and vital signs were observed throughout the procedure. The area of the lumbar spine was prepped with chloraprep and draped in a sterile manner.  AP fluoroscopy was used to identify and darryl facet joints at the targeted levels at L5-S1. The skin and subcutaneous tissues in these identified areas were anesthetized with 0.5%Lidocaine. A 22 # gauge 3.5 inch spinal needles was advanced toward the facet joints. Satisfactory needle placement was confirmed by AP and oblique projections. Once bone was contacted and negative aspiration was confirmed, 0.2 cc of Isovue M 300 was injected with good local spread. A solution of 0.5% marcaine with 20 DepoMedrol 1 cc was then injected at each level. Following the procedure Eddi Hill noted improvement of previous pain symptoms. Disposition the patient tolerated the procedure well and there were no complications . Vital signs remained stable throughout the procedure. The patient was escorted to the recovery area where they remained until discharge and written discharge instructions for the procedure were given. Plan: Eddi Hill will return to our pain management center as scheduled.        Aditya To MD

## 2022-11-22 NOTE — INTERVAL H&P NOTE
Update History & Physical    The patient's History and Physical of November 15, 2022 was reviewed with the patient and I examined the patient. There was no change. The surgical site was confirmed by the patient and me. Plan: Lumbar facet block. The risks, benefits, expected outcome, and alternative to the recommended procedure have been discussed with the patient. Patient understands and wants to proceed with the procedure.      Electronically signed by Tulio Shukla MD on 11/22/2022

## 2022-11-22 NOTE — DISCHARGE INSTRUCTIONS
[facility]) Pain Management Department  [phone]   Post-Pain Block/ Radiofrequency Home Going Instructions    1-Go home, rest for the remainder of the day  2-Please do not lift over 20 pounds the day of the injection  3-If you received sedation No: alcohol, driving, operating lawn mowers, plows, tractors or other dangerous equipment until next morning. Do not make important decisions or sign legal documents for 24 hours. You may experience light headedness, dizziness, nausea or sleepiness after sedation. Do not stay alone. A responsible adult must be with you for 24 hours. You could be nauseated from the medications you have received. Your IV site may be sore and bruised. 4-No dietary restrictions     5-Resume all medications the same day, blood thinners to be resumed 24 hours after injection    6-Keep the surgical site clean and dry, you may shower the next morning and remove the      dressing. 7- No sitz baths, tub baths or hot tubs/swimming for 24 hours. 8- If you have any pain at the injection site(s), application of an ice pack to the area should be       helpful, 20 minutes on/20 minutes off for next 48 hours. 9- [facility] at if you develop. Fever greater than 100.4 F  Have bleeding or drainage from the puncture site  Have progressive Leg/arm numbness and or weakness  Loss of control of bowel and or bladder (wet/soil yourself)  Severe headache with inability to lift head  10-You may return to work the next day          Infection After Surgery: Care Instructions  Overview  After surgery, an infection is always possible. It doesn't mean that the surgery didn't go well. Because an infection can be serious, your doctor has taken steps to manage it. Your doctor checked the infection and cleaned it if necessary. Your doctor may have made an opening in the area so that the pus can drain out.  You may have gauze in the cut so that the area will stay open and keep draining. You may need antibiotics. You will need to follow up with your doctor to make sure the infection has gone away. Follow-up care is a key part of your treatment and safety. Be sure to make and go to all appointments, and call your doctor if you are having problems. It's also a good idea to know your test results and keep a list of the medicines you take. How can you care for yourself at home? Make sure your surgeon knows about the infection, especially if you saw another doctor about your symptoms. If your doctor prescribed antibiotics, take them as directed. Do not stop taking them just because you feel better. You need to take the full course of antibiotics. Ask your doctor if you can take an over-the-counter pain medicine, such as acetaminophen (Tylenol), ibuprofen (Advil, Motrin), or naproxen (Aleve). Be safe with medicines. Read and follow all instructions on the label. Do not take two or more pain medicines at the same time unless the doctor told you to. Many pain medicines have acetaminophen, which is Tylenol. Too much acetaminophen (Tylenol) can be harmful. Prop up the area on a pillow anytime you sit or lie down during the next 3 days. Try to keep it above the level of your heart. This will help reduce swelling. Keep the skin clean and dry. You may have a bandage over the cut (incision). A bandage helps the incision heal and protects it. Your doctor will tell you how to take care of this. Keep it clean and dry. You may have drainage from the wound. If your doctor told you how to care for your incision, follow your doctor's instructions. If you did not get instructions, follow this general advice:  Wash around the incision with clean water 2 times a day. Don't use hydrogen peroxide or alcohol, which can slow healing. When should you call for help? Call your doctor now or seek immediate medical care if:    You have signs that your infection is getting worse, such as:   Increased pain, swelling, warmth, or redness in the area. Red streaks leading from the area. Pus draining from the wound. A new or higher fever. Watch closely for changes in your health, and be sure to contact your doctor if you have any problems. Where can you learn more? Go to [email or web address] org and sign in to your [employer] account. Enter C340 in the ADINCON box to learn more about \"Infection After Surgery: Care Instructions. \"     If you do not have an account, please click on the \"Sign Up Now\" link. Current as of: January 20, 2022               Content Version: 13.4  © 3785-4829 [employer]. Care instructions adapted under license by [facility] ([facility]). If you have questions about a medical condition or this instruction, always ask your healthcare professional. Chang Burton any warranty or liability for your use of this information.

## 2023-01-26 ENCOUNTER — OFFICE VISIT (OUTPATIENT)
Dept: SLEEP CENTER | Age: 83
End: 2023-01-26
Payer: MEDICARE

## 2023-01-26 VITALS
RESPIRATION RATE: 16 BRPM | WEIGHT: 170.8 LBS | BODY MASS INDEX: 26.81 KG/M2 | DIASTOLIC BLOOD PRESSURE: 62 MMHG | HEART RATE: 74 BPM | HEIGHT: 67 IN | SYSTOLIC BLOOD PRESSURE: 104 MMHG | OXYGEN SATURATION: 97 %

## 2023-01-26 DIAGNOSIS — G47.00 INSOMNIA, UNSPECIFIED TYPE: ICD-10-CM

## 2023-01-26 DIAGNOSIS — G47.33 OBSTRUCTIVE SLEEP APNEA: Primary | ICD-10-CM

## 2023-01-26 PROCEDURE — G8427 DOCREV CUR MEDS BY ELIG CLIN: HCPCS | Performed by: NURSE PRACTITIONER

## 2023-01-26 PROCEDURE — 1123F ACP DISCUSS/DSCN MKR DOCD: CPT | Performed by: NURSE PRACTITIONER

## 2023-01-26 PROCEDURE — G8484 FLU IMMUNIZE NO ADMIN: HCPCS | Performed by: NURSE PRACTITIONER

## 2023-01-26 PROCEDURE — 4004F PT TOBACCO SCREEN RCVD TLK: CPT | Performed by: NURSE PRACTITIONER

## 2023-01-26 PROCEDURE — G8417 CALC BMI ABV UP PARAM F/U: HCPCS | Performed by: NURSE PRACTITIONER

## 2023-01-26 PROCEDURE — 99214 OFFICE O/P EST MOD 30 MIN: CPT | Performed by: NURSE PRACTITIONER

## 2023-01-26 ASSESSMENT — SLEEP AND FATIGUE QUESTIONNAIRES
HOW LIKELY ARE YOU TO NOD OFF OR FALL ASLEEP IN A CAR, WHILE STOPPED FOR A FEW MINUTES IN TRAFFIC: 0
HOW LIKELY ARE YOU TO NOD OFF OR FALL ASLEEP WHILE SITTING QUIETLY AFTER LUNCH WITHOUT ALCOHOL: 1
HOW LIKELY ARE YOU TO NOD OFF OR FALL ASLEEP WHILE WATCHING TV: 1
HOW LIKELY ARE YOU TO NOD OFF OR FALL ASLEEP WHILE SITTING INACTIVE IN A PUBLIC PLACE: 0
ESS TOTAL SCORE: 2
HOW LIKELY ARE YOU TO NOD OFF OR FALL ASLEEP WHILE LYING DOWN TO REST IN THE AFTERNOON WHEN CIRCUMSTANCES PERMIT: 0
HOW LIKELY ARE YOU TO NOD OFF OR FALL ASLEEP WHEN YOU ARE A PASSENGER IN A CAR FOR AN HOUR WITHOUT A BREAK: 0
HOW LIKELY ARE YOU TO NOD OFF OR FALL ASLEEP WHILE SITTING AND TALKING TO SOMEONE: 0
HOW LIKELY ARE YOU TO NOD OFF OR FALL ASLEEP WHILE SITTING AND READING: 0

## 2023-01-26 NOTE — PROGRESS NOTES
[facility] Sleep Medicine    Patient Name: Kylee Moody  Age: 78 y.o.   : 1940    Date of Visit: 23        Review of Last Visit Summary:    The patient was last seen on 2022 for  Obstructive Sleep Apnea. Interim History:     Kylee Moody is a 78 y.o. male that  has a past medical history of Accident caused by farm tractor, Arthritis, CAD (coronary artery disease), Chronic pain, Clavicle fracture, Concussion with no loss of consciousness, Depression, Diabetes mellitus ([address] GERD (gastroesophageal reflux disease), Headache(784.0), History of blood transfusion, Hyperlipidemia, Kidney stone, Laceration of flexor tendon of hand, not in \"[place]\" (10/10/2014), Multiple closed fractures of ribs of left side, Neck pain, Osteoarthritis, Rib fracture, SBO (small bowel obstruction) ([facility] [address] (2019), Skin cancer, Thyroid disease, Trauma (2018), and Traumatic hemopneumothorax, initial encounter. He presents in follow up to Sleep Clinic to review CPAP adherence and efficacy. Interval Events:  -Since last visit, patient has not worn CPAP. He is uncomfortable with mask and has gotten out of the habit of wearing CPAP since his back surgery in April of last year.  -Last visit, he was given a DreamWear full facemask. He notes comfort with the mask but believes it is too small. He did not have the mask fitting that was ordered at last visit.  -Notes that he does not \"feel much different with or without CPAP. -Typically sleeping on his back and waking up fatigued.  -He is willing to give CPAP another try with the right sized mask. DME: [facility]    Sleep Study History: Split Night Sleep Study:   Study date: 2021  AHI:  66.2 , supine AHI 98,   Oxygen Derick: 81.0%, T<88% was 28%-->titration using CPAP of 11 resulted in AHI of 0.8, and mean O2 of 92%.     Past Medical History:  Past Medical History:   Diagnosis Date    Accident caused by farm tractor     Arthritis CAD (coronary artery disease)     Chronic pain     Clavicle fracture     Concussion with no loss of consciousness     Depression     Diabetes mellitus (HCC)     GERD (gastroesophageal reflux disease)     Headache(784.0)     History of blood transfusion     Hyperlipidemia     Kidney stone     Laceration of flexor tendon of hand, not in \"[place]\" 10/10/2014    Multiple closed fractures of ribs of left side     Neck pain     Osteoarthritis     Rib fracture     SBO (small bowel obstruction) ([facility] [address] 4/23/2019    Skin cancer     Thyroid disease     Trauma 8/4/2018    Traumatic hemopneumothorax, initial encounter        Past Surgical History:        Procedure Laterality Date    ANESTHESIA NERVE BLOCK Bilateral 9/10/2019    BILATERAL LUMBAR FACET MEDIAL BRANCH BLOCK L3 L4 AND L5 DORSAL RAMI (CPT [id number]) performed by Sandy Cook MD at [facility] N/A 7/21/2020    LUMBAR EPIDURAL STEROID INJECTION L4-L5 X-RAY performed by Sandy Cook MD at [address] Right 11/23/2021    RIGHT SACROILIAC JOINT INJECTION UNDER FLUOROSCOPY (CPT 54217) performed by Sandy Cook MD at [address]      x2 lumbar    CATARACT REMOVAL WITH IMPLANT Left 10/08/13    CATARACT REMOVAL WITH IMPLANT Right 12/10/13    CERVICAL SPINE SURGERY  2012    cervical fusion    COLON SURGERY      due to diverticulitis colon resection    CORONARY ANGIOPLASTY WITH STENT PLACEMENT  2005    x2 stents    DILATATION, ESOPHAGUS      FRACTURE SURGERY      Lt. Jaw Plate    NERVE BLOCK Left 3/7/16    cervical transforaminal #1    NERVE BLOCK Left 3/28/16    cervical transforaminal #2    NERVE BLOCK Left 04 04 2016    transforaminal nerve block left cervical #3    NERVE BLOCK Left 07/25/2016    shoulder    NERVE BLOCK Left 08/15/2016    left shoulder injection #2    NERVE BLOCK Right 08/22/2016    shoulder    NERVE BLOCK Right 08/29/2016    shoulder injection #2    NERVE BLOCK Bilateral 09/10/2019    BILATERAL LUMBAR FACET MEDIAL BRANCH NERVE BLOCK AT L3,L4 MEDIAL BRANCH AND L5 DORSAL RAMI    NERVE BLOCK Bilateral 10/29/2019    lumbar facet, meidal branch    NERVE BLOCK N/A 07/21/2020    Lumbar epidural steriod injection L4-L5    NERVE BLOCK Right 09/15/2020    transforaminal L4,5    NERVE BLOCK Right 9/15/2020    TRANSFORAMINAL EPIDURAL STEROID INJECTION RIGHT L4 AND L5 UNDER FLUOROSCOPIC GUIDANCE (CPT 83387,92476) performed by Coco Charles MD at [facility] Bilateral 11/22/2022    BILATERAL LUMBAR FACET BLOCK UNDER FLUOROSCOPIC GUIDANCE AT L5-S1 performed by Coco Charles MD at [id number] [place] [address] Left 101/10/2014    repair left hand, finger laceration    OTHER SURGICAL HISTORY Bilateral 01/05/2021    lumbar facet medial branch dorsal rami radiofrequency    PAIN MANAGEMENT PROCEDURE Bilateral 1/5/2021    BILATERAL LUMBAR FACET L3,L4 MEDIAL BRANCH L5 DORSAL RAMI RADIOFREQUENCY ABLATION WITH IV SEDATION (CPT 00387,84770) performed by Coco Charles MD at [address] [place] [address] Right 8/17/2021    L4 L5 RIGHT TRANSFORAMINAL EPIDURAL STEROID INJECTION X-RAY (CPT 05690) performed by Coco Charles MD at [address] [place] [address] Bilateral 9/28/2021    BILATERAL LUMBAR RADIOFREQUENCY ABLATION UNDER FLUOROSCOPIC GUIDANCE AT L3, L4, L5 AND DORSAL RAMI UNDER FLUOROSCOPY (CPR 33388) performed by Coco Charles MD at [facility] [address] RPR TUBRST RPLCMT Left 10/23/2018    LEFT CLAVICLE OPEN REDUCTION INTERNAL FIXATION performed by Vanessa Brown DO at [address] [facility] Left 8/6/2018    LEFT SIDED VIDEO ASSISTED THORACOSCOPY WITH RIB PLATING performed by Janak Muller MD at [address] d/t a fall    RADIOFREQUENCY ABLATION NERVES Bilateral 10/29/2019    BILATERAL LUMBAR FACET L3 L4 MEDIAL BRANCH  L5 DORSAL RAMI [address] (CPT [id number]) performed by Billy Hung MD at [facility] Bilateral     SHOULDER ARTHROSCOPY Right 2016    repair rotator cuff, bursectomy, debridement glenohumerol     SKIN BIOPSY         Allergies:  is allergic to sulfa antibiotics.   Social History:    Social History     Tobacco Use    Smoking status: Former     Years: 40.00     Types: Cigarettes     Quit date: 3/19/1991     Years since quittin.8    Smokeless tobacco: Current     Types: Chew   Vaping Use    Vaping Use: Never used   Substance Use Topics    Alcohol use: No    Drug use: No        Family History:       Problem Relation Age of Onset    Diabetes Mother        Current Medications:    Current Outpatient Medications:     JENTADUETO XR 5-1000 MG TB24, TAKE 1 TABLET BY MOUTH EVERY DAY, Disp: 90 tablet, Rfl: 0    donepezil (ARICEPT) 5 MG tablet, TAKE 1 TABLET BY MOUTH NIGHTLY, Disp: 90 tablet, Rfl: 1    HYDROcodone-acetaminophen (NORCO) 5-325 MG per tablet, Take 1 tablet by mouth every 8 hours as needed for Pain., Disp: , Rfl:     famotidine (PEPCID) 40 MG tablet, TAKE 1 TABLET BY MOUTH EVERY DAY IN THE EVENING, Disp: 90 tablet, Rfl: 1    levothyroxine (SYNTHROID) 25 MCG tablet, TAKE 1 AND 1/2 TABLETS BY MOUTH EVERY DAY, Disp: 135 tablet, Rfl: 1    meclizine (ANTIVERT) 25 MG tablet, Take 1 tablet by mouth 3 times daily as needed for Dizziness, Disp: 90 tablet, Rfl: 1    pantoprazole (PROTONIX) 40 MG tablet, TAKE 1 TABLET BY MOUTH EVERY DAY IN THE MORNING BEFORE BREAKFAST, Disp: 90 tablet, Rfl: 1    sucralfate (CARAFATE) 1 GM tablet, Take 1 tablet by mouth 2 times daily, Disp: 180 tablet, Rfl: 1    latanoprost (XALATAN) 0.005 % ophthalmic solution, , Disp: , Rfl:     citalopram (CELEXA) 40 MG tablet, TAKE 1 TABLET BY MOUTH EVERY DAY, Disp: 30 tablet, Rfl: 5    simvastatin (ZOCOR) 20 MG tablet, TAKE 1 TABLET BY MOUTH EVERY DAY AT NIGHT, Disp: 30 tablet, Rfl: 5    Handicap Placard MIS, by Does not apply route Above patient unable to ambulate more than 200 feet without stopping to rest. Expires: 1-, Disp: 1 each, Rfl: 0    b complex vitamins capsule, TAKE 1 CAPSULE BY MOUTH EVERY DAY, Disp: 30 capsule, Rfl: 3    vitamin D (CHOLECALCIFEROL) 1000 UNIT TABS tablet, Take 1,000 Units by mouth daily, Disp: , Rfl:     aspirin EC 81 MG EC tablet, Take 81 mg by mouth daily, Disp: , Rfl:     Sleep Medicine 1/26/2023 9/27/2022 1/27/2022 8/19/2021 6/10/2021 4/22/2021   Sitting and reading 0 0 0 0 0 0   Watching TV 1 0 0 0 0 0   Sitting, inactive in a public place (e.g. a theatre or a meeting) 0 0 0 0 0 0   As a passenger in a car for an hour without a break 0 0 0 0 0 0   Lying down to rest in the afternoon when circumstances permit 0 0 0 0 3 0   Sitting and talking to someone 0 0 0 0 0 0   Sitting quietly after a lunch without alcohol 1 0 0 0 1 0   In a car, while stopped for a few minutes in traffic 0 0 0 0 0 0   [place] Sleepiness Score 2 0 0 0 4 0   Neck circumference (Inches) - - - - - 16       Review of Systems:    Constitutional: no chills, no fever   Eyes: no blurred vision   Cardiovascular: no chest pain,   Respiratory: no cough, no shortness of breath   Neurological:  no dizziness,  no headache, no memory changes. Endocrine: No chills    Objective:   PHYSICAL EXAM:    /62 (Site: Left Upper Arm, Position: Sitting, Cuff Size: Medium Adult)   Pulse 74   Resp 16   Ht 5' 7\" (1.702 m)   Wt 170 lb 12.8 oz (77.5 kg)   SpO2 97%   BMI 26.75 kg/m²     Physical exam:  Gen: No acute distress. BMI of Body mass index is 26.75 kg/m². Neck: Trachea midline. No obvious mass. Neck circumference     Resp: No accessory muscle use. No crackles. No wheezes. No rhonchi. CV: Regular rate. Regular rhythm. No murmur or rub. Skin: Warm and dry. No bleeding observed   M/S: No cyanosis. No obvious joint deformity. Neuro: Awake. Alert. Moves all four extremities. Psych: Alert and oriented. No anxiety. CPAP Compliance Download --     No data download available today. Reported compliance: Not using. Assessment:      Flossie Brittle was seen today for sleep apnea. Diagnoses and all orders for this visit:    Obstructive sleep apnea  -     DME Order for CPAP as OP    Insomnia, unspecified type     Plan:      Obstructive Sleep Apnea    -CPAP adherence is nonexistent. Patient feels the mask size is not correct. Did not receive home visit from [facility] that was ordered last visit, he states it is possible that he missed their call.  -Was given a DreamWear full facemask at last visit. He needs a size up he believes. -Reached out to [facility], they will be contacting patient to set up a home mask fitting visit.  -Continue settings of 7-15 cm of water.  -Previously discussed pathophysiology of TREVIN and its impact on daily well-being, as well as cardiometabolic and neurocognitive health (particularly in moderate-severe cases). -Patient is still motivated to continue CPAP with the correct mask.  -Discussed avoidance of supine sleep, especially in cases when he is not using CPAP as his sleeps apnea significantly worsened in supine sleep. 2.Chronic Sleep Initiation Insomnia     -Patient reports no changes since last visit.  -Not interested in CBT-I. Basic sleep hygiene discussed at last visit.  -Not candidate for sleeping pills.  -Try to avoid excessive daytime napping. Return in about 6 months (around 7/26/2023) for Follow up for sleep apnea.     Lexx Rodríguez, VIKRAM-CNP  [address] [facility]  P -[phone] option 2  - [phone]

## 2023-01-27 ENCOUNTER — OFFICE VISIT (OUTPATIENT)
Dept: PAIN MANAGEMENT | Age: 83
End: 2023-01-27
Payer: MEDICARE

## 2023-01-27 ENCOUNTER — PREP FOR PROCEDURE (OUTPATIENT)
Dept: PAIN MANAGEMENT | Age: 83
End: 2023-01-27

## 2023-01-27 VITALS
WEIGHT: 170 LBS | HEIGHT: 67 IN | BODY MASS INDEX: 26.68 KG/M2 | DIASTOLIC BLOOD PRESSURE: 70 MMHG | HEART RATE: 114 BPM | SYSTOLIC BLOOD PRESSURE: 126 MMHG | TEMPERATURE: 97.1 F | RESPIRATION RATE: 18 BRPM | OXYGEN SATURATION: 95 %

## 2023-01-27 DIAGNOSIS — M96.1 POSTLAMINECTOMY SYNDROME, LUMBAR: Primary | ICD-10-CM

## 2023-01-27 DIAGNOSIS — M51.36 DDD (DEGENERATIVE DISC DISEASE), LUMBAR: ICD-10-CM

## 2023-01-27 DIAGNOSIS — M47.817 LUMBOSACRAL SPONDYLOSIS WITHOUT MYELOPATHY: Primary | ICD-10-CM

## 2023-01-27 DIAGNOSIS — M48.061 SPINAL STENOSIS OF LUMBAR REGION, UNSPECIFIED WHETHER NEUROGENIC CLAUDICATION PRESENT: ICD-10-CM

## 2023-01-27 DIAGNOSIS — M47.817 LUMBOSACRAL SPONDYLOSIS WITHOUT MYELOPATHY: ICD-10-CM

## 2023-01-27 PROCEDURE — 4004F PT TOBACCO SCREEN RCVD TLK: CPT | Performed by: ANESTHESIOLOGY

## 2023-01-27 PROCEDURE — G8427 DOCREV CUR MEDS BY ELIG CLIN: HCPCS | Performed by: ANESTHESIOLOGY

## 2023-01-27 PROCEDURE — G8417 CALC BMI ABV UP PARAM F/U: HCPCS | Performed by: ANESTHESIOLOGY

## 2023-01-27 PROCEDURE — 1123F ACP DISCUSS/DSCN MKR DOCD: CPT | Performed by: ANESTHESIOLOGY

## 2023-01-27 PROCEDURE — G8484 FLU IMMUNIZE NO ADMIN: HCPCS | Performed by: ANESTHESIOLOGY

## 2023-01-27 PROCEDURE — 99213 OFFICE O/P EST LOW 20 MIN: CPT | Performed by: ANESTHESIOLOGY

## 2023-01-27 PROCEDURE — 99214 OFFICE O/P EST MOD 30 MIN: CPT | Performed by: ANESTHESIOLOGY

## 2023-01-27 RX ORDER — SODIUM CHLORIDE 9 MG/ML
INJECTION, SOLUTION INTRAVENOUS PRN
Status: CANCELLED | OUTPATIENT
Start: 2023-01-27

## 2023-01-27 RX ORDER — SODIUM CHLORIDE 0.9 % (FLUSH) 0.9 %
5-40 SYRINGE (ML) INJECTION PRN
Status: CANCELLED | OUTPATIENT
Start: 2023-01-27

## 2023-01-27 RX ORDER — BRIMONIDINE TARTRATE/TIMOLOL 0.2%-0.5%
DROPS OPHTHALMIC (EYE)
COMMUNITY
Start: 2023-01-23

## 2023-01-27 RX ORDER — SODIUM CHLORIDE 0.9 % (FLUSH) 0.9 %
5-40 SYRINGE (ML) INJECTION EVERY 12 HOURS SCHEDULED
Status: CANCELLED | OUTPATIENT
Start: 2023-01-27

## 2023-01-27 RX ORDER — HYDROCODONE BITARTRATE AND ACETAMINOPHEN 5; 325 MG/1; MG/1
0.5 TABLET ORAL 2 TIMES DAILY PRN
Qty: 30 TABLET | Refills: 0 | Status: SHIPPED | OUTPATIENT
Start: 2023-01-27 | End: 2023-02-26

## 2023-01-27 NOTE — PROGRESS NOTES
Do you currently have any of the following:    Fever: No  Headache:  No  Cough: No  Shortness of breath: No  Exposed to anyone with these symptoms: No                                                                                                                Wilberto Juares presents to the [address] on 1/27/2023. Anderson Mercedes is complaining of pain in his lower back that radiates to bilateral hip and bilateral lower extremities. The pain is constant. The pain is described as aching, stabbing, with numbness and tingling. Pain is rated on his best day at a 4, on his worst day at a 9, and on average at a 5 on the VAS scale. He took his last dose of Norco and tylenol today. Anderson Mercedes does not have issues with constipation. Any procedures since your last visit: Yes, with 75 % relief. He is  on NSAIDS and  is  on anticoagulation medications to include ASA and is managed by Steve Ward DO  . Pacemaker or defibrillator: No Physician managing device is NA. Medication Contract and Consent for Opioid Use Documents Filed       Patient Documents       Type of Document Status Date Received Received By Description    Medication Contract Received 3/15/2019  1:18 PM AALIYAH HOPE PAIN MANAGEMENT PT AGREEMENT 3/15/2019    Medication Contract Received 2/12/2020  3:12 PM Guillermo Friday DILLON pain management patient agreement 02/12/2020    Medication Contract Received 9/1/2021 10:54 AM ZOË ROBERSON PAIN AGREEMENT SEPTEMBER 2021                       There were no vitals taken for this visit. No LMP for male patient.

## 2023-01-27 NOTE — H&P (VIEW-ONLY)
[address] [place] [address], [address] [place], [place] [id number]  [phone]    Follow up Note      Wilberto Juares     Date of Visit:  1/27/2023    CC:    Chief Complaint   Patient presents with    Back Pain    Leg Pain       HPI:  78 y.o.  pleasant gentleman with prior H/o cervical spine surgery- ACDF C3-7. He has undergone X 2 lumbar spine surgery in the past.   Recent  Lumbar spine surgery by Dr. Jose Alberto Matson in April 2022 at [facility]- L3-5 interbody fusion, posterior instrumented fusion L3-5, Laminectomy L3-S1. Delayed wound healing/ superficial infection- was on prolonged antibiotics. Off antibiotics since July 2022. Currently having low back pain in the lower lumbar area. Intermittent LE symptoms but back pain predominant. Medication help with pain and functionality, no side effects, no signs of misuse abuse or diversion, he is compliant with medication use. Nursing notes and details of the pain history reviewed. Please see intake notes for details. Prior treatment;  PT/ HEP  Medications  Interventions  Surgery     He is on ASA-81 mg. H/o CAD s/p stent. Imaging studies:    Xray LS spine: 10/12/2022:  Impression   1. Diffuse degenerative changes throughout the lumbar spine with discectomy   and posterior fusion L3 through L5.   2. Probable abdominal aortic aneurysm.                                                 OARRS report[de-identified]   Reviewed today: Consistent     Past Medical History:   Diagnosis Date    Accident caused by farm tractor     Arthritis     CAD (coronary artery disease)     Chronic pain     Clavicle fracture     Concussion with no loss of consciousness     Depression     Diabetes mellitus ([address]     GERD (gastroesophageal reflux disease)     Headache(784.0)     History of blood transfusion     Hyperlipidemia     Kidney stone     Laceration of flexor tendon of hand, not in \"[place] 10/10/2014    Multiple closed fractures of ribs of left side     Neck pain     Osteoarthritis     Rib fracture     SBO (small bowel obstruction) ([facility] [address] [facility] 4/23/2019    Skin cancer     Thyroid disease     Trauma 8/4/2018    Traumatic hemopneumothorax, initial encounter        Past Surgical History:   Procedure Laterality Date    ANESTHESIA NERVE BLOCK Bilateral 9/10/2019    BILATERAL LUMBAR FACET MEDIAL BRANCH BLOCK L3 L4 AND L5 DORSAL RAMI (CPT [id number]) performed by Zainab Underwood MD at [facility] N/A 7/21/2020    LUMBAR EPIDURAL STEROID INJECTION L4-L5 X-RAY performed by Zainab Underwood MD at [address] Right 11/23/2021    RIGHT SACROILIAC JOINT INJECTION UNDER FLUOROSCOPY (CPT 87751) performed by Zainab Underwood MD at [address]      x2 lumbar    CATARACT REMOVAL WITH IMPLANT Left 10/08/13    CATARACT REMOVAL WITH IMPLANT Right 12/10/13    CERVICAL SPINE SURGERY  2012    cervical fusion    COLON SURGERY      due to diverticulitis colon resection    CORONARY ANGIOPLASTY WITH STENT PLACEMENT  2005    x2 stents    DILATATION, ESOPHAGUS      FRACTURE SURGERY      Lt. Jaw Plate    NERVE BLOCK Left 3/7/16    cervical transforaminal #1    NERVE BLOCK Left 3/28/16    cervical transforaminal #2    NERVE BLOCK Left 04 04 2016    transforaminal nerve block left cervical #3    NERVE BLOCK Left 07/25/2016    shoulder    NERVE BLOCK Left 08/15/2016    left shoulder injection #2    NERVE BLOCK Right 08/22/2016    shoulder    NERVE BLOCK Right 08/29/2016    shoulder injection #2    NERVE BLOCK Bilateral 09/10/2019    BILATERAL LUMBAR FACET MEDIAL BRANCH NERVE BLOCK AT L3,L4 MEDIAL BRANCH AND L5 DORSAL RAMI    NERVE BLOCK Bilateral 10/29/2019    lumbar facet, meidal branch    NERVE BLOCK N/A 07/21/2020    Lumbar epidural steriod injection L4-L5    NERVE BLOCK Right 09/15/2020    transforaminal L4,5    NERVE BLOCK Right 9/15/2020    TRANSFORAMINAL EPIDURAL STEROID INJECTION RIGHT L4 AND L5 UNDER FLUOROSCOPIC GUIDANCE (CPT [id number]) performed by Sandy Cook MD at [facility] Bilateral 11/22/2022    BILATERAL LUMBAR FACET BLOCK UNDER FLUOROSCOPIC GUIDANCE AT L5-S1 performed by Sandy Cook MD at [address] [place] [address] Left 101/10/2014    repair left hand, finger laceration    OTHER SURGICAL HISTORY Bilateral 01/05/2021    lumbar facet medial branch dorsal rami radiofrequency    PAIN MANAGEMENT PROCEDURE Bilateral 1/5/2021    BILATERAL LUMBAR FACET L3,L4 MEDIAL BRANCH L5 DORSAL RAMI RADIOFREQUENCY ABLATION WITH IV SEDATION (CPT 61593,76564) performed by Sandy Cook MD at [address] [place] [address] Right 8/17/2021    L4 L5 RIGHT TRANSFORAMINAL EPIDURAL STEROID INJECTION X-RAY (CPT 39207) performed by Sandy Cook MD at [address] [place] [address] Bilateral 9/28/2021    BILATERAL LUMBAR RADIOFREQUENCY ABLATION UNDER FLUOROSCOPIC GUIDANCE AT L3, L4, L5 AND DORSAL RAMI UNDER FLUOROSCOPY (CPR 96351) performed by Sandy Cook MD at [facility] [address]/INT FIXJ RPR TUBRST RPLCMT Left 10/23/2018    LEFT CLAVICLE OPEN REDUCTION INTERNAL FIXATION performed by Blanca Dick DO at [address] [place] [address] Left 8/6/2018    LEFT SIDED VIDEO ASSISTED THORACOSCOPY WITH RIB PLATING performed by Frank Rivera MD at [address] [place] [address] d/t [facility] Bilateral 10/29/2019    BILATERAL LUMBAR FACET L3 L4 MEDIAL BRANCH  L5 DORSAL RAMI  RADIOFREQUENCY ABLATION SEDATION (CPT I004177) performed by Sandy Cook MD at [facility] Bilateral     SHOULDER ARTHROSCOPY Right 12/08/2016    repair rotator cuff, bursectomy, debridement glenohumerol     SKIN BIOPSY         Prior to Admission medications    Medication Sig Start Date End Date Taking?  Authorizing Provider   COMBIGAN 0.2-0.5 % ophthalmic solution  1/23/23 Historical Provider, MD   JENTADUETO XR 5-1000 MG TB24 TAKE 1 TABLET BY MOUTH EVERY DAY 1/23/23   Arnaud Arreola,    donepezil (ARICEPT) 5 MG tablet TAKE 1 TABLET BY MOUTH NIGHTLY 1/23/23   Arnaud Arreola DO   HYDROcodone-acetaminophen (NORCO) 5-325 MG per tablet Take 1 tablet by mouth every 8 hours as needed for Pain.     Historical Provider, MD   famotidine (PEPCID) 40 MG tablet TAKE 1 TABLET BY MOUTH EVERY DAY IN THE EVENING 12/21/22   Arnaud Arreola,    levothyroxine (SYNTHROID) 25 MCG tablet TAKE 1 AND 1/2 TABLETS BY MOUTH EVERY DAY 12/21/22   Cherie Olmedo, DO   meclizine (ANTIVERT) 25 MG tablet Take 1 tablet by mouth 3 times daily as needed for Dizziness 12/21/22   Arnaud Arreola,    pantoprazole (PROTONIX) 40 MG tablet TAKE 1 TABLET BY MOUTH EVERY DAY IN THE MORNING BEFORE BREAKFAST 12/21/22   Arnaud Arreola,    sucralfate (CARAFATE) 1 GM tablet Take 1 tablet by mouth 2 times daily 12/21/22   Arnaud Arreola, DO   latanoprost (XALATAN) 0.005 % ophthalmic solution  10/17/22   Historical Provider, MD   citalopram (CELEXA) 40 MG tablet TAKE 1 TABLET BY MOUTH EVERY DAY 9/21/22   Arnaud Arreola DO   simvastatin (ZOCOR) 20 MG tablet TAKE 1 TABLET BY MOUTH EVERY DAY AT NIGHT 9/21/22   Cherie Olmedo, DO   Handicap Placard MISC by Does not apply route Above patient unable to ambulate more than 200 feet without stopping to rest.  Expires: 1- 1/21/22   Isaias John MD   b complex vitamins capsule TAKE 1 CAPSULE BY MOUTH EVERY DAY 10/28/21   Arnaud Arreola,    vitamin D (CHOLECALCIFEROL) 1000 UNIT TABS tablet Take 1,000 Units by mouth daily    Historical Provider, MD   aspirin EC 81 MG EC tablet Take 81 mg by mouth daily    Historical Provider, MD       Allergies   Allergen Reactions    Sulfa Antibiotics Hives and Rash       Social History     Socioeconomic History    Marital status:      Spouse name: Not on file    Number of children: Not on file    Years of education: Not on file Highest education level: Not on file   Occupational History    Not on file   Tobacco Use    Smoking status: Former     Years: 40.00     Types: Cigarettes     Quit date: 3/19/1991     Years since quittin.8    Smokeless tobacco: Current     Types: Chew   Vaping Use    Vaping Use: Never used   Substance and Sexual Activity    Alcohol use: No    Drug use: No    Sexual activity: Not Currently   Other Topics Concern    Not on file   Social History Narrative    ** Merged History Encounter **          Social Determinants of Health     Financial Resource Strain: Low Risk     Difficulty of Paying Living Expenses: Not hard at all   Food Insecurity: No Food Insecurity    Worried About Running Out of Food in the Last Year: Never true    Ran Out of Food in the Last Year: Never true   Transportation Needs: Not on file   Physical Activity: Inactive    Days of Exercise per Week: 0 days    Minutes of Exercise per Session: 0 min   Stress: Not on file   Social Connections: Not on file   Intimate Partner Violence: Not on file   Housing Stability: Not on file       Family History   Problem Relation Age of Onset    Diabetes Mother        REVIEW OF SYSTEMS:     Tamra Pacheco denies fever/chills, chest pain, shortness of breath, new bowel or bladder complaints or suicidal ideations. All other review of systems was negative. PHYSICAL EXAMINATION:    Temp 97.1 °F (36.2 °C) (Infrared)   Ht 5' 7\" (1.702 m)   Wt 170 lb (77.1 kg)   BMI 26.63 kg/m²     General:    General appearance:  Pleasant and well-hydrated, in mild to moderate distress and A & O x3     HEENT:  Head:normocephalic, atraumatic    Lungs:  Breathing:normal breathing pattern      CVS:  clear and non labored      Abdomen:  Shape:non-distended and normal     Cervical spine:  Inspection:normal  Palpation:paraspinal tenderness +  Range of motion: limitations of ROM noted. Facet tenderness noted over the mid and lower cervical facets.      Lumbar Spine:  Scar from the prior surgery noted, healed well, ROM reduced, Lumbar facet loading + below the fusion  Sensory and motor in B/l LE no new focal deficits  SI joint tenderness improved    Musculoskeletal:   Trigger points + cervical paraspinal muscles     Extremities:  NO tremors     Neurological:  Sensory: normal to light touch   Motor: No new  focal deficits, generalized weakness noted. Dermatology:     Skin:no rashes or lesions noted    Assessment/Plan:.      1. DDD (degenerative disc disease), lumbar      2. Lumbosacral spondylosis without myelopathy      3. Spinal stenosis of lumbar region, unspecified whether neurogenic claudication present      4. Postlaminectomy syndrome, lumbar      5. Cervical postlaminectomy syndrome      6. DDD (degenerative disc disease), cervical      7. Cervical spondylolysis        Chronic low back pain- Prior lumbar spine surgeries. Recent repeat surgery in April 2022 by Kedric Merlin at [facility]. S/P Lumbar spine surgery by Dr. Kedric Merlin in April 2022 at [address] [facility] Unit- L3-5 interbody fusion, posterior instrumented fusion L3-5, Laminectomy L3-S1. Delayed wound healing/ superficial infection- was on prolonged antibiotics- stopped since July 2022. Records form [facility] reviewed. Did PT after the surgery. Has significant facet tenderness and intermittent LE symptoms. Recent X-ray of LS spine- reviewed personally and discussed the finding. S/P B/l Lumbar facet injection L5-S1 under fluoroscopy > 70-80% Relief for few weeks. Has noted recurrence of pain. Continues to do HEP as tolerated. Will do MBNB to address pain form B/l L5-S1 facet joint under fluoroscopic guidance. If short-term relief will consider radiofrequency ablation future. He is on ASA-81 mg. H/o CAD s/p stent. Can hold aspirin for 3 days for facet interventions. If continued LE pain, consider MRI and possible trial of LUCILA in future. ZT lido patch samples- not much relief. - DC    Consider Compound cream for local    Low dose Norco for prn use. To take 0.5 tab po bid prn # 30 given. E-scribed today 1/27/2023. Pain meds help with pain and functionality, he is compliant with the medication regime, no signs of misuse abuse or diversion, no side effects. RBA of chronic opioid therapy discussed. Buccal Drug screen  on 11/15/2022- Reviewed. OARRS reviewed today consistent. Opioid agreement: to be done 11/15/2022. Discussed salient features of opioid agreement. Continue with HEP as tolerated      We discussed with the patient that combining opioids, benzodiazepines, alcohol, illicit drugs or sleep aids increases the risk of respiratory depression including death. We discussed that these medications may cause drowsiness, sedation or dizziness and have counseled the patient not to drive or operate machinery. We have discussed that these medications will be prescribed only by one provider. We have discussed with the patient about age related risk factors and have thoroughly discussed the importance of taking these medications as prescribed. The patient verbalizes understanding. Ashleigh Paige MD    CC:  Blayne Belle DO    NOTE: The above documentation was prepared using voice recognition software. Every attempt was made to ensure accuracy but there may be spelling, grammatical, and contextual errors.

## 2023-01-27 NOTE — PROGRESS NOTES
[facility], [address] [place] [id number]  [phone]    Follow up Note      Jakub Benitez     Date of Visit:  1/27/2023    CC:    Chief Complaint   Patient presents with    Back Pain    Leg Pain       HPI:  78 y.o.  pleasant gentleman with prior H/o cervical spine surgery- ACDF C3-7. He has undergone X 2 lumbar spine surgery in the past.   Recent  Lumbar spine surgery by Dr. Karlo Goldman in April 2022 at [facility]- L3-5 interbody fusion, posterior instrumented fusion L3-5, Laminectomy L3-S1. Delayed wound healing/ superficial infection- was on prolonged antibiotics. Off antibiotics since July 2022. Currently having low back pain in the lower lumbar area. Intermittent LE symptoms but back pain predominant. Medication help with pain and functionality, no side effects, no signs of misuse abuse or diversion, he is compliant with medication use. Nursing notes and details of the pain history reviewed. Please see intake notes for details. Prior treatment;  PT/ HEP  Medications  Interventions  Surgery     He is on ASA-81 mg. H/o CAD s/p stent. Imaging studies:    Xray LS spine: 10/12/2022:  Impression   1. Diffuse degenerative changes throughout the lumbar spine with discectomy   and posterior fusion L3 through L5.   2. Probable abdominal aortic aneurysm.                                                 OARRS report[de-identified]   Reviewed today: Consistent     Past Medical History:   Diagnosis Date    Accident caused by farm tractor     Arthritis     CAD (coronary artery disease)     Chronic pain     Clavicle fracture     Concussion with no loss of consciousness     Depression     Diabetes mellitus ([address]     GERD (gastroesophageal reflux disease)     Headache(784.0)     History of blood transfusion     Hyperlipidemia     Kidney stone     Laceration of flexor tendon of hand, not [place] 10/10/2014    Multiple closed fractures of ribs of left side     Neck pain     Osteoarthritis     Rib fracture     SBO (small bowel obstruction) ([facility] 4/23/2019    Skin cancer     Thyroid disease     Trauma 8/4/2018    Traumatic hemopneumothorax, initial encounter        Past Surgical History:   Procedure Laterality Date    ANESTHESIA NERVE BLOCK Bilateral 9/10/2019    BILATERAL LUMBAR FACET MEDIAL BRANCH BLOCK L3 L4 AND L5 DORSAL RAMI (CPT F0652015) performed by Rosa Patel MD at [facility] N/A 7/21/2020    LUMBAR EPIDURAL STEROID INJECTION L4-L5 X-RAY performed by Rosa Patel MD at [address] Right 11/23/2021    RIGHT SACROILIAC JOINT INJECTION UNDER FLUOROSCOPY (CPT 48349) performed by Rosa Patel MD at [address]      x2 lumbar    CATARACT REMOVAL WITH IMPLANT Left 10/08/13    CATARACT REMOVAL WITH IMPLANT Right 12/10/13    CERVICAL SPINE SURGERY  2012    cervical fusion    COLON SURGERY      due to diverticulitis colon resection    CORONARY ANGIOPLASTY WITH STENT PLACEMENT  2005    x2 stents    DILATATION, ESOPHAGUS      FRACTURE SURGERY      Lt. Jaw Plate    NERVE BLOCK Left 3/7/16    cervical transforaminal #1    NERVE BLOCK Left 3/28/16    cervical transforaminal #2    NERVE BLOCK Left 04 04 2016    transforaminal nerve block left cervical #3    NERVE BLOCK Left 07/25/2016    shoulder    NERVE BLOCK Left 08/15/2016    left shoulder injection #2    NERVE BLOCK Right 08/22/2016    shoulder    NERVE BLOCK Right 08/29/2016    shoulder injection #2    NERVE BLOCK Bilateral 09/10/2019    BILATERAL LUMBAR FACET MEDIAL BRANCH NERVE BLOCK AT L3,L4 MEDIAL BRANCH AND L5 DORSAL RAMI    NERVE BLOCK Bilateral 10/29/2019    lumbar facet, meidal branch    NERVE BLOCK N/A 07/21/2020    Lumbar epidural steriod injection L4-L5    NERVE BLOCK Right 09/15/2020    transforaminal L4,5    NERVE BLOCK Right 9/15/2020    TRANSFORAMINAL EPIDURAL STEROID INJECTION RIGHT L4 AND L5 UNDER FLUOROSCOPIC GUIDANCE (CPT [id number]) performed by Javier Weathers MD at [address] Bilateral 11/22/2022    BILATERAL LUMBAR FACET BLOCK UNDER FLUOROSCOPIC GUIDANCE AT L5-S1 performed by Javier Weathers MD at [address] [place] [address] Left 101/10/2014    repair left hand, finger laceration    OTHER SURGICAL HISTORY Bilateral 01/05/2021    lumbar facet medial branch dorsal rami radiofrequency    PAIN MANAGEMENT PROCEDURE Bilateral 1/5/2021    BILATERAL LUMBAR FACET L3,L4 MEDIAL BRANCH L5 DORSAL RAMI RADIOFREQUENCY ABLATION WITH IV SEDATION (CPT 27488,85666) performed by Javier Weathers MD at [address] [place] [address] Right 8/17/2021    L4 L5 RIGHT TRANSFORAMINAL EPIDURAL STEROID INJECTION X-RAY (CPT 83916) performed by Javier Weathers MD at [address] [place] [address] Bilateral 9/28/2021    BILATERAL LUMBAR RADIOFREQUENCY ABLATION UNDER FLUOROSCOPIC GUIDANCE AT L3, L4, L5 AND DORSAL RAMI UNDER FLUOROSCOPY (CPR 02070) performed by Javier Weathers MD at [facility] [address] FIXJ RPR TUBRST RPLCMT Left 10/23/2018    LEFT CLAVICLE OPEN REDUCTION INTERNAL FIXATION [facility] Left 8/6/2018    LEFT SIDED VIDEO ASSISTED THORACOSCOPY WITH RIB PLATING performed by Aurora Bean MD at [address] [place] [address] d/t [facility] Bilateral 10/29/2019    BILATERAL LUMBAR FACET L3 L4 MEDIAL BRANCH  L5 DORSAL RAMI  RADIOFREQUENCY ABLATION SEDATION (CPT [id number]) performed by Javier Weathers MD at [facility] Bilateral     SHOULDER ARTHROSCOPY Right 12/08/2016    repair rotator cuff, bursectomy, debridement glenohumerol     SKIN BIOPSY         Prior to Admission medications    Medication Sig Start Date End Date Taking?  Authorizing Provider   COMBIGAN 0.2-0.5 % ophthalmic solution  1/23/23 Historical Provider, MD   JENTADUETO XR 5-1000 MG TB24 TAKE 1 TABLET BY MOUTH EVERY DAY 1/23/23   Arnaud Arreola,    donepezil (ARICEPT) 5 MG tablet TAKE 1 TABLET BY MOUTH NIGHTLY 1/23/23   Arnaud Arreola DO   HYDROcodone-acetaminophen (NORCO) 5-325 MG per tablet Take 1 tablet by mouth every 8 hours as needed for Pain.     Historical Provider, MD   famotidine (PEPCID) 40 MG tablet TAKE 1 TABLET BY MOUTH EVERY DAY IN THE EVENING 12/21/22   Arnaud Arreola,    levothyroxine (SYNTHROID) 25 MCG tablet TAKE 1 AND 1/2 TABLETS BY MOUTH EVERY DAY 12/21/22   Ld Bailey,    meclizine (ANTIVERT) 25 MG tablet Take 1 tablet by mouth 3 times daily as needed for Dizziness 12/21/22   Arnaud Arreola DO   pantoprazole (PROTONIX) 40 MG tablet TAKE 1 TABLET BY MOUTH EVERY DAY IN THE MORNING BEFORE BREAKFAST 12/21/22   Arnaud Arreola,    sucralfate (CARAFATE) 1 GM tablet Take 1 tablet by mouth 2 times daily 12/21/22   Arnaud Arreola, DO   latanoprost (XALATAN) 0.005 % ophthalmic solution  10/17/22   Historical Provider, MD   citalopram (CELEXA) 40 MG tablet TAKE 1 TABLET BY MOUTH EVERY DAY 9/21/22   Arnaud Arreola DO   simvastatin (ZOCOR) 20 MG tablet TAKE 1 TABLET BY MOUTH EVERY DAY AT NIGHT 9/21/22   Ld Bailey, DO   Handicap Placard MISC by Does not apply route Above patient unable to ambulate more than 200 feet without stopping to rest.  Expires: 1- 1/21/22   Sheila Meehan MD   b complex vitamins capsule TAKE 1 CAPSULE BY MOUTH EVERY DAY 10/28/21   Arnaud Arreola DO   vitamin D (CHOLECALCIFEROL) 1000 UNIT TABS tablet Take 1,000 Units by mouth daily    Historical Provider, MD   aspirin EC 81 MG EC tablet Take 81 mg by mouth daily    Historical Provider, MD       Allergies   Allergen Reactions    Sulfa Antibiotics Hives and Rash       Social History     Socioeconomic History    Marital status:      Spouse name: Not on file    Number of children: Not on file    Years of education: Not on file Highest education level: Not on file   Occupational History    Not on file   Tobacco Use    Smoking status: Former     Years: 40.00     Types: Cigarettes     Quit date: 3/19/1991     Years since quittin.8    Smokeless tobacco: Current     Types: Chew   Vaping Use    Vaping Use: Never used   Substance and Sexual Activity    Alcohol use: No    Drug use: No    Sexual activity: Not Currently   Other Topics Concern    Not on file   Social History Narrative    ** Merged History Encounter **          Social Determinants of Health     Financial Resource Strain: Low Risk     Difficulty of Paying Living Expenses: Not hard at all   Food Insecurity: No Food Insecurity    Worried About Running Out of Food in the Last Year: Never true    Ran Out of Food in the Last Year: Never true   Transportation Needs: Not on file   Physical Activity: Inactive    Days of Exercise per Week: 0 days    Minutes of Exercise per Session: 0 min   Stress: Not on file   Social Connections: Not on file   Intimate Partner Violence: Not on file   Housing Stability: Not on file       Family History   Problem Relation Age of Onset    Diabetes Mother        REVIEW OF SYSTEMS:     Windy Milder denies fever/chills, chest pain, shortness of breath, new bowel or bladder complaints or suicidal ideations. All other review of systems was negative. PHYSICAL EXAMINATION:    Temp 97.1 °F (36.2 °C) (Infrared)   Ht 5' 7\" (1.702 m)   Wt 170 lb (77.1 kg)   BMI 26.63 kg/m²     General:    General appearance:  Pleasant and well-hydrated, in mild to moderate distress and A & O x3     HEENT:  Head:normocephalic, atraumatic    Lungs:  Breathing:normal breathing pattern      CVS:  clear and non labored      Abdomen:  Shape:non-distended and normal     Cervical spine:  Inspection:normal  Palpation:paraspinal tenderness +  Range of motion: limitations of ROM noted. Facet tenderness noted over the mid and lower cervical facets.      Lumbar Spine:  Scar from the prior surgery noted, healed well, ROM reduced, Lumbar facet loading + below the fusion  Sensory and motor in B/l LE no new focal deficits  SI joint tenderness improved    Musculoskeletal:   Trigger points + cervical paraspinal muscles     Extremities:  NO tremors     Neurological:  Sensory: normal to light touch   Motor: No new  focal deficits, generalized weakness noted. Dermatology:     Skin:no rashes or lesions noted    Assessment/Plan:.      1. DDD (degenerative disc disease), lumbar      2. Lumbosacral spondylosis without myelopathy      3. Spinal stenosis of lumbar region, unspecified whether neurogenic claudication present      4. Postlaminectomy syndrome, lumbar      5. Cervical postlaminectomy syndrome      6. DDD (degenerative disc disease), cervical      7. Cervical spondylolysis        Chronic low back pain- Prior lumbar spine surgeries. Recent repeat surgery in April 2022 by Reilly Orozco at [facility]. S/P Lumbar spine [facility] Dr. [facility]- L3-5 interbody fusion, posterior instrumented fusion L3-5, Laminectomy L3-S1. Delayed wound healing/ superficial infection- was on prolonged antibiotics- stopped since July 2022. [facility]. Did PT after the surgery. Has significant facet tenderness and intermittent LE symptoms. Recent X-ray of LS spine- reviewed personally and discussed the finding. S/P B/l Lumbar facet injection L5-S1 under fluoroscopy > 70-80% Relief for few weeks. Has noted recurrence of pain. Continues to do HEP as tolerated. Will do MBNB to address pain form B/l L5-S1 facet joint under fluoroscopic guidance. If short-term relief will consider radiofrequency ablation future. He is on ASA-81 mg. H/o CAD s/p stent. Can hold aspirin for 3 days for facet interventions. If continued LE pain, consider MRI and possible trial of LUCILA in future. ZT lido patch samples- not much relief. - DC    Consider Compound cream for local    Low dose Norco for prn use. To take 0.5 tab po bid prn # 30 given. E-scribed today 1/27/2023. Pain meds help with pain and functionality, he is compliant with the medication regime, no signs of misuse abuse or diversion, no side effects. RBA of chronic opioid therapy discussed. Buccal Drug screen  on 11/15/2022- Reviewed. OARRS reviewed today consistent. Opioid agreement: to be done 11/15/2022. Discussed salient features of opioid agreement. Continue with HEP as tolerated      We discussed with the patient that combining opioids, benzodiazepines, alcohol, illicit drugs or sleep aids increases the risk of respiratory depression including death. We discussed that these medications may cause drowsiness, sedation or dizziness and have counseled the patient not to drive or operate machinery. We have discussed that these medications will be prescribed only by one provider. We have discussed with the patient about age related risk factors and have thoroughly discussed the importance of taking these medications as prescribed. The patient verbalizes understanding. Alisa Angel MD    CC:  Clare Rose DO    NOTE: The above documentation was prepared using voice recognition software. Every attempt was made to ensure accuracy but there may be spelling, grammatical, and contextual errors.

## 2023-02-06 ENCOUNTER — TELEPHONE (OUTPATIENT)
Dept: PAIN MANAGEMENT | Age: 83
End: 2023-02-06

## 2023-02-06 NOTE — TELEPHONE ENCOUNTER
Call to Aminta gAgarwal that procedure was approved for 2/14/2023 and that [facility] should call him a few days before for the pre op call and after 3:00 PM the business day before with the arrival time. Instructed Desmond Felty to hold ibuprofen for 24 hours, naprosyn for 4 days and any aspirin containing products or fish oil for 7 days, last dose 2/6/2023. Instructed to call office back if any questions. Desmond Felty verbalized understanding.     Electronically signed by Luke Oliveros RN on 2/6/2023 at 3:37 PM

## 2023-02-14 ENCOUNTER — HOSPITAL ENCOUNTER (OUTPATIENT)
Age: 83
Setting detail: OUTPATIENT SURGERY
Discharge: HOME | End: 2023-02-14
Attending: ANESTHESIOLOGY | Admitting: ANESTHESIOLOGY
Payer: MEDICARE

## 2023-02-14 ENCOUNTER — HOSPITAL ENCOUNTER (OUTPATIENT)
Dept: OPERATING ROOM | Age: 83
Setting detail: OUTPATIENT SURGERY
Discharge: HOME | End: 2023-02-14
Attending: ANESTHESIOLOGY
Payer: MEDICARE

## 2023-02-14 VITALS
RESPIRATION RATE: 16 BRPM | SYSTOLIC BLOOD PRESSURE: 156 MMHG | WEIGHT: 160 LBS | OXYGEN SATURATION: 97 % | HEART RATE: 77 BPM | HEIGHT: 67 IN | DIASTOLIC BLOOD PRESSURE: 81 MMHG | TEMPERATURE: 97.3 F | BODY MASS INDEX: 25.11 KG/M2

## 2023-02-14 DIAGNOSIS — M47.896 OTHER OSTEOARTHRITIS OF SPINE, LUMBAR REGION: ICD-10-CM

## 2023-02-14 PROCEDURE — 3600000005 HC SURGERY LEVEL 5 BASE: Performed by: ANESTHESIOLOGY

## 2023-02-14 PROCEDURE — 6360000002 HC RX W HCPCS: Performed by: ANESTHESIOLOGY

## 2023-02-14 PROCEDURE — 64493 INJ PARAVERT F JNT L/S 1 LEV: CPT | Performed by: ANESTHESIOLOGY

## 2023-02-14 PROCEDURE — 7100000010 HC PHASE II RECOVERY - FIRST 15 MIN: Performed by: ANESTHESIOLOGY

## 2023-02-14 PROCEDURE — 7100000011 HC PHASE II RECOVERY - ADDTL 15 MIN: Performed by: ANESTHESIOLOGY

## 2023-02-14 PROCEDURE — 3209999900 FLUORO FOR SURGICAL PROCEDURES

## 2023-02-14 PROCEDURE — 2709999900 HC NON-CHARGEABLE SUPPLY: Performed by: ANESTHESIOLOGY

## 2023-02-14 PROCEDURE — 2500000003 HC RX 250 WO HCPCS: Performed by: ANESTHESIOLOGY

## 2023-02-14 RX ORDER — LIDOCAINE HYDROCHLORIDE 5 MG/ML
INJECTION, SOLUTION INFILTRATION; INTRAVENOUS PRN
Status: DISCONTINUED | OUTPATIENT
Start: 2023-02-14 | End: 2023-02-14 | Stop reason: ALTCHOICE

## 2023-02-14 RX ORDER — SODIUM CHLORIDE 9 MG/ML
INJECTION, SOLUTION INTRAVENOUS PRN
Status: DISCONTINUED | OUTPATIENT
Start: 2023-02-14 | End: 2023-02-14 | Stop reason: HOSPADM

## 2023-02-14 RX ORDER — METHYLPREDNISOLONE ACETATE 40 MG/ML
INJECTION, SUSPENSION INTRA-ARTICULAR; INTRALESIONAL; INTRAMUSCULAR; SOFT TISSUE PRN
Status: DISCONTINUED | OUTPATIENT
Start: 2023-02-14 | End: 2023-02-14 | Stop reason: ALTCHOICE

## 2023-02-14 RX ORDER — SODIUM CHLORIDE 0.9 % (FLUSH) 0.9 %
5-40 SYRINGE (ML) INJECTION PRN
Status: DISCONTINUED | OUTPATIENT
Start: 2023-02-14 | End: 2023-02-14 | Stop reason: HOSPADM

## 2023-02-14 RX ORDER — BUPIVACAINE HYDROCHLORIDE 5 MG/ML
INJECTION, SOLUTION PERINEURAL PRN
Status: DISCONTINUED | OUTPATIENT
Start: 2023-02-14 | End: 2023-02-14 | Stop reason: ALTCHOICE

## 2023-02-14 RX ORDER — SODIUM CHLORIDE 0.9 % (FLUSH) 0.9 %
5-40 SYRINGE (ML) INJECTION EVERY 12 HOURS SCHEDULED
Status: DISCONTINUED | OUTPATIENT
Start: 2023-02-14 | End: 2023-02-14 | Stop reason: HOSPADM

## 2023-02-14 ASSESSMENT — PAIN SCALES - GENERAL
PAINLEVEL_OUTOF10: 0
PAINLEVEL_OUTOF10: 0

## 2023-02-14 ASSESSMENT — PAIN DESCRIPTION - DESCRIPTORS: DESCRIPTORS: ACHING

## 2023-02-14 ASSESSMENT — PAIN - FUNCTIONAL ASSESSMENT: PAIN_FUNCTIONAL_ASSESSMENT: 0-10

## 2023-02-14 NOTE — OP NOTE
Operative Note      Patient: Kayode Rogers  YOB: 1940  MRN: [id number]    Date of Procedure: 2023    Pre-Op Diagnosis: Lumbar spondylosis [M47.816]    Post-Op Diagnosis: Same       Procedure(s):  BILATERAL LUMBARMEDIAL BRANCH NERVE BLOCK UNDER FLUOROSCOPIC GUIDANCE AT L5-S1    Surgeon(s):  Jesi Mantilla MD    Assistant:   * No surgical staff found *    Anesthesia: Local    Estimated Blood Loss (mL): Minimal    Complications: None    Specimens:   * No specimens in log *    Implants:  * No implants in log *      Drains: * No LDAs found *    Findings: good needle placement    Detailed Description of Procedure:   2023    Patient: Kayode Rogers  :  1940  Age:  78 y.o. Sex:  male     PRE-OPERATIVE DIAGNOSIS:  Lumbar spondylosis, lumbar facet syndrome. POST-OPERATIVE DIAGNOSIS: Same. PROCEDURE:   Fluoroscopic guided lumbar medial branch blocks Bilateral at Levels: L5-S1. SURGEON: Jesi Mantilla MD    ANESTHESIA: Local    ESTIMATED BLOOD LOSS: None.  ______________________________________________________________________  BRIEF HISTORY:  Kayode Rogers comes in today for  fluoroscopic guided lumbar medial branch blocks  Bilateral  at Levels:  L5-S1. The potential complications of this procedure were discussed with him again today. He has elected to undergo the aforementioned procedureBenson Dickerson complete History & Physical examination were reviewed in depth, a copy of which is in the chart. DESCRIPTION OF PROCEDURE:   After confirming written and informed consent, a time-out was performed and Tuan name and date of birth, the procedure to be performed as well as the plan for the location of the needle insertion were confirmed. The patient was brought into the procedure room and placed in the prone position on the fluoroscopy table. Standard monitors were placed and vital signs were observed throughout the procedure.  The area of the lumbar spine was prepped with chloraprep and draped in a sterile manner. AP fluoroscopy was used to identify and darryl bartons point at the targeted levels. The skin and subcutaneous tissues in these identified areas were anesthetized with 0.5%Lidocaine. A 22 # gauge 3.5 spinal needle was advanced toward the junction of the superior articular process and the transverse process, along the course of the medial branch. Satisfactory needle placement was confirmed by AP and oblique projections. At the sacral alar level, the sacral alar region was visualized and the needle tip was positioned on the sacral alar at the base of the superior articulating process where the medial branch traverses under fluoroscopic guidance. Once bone was contacted and negative aspiration was confirmed. A solution of 0.5% marcaine with 5 mg DepoMedrol 1 cc was then injected at each level. Following the procedure Gregory Cain noted improvement of previous pain symptoms. Disposition the patient tolerated the procedure well and there were no complications . Vital signs remained stable throughout the procedure. The patient was escorted to the recovery area where they remained until discharge and written discharge instructions for the procedure were given. Plan: Gregory Cain will return to our pain management center as scheduled. He noticed good pain relief after the procedure.     Jesi Mantilla MD

## 2023-02-14 NOTE — INTERVAL H&P NOTE
Update History & Physical    The patient's History and Physical of January 27, 2023 was reviewed with the patient and I examined the patient. There was no change. The surgical site was confirmed by the patient and me. Plan: The risks, benefits, expected outcome, and alternative to the recommended procedure have been discussed with the patient. Patient understands and wants to proceed with the procedure.      Electronically signed by Gaby Arguelles MD on 2/14/2023

## 2023-02-14 NOTE — DISCHARGE INSTRUCTIONS
[facility]) Pain Management Department  [phone]   Post-Pain Block/ Radiofrequency Home Going Instructions    1-Go home, rest for the remainder of the day  2-Please do not lift over 20 pounds the day of the injection  3-If you received sedation No: alcohol, driving, operating lawn mowers, plows, tractors or other dangerous equipment until next morning. Do not make important decisions or sign legal documents for 24 hours. You may experience light headedness, dizziness, nausea or sleepiness after sedation. Do not stay alone. A responsible adult must be with you for 24 hours. You could be nauseated from the medications you have received. Your IV site may be sore and bruised. 4-No dietary restrictions     5-Resume all medications the same day, blood thinners to be resumed 24 hours after injection    6-Keep the surgical site clean and dry, you may shower the next morning and remove the      dressing. 7- No sitz baths, tub baths or hot tubs/swimming for 24 hours. 8- If you have any pain at the injection site(s), application of an ice pack to the area should be       helpful, 20 minutes on/20 minutes off for next 48 hours. 9- [facility] if you develop. Fever greater than 100.4 F  Have bleeding or drainage from the puncture site  Have progressive Leg/arm numbness and or weakness  Loss of control of bowel and or bladder (wet/soil yourself)  Severe headache with inability to lift head  10-You may return to work the next day      Infection After Surgery: Care Instructions  Overview  After surgery, an infection is always possible. It doesn't mean that the surgery didn't go well. Because an infection can be serious, your doctor has taken steps to manage it. Your doctor checked the infection and cleaned it if necessary. Your doctor may have made an opening in the area so that the pus can drain out. You may have gauze in the cut so that the area will stay open and keep draining. You may need antibiotics. You will need to follow up with your doctor to make sure the infection has gone away. Follow-up care is a key part of your treatment and safety. Be sure to make and go to all appointments, and call your doctor if you are having problems. It's also a good idea to know your test results and keep a list of the medicines you take. How can you care for yourself at home? Make sure your surgeon knows about the infection, especially if you saw another doctor about your symptoms. If your doctor prescribed antibiotics, take them as directed. Do not stop taking them just because you feel better. You need to take the full course of antibiotics. Ask your doctor if you can take an over-the-counter pain medicine, such as acetaminophen (Tylenol), ibuprofen (Advil, Motrin), or naproxen (Aleve). Be safe with medicines. Read and follow all instructions on the label. Do not take two or more pain medicines at the same time unless the doctor told you to. Many pain medicines have acetaminophen, which is Tylenol. Too much acetaminophen (Tylenol) can be harmful. Prop up the area on a pillow anytime you sit or lie down during the next 3 days. Try to keep it above the level of your heart. This will help reduce swelling. Keep the skin clean and dry. You may have a bandage over the cut (incision). A bandage helps the incision heal and protects it. Your doctor will tell you how to take care of this. Keep it clean and dry. You may have drainage from the wound. If your doctor told you how to care for your incision, follow your doctor's instructions. If you did not get instructions, follow this general advice:  Wash around the incision with clean water 2 times a day. Don't use hydrogen peroxide or alcohol, which can slow healing. When should you call for help? Call your doctor now or seek immediate medical care if:    You have signs that your infection is getting worse, such as:   Increased pain, swelling, warmth, or redness in the area. Red streaks leading from the area. Pus draining from the wound. A new or higher fever. Watch closely for changes in your health, and be sure to contact your doctor if you have any problems. Where can you learn more? Go to http://www.woods.com/ and enter C340 to learn more about \"Infection After Surgery: Care Instructions. \"  Current as of: January 20, 2022               Content Version: 13.5  © 2006-2022 [employer]. Care instructions adapted under license by [facility] ([facility]). If you have questions about a medical condition or this instruction, always ask your healthcare professional. [address] any warranty or liability for your use of this information.

## 2023-03-01 ENCOUNTER — TELEPHONE (OUTPATIENT)
Dept: PAIN MANAGEMENT | Age: 83
End: 2023-03-01

## 2023-03-01 DIAGNOSIS — M96.1 POSTLAMINECTOMY SYNDROME, LUMBAR: ICD-10-CM

## 2023-03-02 RX ORDER — HYDROCODONE BITARTRATE AND ACETAMINOPHEN 5; 325 MG/1; MG/1
0.5 TABLET ORAL 2 TIMES DAILY PRN
Qty: 30 TABLET | Refills: 0 | Status: SHIPPED
Start: 2023-03-02 | End: 2023-03-06 | Stop reason: SDUPTHER

## 2023-03-06 ENCOUNTER — TELEPHONE (OUTPATIENT)
Dept: PAIN MANAGEMENT | Age: 83
End: 2023-03-06

## 2023-03-06 DIAGNOSIS — M96.1 POSTLAMINECTOMY SYNDROME, LUMBAR: ICD-10-CM

## 2023-03-06 RX ORDER — HYDROCODONE BITARTRATE AND ACETAMINOPHEN 5; 325 MG/1; MG/1
0.5 TABLET ORAL 2 TIMES DAILY PRN
Qty: 30 TABLET | Refills: 0 | Status: SHIPPED | OUTPATIENT
Start: 2023-03-06 | End: 2023-04-05

## 2023-03-23 PROBLEM — F32.0 MAJOR DEPRESSIVE DISORDER, SINGLE EPISODE, MILD (HCC): Status: ACTIVE | Noted: 2023-03-23

## 2023-04-28 ENCOUNTER — OFFICE VISIT (OUTPATIENT)
Dept: PAIN MANAGEMENT | Age: 83
End: 2023-04-28
Payer: MEDICARE

## 2023-04-28 ENCOUNTER — PREP FOR PROCEDURE (OUTPATIENT)
Dept: PAIN MANAGEMENT | Age: 83
End: 2023-04-28

## 2023-04-28 VITALS
RESPIRATION RATE: 18 BRPM | WEIGHT: 165 LBS | SYSTOLIC BLOOD PRESSURE: 122 MMHG | TEMPERATURE: 97.1 F | DIASTOLIC BLOOD PRESSURE: 60 MMHG | BODY MASS INDEX: 25.9 KG/M2 | HEART RATE: 87 BPM | HEIGHT: 67 IN | OXYGEN SATURATION: 95 %

## 2023-04-28 DIAGNOSIS — M47.817 LUMBOSACRAL SPONDYLOSIS WITHOUT MYELOPATHY: ICD-10-CM

## 2023-04-28 DIAGNOSIS — M96.1 POSTLAMINECTOMY SYNDROME, LUMBAR: Primary | ICD-10-CM

## 2023-04-28 DIAGNOSIS — M48.061 SPINAL STENOSIS OF LUMBAR REGION, UNSPECIFIED WHETHER NEUROGENIC CLAUDICATION PRESENT: ICD-10-CM

## 2023-04-28 DIAGNOSIS — M51.36 DDD (DEGENERATIVE DISC DISEASE), LUMBAR: ICD-10-CM

## 2023-04-28 DIAGNOSIS — M47.817 LUMBOSACRAL SPONDYLOSIS WITHOUT MYELOPATHY: Primary | ICD-10-CM

## 2023-04-28 PROCEDURE — G8427 DOCREV CUR MEDS BY ELIG CLIN: HCPCS | Performed by: ANESTHESIOLOGY

## 2023-04-28 PROCEDURE — G8417 CALC BMI ABV UP PARAM F/U: HCPCS | Performed by: ANESTHESIOLOGY

## 2023-04-28 PROCEDURE — 99213 OFFICE O/P EST LOW 20 MIN: CPT | Performed by: ANESTHESIOLOGY

## 2023-04-28 PROCEDURE — 1123F ACP DISCUSS/DSCN MKR DOCD: CPT | Performed by: ANESTHESIOLOGY

## 2023-04-28 PROCEDURE — 99214 OFFICE O/P EST MOD 30 MIN: CPT | Performed by: ANESTHESIOLOGY

## 2023-04-28 PROCEDURE — 4004F PT TOBACCO SCREEN RCVD TLK: CPT | Performed by: ANESTHESIOLOGY

## 2023-04-28 RX ORDER — SODIUM CHLORIDE 9 MG/ML
INJECTION, SOLUTION INTRAVENOUS PRN
Status: CANCELLED | OUTPATIENT
Start: 2023-04-28

## 2023-04-28 RX ORDER — SODIUM CHLORIDE 0.9 % (FLUSH) 0.9 %
5-40 SYRINGE (ML) INJECTION PRN
Status: CANCELLED | OUTPATIENT
Start: 2023-04-28

## 2023-04-28 RX ORDER — SODIUM CHLORIDE 0.9 % (FLUSH) 0.9 %
5-40 SYRINGE (ML) INJECTION EVERY 12 HOURS SCHEDULED
Status: CANCELLED | OUTPATIENT
Start: 2023-04-28

## 2023-04-28 RX ORDER — HYDROCODONE BITARTRATE AND ACETAMINOPHEN 5; 325 MG/1; MG/1
0.5 TABLET ORAL 2 TIMES DAILY PRN
Qty: 30 TABLET | Refills: 0 | Status: SHIPPED | OUTPATIENT
Start: 2023-04-28 | End: 2023-05-28

## 2023-04-28 NOTE — PROGRESS NOTES
Aishwarya Dobbins presents to the [facility] on 4/28/2023. Jaylin Tee is complaining of pain low back, radiates to both legs. The pain is constant. The pain is described as throbbing and sharp. Pain is rated on his best day at a 4, on his worst day at a 8, and on average at a 5 on the VAS scale. He took his last dose of Norco a few days ago. Jaylin Tee does not have issues with constipation. Any procedures since your last visit: No  He is not on NSAIDS and  is  on anticoagulation medications to include ASA and is managed by PCP. Pacemaker or defibrillator: No  Medication Contract and Consent for Opioid Use Documents Filed       Patient Documents       Type of Document Status Date Received Received By Description    Medication Contract Received 3/15/2019  1:18 PM AALIYAH HOPE PAIN MANAGEMENT PT AGREEMENT 3/15/2019    Medication Contract Received 2/12/2020  3:12 PM Harper RIBERA pain management patient agreement 02/12/2020    Medication Contract Received 9/1/2021 10:54 AM ZOË ROBERSON PAIN AGREEMENT SEPTEMBER 2021                       /60   Pulse 87   Temp 97.1 °F (36.2 °C) (Infrared)   Resp 18   Ht 5' 7\" (1.702 m)   Wt 165 lb (74.8 kg)   SpO2 95%   BMI 25.84 kg/m²      No LMP for male patient.
H/o CAD s/p stent. Can hold aspirin for 3 days for facet interventions. He apparently had a follow up appointment with his spine surgery recently. If continued LE pain, consider MRI and possible trial of LUCILA in future. ZT lido patch samples- not much relief. - DC    Consider Compound cream for local    Low dose Norco for prn use. To take 0.5 tab po bid prn # 30 given. E-scribed today 4/28/2023. Last script on : 3/6/2023. Pain meds help with pain and functionality, he is compliant with the medication regime, no signs of misuse abuse or diversion, no side effects. RBA of chronic opioid therapy discussed. Buccal Drug screen  on 11/15/2022- Reviewed. OARRS reviewed today consistent. Opioid agreement: 11/15/2022. Discussed salient features of opioid agreement. Continue with HEP as tolerated      We discussed with the patient that combining opioids, benzodiazepines, alcohol, illicit drugs or sleep aids increases the risk of respiratory depression including death. We discussed that these medications may cause drowsiness, sedation or dizziness and have counseled the patient not to drive or operate machinery. We have discussed that these medications will be prescribed only by one provider. We have discussed with the patient about age related risk factors and have thoroughly discussed the importance of taking these medications as prescribed. The patient verbalizes understanding. Alma Barnard MD    CC:  Radha Bañuelos DO    NOTE: The above documentation was prepared using voice recognition software. Every attempt was made to ensure accuracy but there may be spelling, grammatical, and contextual errors.

## 2023-05-18 ENCOUNTER — TELEPHONE (OUTPATIENT)
Dept: PAIN MANAGEMENT | Age: 83
End: 2023-05-18

## 2023-05-23 ENCOUNTER — APPOINTMENT (OUTPATIENT)
Dept: CT IMAGING | Age: 83
End: 2023-05-23
Payer: MEDICARE

## 2023-05-23 ENCOUNTER — APPOINTMENT (OUTPATIENT)
Dept: GENERAL RADIOLOGY | Age: 83
End: 2023-05-23
Payer: MEDICARE

## 2023-05-23 ENCOUNTER — HOSPITAL ENCOUNTER (EMERGENCY)
Age: 83
Discharge: HOME | End: 2023-05-23
Attending: EMERGENCY MEDICINE
Payer: MEDICARE

## 2023-05-23 VITALS
DIASTOLIC BLOOD PRESSURE: 70 MMHG | HEART RATE: 70 BPM | RESPIRATION RATE: 20 BRPM | SYSTOLIC BLOOD PRESSURE: 116 MMHG | OXYGEN SATURATION: 97 % | TEMPERATURE: 98.4 F

## 2023-05-23 DIAGNOSIS — R42 DIZZINESS: Primary | ICD-10-CM

## 2023-05-23 DIAGNOSIS — S09.90XA CLOSED HEAD INJURY, INITIAL ENCOUNTER: ICD-10-CM

## 2023-05-23 LAB
ALBUMIN SERPL-MCNC: 4.4 G/DL (ref 3.5–5.2)
ALP SERPL-CCNC: 71 U/L (ref 40–129)
ALT SERPL-CCNC: 10 U/L (ref 0–40)
ANION GAP SERPL CALCULATED.3IONS-SCNC: 13 MMOL/L (ref 7–16)
AST SERPL-CCNC: 18 U/L (ref 0–39)
BACTERIA URNS QL MICRO: ABNORMAL /HPF
BASOPHILS # BLD: 0.06 E9/L (ref 0–0.2)
BASOPHILS NFR BLD: 0.7 % (ref 0–2)
BILIRUB SERPL-MCNC: 0.3 MG/DL (ref 0–1.2)
BILIRUB UR QL STRIP: NEGATIVE
BUN SERPL-MCNC: 23 MG/DL (ref 6–23)
CALCIUM SERPL-MCNC: 10.1 MG/DL (ref 8.6–10.2)
CHLORIDE SERPL-SCNC: 99 MMOL/L (ref 98–107)
CLARITY UR: CLEAR
CO2 SERPL-SCNC: 24 MMOL/L (ref 22–29)
COLOR UR: ABNORMAL
CREAT SERPL-MCNC: 1.6 MG/DL (ref 0.7–1.2)
EKG ATRIAL RATE: 76 BPM
EKG P AXIS: 54 DEGREES
EKG P-R INTERVAL: 188 MS
EKG Q-T INTERVAL: 400 MS
EKG QRS DURATION: 94 MS
EKG QTC CALCULATION (BAZETT): 450 MS
EKG R AXIS: 10 DEGREES
EKG T AXIS: 23 DEGREES
EKG VENTRICULAR RATE: 76 BPM
EOSINOPHIL # BLD: 0.19 E9/L (ref 0.05–0.5)
EOSINOPHIL NFR BLD: 2.4 % (ref 0–6)
ERYTHROCYTE [DISTWIDTH] IN BLOOD BY AUTOMATED COUNT: 14.1 FL (ref 11.5–15)
ETHANOLAMINE SERPL-MCNC: <10 MG/DL (ref 0–0.08)
GLUCOSE SERPL-MCNC: 213 MG/DL (ref 74–99)
GLUCOSE UR STRIP-MCNC: >=1000 MG/DL
HCT VFR BLD AUTO: 37.3 % (ref 37–54)
HGB BLD-MCNC: 11.5 G/DL (ref 12.5–16.5)
HGB UR QL STRIP: NEGATIVE
IMM GRANULOCYTES # BLD: 0.04 E9/L
IMM GRANULOCYTES NFR BLD: 0.5 % (ref 0–5)
INR BLD: 0.9
KETONES UR STRIP-MCNC: NEGATIVE MG/DL
LEUKOCYTE ESTERASE UR QL STRIP: NEGATIVE
LYMPHOCYTES # BLD: 1.53 E9/L (ref 1.5–4)
LYMPHOCYTES NFR BLD: 19 % (ref 20–42)
MAGNESIUM SERPL-MCNC: 1.8 MG/DL (ref 1.6–2.6)
MCH RBC QN AUTO: 30.1 PG (ref 26–35)
MCHC RBC AUTO-ENTMCNC: 30.8 % (ref 32–34.5)
MCV RBC AUTO: 97.6 FL (ref 80–99.9)
MONOCYTES # BLD: 0.87 E9/L (ref 0.1–0.95)
MONOCYTES NFR BLD: 10.8 % (ref 2–12)
NEUTROPHILS # BLD: 5.36 E9/L (ref 1.8–7.3)
NEUTS SEG NFR BLD: 66.6 % (ref 43–80)
NITRITE UR QL STRIP: NEGATIVE
PH UR STRIP: 5.5 [PH] (ref 5–9)
PLATELET # BLD AUTO: 208 E9/L (ref 130–450)
PMV BLD AUTO: 10.2 FL (ref 7–12)
POTASSIUM SERPL-SCNC: 4 MMOL/L (ref 3.5–5)
PROT SERPL-MCNC: 7.6 G/DL (ref 6.4–8.3)
PROT UR STRIP-MCNC: NEGATIVE MG/DL
PROTHROMBIN TIME: 10.8 SEC (ref 9.3–12.4)
RBC # BLD AUTO: 3.82 E12/L (ref 3.8–5.8)
RBC #/AREA URNS HPF: ABNORMAL /HPF (ref 0–2)
REASON FOR REJECTION: NORMAL
REJECTED TEST: NORMAL
SODIUM SERPL-SCNC: 136 MMOL/L (ref 132–146)
SP GR UR STRIP: 1.01 (ref 1–1.03)
TROPONIN, HIGH SENSITIVITY: 19 NG/L (ref 0–11)
TROPONIN, HIGH SENSITIVITY: 21 NG/L (ref 0–11)
UROBILINOGEN UR STRIP-ACNC: 0.2 E.U./DL
WBC # BLD: 8.1 E9/L (ref 4.5–11.5)
WBC #/AREA URNS HPF: ABNORMAL /HPF (ref 0–5)

## 2023-05-23 PROCEDURE — 72125 CT NECK SPINE W/O DYE: CPT

## 2023-05-23 PROCEDURE — 84484 ASSAY OF TROPONIN QUANT: CPT

## 2023-05-23 PROCEDURE — 82077 ASSAY SPEC XCP UR&BREATH IA: CPT

## 2023-05-23 PROCEDURE — 93005 ELECTROCARDIOGRAM TRACING: CPT | Performed by: EMERGENCY MEDICINE

## 2023-05-23 PROCEDURE — 36415 COLL VENOUS BLD VENIPUNCTURE: CPT

## 2023-05-23 PROCEDURE — 73130 X-RAY EXAM OF HAND: CPT

## 2023-05-23 PROCEDURE — 96361 HYDRATE IV INFUSION ADD-ON: CPT

## 2023-05-23 PROCEDURE — 85610 PROTHROMBIN TIME: CPT

## 2023-05-23 PROCEDURE — 70450 CT HEAD/BRAIN W/O DYE: CPT

## 2023-05-23 PROCEDURE — 85025 COMPLETE CBC W/AUTO DIFF WBC: CPT

## 2023-05-23 PROCEDURE — 96360 HYDRATION IV INFUSION INIT: CPT

## 2023-05-23 PROCEDURE — 99285 EMERGENCY DEPT VISIT HI MDM: CPT

## 2023-05-23 PROCEDURE — 81001 URINALYSIS AUTO W/SCOPE: CPT

## 2023-05-23 PROCEDURE — 93010 ELECTROCARDIOGRAM REPORT: CPT | Performed by: INTERNAL MEDICINE

## 2023-05-23 PROCEDURE — 83735 ASSAY OF MAGNESIUM: CPT

## 2023-05-23 PROCEDURE — 2580000003 HC RX 258: Performed by: EMERGENCY MEDICINE

## 2023-05-23 PROCEDURE — 80053 COMPREHEN METABOLIC PANEL: CPT

## 2023-05-23 RX ORDER — 0.9 % SODIUM CHLORIDE 0.9 %
500 INTRAVENOUS SOLUTION INTRAVENOUS ONCE
Status: COMPLETED | OUTPATIENT
Start: 2023-05-23 | End: 2023-05-23

## 2023-05-23 RX ADMIN — SODIUM CHLORIDE 500 ML: 9 INJECTION, SOLUTION INTRAVENOUS at 14:30

## 2023-05-23 ASSESSMENT — ENCOUNTER SYMPTOMS
APNEA: 0
EYE REDNESS: 0
COLOR CHANGE: 0
ABDOMINAL DISTENTION: 0
ABDOMINAL PAIN: 0
SHORTNESS OF BREATH: 0
CHEST TIGHTNESS: 0
EYE PAIN: 0

## 2023-05-23 NOTE — ED PROVIDER NOTES
mg/dL    Est, Glom Filt Rate 42 >=60 mL/min/1.73    Calcium 10.1 8.6 - 10.2 mg/dL    Total Protein 7.6 6.4 - 8.3 g/dL    Albumin 4.4 3.5 - 5.2 g/dL    Total Bilirubin 0.3 0.0 - 1.2 mg/dL    Alkaline Phosphatase 71 40 - 129 U/L    ALT 10 0 - 40 U/L    AST 18 0 - 39 U/L   ETOH   Result Value Ref Range    Ethanol Lvl <10 mg/dL   Magnesium   Result Value Ref Range    Magnesium 1.8 1.6 - 2.6 mg/dL   Troponin   Result Value Ref Range    Troponin, High Sensitivity 21 (H) 0 - 11 ng/L   Urinalysis with Microscopic   Result Value Ref Range    Color, UA Straw Straw/Yellow    Clarity, UA Clear Clear    Glucose, Ur >=1000 (A) Negative mg/dL    Bilirubin Urine Negative Negative    Ketones, Urine Negative Negative mg/dL    Specific Gravity, UA 1.015 1.005 - 1.030    Blood, Urine Negative Negative    pH, UA 5.5 5.0 - 9.0    Protein, UA Negative Negative mg/dL    Urobilinogen, Urine 0.2 <2.0 E.U./dL    Nitrite, Urine Negative Negative    Leukocyte Esterase, Urine Negative Negative    WBC, UA 0-1 0 - 5 /HPF    RBC, UA NONE 0 - 2 /HPF    Bacteria, UA NONE SEEN None Seen /HPF   SPECIMEN REJECTION   Result Value Ref Range    Rejected Test pt     Reason for Rejection see below    Protime-INR   Result Value Ref Range    Protime 10.8 9.3 - 12.4 sec    INR 0.9    CBC with Auto Differential   Result Value Ref Range    WBC 8.1 4.5 - 11.5 E9/L    RBC 3.82 3.80 - 5.80 E12/L    Hemoglobin 11.5 (L) 12.5 - 16.5 g/dL    Hematocrit 37.3 37.0 - 54.0 %    MCV 97.6 80.0 - 99.9 fL    MCH 30.1 26.0 - 35.0 pg    MCHC 30.8 (L) 32.0 - 34.5 %    RDW 14.1 11.5 - 15.0 fL    Platelets 610 444 - 852 E9/L    MPV 10.2 7.0 - 12.0 fL    Neutrophils % 66.6 43.0 - 80.0 %    Immature Granulocytes % 0.5 0.0 - 5.0 %    Lymphocytes % 19.0 (L) 20.0 - 42.0 %    Monocytes % 10.8 2.0 - 12.0 %    Eosinophils % 2.4 0.0 - 6.0 %    Basophils % 0.7 0.0 - 2.0 %    Neutrophils Absolute 5.36 1.80 - 7.30 E9/L    Immature Granulocytes # 0.04 E9/L    Lymphocytes Absolute 1.53 1.50 -

## 2023-05-23 NOTE — ED NOTES
Dr. Wilfredo Holbrook informed of positive orthostatic vitals with c/o dizziness     Jung Stallings  05/23/23 1530

## 2023-05-23 NOTE — DISCHARGE INSTRUCTIONS
Continue home medications. Encourage oral fluids. Pulmonary care doctor later to recheck. Return for worsening symptoms or concerns.

## 2023-05-31 ENCOUNTER — TELEPHONE (OUTPATIENT)
Dept: PAIN MANAGEMENT | Age: 83
End: 2023-05-31

## 2023-05-31 NOTE — TELEPHONE ENCOUNTER
Call to [facility] that procedure was approved for 6/6/2023 and that [facility] should call him a few days before for the pre op call and after 3:00 PM the business day before with the arrival time. Instructed Rachid to hold ibuprofen for 24 hours, naprosyn for 4 days and any aspirin containing products or fish oil for 3 days. Instructed to call office back if any questions. Jenniffer Chaudhry verbalized understanding.     Electronically signed by Jane Pickett RN on 5/31/2023 at 9:22 AM

## 2023-06-06 ENCOUNTER — HOSPITAL ENCOUNTER (OUTPATIENT)
Age: 83
Setting detail: OUTPATIENT SURGERY
Discharge: HOME | End: 2023-06-06
Attending: ANESTHESIOLOGY | Admitting: ANESTHESIOLOGY
Payer: MEDICARE

## 2023-06-06 ENCOUNTER — HOSPITAL ENCOUNTER (OUTPATIENT)
Dept: OPERATING ROOM | Age: 83
Setting detail: OUTPATIENT SURGERY
Discharge: HOME | End: 2023-06-06
Attending: ANESTHESIOLOGY
Payer: MEDICARE

## 2023-06-06 VITALS
RESPIRATION RATE: 16 BRPM | HEIGHT: 67 IN | SYSTOLIC BLOOD PRESSURE: 153 MMHG | HEART RATE: 75 BPM | WEIGHT: 161 LBS | TEMPERATURE: 98 F | OXYGEN SATURATION: 97 % | DIASTOLIC BLOOD PRESSURE: 86 MMHG | BODY MASS INDEX: 25.27 KG/M2

## 2023-06-06 DIAGNOSIS — M47.896 OTHER OSTEOARTHRITIS OF SPINE, LUMBAR REGION: ICD-10-CM

## 2023-06-06 PROCEDURE — 3600000005 HC SURGERY LEVEL 5 BASE: Performed by: ANESTHESIOLOGY

## 2023-06-06 PROCEDURE — 3600000015 HC SURGERY LEVEL 5 ADDTL 15MIN: Performed by: ANESTHESIOLOGY

## 2023-06-06 PROCEDURE — 7100000010 HC PHASE II RECOVERY - FIRST 15 MIN: Performed by: ANESTHESIOLOGY

## 2023-06-06 PROCEDURE — 2500000003 HC RX 250 WO HCPCS: Performed by: ANESTHESIOLOGY

## 2023-06-06 PROCEDURE — 3209999900 FLUORO FOR SURGICAL PROCEDURES

## 2023-06-06 PROCEDURE — 7100000011 HC PHASE II RECOVERY - ADDTL 15 MIN: Performed by: ANESTHESIOLOGY

## 2023-06-06 PROCEDURE — 2709999900 HC NON-CHARGEABLE SUPPLY: Performed by: ANESTHESIOLOGY

## 2023-06-06 PROCEDURE — 6360000002 HC RX W HCPCS: Performed by: ANESTHESIOLOGY

## 2023-06-06 RX ORDER — SODIUM CHLORIDE 0.9 % (FLUSH) 0.9 %
5-40 SYRINGE (ML) INJECTION EVERY 12 HOURS SCHEDULED
Status: DISCONTINUED | OUTPATIENT
Start: 2023-06-06 | End: 2023-06-06 | Stop reason: HOSPADM

## 2023-06-06 RX ORDER — SODIUM CHLORIDE 9 MG/ML
INJECTION, SOLUTION INTRAVENOUS PRN
Status: DISCONTINUED | OUTPATIENT
Start: 2023-06-06 | End: 2023-06-06 | Stop reason: HOSPADM

## 2023-06-06 RX ORDER — METHYLPREDNISOLONE ACETATE 40 MG/ML
INJECTION, SUSPENSION INTRA-ARTICULAR; INTRALESIONAL; INTRAMUSCULAR; SOFT TISSUE PRN
Status: DISCONTINUED | OUTPATIENT
Start: 2023-06-06 | End: 2023-06-06 | Stop reason: ALTCHOICE

## 2023-06-06 RX ORDER — SODIUM CHLORIDE 0.9 % (FLUSH) 0.9 %
5-40 SYRINGE (ML) INJECTION PRN
Status: DISCONTINUED | OUTPATIENT
Start: 2023-06-06 | End: 2023-06-06 | Stop reason: HOSPADM

## 2023-06-06 RX ORDER — LIDOCAINE HYDROCHLORIDE 10 MG/ML
INJECTION, SOLUTION EPIDURAL; INFILTRATION; INTRACAUDAL; PERINEURAL PRN
Status: DISCONTINUED | OUTPATIENT
Start: 2023-06-06 | End: 2023-06-06 | Stop reason: ALTCHOICE

## 2023-06-06 RX ORDER — BUPIVACAINE HYDROCHLORIDE 2.5 MG/ML
INJECTION, SOLUTION EPIDURAL; INFILTRATION; INTRACAUDAL PRN
Status: DISCONTINUED | OUTPATIENT
Start: 2023-06-06 | End: 2023-06-06 | Stop reason: ALTCHOICE

## 2023-06-06 RX ORDER — LIDOCAINE HYDROCHLORIDE 5 MG/ML
INJECTION, SOLUTION INFILTRATION; INTRAVENOUS PRN
Status: DISCONTINUED | OUTPATIENT
Start: 2023-06-06 | End: 2023-06-06 | Stop reason: ALTCHOICE

## 2023-06-06 ASSESSMENT — PAIN - FUNCTIONAL ASSESSMENT: PAIN_FUNCTIONAL_ASSESSMENT: 0-10

## 2023-06-06 ASSESSMENT — PAIN DESCRIPTION - DESCRIPTORS: DESCRIPTORS: ACHING

## 2023-06-06 NOTE — H&P
fevers, chills, sweats and fatigue    RESPIRATORY:  negative for  dry cough, cough with sputum, dyspnea, wheezing and chest pain    CARDIOVASCULAR:  negative for chest pain, dyspnea, palpitations, syncope    GASTROINTESTINAL:  negative for nausea, vomiting, change in bowel habits, diarrhea, constipation and abdominal pain    MUSCULOSKELETAL: negative for muscle weakness    SKIN: negative for itching or rashes. BEHAVIOR/PSYCH:  negative for poor appetite, increased appetite, decreased sleep and poor concentration    All other systems negative      PHYSICAL EXAM:    VITALS:  BP (!) 150/80   Pulse 70   Temp 98 °F (36.7 °C) (Skin)   Resp 20   Ht 5' 7\" (1.702 m)   Wt 161 lb (73 kg)   SpO2 97%   BMI 25.22 kg/m²     CONSTITUTIONAL:  awake, alert, cooperative, no apparent distress, and appears stated age    EYES: PERRLA, EOMI    LUNGS:  No increased work of breathing, no audible wheezing    CARDIOVASCULAR:  regular rate and rhythm    ABDOMEN:  Soft non tender non distended     EXTREMITIES: no signs of clubbing or cyanosis. MUSCULOSKELETAL: negative for flaccid muscle tone or spastic movements. SKIN: gross examination reveals no signs of rashes, or diaphoresis. NEURO: Cranial nerves II-XII grossly intact. No signs of agitated mood. Assessment/Plan:    Patient  is here for Lumbar facet MB RFA for low back pain.     King Jasmin MD

## 2023-06-06 NOTE — DISCHARGE INSTRUCTIONS
[facility]) Pain Management Department  [phone]   Post-Pain Block/ Radiofrequency Home Going Instructions    1-Go home, rest for the remainder of the day  2-Please do not lift over 20 pounds the day of the injection  3-If you received sedation No: alcohol, driving, operating lawn mowers, plows, tractors or other dangerous equipment until next morning. Do not make important decisions or sign legal documents for 24 hours. You may experience light headedness, dizziness, nausea or sleepiness after sedation. Do not stay alone. A responsible adult must be with you for 24 hours. You could be nauseated from the medications you have received. Your IV site may be sore and bruised. 4-No dietary restrictions     5-Resume all medications the same day, blood thinners to be resumed 24 hours after injection    6-Keep the surgical site clean and dry, you may shower the next morning and remove the      dressing. 7- No sitz baths, tub baths or hot tubs/swimming for 24 hours. 8- If you have any pain at the injection site(s), application of an ice pack to the area should be       helpful, 20 minutes on/20 minutes off for next 48 hours. 9- [facility] at if you develop.   Fever greater than 100.4 F  Have bleeding or drainage from the puncture site  Have progressive Leg/arm numbness and or weakness  Loss of control of bowel and or bladder (wet/soil yourself)  Severe headache with inability to lift head  10-You may return to work the next day

## 2023-06-06 NOTE — OP NOTE
Operative Note      Patient: Jonny Medina  YOB: 1940  MRN: [id number]    Date of Procedure: 2023    Pre-Op Diagnosis Codes:     * Lumbar spondylosis [M47.816]    Post-Op Diagnosis: Same       Procedure(s):  BILATERAL LUMBAR RADIOFREQUENCY ABLATION AT L5-S1 UNDER FLUOROSCOPY    Surgeon(s):  Mirna Henderson MD    Assistant:   * No surgical staff found *    Anesthesia: Local    Estimated Blood Loss (mL): Minimal    Complications: None    Specimens:   * No specimens in log *    Implants:  * No implants in log *      Drains: * No LDAs found *    Findings: good needle placement      Detailed Description of Procedure:   2023    Patient: Jonny Medina  :  1940  Age:  78 y.o. Sex:  male     PRE-OPERATIVE DIAGNOSIS: Bilateral Lumbar spondylosis, lumbar facet arthropathy. POST-OPERATIVE DIAGNOSIS: Same. PROCEDURE:  Fluoroscopic-guided Bilateral  Lumbar facet medial branch radiofrequency ablation at facet levels L5-S1. SURGEON:  Mirna Henderson MD    ANESTHESIA: Local    ESTIMATED BLOOD LOSS: None.  ______________________________________________________________________  HISTORY & PHYSICAL: Jonny Medina presents today for fluoroscopic-guided Bilateral lumbar facet medial branch radiofrequency ablation at levels L5-S1. The potential complications of the procedure were explained to [facility] again today and he has elected to undergo the aforementioned procedure. Tuan complete History & Physical examination were reviewed in depth, a copy of which is in the chart. DESCRIPTION OF PROCEDURE:    After confirming written and informed consent, a time-out was performed and Tuan name and date of birth, the procedure to be performed as well as the plan for the location of the needle insertion were confirmed. The patient was brought into the procedure room and placed in the prone position on the fluoroscopy table.  Standard monitors were placed and vital signs were

## 2023-06-20 ENCOUNTER — TELEPHONE (OUTPATIENT)
Dept: PAIN MANAGEMENT | Age: 83
End: 2023-06-20

## 2023-06-20 DIAGNOSIS — M96.1 POSTLAMINECTOMY SYNDROME, LUMBAR: ICD-10-CM

## 2023-06-20 RX ORDER — HYDROCODONE BITARTRATE AND ACETAMINOPHEN 5; 325 MG/1; MG/1
0.5 TABLET ORAL 2 TIMES DAILY PRN
Qty: 30 TABLET | Refills: 0 | Status: SHIPPED
Start: 2023-06-20 | End: 2023-06-26 | Stop reason: SDUPTHER

## 2023-06-21 DIAGNOSIS — E03.9 ACQUIRED HYPOTHYROIDISM: ICD-10-CM

## 2023-06-21 DIAGNOSIS — E78.5 DYSLIPIDEMIA: ICD-10-CM

## 2023-06-21 DIAGNOSIS — K21.9 GASTROESOPHAGEAL REFLUX DISEASE, UNSPECIFIED WHETHER ESOPHAGITIS PRESENT: ICD-10-CM

## 2023-06-21 DIAGNOSIS — N18.31 STAGE 3A CHRONIC KIDNEY DISEASE (HCC): ICD-10-CM

## 2023-06-21 DIAGNOSIS — I25.10 ATHEROSCLEROSIS OF CORONARY ARTERY OF NATIVE HEART WITHOUT ANGINA PECTORIS, UNSPECIFIED VESSEL OR LESION TYPE: ICD-10-CM

## 2023-06-21 DIAGNOSIS — N18.30 TYPE 2 DIABETES MELLITUS WITH STAGE 3 CHRONIC KIDNEY DISEASE, WITHOUT LONG-TERM CURRENT USE OF INSULIN, UNSPECIFIED WHETHER STAGE 3A OR 3B CKD (HCC): ICD-10-CM

## 2023-06-21 DIAGNOSIS — F03.A0 MILD DEMENTIA (HCC): ICD-10-CM

## 2023-06-21 DIAGNOSIS — Z76.0 MEDICATION REFILL: ICD-10-CM

## 2023-06-21 DIAGNOSIS — E11.22 TYPE 2 DIABETES MELLITUS WITH STAGE 3 CHRONIC KIDNEY DISEASE, WITHOUT LONG-TERM CURRENT USE OF INSULIN, UNSPECIFIED WHETHER STAGE 3A OR 3B CKD (HCC): ICD-10-CM

## 2023-06-21 LAB
ALBUMIN SERPL-MCNC: 4.1 G/DL (ref 3.5–5.2)
ALP SERPL-CCNC: 51 U/L (ref 40–129)
ALT SERPL-CCNC: 12 U/L (ref 0–40)
ANION GAP SERPL CALCULATED.3IONS-SCNC: 14 MMOL/L (ref 7–16)
AST SERPL-CCNC: 20 U/L (ref 0–39)
BASOPHILS # BLD: 0.04 E9/L (ref 0–0.2)
BASOPHILS NFR BLD: 0.8 % (ref 0–2)
BILIRUB SERPL-MCNC: 0.3 MG/DL (ref 0–1.2)
BUN SERPL-MCNC: 21 MG/DL (ref 6–23)
CALCIUM SERPL-MCNC: 9.8 MG/DL (ref 8.6–10.2)
CHLORIDE SERPL-SCNC: 103 MMOL/L (ref 98–107)
CHOLESTEROL, TOTAL: 148 MG/DL (ref 0–199)
CO2 SERPL-SCNC: 23 MMOL/L (ref 22–29)
CREAT SERPL-MCNC: 1.7 MG/DL (ref 0.7–1.2)
EOSINOPHIL # BLD: 0.23 E9/L (ref 0.05–0.5)
EOSINOPHIL NFR BLD: 4.3 % (ref 0–6)
ERYTHROCYTE [DISTWIDTH] IN BLOOD BY AUTOMATED COUNT: 15.5 FL (ref 11.5–15)
GLUCOSE SERPL-MCNC: 169 MG/DL (ref 74–99)
HBA1C MFR BLD: 9.5 % (ref 4–5.6)
HCT VFR BLD AUTO: 35.2 % (ref 37–54)
HDLC SERPL-MCNC: 47 MG/DL
HGB BLD-MCNC: 10.7 G/DL (ref 12.5–16.5)
IMM GRANULOCYTES # BLD: 0.02 E9/L
IMM GRANULOCYTES NFR BLD: 0.4 % (ref 0–5)
LDLC SERPL CALC-MCNC: 76 MG/DL (ref 0–99)
LYMPHOCYTES # BLD: 1.82 E9/L (ref 1.5–4)
LYMPHOCYTES NFR BLD: 34.1 % (ref 20–42)
MCH RBC QN AUTO: 30.5 PG (ref 26–35)
MCHC RBC AUTO-ENTMCNC: 30.4 % (ref 32–34.5)
MCV RBC AUTO: 100.3 FL (ref 80–99.9)
MONOCYTES # BLD: 0.72 E9/L (ref 0.1–0.95)
MONOCYTES NFR BLD: 13.5 % (ref 2–12)
NEUTROPHILS # BLD: 2.5 E9/L (ref 1.8–7.3)
NEUTS SEG NFR BLD: 46.9 % (ref 43–80)
PLATELET # BLD AUTO: 151 E9/L (ref 130–450)
PMV BLD AUTO: 10.9 FL (ref 7–12)
POTASSIUM SERPL-SCNC: 4 MMOL/L (ref 3.5–5)
PROT SERPL-MCNC: 7 G/DL (ref 6.4–8.3)
RBC # BLD AUTO: 3.51 E12/L (ref 3.8–5.8)
SODIUM SERPL-SCNC: 140 MMOL/L (ref 132–146)
TRIGL SERPL-MCNC: 126 MG/DL (ref 0–149)
TSH SERPL-MCNC: 1.05 UIU/ML (ref 0.27–4.2)
VLDLC SERPL CALC-MCNC: 25 MG/DL
WBC # BLD: 5.3 E9/L (ref 4.5–11.5)

## 2023-06-22 LAB
FOLATE SERPL-MCNC: 11.6 NG/ML (ref 4.8–24.2)
VIT B12 SERPL-MCNC: 814 PG/ML (ref 211–946)

## 2023-06-26 ENCOUNTER — TELEPHONE (OUTPATIENT)
Dept: PAIN MANAGEMENT | Age: 83
End: 2023-06-26

## 2023-06-26 DIAGNOSIS — M96.1 POSTLAMINECTOMY SYNDROME, LUMBAR: ICD-10-CM

## 2023-06-26 RX ORDER — HYDROCODONE BITARTRATE AND ACETAMINOPHEN 5; 325 MG/1; MG/1
0.5 TABLET ORAL 2 TIMES DAILY PRN
Qty: 30 TABLET | Refills: 0 | Status: SHIPPED | OUTPATIENT
Start: 2023-06-26 | End: 2023-07-26

## 2023-08-30 ENCOUNTER — PREP FOR PROCEDURE (OUTPATIENT)
Dept: PAIN MANAGEMENT | Age: 83
End: 2023-08-30

## 2023-08-30 ENCOUNTER — OFFICE VISIT (OUTPATIENT)
Dept: PAIN MANAGEMENT | Age: 83
End: 2023-08-30
Payer: MEDICARE

## 2023-08-30 VITALS
RESPIRATION RATE: 18 BRPM | DIASTOLIC BLOOD PRESSURE: 64 MMHG | SYSTOLIC BLOOD PRESSURE: 148 MMHG | TEMPERATURE: 97.1 F | HEIGHT: 67 IN | BODY MASS INDEX: 24.96 KG/M2 | HEART RATE: 72 BPM | OXYGEN SATURATION: 96 % | WEIGHT: 159 LBS

## 2023-08-30 DIAGNOSIS — M47.817 LUMBOSACRAL SPONDYLOSIS WITHOUT MYELOPATHY: ICD-10-CM

## 2023-08-30 DIAGNOSIS — M53.3 SACROILIAC DYSFUNCTION: Primary | ICD-10-CM

## 2023-08-30 DIAGNOSIS — M48.061 SPINAL STENOSIS OF LUMBAR REGION, UNSPECIFIED WHETHER NEUROGENIC CLAUDICATION PRESENT: ICD-10-CM

## 2023-08-30 DIAGNOSIS — M51.36 DDD (DEGENERATIVE DISC DISEASE), LUMBAR: ICD-10-CM

## 2023-08-30 DIAGNOSIS — M96.1 POSTLAMINECTOMY SYNDROME, LUMBAR: ICD-10-CM

## 2023-08-30 DIAGNOSIS — M53.3 DISORDER OF SACRUM: ICD-10-CM

## 2023-08-30 PROCEDURE — G8420 CALC BMI NORM PARAMETERS: HCPCS | Performed by: ANESTHESIOLOGY

## 2023-08-30 PROCEDURE — 99213 OFFICE O/P EST LOW 20 MIN: CPT | Performed by: ANESTHESIOLOGY

## 2023-08-30 PROCEDURE — 4004F PT TOBACCO SCREEN RCVD TLK: CPT | Performed by: ANESTHESIOLOGY

## 2023-08-30 PROCEDURE — 1123F ACP DISCUSS/DSCN MKR DOCD: CPT | Performed by: ANESTHESIOLOGY

## 2023-08-30 PROCEDURE — 99214 OFFICE O/P EST MOD 30 MIN: CPT | Performed by: ANESTHESIOLOGY

## 2023-08-30 PROCEDURE — G8427 DOCREV CUR MEDS BY ELIG CLIN: HCPCS | Performed by: ANESTHESIOLOGY

## 2023-08-30 RX ORDER — SODIUM CHLORIDE 0.9 % (FLUSH) 0.9 %
5-40 SYRINGE (ML) INJECTION EVERY 12 HOURS SCHEDULED
Status: CANCELLED | OUTPATIENT
Start: 2023-08-30

## 2023-08-30 RX ORDER — SODIUM CHLORIDE 0.9 % (FLUSH) 0.9 %
5-40 SYRINGE (ML) INJECTION PRN
Status: CANCELLED | OUTPATIENT
Start: 2023-08-30

## 2023-08-30 RX ORDER — SODIUM CHLORIDE 9 MG/ML
INJECTION, SOLUTION INTRAVENOUS PRN
Status: CANCELLED | OUTPATIENT
Start: 2023-08-30

## 2023-08-30 RX ORDER — MIRABEGRON 50 MG/1
50 TABLET, FILM COATED, EXTENDED RELEASE ORAL DAILY
COMMUNITY
Start: 2023-08-03

## 2023-08-30 RX ORDER — HYDROCODONE BITARTRATE AND ACETAMINOPHEN 5; 325 MG/1; MG/1
0.5 TABLET ORAL 2 TIMES DAILY PRN
Qty: 30 TABLET | Refills: 0 | Status: SHIPPED | OUTPATIENT
Start: 2023-08-30 | End: 2023-09-29

## 2023-08-30 NOTE — PROGRESS NOTES
Alex Rivera presents to the [address] [place] [address] on 8/30/2023. Lucita High is complaining of pain low back pain, radiates to both legs. The pain is intermittent. The pain is described as aching, throbbing, sharp, penetrating, and nagging. Pain is rated on his best day at a 3, on his worst day at a 10, and on average at a 7 on the VAS scale. He took his last dose of Norco yesterday. Any procedures since your last visit: Yes, with 30 % relief. He is not on NSAIDS and  is  on anticoagulation medications to include ASA and is managed by PCP. Pacemaker or defibrillator: No  Medication Contract and Consent for Opioid Use Documents Filed       Patient Documents       Type of Document Status Date Received Received By Description    Medication Contract Received 3/15/2019  1:18 PM AALIYAH HOPE PAIN MANAGEMENT PT AGREEMENT 3/15/2019    Medication Contract Received 2/12/2020  3:12 PM Dayanna CARRANZA pain management patient agreement 02/12/2020    Medication Contract Received 9/1/2021 10:54 AM ZOË ROBERSON PAIN AGREEMENT SEPTEMBER 2021                       There were no vitals taken for this visit. No LMP for male patient.

## 2023-08-30 NOTE — H&P (VIEW-ONLY)
appearance:  Pleasant and well-hydrated, in mild to moderate distress and A & O x3     HEENT:  Head:normocephalic, atraumatic    Lungs:  Breathing:normal breathing pattern      CVS:  RRR     Abdomen:  Shape:non-distended and normal     Cervical spine:  Inspection:normal  Range of motion: limitations of ROM noted. Facet tenderness noted over the mid and lower cervical facets. Lumbar Spine:  Scar from the prior surgery noted, healed well, ROM reduced, Lumbar facet loading + below the fusion improved  Sensory and motor in B/l LE no new focal deficits  SI joint tenderness Bilateral  Gaenslen's + Soha's + B/l     Musculoskeletal:   Trigger points + cervical paraspinal muscles     Extremities:  NO tremors     Neurological:  Sensory: normal to light touch   Motor: No new  focal deficits, generalized weakness noted. Dermatology:     Skin:no rashes or lesions noted    Assessment/Plan:.      1. DDD (degenerative disc disease), lumbar      2. Lumbosacral spondylosis without myelopathy      3. Spinal stenosis of lumbar region, unspecified whether neurogenic claudication present      4. Postlaminectomy syndrome, lumbar      5. Cervical postlaminectomy syndrome      6. DDD (degenerative disc disease), cervical      7. Cervical spondylolysis        Chronic low back pain- Prior lumbar spine surgeries. S/P Lumbar spine surgery by Dr. Alejandra Mortimer in April 2022 at [address]- L3-5 interbody fusion, posterior instrumented fusion L3-5, Laminectomy L3-S1. Did PT after the surgery. Has significant facet tenderness and intermittent LE symptoms. X-ray of LS spine- reviewed. Continues to do HEP as tolerated. S/P Radiofrequency ablation of bilateral L5-S1 under fluoroscopic guidance> 50% releif. Current pain over the B/l SIJ. Doing HEP. Plan:    B/l SIJ injection under fluoroscopy. RBA discussed- patient agreed. OK to continue ASA for SIJ injection.     If continued LE pain, consider MRI and

## 2023-08-30 NOTE — PROGRESS NOTES
[facility], [address] [address] [phone]    Follow up Note      Russ Ibarra     Date of Visit:  8/30/2023    CC:    Chief Complaint   Patient presents with    Follow-up     6/6/23 BILATERAL LUMBAR RADIOFREQUENCY ABLATION AT L5-S1 UNDER FLUOROSCOPY     HPI:  78 y.o.  pleasant gentleman with prior H/o cervical spine surgery- ACDF C3-7. He has undergone X 2 lumbar spine surgery in the past.     Recent Lumbar spine surgery by Dr. Harrington in April 2022 at [facility]- L3-5 interbody fusion, posterior instrumented fusion L3-5, Laminectomy L3-S1. Currently having low back pain in the lower lumbar area. Intermittent LE symptoms but back pain predominant. Medication help with pain and functionality, no side effects, no signs of misuse abuse or diversion, he is compliant with medication use. Nursing notes and details of the pain history reviewed. Please see intake notes for details. Prior treatment;  PT/ HEP  Medications  Interventions  Surgery     He is on ASA-81 mg. H/o CAD s/p stent. Imaging studies:    Xray LS spine: 10/12/2022:  Impression   1. Diffuse degenerative changes throughout the lumbar spine with discectomy   and posterior fusion L3 through L5.   2. Probable abdominal aortic aneurysm.                                                 OARRS report[de-identified]   Reviewed today: Consistent     Past Medical History:   Diagnosis Date    Accident caused by farm tractor     Arthritis     CAD (coronary artery disease)     Chronic pain     Clavicle fracture     Concussion with no loss of consciousness     Depression     Diabetes mellitus ([address])     GERD (gastroesophageal reflux disease)     Headache(784.0)     History of blood transfusion     Hyperlipidemia     Kidney stone     Laceration of flexor tendon of hand, [place] \"[place] 10/10/2014    Multiple closed fractures of ribs of left side     Neck pain     Osteoarthritis     Rib

## 2023-09-11 PROBLEM — M53.3 DISORDER OF SACRUM: Status: ACTIVE | Noted: 2023-08-30

## 2023-09-12 ENCOUNTER — HOSPITAL ENCOUNTER (OUTPATIENT)
Dept: OPERATING ROOM | Age: 83
Setting detail: OUTPATIENT SURGERY
Discharge: HOME | End: 2023-09-12
Attending: ANESTHESIOLOGY
Payer: MEDICARE

## 2023-09-12 ENCOUNTER — HOSPITAL ENCOUNTER (OUTPATIENT)
Age: 83
Setting detail: OUTPATIENT SURGERY
Discharge: HOME | End: 2023-09-12
Attending: ANESTHESIOLOGY | Admitting: ANESTHESIOLOGY
Payer: MEDICARE

## 2023-09-12 VITALS
HEART RATE: 71 BPM | DIASTOLIC BLOOD PRESSURE: 69 MMHG | RESPIRATION RATE: 16 BRPM | BODY MASS INDEX: 24.96 KG/M2 | SYSTOLIC BLOOD PRESSURE: 123 MMHG | WEIGHT: 159 LBS | OXYGEN SATURATION: 99 % | HEIGHT: 67 IN

## 2023-09-12 DIAGNOSIS — M46.1 BILATERAL SACROILIITIS (HCC): ICD-10-CM

## 2023-09-12 PROCEDURE — 27096 INJECT SACROILIAC JOINT: CPT | Performed by: ANESTHESIOLOGY

## 2023-09-12 PROCEDURE — 7100000010 HC PHASE II RECOVERY - FIRST 15 MIN: Performed by: ANESTHESIOLOGY

## 2023-09-12 PROCEDURE — 3600000005 HC SURGERY LEVEL 5 BASE: Performed by: ANESTHESIOLOGY

## 2023-09-12 PROCEDURE — 6360000002 HC RX W HCPCS: Performed by: ANESTHESIOLOGY

## 2023-09-12 PROCEDURE — 2500000003 HC RX 250 WO HCPCS: Performed by: ANESTHESIOLOGY

## 2023-09-12 PROCEDURE — 6360000004 HC RX CONTRAST MEDICATION: Performed by: ANESTHESIOLOGY

## 2023-09-12 PROCEDURE — 2709999900 HC NON-CHARGEABLE SUPPLY: Performed by: ANESTHESIOLOGY

## 2023-09-12 PROCEDURE — 7100000011 HC PHASE II RECOVERY - ADDTL 15 MIN: Performed by: ANESTHESIOLOGY

## 2023-09-12 RX ORDER — LIDOCAINE HYDROCHLORIDE 5 MG/ML
INJECTION, SOLUTION INFILTRATION; INTRAVENOUS PRN
Status: DISCONTINUED | OUTPATIENT
Start: 2023-09-12 | End: 2023-09-12 | Stop reason: ALTCHOICE

## 2023-09-12 RX ORDER — SODIUM CHLORIDE 0.9 % (FLUSH) 0.9 %
5-40 SYRINGE (ML) INJECTION PRN
Status: DISCONTINUED | OUTPATIENT
Start: 2023-09-12 | End: 2023-09-12 | Stop reason: HOSPADM

## 2023-09-12 RX ORDER — METHYLPREDNISOLONE ACETATE 40 MG/ML
INJECTION, SUSPENSION INTRA-ARTICULAR; INTRALESIONAL; INTRAMUSCULAR; SOFT TISSUE PRN
Status: DISCONTINUED | OUTPATIENT
Start: 2023-09-12 | End: 2023-09-12 | Stop reason: ALTCHOICE

## 2023-09-12 RX ORDER — BUPIVACAINE HYDROCHLORIDE 2.5 MG/ML
INJECTION, SOLUTION EPIDURAL; INFILTRATION; INTRACAUDAL PRN
Status: DISCONTINUED | OUTPATIENT
Start: 2023-09-12 | End: 2023-09-12 | Stop reason: ALTCHOICE

## 2023-09-12 RX ORDER — SODIUM CHLORIDE 9 MG/ML
INJECTION, SOLUTION INTRAVENOUS PRN
Status: DISCONTINUED | OUTPATIENT
Start: 2023-09-12 | End: 2023-09-12 | Stop reason: HOSPADM

## 2023-09-12 RX ORDER — SODIUM CHLORIDE 0.9 % (FLUSH) 0.9 %
5-40 SYRINGE (ML) INJECTION EVERY 12 HOURS SCHEDULED
Status: DISCONTINUED | OUTPATIENT
Start: 2023-09-12 | End: 2023-09-12 | Stop reason: HOSPADM

## 2023-09-12 ASSESSMENT — PAIN SCALES - GENERAL: PAINLEVEL_OUTOF10: 0

## 2023-09-12 ASSESSMENT — PAIN - FUNCTIONAL ASSESSMENT: PAIN_FUNCTIONAL_ASSESSMENT: 0-10

## 2023-09-12 NOTE — DISCHARGE INSTRUCTIONS
[facility]) Pain Management Department  [phone]   Post-Pain Block/ Radiofrequency Home Going Instructions    1-Go home, rest for the remainder of the day  2-Please do not lift over 20 pounds the day of the injection  3-If you received sedation No: alcohol, driving, operating lawn mowers, plows, tractors or other dangerous equipment until next morning. Do not make important decisions or sign legal documents for 24 hours. You may experience light headedness, dizziness, nausea or sleepiness after sedation. Do not stay alone. A responsible adult must be with you for 24 hours. You could be nauseated from the medications you have received. Your IV site may be sore and bruised. 4-No dietary restrictions     5-Resume all medications the same day, blood thinners to be resumed 24 hours after injection    6-Keep the surgical site clean and dry, you may shower the next morning and remove the      dressing. 7- No sitz baths, tub baths or hot tubs/swimming for 24 hours. 8- If you have any pain at the injection site(s), application of an ice pack to the area should be       helpful, 20 minutes on/20 minutes off for next 48 hours. 9- [facility] at if you develop. Fever greater than 100.4 F  Have bleeding or drainage from the puncture site  Have progressive Leg/arm numbness and or weakness  Loss of control of bowel and or bladder (wet/soil yourself)  Severe headache with inability to lift head  10-You may return to work the next day      Infection After Surgery: Care Instructions  Overview  After surgery, an infection is always possible. It doesn't mean that the surgery didn't go well. Because an infection can be serious, your doctor has taken steps to manage it. Your doctor checked the infection and cleaned it if necessary. Your doctor may have made an opening in the area so that the pus can drain out. You may have gauze in the cut so that the area will stay open and keep draining.

## 2023-09-12 NOTE — INTERVAL H&P NOTE
Update History & Physical    The patient's History and Physical of August 30, 2023 was reviewed with the patient and I examined the patient. There was no change. The surgical site was confirmed by the patient and me. Plan: The risks, benefits, expected outcome, and alternative to the recommended procedure have been discussed with the patient. Patient understands and wants to proceed with the procedure.      Electronically signed by Elmer Pearce MD on 9/12/2023

## 2023-09-12 NOTE — OP NOTE
Operative Note      Patient: Es Barros  YOB: 1940  MRN: [id number]    Date of Procedure: 2023    Pre-Op Diagnosis Codes:     * Disorder of sacrum [M53.3]    Post-Op Diagnosis: Same       Procedure(s):  BILATERAL SACROILIAC JOINT INJECTION UNDER FLUOROSCOPIC GUIDANCE    Surgeon(s):  Kaylie Mchugh MD    Assistant:   * No surgical staff found *    Anesthesia: Local    Estimated Blood Loss (mL): Minimal    Complications: None    Specimens:   * No specimens in log *    Implants:  * No implants in log *      Drains: * No LDAs found *    Findings: good needle placement    Detailed Description of Procedure:   2023    Patient: Es Barros  :  1940  Age:  78 y.o. Sex:  male     PRE-OPERATIVE DIAGNOSIS:     Sacroiliac DYSFUNCTION    POST-OPERATIVE DIAGNOSIS: Same. PROCEDURE:  Fluoroscopic guided Bilateral   sacroiliac joint injection with steroid. SURGEON:  Kaylie Mchugh MD    ANESTHESIA: lOCAL    ESTIMATED BLOOD LOSS: None.  ______________________________________________________________________  BRIEF HISTORY:  Es Barros comes in today for  Bilateral sacroiliac joint injection under fluoroscopic guidance. The potential complications as well as the procedure in detail were explained to him today. He has elected to undergo the aforementioned procedure. Rachid's complete History & Physical examination were reviewed in depth, a copy of which is in the chart. DESCRIPTION OF PROCEDURE:    After confirming written and informed consent, a time-out was performed and Danny name and date of birth, the procedure to be performed as well as the plan for the location of the needle insertion were confirmed. The patient was brought into the procedure room and placed in the prone position on the fluoroscopy table. Standard monitors were placed and vital signs were observed throughout the procedure.  The low back and upper buttocks area was prepped with

## 2023-09-26 ENCOUNTER — OFFICE VISIT (OUTPATIENT)
Dept: SLEEP CENTER | Age: 83
End: 2023-09-26
Payer: MEDICARE

## 2023-09-26 VITALS
BODY MASS INDEX: 24.91 KG/M2 | WEIGHT: 158.73 LBS | HEIGHT: 67 IN | OXYGEN SATURATION: 96 % | HEART RATE: 75 BPM | RESPIRATION RATE: 18 BRPM | SYSTOLIC BLOOD PRESSURE: 114 MMHG | DIASTOLIC BLOOD PRESSURE: 67 MMHG

## 2023-09-26 DIAGNOSIS — Z91.199 NONCOMPLIANCE: ICD-10-CM

## 2023-09-26 DIAGNOSIS — G47.33 OBSTRUCTIVE SLEEP APNEA: Primary | ICD-10-CM

## 2023-09-26 PROCEDURE — G8420 CALC BMI NORM PARAMETERS: HCPCS | Performed by: NURSE PRACTITIONER

## 2023-09-26 PROCEDURE — 4004F PT TOBACCO SCREEN RCVD TLK: CPT | Performed by: NURSE PRACTITIONER

## 2023-09-26 PROCEDURE — 1123F ACP DISCUSS/DSCN MKR DOCD: CPT | Performed by: NURSE PRACTITIONER

## 2023-09-26 PROCEDURE — G8427 DOCREV CUR MEDS BY ELIG CLIN: HCPCS | Performed by: NURSE PRACTITIONER

## 2023-09-26 PROCEDURE — 99213 OFFICE O/P EST LOW 20 MIN: CPT | Performed by: NURSE PRACTITIONER

## 2023-09-26 ASSESSMENT — SLEEP AND FATIGUE QUESTIONNAIRES
HOW LIKELY ARE YOU TO NOD OFF OR FALL ASLEEP WHILE LYING DOWN TO REST IN THE AFTERNOON WHEN CIRCUMSTANCES PERMIT: 0
HOW LIKELY ARE YOU TO NOD OFF OR FALL ASLEEP WHEN YOU ARE A PASSENGER IN A CAR FOR AN HOUR WITHOUT A BREAK: 0
HOW LIKELY ARE YOU TO NOD OFF OR FALL ASLEEP WHILE SITTING QUIETLY AFTER LUNCH WITHOUT ALCOHOL: 0
HOW LIKELY ARE YOU TO NOD OFF OR FALL ASLEEP WHILE SITTING INACTIVE IN A PUBLIC PLACE: 0
HOW LIKELY ARE YOU TO NOD OFF OR FALL ASLEEP IN A CAR, WHILE STOPPED FOR A FEW MINUTES IN TRAFFIC: 0
HOW LIKELY ARE YOU TO NOD OFF OR FALL ASLEEP WHILE SITTING AND READING: 0
HOW LIKELY ARE YOU TO NOD OFF OR FALL ASLEEP WHILE WATCHING TV: 2
HOW LIKELY ARE YOU TO NOD OFF OR FALL ASLEEP WHILE SITTING AND TALKING TO SOMEONE: 0
ESS TOTAL SCORE: 2

## 2023-09-26 NOTE — PROGRESS NOTES
[facility] Sleep Medicine    Patient Name: Delmar Philip  Age: 78 y.o.   : 1940    Date of Visit: 23        Review of Last Visit Summary:    The patient was last seen on 2023 for  Obstructive Sleep Apnea. Interim History:     Delmar Philip is a 78 y.o. male that  has a past medical history of Accident caused by farm tractor, Arthritis, CAD (coronary artery disease), Chronic pain, Clavicle fracture, Concussion with no loss of consciousness, Depression, Diabetes mellitus ([address]), GERD (gastroesophageal reflux disease), Headache(784.0), History of blood transfusion, Hyperlipidemia, Kidney stone, Laceration of flexor tendon of hand, not in \"[place]\" (10/10/2014), Multiple closed fractures of ribs of left side, Neck pain, Osteoarthritis, Rib fracture, SBO (small bowel obstruction) ([address]) (2019), Skin cancer, Thyroid disease, Trauma (2018), and Traumatic hemopneumothorax, initial encounter. He presents in follow up to Sleep Clinic to review CPAP adherence and efficacy. Interval Events:    - Patient not compliant with CPAP, was delivered the wrong mask and just has not been interested  in using CPAP. Feels his sleep is decent with out CPAP and he is rested waking up.   -He is open to try using CPAP with new mask.  -Has bottom dentures, would not be able to tolerate oral appliance. DME:[facility]    Sleep Study History: Split Night Sleep Study:   Study date: 2021  AHI:  66.2 , supine AHI 98,   Oxygen Derick: 81.0%, T<88% was 28%-->titration using CPAP of 11 resulted in AHI of 0.8, and mean O2 of 92%.     Past Medical History:  Past Medical History:   Diagnosis Date    Accident caused by farm tractor     Arthritis     CAD (coronary artery disease)     Chronic pain     Clavicle fracture     Concussion with no loss of consciousness     Depression     Diabetes mellitus ([address])     GERD (gastroesophageal reflux disease)     Headache(784.0)     History of blood transfusion

## 2023-11-07 ENCOUNTER — TELEPHONE (OUTPATIENT)
Dept: PAIN MANAGEMENT | Age: 83
End: 2023-11-07

## 2023-11-07 DIAGNOSIS — M96.1 POSTLAMINECTOMY SYNDROME, LUMBAR: ICD-10-CM

## 2023-11-07 RX ORDER — HYDROCODONE BITARTRATE AND ACETAMINOPHEN 5; 325 MG/1; MG/1
0.5 TABLET ORAL 2 TIMES DAILY PRN
Qty: 30 TABLET | Refills: 0 | Status: SHIPPED | OUTPATIENT
Start: 2023-11-07 | End: 2023-12-07

## 2023-11-07 NOTE — TELEPHONE ENCOUNTER
Meghna called today for her  Rachid requesting a refill of his pain medication. Last filled 8/30/23.OARRS is consistent. Last visit was procedure on 9/12/23, next appt is 12/12/23. Thank you

## 2023-12-12 ENCOUNTER — OFFICE VISIT (OUTPATIENT)
Dept: PAIN MANAGEMENT | Age: 83
End: 2023-12-12
Payer: MEDICARE

## 2023-12-12 ENCOUNTER — PREP FOR PROCEDURE (OUTPATIENT)
Dept: PAIN MANAGEMENT | Age: 83
End: 2023-12-12

## 2023-12-12 VITALS
TEMPERATURE: 97.1 F | RESPIRATION RATE: 18 BRPM | BODY MASS INDEX: 25.43 KG/M2 | WEIGHT: 162 LBS | HEIGHT: 67 IN | SYSTOLIC BLOOD PRESSURE: 120 MMHG | DIASTOLIC BLOOD PRESSURE: 86 MMHG | HEART RATE: 84 BPM | OXYGEN SATURATION: 97 %

## 2023-12-12 DIAGNOSIS — F11.90 CHRONIC, CONTINUOUS USE OF OPIOIDS: ICD-10-CM

## 2023-12-12 DIAGNOSIS — M53.3 SACROILIAC DYSFUNCTION: ICD-10-CM

## 2023-12-12 DIAGNOSIS — M48.061 SPINAL STENOSIS OF LUMBAR REGION, UNSPECIFIED WHETHER NEUROGENIC CLAUDICATION PRESENT: ICD-10-CM

## 2023-12-12 DIAGNOSIS — M47.817 LUMBOSACRAL SPONDYLOSIS WITHOUT MYELOPATHY: Primary | ICD-10-CM

## 2023-12-12 DIAGNOSIS — M51.36 DDD (DEGENERATIVE DISC DISEASE), LUMBAR: ICD-10-CM

## 2023-12-12 DIAGNOSIS — M96.1 POSTLAMINECTOMY SYNDROME, LUMBAR: Primary | ICD-10-CM

## 2023-12-12 DIAGNOSIS — M47.817 LUMBOSACRAL SPONDYLOSIS WITHOUT MYELOPATHY: ICD-10-CM

## 2023-12-12 PROCEDURE — G8417 CALC BMI ABV UP PARAM F/U: HCPCS | Performed by: ANESTHESIOLOGY

## 2023-12-12 PROCEDURE — 99213 OFFICE O/P EST LOW 20 MIN: CPT | Performed by: ANESTHESIOLOGY

## 2023-12-12 PROCEDURE — 99214 OFFICE O/P EST MOD 30 MIN: CPT | Performed by: ANESTHESIOLOGY

## 2023-12-12 PROCEDURE — G8484 FLU IMMUNIZE NO ADMIN: HCPCS | Performed by: ANESTHESIOLOGY

## 2023-12-12 PROCEDURE — 4004F PT TOBACCO SCREEN RCVD TLK: CPT | Performed by: ANESTHESIOLOGY

## 2023-12-12 PROCEDURE — 1123F ACP DISCUSS/DSCN MKR DOCD: CPT | Performed by: ANESTHESIOLOGY

## 2023-12-12 PROCEDURE — G8427 DOCREV CUR MEDS BY ELIG CLIN: HCPCS | Performed by: ANESTHESIOLOGY

## 2023-12-12 RX ORDER — SODIUM CHLORIDE 9 MG/ML
INJECTION, SOLUTION INTRAVENOUS PRN
Status: CANCELLED | OUTPATIENT
Start: 2023-12-12

## 2023-12-12 RX ORDER — SODIUM CHLORIDE 0.9 % (FLUSH) 0.9 %
5-40 SYRINGE (ML) INJECTION PRN
Status: CANCELLED | OUTPATIENT
Start: 2023-12-12

## 2023-12-12 RX ORDER — SODIUM CHLORIDE 0.9 % (FLUSH) 0.9 %
5-40 SYRINGE (ML) INJECTION EVERY 12 HOURS SCHEDULED
Status: CANCELLED | OUTPATIENT
Start: 2023-12-12

## 2023-12-12 RX ORDER — HYDROCODONE BITARTRATE AND ACETAMINOPHEN 5; 325 MG/1; MG/1
0.5 TABLET ORAL 2 TIMES DAILY PRN
Qty: 30 TABLET | Refills: 0 | Status: SHIPPED | OUTPATIENT
Start: 2023-12-12 | End: 2024-01-11

## 2023-12-12 NOTE — H&P (VIEW-ONLY)
[facility]  [address], [place] [address]  [phone]    Follow up Note      Rachid Forde     Date of Visit:  12/12/2023    CC:    Chief Complaint   Patient presents with    Back Pain     HPI:  83 y.o.  pleasant gentleman with prior H/o cervical spine surgery- ACDF C3-7.    He has undergone X 2 lumbar spine surgery in the past.     Recent Lumbar spine surgery by Dr. Benitez in April 2022 at [facility]- L3-5 interbody fusion, posterior instrumented fusion L3-5, Laminectomy L3-S1.    Currently having low back pain in the lower lumbar area. Intermittent LE symptoms but back pain predominant.    Medication help with pain and functionality, no side effects, no signs of misuse abuse or diversion, he is compliant with medication use.    Nursing notes and details of the pain history reviewed. Please see intake notes for details.    Prior treatment;  PT/ HEP  Medications  Interventions  Surgery     He is on ASA-81 mg. H/o CAD s/p stent.      Imaging studies:  MRI of LS spine: 4/25/2023:      Xray LS spine: 10/12/2022:  Impression   1. Diffuse degenerative changes throughout the lumbar spine with discectomy   and posterior fusion L3 through L5.   2. Probable abdominal aortic aneurysm.                                                OARRS report::   Reviewed today: Consistent     Past Medical History:   Diagnosis Date    Accident caused by farm tractor     Arthritis     CAD (coronary artery disease)     Chronic pain     Clavicle fracture     Concussion with no loss of consciousness     Depression     Diabetes mellitus (HCC)     GERD (gastroesophageal reflux disease)     Headache(784.0)     History of blood transfusion     Hyperlipidemia     Kidney stone     Laceration of flexor tendon of hand, not [place] \"[place]\" 10/10/2014    Multiple closed fractures of ribs of left side     Neck pain     Osteoarthritis     Rib fracture     SBO (small bowel obstruction)

## 2023-12-13 LAB
6-MONOACETYLMORPHINE, URINE: NEGATIVE
ABNORMAL SPECIMEN VALIDITY TEST: ABNORMAL
ALCOHOL URINE: NOT DETECTED MG/DL
AMPHETAMINE SCREEN URINE: NEGATIVE
BARBITURATE SCREEN URINE: NEGATIVE
BENZODIAZEPINE SCREEN, URINE: NEGATIVE
BUPRENORPHINE URINE: NEGATIVE
CANNABINOID SCREEN URINE: NEGATIVE
COCAINE METABOLITE, URINE: NEGATIVE
FENTANYL URINE: NEGATIVE
INTEGRITY CHECK, CREATININE, URINE: 110.1 MG/DL (ref 22–250)
INTEGRITY CHECK, OXIDANT, URINE: 53 MG/L
INTEGRITY CHECK, PH, URINE: 5.3 (ref 4.5–9)
INTEGRITY CHECK, SPECIFIC GRAVITY, URINE: 1.02 (ref 1–1.03)
METHADONE SCREEN, URINE: NEGATIVE
OPIATES, URINE: POSITIVE
OXYCODONE SCREEN URINE: NEGATIVE
PHENCYCLIDINE, URINE: NEGATIVE
TEST INFORMATION: ABNORMAL
TRAMADOL, URINE: NEGATIVE

## 2023-12-15 LAB
6-MAM, QUANTITATIVE, URINE: <10 NG/ML
7-AMINOCLONAZEPAM, QUANTITATIVE, URINE: <50 NG/ML
ALPHA-HYDROXYALPRAZOLAM, QUANTITATIVE, URINE: <50 NG/ML
ALPHA-HYDROXYMIDAZOLAM, QUANTITATIVE, URINE: <50 NG/ML
ALPHA-HYDROXYTRIAZOLAM, QUANTITATIVE, URINE: <50 NG/ML
ALPRAZOLAM URINE QUANT: <50 NG/ML
CHLORDIAZEPOXIDE, QUANTITATIVE, URINE: <50 NG/ML
CLONAZEPAM, QUANTITATIVE, URINE: <50 NG/ML
CODEINE, QUANTITATIVE, URINE: <50 NG/ML
COMPLIANCE DRUG ANALYSIS, URINE: NORMAL
DIAZEPAM URINE QUANT: <50 NG/ML
FLUNITRAZEPAM, QUANTITATIVE, URINE: <50 NG/ML
FLURAZEPAM, QUANTITATIVE, URINE: <50 NG/ML
HYDROCODONE, QUANTITATIVE, URINE: 284.8 NG/ML
HYDROMORPHONE, QUANTITATIVE, URINE: 50.6 NG/ML
LORAZEPAM URINE QUANT: <50 NG/ML
MIDAZOLAM URINE QUANT: <50 NG/ML
MORPHINE, QUANTITATIVE, URINE: <50 NG/ML
NORDIAZEPAM URINE QUANT: <50 NG/ML
NORHYDROCODONE, QUANTITATIVE, URINE: 401.7 NG/ML
NOROXYCODONE, QUANTITATIVE, URINE: <50 NG/ML
OXAZEPAM URINE QUANT: <50 NG/ML
OXYCODONE URINE, QUANTITATIVE: <50 NG/ML
OXYMORPHONE, QUANTITATIVE, URINE: <50 NG/ML
TEMAZEPAM, QUANTITATIVE, URINE: <50 NG/ML

## 2024-01-03 ENCOUNTER — TELEPHONE (OUTPATIENT)
Dept: PAIN MANAGEMENT | Age: 84
End: 2024-01-03

## 2024-01-03 DIAGNOSIS — M47.816 LUMBAR SPONDYLOSIS: ICD-10-CM

## 2024-01-03 NOTE — TELEPHONE ENCOUNTER
Call to Rachid MAKAYLA Maurisio that procedure was approved for 1/9/2024 and that [facility] should call him a few days before for the pre op call and after 3:00 PM the business day before with the arrival time. Instructed Rachid to hold ibuprofen for 24 hours, naprosyn for 4 days and any aspirin containing products or fish oil for 3 days. Instructed to call office back if any questions. Rachid verbalized understanding.    Electronically signed by Ricco Caballero RN on 1/3/2024 at 10:57 AM

## 2024-01-04 PROBLEM — F32.0 MAJOR DEPRESSIVE DISORDER, SINGLE EPISODE, MILD (HCC): Status: RESOLVED | Noted: 2023-03-23 | Resolved: 2024-01-04

## 2024-01-04 PROBLEM — N18.30 STAGE 3 CHRONIC KIDNEY DISEASE (HCC): Status: RESOLVED | Noted: 2020-06-01 | Resolved: 2024-01-04

## 2024-01-04 PROBLEM — N18.32 STAGE 3B CHRONIC KIDNEY DISEASE (HCC): Status: ACTIVE | Noted: 2024-01-04

## 2024-01-09 ENCOUNTER — HOSPITAL ENCOUNTER (OUTPATIENT)
Dept: OPERATING ROOM | Age: 84
Setting detail: OUTPATIENT SURGERY
Discharge: HOME | End: 2024-01-09
Attending: ANESTHESIOLOGY
Payer: MEDICARE

## 2024-01-09 ENCOUNTER — HOSPITAL ENCOUNTER (OUTPATIENT)
Age: 84
Setting detail: OUTPATIENT SURGERY
Discharge: HOME | End: 2024-01-09
Attending: ANESTHESIOLOGY | Admitting: ANESTHESIOLOGY
Payer: MEDICARE

## 2024-01-09 VITALS
WEIGHT: 162 LBS | DIASTOLIC BLOOD PRESSURE: 91 MMHG | SYSTOLIC BLOOD PRESSURE: 169 MMHG | RESPIRATION RATE: 16 BRPM | TEMPERATURE: 97.3 F | HEART RATE: 77 BPM | HEIGHT: 67 IN | BODY MASS INDEX: 25.43 KG/M2 | OXYGEN SATURATION: 97 %

## 2024-01-09 DIAGNOSIS — M47.816 LUMBAR SPONDYLOSIS: ICD-10-CM

## 2024-01-09 PROCEDURE — 7100000010 HC PHASE II RECOVERY - FIRST 15 MIN: Performed by: ANESTHESIOLOGY

## 2024-01-09 PROCEDURE — 6360000002 HC RX W HCPCS: Performed by: ANESTHESIOLOGY

## 2024-01-09 PROCEDURE — 2709999900 HC NON-CHARGEABLE SUPPLY: Performed by: ANESTHESIOLOGY

## 2024-01-09 PROCEDURE — 2500000003 HC RX 250 WO HCPCS: Performed by: ANESTHESIOLOGY

## 2024-01-09 PROCEDURE — 3600000015 HC SURGERY LEVEL 5 ADDTL 15MIN: Performed by: ANESTHESIOLOGY

## 2024-01-09 PROCEDURE — 7100000011 HC PHASE II RECOVERY - ADDTL 15 MIN: Performed by: ANESTHESIOLOGY

## 2024-01-09 PROCEDURE — 3600000005 HC SURGERY LEVEL 5 BASE: Performed by: ANESTHESIOLOGY

## 2024-01-09 PROCEDURE — 64635 DESTROY LUMB/SAC FACET JNT: CPT | Performed by: ANESTHESIOLOGY

## 2024-01-09 RX ORDER — SODIUM CHLORIDE 0.9 % (FLUSH) 0.9 %
5-40 SYRINGE (ML) INJECTION EVERY 12 HOURS SCHEDULED
Status: DISCONTINUED | OUTPATIENT
Start: 2024-01-09 | End: 2024-01-09 | Stop reason: HOSPADM

## 2024-01-09 RX ORDER — SODIUM CHLORIDE 9 MG/ML
INJECTION, SOLUTION INTRAVENOUS PRN
Status: DISCONTINUED | OUTPATIENT
Start: 2024-01-09 | End: 2024-01-09 | Stop reason: HOSPADM

## 2024-01-09 RX ORDER — METHYLPREDNISOLONE ACETATE 40 MG/ML
INJECTION, SUSPENSION INTRA-ARTICULAR; INTRALESIONAL; INTRAMUSCULAR; SOFT TISSUE PRN
Status: DISCONTINUED | OUTPATIENT
Start: 2024-01-09 | End: 2024-01-09 | Stop reason: ALTCHOICE

## 2024-01-09 RX ORDER — LIDOCAINE HYDROCHLORIDE 10 MG/ML
INJECTION, SOLUTION EPIDURAL; INFILTRATION; INTRACAUDAL; PERINEURAL PRN
Status: DISCONTINUED | OUTPATIENT
Start: 2024-01-09 | End: 2024-01-09 | Stop reason: ALTCHOICE

## 2024-01-09 RX ORDER — LIDOCAINE HYDROCHLORIDE 5 MG/ML
INJECTION, SOLUTION INFILTRATION; INTRAVENOUS PRN
Status: DISCONTINUED | OUTPATIENT
Start: 2024-01-09 | End: 2024-01-09 | Stop reason: ALTCHOICE

## 2024-01-09 RX ORDER — BUPIVACAINE HYDROCHLORIDE 2.5 MG/ML
INJECTION, SOLUTION EPIDURAL; INFILTRATION; INTRACAUDAL PRN
Status: DISCONTINUED | OUTPATIENT
Start: 2024-01-09 | End: 2024-01-09 | Stop reason: ALTCHOICE

## 2024-01-09 RX ORDER — SODIUM CHLORIDE 0.9 % (FLUSH) 0.9 %
5-40 SYRINGE (ML) INJECTION PRN
Status: DISCONTINUED | OUTPATIENT
Start: 2024-01-09 | End: 2024-01-09 | Stop reason: HOSPADM

## 2024-01-09 ASSESSMENT — PAIN - FUNCTIONAL ASSESSMENT
PAIN_FUNCTIONAL_ASSESSMENT: 0-10
PAIN_FUNCTIONAL_ASSESSMENT: 0-10

## 2024-01-09 ASSESSMENT — PAIN DESCRIPTION - DESCRIPTORS: DESCRIPTORS: ACHING

## 2024-01-09 NOTE — DISCHARGE INSTRUCTIONS
[facility] Management Department  [phone]   Post-Pain Block/ Radiofrequency Home Going Instructions    1-Go home, rest for the remainder of the day  2-Please do not lift over 20 pounds the day of the injection  3-If you received sedation No: alcohol, driving, operating lawn mowers, plows, tractors or other dangerous equipment until next morning. Do not make important decisions or sign legal documents for 24 hours. You may experience light headedness, dizziness, nausea or sleepiness after sedation. Do not stay alone. A responsible adult must be with you for 24 hours. You could be nauseated from the medications you have received. Your IV site may be sore and bruised.    4-No dietary restrictions     5-Resume all medications the same day, blood thinners to be resumed 24 hours after injection    6-Keep the surgical site clean and dry, you may shower the next morning and remove the      dressing.     7- No sitz baths, tub baths or hot tubs/swimming for 24 hours.       8- If you have any pain at the injection site(s), application of an ice pack to the area should be       helpful, 20 minutes on/20 minutes off for next 48 hours.  9- Call [facility] pain management immediately at if you develop.  Fever greater than 100.4 F  Have bleeding or drainage from the puncture site  Have progressive Leg/arm numbness and or weakness  Loss of control of bowel and or bladder (wet/soil yourself)  Severe headache with inability to lift head  10-You may return to work the next day

## 2024-01-09 NOTE — INTERVAL H&P NOTE
Update History & Physical    The patient's History and Physical of December 12, 2023 was reviewed with the patient and I examined the patient. There was no change. The surgical site was confirmed by the patient and me.     Plan: The risks, benefits, expected outcome, and alternative to the recommended procedure have been discussed with the patient. Patient understands and wants to proceed with the procedure.     Electronically signed by Clay Rangel MD on 1/9/2024 at 10:36 AM

## 2024-01-09 NOTE — OP NOTE
Operative Note      Patient: Rachid Forde  YOB: 1940  MRN: [id number]    Date of Procedure: 2024    Pre-Op Diagnosis Codes:     * Lumbar spondylosis [M47.816]    Post-Op Diagnosis: Same       Procedure(s):  BILATERAL LUMBAR RADIOFREQUENCY ABLATION AT L5-S1 UNDER FLUOROSCOPY    Surgeon(s):  Clay Rangel MD    Assistant:   * No surgical staff found *    Anesthesia: Local    Estimated Blood Loss (mL): Minimal    Complications: None    Specimens:   * No specimens in log *    Implants:  * No implants in log *      Drains: * No LDAs found *    Findings: good needle placement        Detailed Description of Procedure:   2024    Patient: Rachid Forde  :  1940  Age:  83 y.o.  Sex:  male     PRE-OPERATIVE DIAGNOSIS:  Lumbar spondylosis, lumbar facet arthropathy.    POST-OPERATIVE DIAGNOSIS: Same.    PROCEDURE:  Fluoroscopic-guided Bilateral  Lumbar facet medial branch radiofrequency ablation at levels L5-S1.    SURGEON:  Clay Rangel MD    ANESTHESIA: Local    ESTIMATED BLOOD LOSS: None.  ______________________________________________________________________  HISTORY & PHYSICAL: Rachid Forde presents today for fluoroscopic-guided Bilateral lumbar facet medial branch radiofrequency ablation at levels L5-S1. The potential complications of the procedure were explained to Rachid again today and he has elected to undergo the aforementioned procedure.    Rachid’s complete History & Physical examination were reviewed in depth, a copy of which is in the chart.      DESCRIPTION OF PROCEDURE:    After confirming written and informed consent, a time-out was performed and Rachid’s name and date of birth, the procedure to be performed as well as the plan for the location of the needle insertion were confirmed.    The patient was brought into the procedure room and placed in the prone position on the fluoroscopy table. Standard monitors were placed and vital signs were observed

## 2024-02-12 ENCOUNTER — TELEPHONE (OUTPATIENT)
Dept: PAIN MANAGEMENT | Age: 84
End: 2024-02-12

## 2024-02-12 DIAGNOSIS — M96.1 POSTLAMINECTOMY SYNDROME, LUMBAR: ICD-10-CM

## 2024-02-12 RX ORDER — HYDROCODONE BITARTRATE AND ACETAMINOPHEN 5; 325 MG/1; MG/1
0.5 TABLET ORAL 2 TIMES DAILY PRN
Qty: 30 TABLET | Refills: 0 | Status: SHIPPED | OUTPATIENT
Start: 2024-02-12 | End: 2024-03-13

## 2024-02-12 NOTE — TELEPHONE ENCOUNTER
Rachid's wife called requesting a refill for him of Norco. Last filled 12/12/23, OARRS is consistent. Last office visit 12/12/23, next appt. Is 3/29/24. Thank you

## 2024-03-04 NOTE — PROGRESS NOTES
fell last month (June) and had injured the right shoulder/ clavicle- was evaluated by Dr. Lana Lawler from ortho and was diagnosed with right clavicle contusion/ chest wall contusion. Managed conservatively. He takes Hydrocodone prn  Takes this only on PRN basis for severe pain. Antoine Sanches is no signs of misuse abuse or diversion.  Patient states medications help his pain. He has reduced his medication requirement since we had started interventional procedures. Currently taking once a day but needing more due to increased pain. Prior pain history and notes reviewed. NSAID's: Yes prn.     Anticoagulants: Yes ASA-81 mg      Imaging studies:  Left Shoulder: 6/14/2020:      Impression   No acute osseous abnormality.       Degenerative changes as above.      Xr Lumbar spine: 6/2020: Impression   Diffuse osteopenia with moderate to advanced degenerative changes of the   lumbar spine, as above.  No convincing evidence of an acute fracture.      CT cervical spine: 6/14/2020:      FINDINGS:   BONES/ALIGNMENT: No evidence of cervical fracture or traumatic subluxation. Stable T3 wedge compression.  There has been fusion of C3 through C7.  There   is an anterior plate with screws at C3, C4, and C7.  There is a bone block   graft at C3-C4.  There is a block graft extending from C4 through C7.  The   hardware and grafts are intact.       DEGENERATIVE CHANGES: Degenerative change at C1-C.  Degenerative disc disease   C7-T1.  Diffuse facet osteoarthritis with stable several mm anterolisthesis   of C7.       SOFT TISSUES: No prevertebral soft tissue swelling.  Maxillary sinusitis   changes noted           Impression   No acute abnormality of the cervical spine.          Xray thoracic spine: 6/14/2020:      Impression   Limited examination, as described above.  Within these limitations, no   convincing evidence of an acute fracture of the thoracic spine.             X ray LS spine: 3/2018:      Lumbar spine:    There is slight levoconvex curvature with normal lumbar lordosis. Vertebral bodies maintain normal height. There is trace retrolisthesis   of L2 on L3 and L3 on L4. There is trace anterolisthesis of L4 on L5. There is multilevel intervertebral disc space narrowing with   hypertrophic endplate changes, most pronounced at L4-L5. No acute   fractures identified. There is lower lumbar facet arthrosis.       Sacrum and coccyx:   Overlying stool obscures the and coccyx on the frontal projections. No   acute displaced sacral or coccygeal fractures identified. Bilateral   sacroiliac joints and the pubic symphysis are maintained. There is   lower lumbar spondylosis.         Impression       Lumbar spine: Moderate multilevel lumbar spondylosis.       Sacrum and coccyx:   Unremarkable sacrum and coccyx radiographs.         CT Lumbar Spine: 8/2018:      Findings: There are bilateral pars defects noted at L4. There is a   spondylolisthesis of L4 on L5. There is wedging of T12 which may be physiologic   Changes seen throughout the discs are abnormal. There are findings to   suggest  degenerative disc disease.       Changes seen throughout the bone marrow are abnormal. Findings are   compatible with degenerative disc disease       Changes within the facets are abnormal. Findings are compatible with   degenerative facet disease.           At L5-S1: There is a mild broad-based disc herniation or bony   spurring. There is a laminectomy defect. There is facet hypertrophy.    There is mild narrowing of the neural foramina       At L4-5: There is a moderate broad-based disc herniation there is   moderate stenosis       At L3-4: There is a large broad-based disc herniation, there is severe   canal stenosis.       At L2-3: There is a mild broad-based disc herniation, there is mild   canal stenosis       At L1-2: There is a mild broad-based disc herniation, there is mild   canal stenosis               Impression   Findings compatible with degenerative changes   Bilateral L4 pars defect   Severe canal stenosis at L3-4 and moderate canal stenosis at L4-5                                            Potential Aberrant Drug-Related Behavior:  No     Urine Drug Screening: no     OARRS report[de-identified]   yes 8/27/2019: consistent  Yes: 10/9/2019: consistent-getting his meds from PCP  11/20/2019: consistent  1/8/2019: consistent   2/12/2020: last script for hydrocodone 10/325 filled on 7/3/2019 for # 90 tabs. Given by Dr. Naya Belle MD     6/17/2020: consistent- last filled on 5/8/2020  7/15/2020: Consistent- # 30 tabs last filled on 6/17/2020    Past Medical History: Reviewed    Past Surgical History: Reviewed     Home Medications: Reviewed    Allergies: Reviewed     Social History: Reviewed     REVIEW OF SYSTEMS:     Mark Guerra denies fever/chills, chest pain, shortness of breath, new bowel or bladder complaints. All other review of systems was negative. PHYSICAL EXAMINATION:    Deferred due to virtual phone visit. Patient appears A & O X 3      Assessment/Plan:   Diagnosis Orders   1. Cervical radiculopathy      2. Chronic pain syndrome      3. Cervical facet syndrome      4. Postlaminectomy syndrome, lumbar      5. Lumbosacral spondylosis without myelopathy      6. DDD (degenerative disc disease), lumbar           78 y.o. pleasant gentleman with prior H/o Cervical spine surgery- ACDF C3-7 having neck pain with features of myofacial pain and also facet pain.     Prior TPI and occipital block - helped significantly. He has finished PT and continues HEP.     He has history of chronic low back pain.  Has H/o prior lumbar spine surgery in the 1980's.      He has failed conservative treatment with meds/ PT.     S/P Bilateral lumbar facet MB RFA at L3, L4 MB & L5 DR on 10/29/2019 with excellent pain relief.     He is on ASA-81 mg. H/o CAD s/p stent.      He got a prescription for compound cream with a combination of diclofenac 3%, gabapentin 6%, baclofen 2%, lidocaine 5%, doxepin 5%.     He takes Norco only prn for severe pain. There is no signs of misuse abuse or diversion. He states that helps of his pain. The OARRS reviewed- consistent.     Detailed discussion about the salient features of opioid agreement done today.      Also discussed about the appropriate medication use, side effects of chronic opioid use, phenomenon of tolerance / Dependence.     Will prescribe Norco 5/325 1 tab po prn. Recent clavicle injury- ortho notes reviewed. Will increase Norco to 1 tab po bid prn for few weeks. Gave script for # 60 tabs given. Dosing and side effects explained. Will plan to reduce to once a day from next month once the injury heals. He will be having LESI in the coming weeks.     Counseling regarding the importance of regular exercise program and appropriate medication usage done. He was instructed to reduce the medication to the lowest minimal and to take only on as necessary basis for severe pain.      Baseline Drug screen-in follow up visit. We discussed with the patient that combining opioids, benzodiazepines, alcohol, illicit drugs or sleep aids increases the risk of respiratory depression including death. We discussed that these medications may cause drowsiness, sedation or dizziness and have counseled the patient not to drive or operate machinery. We have discussed that these medications will be prescribed only by one provider. We have discussed with the patient about age related risk factors and have thoroughly discussed the importance of taking these medications as prescribed. The patient verbalizes understanding. Patient advised regarding steps to help prevent the spread of COVID-19   SOURCE - https://deacon-carla.info/. html     1-Stay home except to get medical care  2-Clean your hands often for at least 20 seconds, avoid touching: Avoid touching your eyes, nose, and mouth with unwashed hands.   3-Seek medical attention: Seek prompt medical attention if your illness is worsening (e.g., difficulty breathing). Call you doctor first.  3-Wear a facemask if you are sick   4-Cover your coughs and sneezes        I affirm this is a Patient Initiated Episode with an Established Patient who has not had a related appointment within my department in the past 7 days or scheduled within the next 24 hours. Total Time: minutes: 11-20 minutes including counseling / coordination of care and documentation.     Feli Saleh MD patient

## 2024-04-02 ENCOUNTER — OFFICE VISIT (OUTPATIENT)
Dept: PAIN MANAGEMENT | Age: 84
End: 2024-04-02
Payer: MEDICARE

## 2024-04-02 VITALS
RESPIRATION RATE: 18 BRPM | WEIGHT: 166 LBS | OXYGEN SATURATION: 97 % | DIASTOLIC BLOOD PRESSURE: 70 MMHG | SYSTOLIC BLOOD PRESSURE: 132 MMHG | HEIGHT: 67 IN | HEART RATE: 68 BPM | TEMPERATURE: 97.7 F | BODY MASS INDEX: 26.06 KG/M2

## 2024-04-02 DIAGNOSIS — M51.36 DDD (DEGENERATIVE DISC DISEASE), LUMBAR: ICD-10-CM

## 2024-04-02 DIAGNOSIS — M96.1 POSTLAMINECTOMY SYNDROME, LUMBAR: Primary | ICD-10-CM

## 2024-04-02 DIAGNOSIS — M48.061 SPINAL STENOSIS OF LUMBAR REGION, UNSPECIFIED WHETHER NEUROGENIC CLAUDICATION PRESENT: ICD-10-CM

## 2024-04-02 DIAGNOSIS — F11.90 CHRONIC, CONTINUOUS USE OF OPIOIDS: ICD-10-CM

## 2024-04-02 DIAGNOSIS — M47.817 LUMBOSACRAL SPONDYLOSIS WITHOUT MYELOPATHY: ICD-10-CM

## 2024-04-02 PROCEDURE — G8417 CALC BMI ABV UP PARAM F/U: HCPCS | Performed by: ANESTHESIOLOGY

## 2024-04-02 PROCEDURE — G8427 DOCREV CUR MEDS BY ELIG CLIN: HCPCS | Performed by: ANESTHESIOLOGY

## 2024-04-02 PROCEDURE — 4004F PT TOBACCO SCREEN RCVD TLK: CPT | Performed by: ANESTHESIOLOGY

## 2024-04-02 PROCEDURE — 99214 OFFICE O/P EST MOD 30 MIN: CPT | Performed by: ANESTHESIOLOGY

## 2024-04-02 PROCEDURE — 99213 OFFICE O/P EST LOW 20 MIN: CPT | Performed by: ANESTHESIOLOGY

## 2024-04-02 PROCEDURE — 1123F ACP DISCUSS/DSCN MKR DOCD: CPT | Performed by: ANESTHESIOLOGY

## 2024-04-02 RX ORDER — HYDROCODONE BITARTRATE AND ACETAMINOPHEN 5; 325 MG/1; MG/1
0.5 TABLET ORAL 2 TIMES DAILY PRN
Qty: 30 TABLET | Refills: 0 | Status: SHIPPED | OUTPATIENT
Start: 2024-04-02 | End: 2024-05-02
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
## 2024-04-02 NOTE — PROGRESS NOTES
Rachid Forde presents to the [facility] on 4/2/2024. Rachid is complaining of pain in his lower back that radiates to RLE. The pain is constant. The pain is described as aching and shooting. Pain is rated on his best day at a 4, on his worst day at a 10, and on average at a 5 on the VAS scale. He took his last dose of Motrin and Tylenol a few days ago, Norco 1 week ago.    Any procedures since your last visit: Yes, with 70 % relief. Pt states pain is back to baseline, prior to procedure.    He is  on NSAIDS and  is  on anticoagulation medications to include ASA and is managed by Arnaud Arreola DO  .     Pacemaker or defibrillator: No     Medication Contract and Consent for Opioid Use Documents Filed       Patient Documents       Type of Document Status Date Received Received By Description    Medication Contract Received 3/15/2019  1:18 PM AALIYAH HOPE PAIN MANAGEMENT PT AGREEMENT 3/15/2019    Medication Contract Received 2/12/2020  3:12 PM LAURA RIBERA pain management patient agreement 02/12/2020    Medication Contract Received 9/1/2021 10:54 AM ZOË ROBERSON PAIN AGREEMENT SEPTEMBER 2021    Medication Contract Received 12/12/2023  2:44 PM ASYA FERNANDO Pain Mgmt Patient Agreement 12.12.23                       Temp 97.7 °F (36.5 °C) (Infrared)   Resp 18   Ht 1.702 m (5' 7.01\")   Wt 75.3 kg (166 lb)   BMI 25.99 kg/m²      No LMP for male patient.

## 2024-04-29 ENCOUNTER — COMMUNITY CARE MANAGEMENT (OUTPATIENT)
Facility: CLINIC | Age: 84
End: 2024-04-29

## 2024-04-30 ENCOUNTER — OFFICE VISIT (OUTPATIENT)
Dept: PAIN MANAGEMENT | Age: 84
End: 2024-04-30
Payer: MEDICARE

## 2024-04-30 VITALS
HEIGHT: 67 IN | OXYGEN SATURATION: 96 % | SYSTOLIC BLOOD PRESSURE: 112 MMHG | RESPIRATION RATE: 18 BRPM | HEART RATE: 82 BPM | DIASTOLIC BLOOD PRESSURE: 64 MMHG | BODY MASS INDEX: 26.68 KG/M2 | TEMPERATURE: 97.5 F | WEIGHT: 170 LBS

## 2024-04-30 DIAGNOSIS — M48.061 SPINAL STENOSIS OF LUMBAR REGION, UNSPECIFIED WHETHER NEUROGENIC CLAUDICATION PRESENT: ICD-10-CM

## 2024-04-30 DIAGNOSIS — M51.36 DDD (DEGENERATIVE DISC DISEASE), LUMBAR: ICD-10-CM

## 2024-04-30 DIAGNOSIS — M96.1 POSTLAMINECTOMY SYNDROME, LUMBAR: Primary | ICD-10-CM

## 2024-04-30 DIAGNOSIS — M47.817 LUMBOSACRAL SPONDYLOSIS WITHOUT MYELOPATHY: ICD-10-CM

## 2024-04-30 PROCEDURE — 99213 OFFICE O/P EST LOW 20 MIN: CPT | Performed by: ANESTHESIOLOGY

## 2024-04-30 PROCEDURE — G8417 CALC BMI ABV UP PARAM F/U: HCPCS | Performed by: ANESTHESIOLOGY

## 2024-04-30 PROCEDURE — 99214 OFFICE O/P EST MOD 30 MIN: CPT | Performed by: ANESTHESIOLOGY

## 2024-04-30 PROCEDURE — G8427 DOCREV CUR MEDS BY ELIG CLIN: HCPCS | Performed by: ANESTHESIOLOGY

## 2024-04-30 PROCEDURE — 4004F PT TOBACCO SCREEN RCVD TLK: CPT | Performed by: ANESTHESIOLOGY

## 2024-04-30 PROCEDURE — 1123F ACP DISCUSS/DSCN MKR DOCD: CPT | Performed by: ANESTHESIOLOGY

## 2024-04-30 RX ORDER — HYDROCODONE BITARTRATE AND ACETAMINOPHEN 5; 325 MG/1; MG/1
0.5 TABLET ORAL EVERY 8 HOURS PRN
Qty: 45 TABLET | Refills: 0 | Status: SHIPPED | OUTPATIENT
Start: 2024-04-30 | End: 2024-05-30

## 2024-04-30 RX ORDER — ATORVASTATIN CALCIUM 40 MG/1
40 TABLET, FILM COATED ORAL DAILY
COMMUNITY
Start: 2024-04-18 | End: 2025-04-18

## 2024-04-30 NOTE — PROGRESS NOTES
Rachid Forde presents to the [facility] on 4/30/2024. Rachid is complaining of pain in his lower back that radiates to RLE. The pain is constant. The pain is described as aching and shooting. Pain is rated on his best day at a 4, on his worst day at a 10, and on average at a 5 on the VAS scale. He took his last dose of Norco, Motrin and Tylenol today.      Any procedures since your last visit: No    He is  on NSAIDS and  is  on anticoagulation medications to include ASA and is managed by Arnaud Arreola DO  .     Pacemaker or defibrillator: No     Medication Contract and Consent for Opioid Use Documents Filed       Patient Documents       Type of Document Status Date Received Received By Description    Medication Contract Received 3/15/2019  1:18 PM AALIYAH HOPE PAIN MANAGEMENT PT AGREEMENT 3/15/2019    Medication Contract Received 2/12/2020  3:12 PM LAURA RIBERA pain management patient agreement 02/12/2020    Medication Contract Received 9/1/2021 10:54 AM ZOË ROBERSON PAIN AGREEMENT SEPTEMBER 2021    Medication Contract Received 12/12/2023  2:44 PM ASYA FERNANDO Pain Mgmt Patient Agreement 12.12.23                       /64   Pulse 82   Temp 97.5 °F (36.4 °C) (Infrared)   Resp 18   Ht 1.702 m (5' 7.01\")   Wt 77.1 kg (170 lb)   SpO2 96%   BMI 26.62 kg/m²      No LMP for male patient.  
and have counseled the patient not to drive or operate machinery. We have discussed that these medications will be prescribed only by one provider. We have discussed with the patient about age related risk factors and have thoroughly discussed the importance of taking these medications as prescribed. The patient verbalizes understanding.    Clay Rangel MD    CC:  Arnaud Arreola DO    NOTE: The above documentation was prepared using voice recognition software.  Every attempt was made to ensure accuracy but there may be spelling, grammatical, and contextual errors.

## 2024-05-17 ENCOUNTER — COMMUNITY CARE MANAGEMENT (OUTPATIENT)
Facility: CLINIC | Age: 84
End: 2024-05-17

## 2024-06-02 NOTE — OP NOTE
Operative Note      Patient: Adelso Walters  YOB: 1940  MRN: [id number]    Date of Procedure: 2020    Pre-Op Diagnosis: LUMBAR RADICULOPATHY    Post-Op Diagnosis: Same       Procedure(s):  LUMBAR EPIDURAL STEROID INJECTION L4-L5 X-RAY    Surgeon(s):  Darius Alberts MD    Assistant:   * No surgical staff found *    Anesthesia: Local    Estimated Blood Loss (mL): Minimal    Complications: None    Specimens:   * No specimens in log *    Implants:  * No implants in log *      Drains: * No LDAs found *    Findings: good needle placement    Detailed Description of Procedure:   2020    Patient: Adelso Walters  :  1940  Age:  78 y.o. Sex:  male     PRE-OPERATIVE DIAGNOSIS: Lumbar disc displacement, lumbar radiculopathy. POST-OPERATIVE DIAGNOSIS: Same. PROCEDURE: Fluoroscopic guided therapeutic lumbar epidural steroid injection at the L4-5 level . SURGEON: Darius Alberts MD    ANESTHESIA: Local    ESTIMATED BLOOD LOSS: None.  ______________________________________________________________________    BRIEF HISTORY:  Adelso Walters comes in today for  lumbar epidural injection at L4-5 level. The potential complications of this procedure were discussed with him again today. He has elected to undergo the aforementioned procedure. Tuan complete History & Physical examination were reviewed in depth, a copy of which is in the chart. DESCRIPTION OF PROCEDURE:    After confirming written and informed consent, a time-out was performed and Tuan name and date of birth, the procedure to be performed as well as the plan for the location of the needle insertion were confirmed. The patient was brought into the procedure room and placed in the prone position on the fluoroscopy table. A pillow was placed under the patient's lower abdomen/upper pelvis to increase lumbar interlaminar space.  Standard monitors were placed, and vital signs were observed throughout the procedure. The area of the lumbar spine was prepped with chloraprep and draped in a sterile manner. The L4-5 interspace was identified and marked under AP fluoroscopy. The skin and subcutaneous tissues at the above level were anesthestized with 0.5% lidocaine. With intermittent fluoroscopy, an # 18 gauge 3 1/2 tuohy epidural needle was inserted and directed toward the interlaminar space. The needle was slowly advanced using loss of resistance technique and 5 cc glass syringe  until the tip of the epidural needle has passed through the ligamentum flavum and entered the epidural space. AP and lateral fluoroscopic imaging is performed to verify that the epidural needle is properly placed. Negative aspiration of blood and CSF was confirmed. 0.5 ml of omnipaque 240 was used for confirmation of even epidural spread under both live and AP fluoroscopy. After negative aspiration, a solution of 0.5 % Lidocaine 3 ml and 40 mg DepoMedrol was easily injected. The needle was gently removed intact . The patient back was cleaned and a Band-Aid was placed over the needle insertion point. Disposition the patient tolerated the procedure well and there were no complications . Vital signs remained stable throughout the procedure. The patient was escorted to the recovery area where they remained until discharge and written discharge instructions for the procedure were given. Plan: Tim Mckeon will return to our pain management center as scheduled.      Jannet Pham MD [id number]

## 2024-06-25 ENCOUNTER — COMMUNITY CARE MANAGEMENT (OUTPATIENT)
Facility: CLINIC | Age: 84
End: 2024-06-25

## 2024-06-25 NOTE — PROGRESS NOTES
[facility] Routine follow up by phone- note 24:     RNCM called patient for routine follow up by phone.  Pt. verified HIPAA by providing his full name,  and address.  Disclaimer provided for recorded call and pt. verbalized agreement.     Current Status: /Change of condition per patients statements: Pt. c/o fatigue after beginning chemo tx for Lymphoma on 24 after tonsillar mass biopsy in .     New labs/MD appts/Changes in medications: PCP added Glipizide 2.5mg 1 PO QD for high BS that his PCP suspects is due to Prednisone.  No new labs to review with patient. Last eGFR was 48 in .     Screening concerns: No new screening concerns identified during this encounter.     Education on medication/CKD/SDoH: No new SDOH needs identified during this encounter. RNCM will follow up with patient after new labs to discuss current kidney function.     CM follow up during next call: RNCM will follow up with patient on 24 @ 1 pm by phone to review PCP visit, new labs and current CKD status post chemo, medications, falls, ER visits and any newly identified CM support needs.     Care plan goals updates: no updates at this time.     KARMA [email or web address]#[phone].

## 2024-08-14 ENCOUNTER — APPOINTMENT (OUTPATIENT)
Dept: CT IMAGING | Age: 84
End: 2024-08-14
Payer: MEDICARE

## 2024-08-14 ENCOUNTER — HOSPITAL ENCOUNTER (EMERGENCY)
Age: 84
Discharge: HOME | End: 2024-08-14
Attending: EMERGENCY MEDICINE
Payer: MEDICARE

## 2024-08-14 ENCOUNTER — APPOINTMENT (OUTPATIENT)
Dept: GENERAL RADIOLOGY | Age: 84
End: 2024-08-14
Payer: MEDICARE

## 2024-08-14 VITALS
OXYGEN SATURATION: 96 % | WEIGHT: 155 LBS | BODY MASS INDEX: 21.7 KG/M2 | HEIGHT: 71 IN | TEMPERATURE: 98 F | DIASTOLIC BLOOD PRESSURE: 64 MMHG | SYSTOLIC BLOOD PRESSURE: 107 MMHG | RESPIRATION RATE: 19 BRPM | HEART RATE: 99 BPM

## 2024-08-14 DIAGNOSIS — R55 SYNCOPE AND COLLAPSE: Primary | ICD-10-CM

## 2024-08-14 DIAGNOSIS — N28.9 ACUTE ON CHRONIC RENAL INSUFFICIENCY: ICD-10-CM

## 2024-08-14 DIAGNOSIS — N18.9 ACUTE ON CHRONIC RENAL INSUFFICIENCY: ICD-10-CM

## 2024-08-14 LAB
ANION GAP SERPL CALCULATED.3IONS-SCNC: 12 MMOL/L (ref 7–16)
BILIRUB UR QL STRIP: NEGATIVE
BUN SERPL-MCNC: 18 MG/DL (ref 6–23)
CALCIUM SERPL-MCNC: 9.6 MG/DL (ref 8.6–10.2)
CHLORIDE SERPL-SCNC: 101 MMOL/L (ref 98–107)
CLARITY UR: CLEAR
CO2 SERPL-SCNC: 23 MMOL/L (ref 22–29)
COLOR UR: YELLOW
CREAT SERPL-MCNC: 1.8 MG/DL (ref 0.7–1.2)
EKG ATRIAL RATE: 105 BPM
EKG P AXIS: 57 DEGREES
EKG P-R INTERVAL: 184 MS
EKG Q-T INTERVAL: 348 MS
EKG QRS DURATION: 88 MS
EKG QTC CALCULATION (BAZETT): 459 MS
EKG R AXIS: 10 DEGREES
EKG T AXIS: 88 DEGREES
EKG VENTRICULAR RATE: 105 BPM
ERYTHROCYTE [DISTWIDTH] IN BLOOD BY AUTOMATED COUNT: 15.2 % (ref 11.5–15)
GFR, ESTIMATED: 37 ML/MIN/1.73M2
GLUCOSE SERPL-MCNC: 94 MG/DL (ref 74–99)
GLUCOSE UR STRIP-MCNC: NEGATIVE MG/DL
HCT VFR BLD AUTO: 34.3 % (ref 37–54)
HGB BLD-MCNC: 11.6 G/DL (ref 12.5–16.5)
HGB UR QL STRIP.AUTO: NEGATIVE
KETONES UR STRIP-MCNC: NEGATIVE MG/DL
LEUKOCYTE ESTERASE UR QL STRIP: NEGATIVE
MCH RBC QN AUTO: 35.3 PG (ref 26–35)
MCHC RBC AUTO-ENTMCNC: 33.8 G/DL (ref 32–34.5)
MCV RBC AUTO: 104.3 FL (ref 80–99.9)
NITRITE UR QL STRIP: NEGATIVE
PH UR STRIP: 6 [PH] (ref 5–9)
PLATELET # BLD AUTO: 160 K/UL (ref 130–450)
PMV BLD AUTO: 10.4 FL (ref 7–12)
POTASSIUM SERPL-SCNC: 4.6 MMOL/L (ref 3.5–5)
PROT UR STRIP-MCNC: NEGATIVE MG/DL
RBC # BLD AUTO: 3.29 M/UL (ref 3.8–5.8)
RBC #/AREA URNS HPF: ABNORMAL /HPF
SODIUM SERPL-SCNC: 136 MMOL/L (ref 132–146)
SP GR UR STRIP: <1.005 (ref 1–1.03)
TROPONIN I SERPL HS-MCNC: 29 NG/L (ref 0–11)
TROPONIN I SERPL HS-MCNC: 30 NG/L (ref 0–11)
UROBILINOGEN UR STRIP-ACNC: 0.2 EU/DL (ref 0–1)
WBC #/AREA URNS HPF: ABNORMAL /HPF
WBC OTHER # BLD: 23.6 K/UL (ref 4.5–11.5)

## 2024-08-14 PROCEDURE — 2580000003 HC RX 258: Performed by: EMERGENCY MEDICINE

## 2024-08-14 PROCEDURE — 99285 EMERGENCY DEPT VISIT HI MDM: CPT

## 2024-08-14 PROCEDURE — 70450 CT HEAD/BRAIN W/O DYE: CPT

## 2024-08-14 PROCEDURE — 96360 HYDRATION IV INFUSION INIT: CPT

## 2024-08-14 PROCEDURE — 85027 COMPLETE CBC AUTOMATED: CPT

## 2024-08-14 PROCEDURE — 80048 BASIC METABOLIC PNL TOTAL CA: CPT

## 2024-08-14 PROCEDURE — 84484 ASSAY OF TROPONIN QUANT: CPT

## 2024-08-14 PROCEDURE — 72125 CT NECK SPINE W/O DYE: CPT

## 2024-08-14 PROCEDURE — 93010 ELECTROCARDIOGRAM REPORT: CPT | Performed by: INTERNAL MEDICINE

## 2024-08-14 PROCEDURE — 93005 ELECTROCARDIOGRAM TRACING: CPT | Performed by: EMERGENCY MEDICINE

## 2024-08-14 PROCEDURE — 96361 HYDRATE IV INFUSION ADD-ON: CPT

## 2024-08-14 PROCEDURE — 72131 CT LUMBAR SPINE W/O DYE: CPT

## 2024-08-14 PROCEDURE — 71045 X-RAY EXAM CHEST 1 VIEW: CPT

## 2024-08-14 PROCEDURE — 81001 URINALYSIS AUTO W/SCOPE: CPT

## 2024-08-14 RX ORDER — 0.9 % SODIUM CHLORIDE 0.9 %
1000 INTRAVENOUS SOLUTION INTRAVENOUS ONCE
Status: COMPLETED | OUTPATIENT
Start: 2024-08-14 | End: 2024-08-14

## 2024-08-14 RX ADMIN — SODIUM CHLORIDE 1000 ML: 9 INJECTION, SOLUTION INTRAVENOUS at 01:34

## 2024-08-14 ASSESSMENT — ENCOUNTER SYMPTOMS
EYE REDNESS: 0
SORE THROAT: 0
COUGH: 0
VOMITING: 0
WHEEZING: 0
SHORTNESS OF BREATH: 0
NAUSEA: 0
BACK PAIN: 1
EYE PAIN: 0
EYE DISCHARGE: 0
SINUS PRESSURE: 0
DIARRHEA: 0
ABDOMINAL PAIN: 0

## 2024-08-14 ASSESSMENT — PAIN - FUNCTIONAL ASSESSMENT: PAIN_FUNCTIONAL_ASSESSMENT: NONE - DENIES PAIN

## 2024-08-14 NOTE — ED PROVIDER NOTES
Patient is an 85 y/o male who presents to the ED via EMS after a syncopal episode at home. Patient states that he stood up tonight and was walking down the [place] when he passed out. He does not remember the event. He denies any chest pain, palpitations or shortness of breath. Currently, he complains of pain in his head and in his lower back. He denies any radiation of the pain, numbness or weakness. He is not on blood thinners.         Review of Systems   Constitutional:  Negative for chills and fever.   HENT:  Negative for ear pain, sinus pressure and sore throat.    Eyes:  Negative for pain, discharge and redness.   Respiratory:  Negative for cough, shortness of breath and wheezing.    Cardiovascular:  Negative for chest pain.   Gastrointestinal:  Negative for abdominal pain, diarrhea, nausea and vomiting.   Genitourinary:  Negative for dysuria and frequency.   Musculoskeletal:  Positive for back pain. Negative for arthralgias.   Skin:  Negative for rash and wound.   Neurological:  Positive for syncope and headaches. Negative for weakness.   Hematological:  Negative for adenopathy.   All other systems reviewed and are negative.       Physical Exam  Vitals and nursing note reviewed.   Constitutional:       General: He is not in acute distress.  HENT:      Head: Normocephalic and atraumatic.      Right Ear: External ear normal.      Left Ear: External ear normal.      Nose: Nose normal.      Mouth/Throat:      Mouth: Mucous membranes are moist.   Eyes:      Conjunctiva/sclera: Conjunctivae normal.      Pupils: Pupils are equal, round, and reactive to light.   Neck:      Comments: Cervical collar present. Positive midline cervical spine tenderness to palpation.  Cardiovascular:      Rate and Rhythm: Regular rhythm. Tachycardia present.      Heart sounds: No murmur heard.  Pulmonary:      Effort: Pulmonary effort is normal. No respiratory distress.      Breath sounds: Normal breath sounds. No stridor. No wheezing,

## 2024-08-15 ENCOUNTER — COMMUNITY CARE MANAGEMENT (OUTPATIENT)
Facility: CLINIC | Age: 84
End: 2024-08-15

## 2024-08-27 ENCOUNTER — COMMUNITY CARE MANAGEMENT (OUTPATIENT)
Facility: CLINIC | Age: 84
End: 2024-08-27

## 2024-08-27 NOTE — PROGRESS NOTES
[facility] RNCM follow up by phone with patient and wife 8/27/24:  Pt.'s wife, Nayeli, stated he is doing ok. 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  Wife believes this is his last treatment and his next PET scan will be in 3 months.  He was in the hospital from 8/14-8/19 and was discharged home with PT.  He had his assessment today with PT with plan to treat 2 x's weekly.  Wife denied any issues in affording, obtaining or administering his medications.  She stated his PCP d/c'd his Kerendia on 8/26/24 at his OV, but this was not found on his medication list and was added as historic.  She denies any financial struggles or issues in obtaining the food they need.  Their daughter continues to provide transportation as needed  Pt. had labs from 8/14/24 and his eGFR remains stable at 37.        Wife denied any other concerns or questions today.     Medications reviewed:  atorvastatin, 40 mg, Freq: 1 PO QD  hydrocodone-acetaminophen, 5-325 mg, Freq: 0.5 mg PO Q 8 hours for back pain, max 1.5/day  Myrbetriq, 50 mg, Freq: 1 PO QD  meclizine, 25 mg, Freq: 1 PO PRN dizziness- rarely takes  Flomax, 0.4 mg, Freq: 1 PO QD  aspirin, 81 mg, Freq: 1 PO QD  cholecalciferol (vitamin D3), 25 mcg (1,000 unit), Freq: 1 PO QD  Vitamins B Complex, , Freq: 1 PO QD  Protonix, 40 mg, Freq: 1 Po QD  Celexa, 40 mg, Freq: 1 PO QD  simvastatin, 20 mg, Freq: 1 PO QD  Jentadueto XR, 5-1,000 mg, Freq: 1 PO QD  levothyroxine, 25 mcg, Freq: 1.5 PO QD  Carafate, 1 gram, Freq: 1 PO BID  Pepcid, 40 mg, Freq: 1 PO QD  Aricept, 5 mg, Freq: 1 PO QD  glipizide, 2.5 mg, Freq: 1 PO QD- newly added by PCP 6/24/24  Ozempic 0.25mg SQ Q week x 3-4 weeks  All medications were reviewed and updated in AMD.  Pt.'s wife stated that his Kerendia 10 mg 1 PO QD was discontinued by his PCP. This was added as historic in AMD as it was not found on his current medication list. No other medications that were discontinued were

## 2024-09-05 ENCOUNTER — COMMUNITY CARE MANAGEMENT (OUTPATIENT)
Facility: CLINIC | Age: 84
End: 2024-09-05

## 2024-09-05 NOTE — PROGRESS NOTES
concentrating, dizziness, slurred speech, blurred vision, fainting, seizures, coma   <70  Test blood glucose, eat/drink 15 grams of fast acting carbs, wait 15 min, test again  Fast acting carbs: glucose tabs, sugar packets – 4, 4 teaspoons sugar, 1 tablespoon of honey, 4 ounces of fruit juice/soda, 15 jelly beans,   -Reviewed with patient S&S of hyperglycemia : increased thirst/dry mouth, need to urinate frequently, tiredness, blurred vision, unintentional weight loss, recurrent infections, thrush, bladder infections, skin infections      EDUCATIONAL MATERIALS:  CC to mail stoplight tool for CKD and Diabetes.     UPCOMING APPOINTMENTS:    9/6 Dr. Gamez- Neph  9/13 Dr. Vizcaino Urology  9/24 PCP Dr. Fidelina Beach      PLAN:   -Offer Doximity  -reinforce fall prevention  -Has dizziness improved?  -daily logs: weight, blood sugar (wife doesn't have BP cuff)  -Follow Up appts, any updates?  9/6 Neph, 9/13 Urology, 9/24 PCP  -Educated on S/S of Sepsis     Marianna RN [facility] TCM

## 2024-09-12 ENCOUNTER — COMMUNITY CARE MANAGEMENT (OUTPATIENT)
Facility: CLINIC | Age: 84
End: 2024-09-12

## 2024-10-09 ENCOUNTER — OFFICE VISIT (OUTPATIENT)
Dept: PAIN MANAGEMENT | Age: 84
End: 2024-10-09
Payer: MEDICARE

## 2024-10-09 VITALS
WEIGHT: 155 LBS | HEIGHT: 71 IN | HEART RATE: 91 BPM | OXYGEN SATURATION: 97 % | BODY MASS INDEX: 21.7 KG/M2 | DIASTOLIC BLOOD PRESSURE: 82 MMHG | SYSTOLIC BLOOD PRESSURE: 112 MMHG | TEMPERATURE: 97.1 F | RESPIRATION RATE: 18 BRPM

## 2024-10-09 DIAGNOSIS — M48.061 SPINAL STENOSIS OF LUMBAR REGION, UNSPECIFIED WHETHER NEUROGENIC CLAUDICATION PRESENT: ICD-10-CM

## 2024-10-09 DIAGNOSIS — M96.1 LUMBAR POSTLAMINECTOMY SYNDROME: Primary | ICD-10-CM

## 2024-10-09 DIAGNOSIS — F11.90 CHRONIC, CONTINUOUS USE OF OPIOIDS: ICD-10-CM

## 2024-10-09 DIAGNOSIS — M51.369 DEGENERATION OF INTERVERTEBRAL DISC OF LUMBAR REGION, UNSPECIFIED WHETHER PAIN PRESENT: ICD-10-CM

## 2024-10-09 DIAGNOSIS — M47.817 LUMBOSACRAL SPONDYLOSIS WITHOUT MYELOPATHY: ICD-10-CM

## 2024-10-09 PROCEDURE — 99213 OFFICE O/P EST LOW 20 MIN: CPT | Performed by: ANESTHESIOLOGY

## 2024-10-09 PROCEDURE — G8420 CALC BMI NORM PARAMETERS: HCPCS | Performed by: ANESTHESIOLOGY

## 2024-10-09 PROCEDURE — G8427 DOCREV CUR MEDS BY ELIG CLIN: HCPCS | Performed by: ANESTHESIOLOGY

## 2024-10-09 PROCEDURE — 99214 OFFICE O/P EST MOD 30 MIN: CPT | Performed by: ANESTHESIOLOGY

## 2024-10-09 PROCEDURE — 1123F ACP DISCUSS/DSCN MKR DOCD: CPT | Performed by: ANESTHESIOLOGY

## 2024-10-09 PROCEDURE — G8484 FLU IMMUNIZE NO ADMIN: HCPCS | Performed by: ANESTHESIOLOGY

## 2024-10-09 PROCEDURE — 4004F PT TOBACCO SCREEN RCVD TLK: CPT | Performed by: ANESTHESIOLOGY

## 2024-10-09 RX ORDER — SEMAGLUTIDE 0.68 MG/ML
INJECTION, SOLUTION SUBCUTANEOUS
COMMUNITY
Start: 2024-09-17

## 2024-10-09 RX ORDER — DULOXETIN HYDROCHLORIDE 60 MG/1
CAPSULE, DELAYED RELEASE ORAL DAILY
COMMUNITY
Start: 2024-08-06

## 2024-10-09 RX ORDER — OMEPRAZOLE 40 MG/1
CAPSULE, DELAYED RELEASE ORAL DAILY
COMMUNITY
Start: 2024-07-09

## 2024-10-09 RX ORDER — MIDODRINE HYDROCHLORIDE 5 MG/1
5 TABLET ORAL 3 TIMES DAILY
COMMUNITY

## 2024-10-09 RX ORDER — HYDROCODONE BITARTRATE AND ACETAMINOPHEN 5; 325 MG/1; MG/1
0.5 TABLET ORAL 2 TIMES DAILY PRN
Qty: 30 TABLET | Refills: 0 | Status: SHIPPED | OUTPATIENT
Start: 2024-10-09 | End: 2024-11-08

## 2024-10-09 RX ORDER — MAGNESIUM OXIDE 400 MG/1
1 TABLET ORAL 2 TIMES DAILY
COMMUNITY
Start: 2024-08-19

## 2024-10-09 RX ORDER — GLIPIZIDE 2.5 MG/1
2.5 TABLET, EXTENDED RELEASE ORAL DAILY
COMMUNITY
Start: 2024-07-09

## 2024-10-09 RX ORDER — FINERENONE 10 MG/1
1 TABLET, FILM COATED ORAL DAILY
COMMUNITY
Start: 2024-08-08

## 2024-10-09 RX ORDER — VENLAFAXINE 75 MG/1
75 TABLET ORAL DAILY
COMMUNITY
Start: 2024-09-03

## 2024-10-09 RX ORDER — METFORMIN HCL 500 MG
1000 TABLET, EXTENDED RELEASE 24 HR ORAL 2 TIMES DAILY
COMMUNITY

## 2024-10-09 RX ORDER — LATANOPROST 50 UG/ML
SOLUTION/ DROPS OPHTHALMIC
COMMUNITY
Start: 2024-09-30

## 2024-10-09 RX ORDER — PREDNISONE 10 MG/1
TABLET ORAL
COMMUNITY
Start: 2024-07-12
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
## 2024-10-09 NOTE — PROGRESS NOTES
Rachid Forde presents to the [facility] on 10/9/2024. Rachid is complaining of pain in his back. The pain is constant. The pain is described as aching and throbbing. Pain is rated on his best day at a 5, on his worst day at a 8, and on average at a 4 on the VAS scale. He took his last dose of Norco a month ago.      Any procedures since your last visit: No    He is  on NSAIDS and  is  on anticoagulation medications to include ASA and is managed by Arnaud Arreola DO.     Pacemaker or defibrillator: No    Medication Contract and Consent for Opioid Use Documents Filed       Patient Documents       Type of Document Status Date Received Received By Description    Medication Contract Received 3/15/2019  1:18 PM AALIYAH HOPE PAIN MANAGEMENT PT AGREEMENT 3/15/2019    Medication Contract Received 2/12/2020  3:12 PM LAURA RIBERA pain management patient agreement 02/12/2020    Medication Contract Received 9/1/2021 10:54 AM ZOË ROBERSON PAIN AGREEMENT SEPTEMBER 2021    Medication Contract Received 12/12/2023  2:44 PM ASYA FERNANDO Pain Mgmt Patient Agreement 12.12.23                       Temp 97.1 °F (36.2 °C) (Temporal)   Resp 18   Ht 1.803 m (5' 10.98\")   Wt 70.3 kg (155 lb)   BMI 21.63 kg/m²      No LMP for male patient.

## 2024-11-06 ENCOUNTER — PREP FOR PROCEDURE (OUTPATIENT)
Dept: PAIN MANAGEMENT | Age: 84
End: 2024-11-06

## 2024-11-06 ENCOUNTER — OFFICE VISIT (OUTPATIENT)
Dept: PAIN MANAGEMENT | Age: 84
End: 2024-11-06
Payer: MEDICARE

## 2024-11-06 ENCOUNTER — TELEPHONE (OUTPATIENT)
Dept: PAIN MANAGEMENT | Age: 84
End: 2024-11-06

## 2024-11-06 VITALS
OXYGEN SATURATION: 95 % | BODY MASS INDEX: 21.7 KG/M2 | DIASTOLIC BLOOD PRESSURE: 80 MMHG | SYSTOLIC BLOOD PRESSURE: 120 MMHG | HEIGHT: 71 IN | HEART RATE: 112 BPM | WEIGHT: 155 LBS | RESPIRATION RATE: 18 BRPM

## 2024-11-06 DIAGNOSIS — M53.3 SACROILIAC DYSFUNCTION: ICD-10-CM

## 2024-11-06 DIAGNOSIS — M51.369 DEGENERATION OF INTERVERTEBRAL DISC OF LUMBAR REGION, UNSPECIFIED WHETHER PAIN PRESENT: ICD-10-CM

## 2024-11-06 DIAGNOSIS — M96.1 LUMBAR POSTLAMINECTOMY SYNDROME: ICD-10-CM

## 2024-11-06 DIAGNOSIS — M47.817 LUMBOSACRAL SPONDYLOSIS WITHOUT MYELOPATHY: ICD-10-CM

## 2024-11-06 DIAGNOSIS — M96.1 POSTLAMINECTOMY SYNDROME, LUMBAR: ICD-10-CM

## 2024-11-06 DIAGNOSIS — M48.061 SPINAL STENOSIS OF LUMBAR REGION, UNSPECIFIED WHETHER NEUROGENIC CLAUDICATION PRESENT: ICD-10-CM

## 2024-11-06 DIAGNOSIS — Z79.891 ENCOUNTER FOR LONG-TERM OPIATE ANALGESIC USE: Primary | ICD-10-CM

## 2024-11-06 DIAGNOSIS — M47.817 LUMBOSACRAL SPONDYLOSIS WITHOUT MYELOPATHY: Primary | ICD-10-CM

## 2024-11-06 LAB
SEND OUT REPORT: NORMAL
TEST NAME: NORMAL

## 2024-11-06 PROCEDURE — G8427 DOCREV CUR MEDS BY ELIG CLIN: HCPCS | Performed by: ANESTHESIOLOGY

## 2024-11-06 PROCEDURE — G8420 CALC BMI NORM PARAMETERS: HCPCS | Performed by: ANESTHESIOLOGY

## 2024-11-06 PROCEDURE — 1123F ACP DISCUSS/DSCN MKR DOCD: CPT | Performed by: ANESTHESIOLOGY

## 2024-11-06 PROCEDURE — 99214 OFFICE O/P EST MOD 30 MIN: CPT | Performed by: ANESTHESIOLOGY

## 2024-11-06 PROCEDURE — G8484 FLU IMMUNIZE NO ADMIN: HCPCS | Performed by: ANESTHESIOLOGY

## 2024-11-06 PROCEDURE — 4004F PT TOBACCO SCREEN RCVD TLK: CPT | Performed by: ANESTHESIOLOGY

## 2024-11-06 PROCEDURE — 1159F MED LIST DOCD IN RCRD: CPT | Performed by: ANESTHESIOLOGY

## 2024-11-06 RX ORDER — SODIUM CHLORIDE 9 MG/ML
INJECTION, SOLUTION INTRAVENOUS PRN
Status: CANCELLED | OUTPATIENT
Start: 2024-11-06

## 2024-11-06 RX ORDER — SODIUM CHLORIDE 0.9 % (FLUSH) 0.9 %
5-40 SYRINGE (ML) INJECTION PRN
Status: CANCELLED | OUTPATIENT
Start: 2024-11-06

## 2024-11-06 RX ORDER — SODIUM CHLORIDE 0.9 % (FLUSH) 0.9 %
5-40 SYRINGE (ML) INJECTION EVERY 12 HOURS SCHEDULED
Status: CANCELLED | OUTPATIENT
Start: 2024-11-06

## 2024-11-06 RX ORDER — VENLAFAXINE HYDROCHLORIDE 150 MG/1
CAPSULE, EXTENDED RELEASE ORAL DAILY
COMMUNITY
Start: 2024-10-28

## 2024-11-06 RX ORDER — HYDROCODONE BITARTRATE AND ACETAMINOPHEN 5; 325 MG/1; MG/1
0.5 TABLET ORAL 2 TIMES DAILY PRN
Qty: 30 TABLET | Refills: 0 | Status: SHIPPED | OUTPATIENT
Start: 2024-11-09 | End: 2024-12-09

## 2024-11-06 NOTE — PROGRESS NOTES
Rachid Forde presents to the [facility] on 11/6/2024. Rachid is complaining of pain lower back, radiates to LLE. The pain is constant. The pain is described as aching, throbbing, pins and needles. Pain is rated on his best day at a 5, on his worst day at a 8, and on average at a 6 on the VAS scale. He took his last dose of Norco and Tylenol today.      Any procedures since your last visit: No  He is not on NSAIDS and  is  on anticoagulation medications to include ASA and is managed by PCP.     Pacemaker or defibrillator: No   Medication Contract and Consent for Opioid Use Documents Filed       Patient Documents       Type of Document Status Date Received Received By Description    Medication Contract Received 3/15/2019  1:18 PM AALIYAH HOPE PAIN MANAGEMENT PT AGREEMENT 3/15/2019    Medication Contract Received 2/12/2020  3:12 PM LAURA RIBERA pain management patient agreement 02/12/2020    Medication Contract Received 9/1/2021 10:54 AM ZOË ROBERSON PAIN AGREEMENT SEPTEMBER 2021    Medication Contract Received 12/12/2023  2:44 PM ASYA FERNANDO Pain Mgmt Patient Agreement 12.12.23                       Resp 18   Ht 1.803 m (5' 10.98\")   Wt 70.3 kg (155 lb)   BMI 21.63 kg/m²      No LMP for male patient.  
WITH RIB PLATING performed by Grace Brenner MD at [facility] OR d/t a fall    RADIOFREQUENCY ABLATION NERVES Bilateral 10/29/2019    BILATERAL LUMBAR FACET L3 L4 MEDIAL BRANCH  L5 DORSAL RAMI  RADIOFREQUENCY ABLATION SEDATION (CPT 23019) performed by Clay Rangel MD at [facility] OR    ROTATOR CUFF REPAIR Bilateral     SHOULDER ARTHROSCOPY Right 12/08/2016    repair rotator cuff, bursectomy, debridement glenohumerol     SKIN BIOPSY         Prior to Admission medications    Medication Sig Start Date End Date Taking? Authorizing Provider   venlafaxine (EFFEXOR XR) 150 MG extended release capsule Take by mouth daily 10/28/24  Yes Dariel Arciniega MD   DULoxetine (CYMBALTA) 60 MG extended release capsule Take by mouth daily 8/6/24  Yes Dariel Arciniega MD   KERENDIA 10 MG TABS Take 1 tablet by mouth daily 8/8/24  Yes Dariel Arciniega MD   glipiZIDE (GLUCOTROL XL) 2.5 MG extended release tablet Take 1 tablet by mouth daily 7/9/24  Yes Dariel Arciniega MD   latanoprost (XALATAN) 0.005 % ophthalmic solution INSTILL 1 DROP INTO EACH EYE AT BEDTIME 9/30/24  Yes Dariel Arciniega MD   magnesium oxide (MAG-OX) 400 MG tablet Take 1 tablet by mouth 2 times daily 8/19/24  Yes Dariel Arciniega MD   metFORMIN (GLUCOPHAGE-XR) 500 MG extended release tablet Take 2 tablets by mouth 2 times daily   Yes Dariel Arciniega MD   midodrine (PROAMATINE) 5 MG tablet Take 1 tablet by mouth 3 times daily   Yes Dariel Arciniega MD   omeprazole (PRILOSEC) 40 MG delayed release capsule Take by mouth daily 7/9/24  Yes Dariel Arciniega MD   predniSONE (DELTASONE) 10 MG tablet PLEASE SEE ATTACHED FOR DETAILED DIRECTIONS 7/12/24  Yes Dariel Arciniega MD   OZEMPIC, 0.25 OR 0.5 MG/DOSE, 2 MG/3ML SOPN INJECT 0.25 MG SUBCUTANEOUSLY ONE TIME PER WEEK 9/17/24  Yes Dariel Arciniega MD   venlafaxine (EFFEXOR) 75 MG tablet Take 1 tablet by mouth daily 9/3/24  Yes Dariel Arciniega MD

## 2024-11-06 NOTE — TELEPHONE ENCOUNTER
Spoke with Mando & he states that he received 70% pain relief from his RFA that he had on 1/09/2024 & its lasted up until a few weeks ago. Guille pain prior to the RFA was a 8/10 & after the procedure his pain was a 2-3/10 with functional improvement & made activities of daily living easier.

## 2024-11-06 NOTE — H&P (VIEW-ONLY)
Chronic low back pain- Prior lumbar spine surgeries.     S/P Lumbar spine surgery by Dr. Benitez in April 2022 at [facility]- L3-5 interbody fusion, posterior instrumented fusion L3-5, Laminectomy L3-S1.    Did PT after the surgery.    Has significant facet tenderness and intermittent LE symptoms.    X-ray of LS spine- reviewed.    Continues to do HEP as tolerated.    MRI of the lumbar spine reviewed.    S/P SIJ injection on 9/12/2023 good relief of there SIJ pain.    S/P Lumbar Mb RFA B/ L5-S1 on 1/9/2024 > 70% relief. Doing HEP. Has noted recent exacerbation of low back pain and recurrence of similar pain. Doing HEP.    He is on ASA-81 mg- H/o CAD s/p stent.     He has been evaluated at [facility] for tonsillar mass/ right cervical lymphadenopathy. Was diagnosed with  Lymphoma s/p chemotherapy- follows at [facility].  Pertinent image findings/ consult notes/ labs reviewed.    Plan:    Repeat Lumbar MB RFA B/l L5-S1 facets under fluoroscopy.  RBA discussed patient agreed proceed.    Okay to continue aspirin for facet intervention.    PT / HEP    Low dose Norco for prn use. RBA of opioid use discussed.    Low dose Norco for prn use: takes 0.5 tab bid prn. Last script from us on : 10/9/2024.      Norco 0.5 tab po BID # 30 script given. E-scribed today to fill on 11/9/2024. Can call for refill when needed.    Pain meds help with pain and functionality, he is compliant with the medication regime, no signs of misuse abuse or diversion, no side effects. RBA of chronic opioid therapy discussed.    UDS/ Buccal drug screen today and opioid agreement renewal today 11/6/2024.    Drug Screen:   - Buccal Drug screen  on 11/15/2022- Reviewed.   - UDS  12/12/2023 : reviewed- consistent    OARRS reviewed today consistent.    Opioid agreement: Discussed salient features of opioid agreement.  - 11/15/2022.   - 12/12/2023 : Renew opioid agreement      Continue with HEP as tolerated      We discussed with the patient that

## 2024-11-11 ENCOUNTER — TELEPHONE (OUTPATIENT)
Dept: PAIN MANAGEMENT | Age: 84
End: 2024-11-11

## 2024-11-11 NOTE — TELEPHONE ENCOUNTER
Call to Rachid Forde that procedure was scheduled for 11/19/2024 and that [facility] should call him a few days before for the pre op call and after 3:00 PM the business day before with the arrival time. Instructed Rachid to hold ibuprofen for 24 hours, Celebrex, Mobic, and naprosyn for 4 days and any aspirin containing products, CoQ 10, or fish oil for 7 days. Instructed to call office back if any questions. Rachid verbalized understanding.    Electronically signed by Ella Del Castillo RN on 11/11/2024 at 1:34 PM

## 2024-11-13 LAB
SEND OUT REPORT: NORMAL
TEST NAME: NORMAL

## 2024-11-19 ENCOUNTER — HOSPITAL ENCOUNTER (OUTPATIENT)
Age: 84
Setting detail: OUTPATIENT SURGERY
Discharge: HOME | End: 2024-11-19
Attending: ANESTHESIOLOGY | Admitting: ANESTHESIOLOGY
Payer: MEDICARE

## 2024-11-19 ENCOUNTER — HOSPITAL ENCOUNTER (OUTPATIENT)
Dept: OPERATING ROOM | Age: 84
Setting detail: OUTPATIENT SURGERY
Discharge: HOME | End: 2024-11-19
Attending: ANESTHESIOLOGY
Payer: MEDICARE

## 2024-11-19 VITALS
RESPIRATION RATE: 16 BRPM | WEIGHT: 155 LBS | OXYGEN SATURATION: 100 % | SYSTOLIC BLOOD PRESSURE: 109 MMHG | TEMPERATURE: 97.6 F | DIASTOLIC BLOOD PRESSURE: 70 MMHG | HEART RATE: 100 BPM | BODY MASS INDEX: 22.19 KG/M2 | HEIGHT: 70 IN

## 2024-11-19 DIAGNOSIS — M47.816 LUMBAR SPONDYLOSIS: ICD-10-CM

## 2024-11-19 PROCEDURE — 3600000015 HC SURGERY LEVEL 5 ADDTL 15MIN: Performed by: ANESTHESIOLOGY

## 2024-11-19 PROCEDURE — 64636 DESTROY L/S FACET JNT ADDL: CPT | Performed by: ANESTHESIOLOGY

## 2024-11-19 PROCEDURE — 7100000011 HC PHASE II RECOVERY - ADDTL 15 MIN: Performed by: ANESTHESIOLOGY

## 2024-11-19 PROCEDURE — 2500000003 HC RX 250 WO HCPCS: Performed by: ANESTHESIOLOGY

## 2024-11-19 PROCEDURE — 64635 DESTROY LUMB/SAC FACET JNT: CPT | Performed by: ANESTHESIOLOGY

## 2024-11-19 PROCEDURE — 2709999900 HC NON-CHARGEABLE SUPPLY: Performed by: ANESTHESIOLOGY

## 2024-11-19 PROCEDURE — 7100000010 HC PHASE II RECOVERY - FIRST 15 MIN: Performed by: ANESTHESIOLOGY

## 2024-11-19 PROCEDURE — 6360000002 HC RX W HCPCS: Performed by: ANESTHESIOLOGY

## 2024-11-19 PROCEDURE — 3600000005 HC SURGERY LEVEL 5 BASE: Performed by: ANESTHESIOLOGY

## 2024-11-19 RX ORDER — LIDOCAINE HYDROCHLORIDE 10 MG/ML
INJECTION, SOLUTION EPIDURAL; INFILTRATION; INTRACAUDAL; PERINEURAL PRN
Status: DISCONTINUED | OUTPATIENT
Start: 2024-11-19 | End: 2024-11-19 | Stop reason: ALTCHOICE

## 2024-11-19 RX ORDER — BUPIVACAINE HYDROCHLORIDE 2.5 MG/ML
INJECTION, SOLUTION EPIDURAL; INFILTRATION; INTRACAUDAL PRN
Status: DISCONTINUED | OUTPATIENT
Start: 2024-11-19 | End: 2024-11-19 | Stop reason: ALTCHOICE

## 2024-11-19 RX ORDER — METHYLPREDNISOLONE ACETATE 40 MG/ML
INJECTION, SUSPENSION INTRA-ARTICULAR; INTRALESIONAL; INTRAMUSCULAR; SOFT TISSUE PRN
Status: DISCONTINUED | OUTPATIENT
Start: 2024-11-19 | End: 2024-11-19 | Stop reason: ALTCHOICE

## 2024-11-19 RX ORDER — LIDOCAINE HYDROCHLORIDE 5 MG/ML
INJECTION, SOLUTION INFILTRATION; INTRAVENOUS PRN
Status: DISCONTINUED | OUTPATIENT
Start: 2024-11-19 | End: 2024-11-19 | Stop reason: ALTCHOICE

## 2024-11-19 ASSESSMENT — PAIN DESCRIPTION - DESCRIPTORS: DESCRIPTORS: ACHING;BURNING

## 2024-11-19 ASSESSMENT — PAIN - FUNCTIONAL ASSESSMENT
PAIN_FUNCTIONAL_ASSESSMENT: 0-10

## 2024-11-19 NOTE — OP NOTE
Operative Note      Patient: Rachid Forde  YOB: 1940  MRN: [id number]    Date of Procedure: 2024    Pre-Op Diagnosis Codes:      * Lumbar spondylosis [M47.816]    Post-Op Diagnosis: Same       Procedure(s):  BILATERAL LUMBAR RADIOFREQUENCY ABLATION AT LUMBAR 5 and SACRAL 1 UNDER FLUOROSCOPY    Surgeon(s):  Clay Rangel MD    Assistant:   * No surgical staff found *    Anesthesia: Local    Estimated Blood Loss (mL): Minimal    Complications: None    Specimens:   * No specimens in log *    Implants:  * No implants in log *      Drains: * No LDAs found *    Findings:  Infection Present At Time Of Surgery (PATOS) (choose all levels that have infection present):  No infection present  Other Findings: good needle placement    Detailed Description of Procedure:   2024    Patient: Rachid Forde  :  1940  Age:  84 y.o.  Sex:  male     PRE-OPERATIVE DIAGNOSIS:  Lumbar spondylosis, lumbar facet arthropathy.    POST-OPERATIVE DIAGNOSIS: Same.    PROCEDURE:  Fluoroscopic-guided Bilateral  Lumbar facet medial branch radiofrequency ablation at levels L5-S1.    SURGEON:  Clay Rangel MD    ANESTHESIA: Local    ESTIMATED BLOOD LOSS: None.  ______________________________________________________________________  HISTORY & PHYSICAL: Rachid Forde presents today for fluoroscopic-guided Bilateral lumbar facet medial branch radiofrequency ablation at levels  L5-S1. The potential complications of the procedure were explained to Rachid again today and he has elected to undergo the aforementioned procedure.    Rachid’s complete History & Physical examination were reviewed in depth, a copy of which is in the chart.      DESCRIPTION OF PROCEDURE:    After confirming written and informed consent, a time-out was performed and Rachid’s name and date of birth, the procedure to be performed as well as the plan for the location of the needle insertion were confirmed.    The patient was

## 2024-11-19 NOTE — DISCHARGE INSTRUCTIONS
[facility]  [phone]   Post-Pain Block/ Radiofrequency Home Going Instructions    1-Go home, rest for the remainder of the day  2-Please do not lift over 20 pounds the day of the injection  3-If you received sedation No: alcohol, driving, operating lawn mowers, plows, tractors or other dangerous equipment until next morning. Do not make important decisions or sign legal documents for 24 hours. You may experience light headedness, dizziness, nausea or sleepiness after sedation. Do not stay alone. A responsible adult must be with you for 24 hours. You could be nauseated from the medications you have received. Your IV site may be sore and bruised.    4-No dietary restrictions     5-Resume all medications the same day, blood thinners to be resumed 24 hours after injection    6-Keep the surgical site clean and dry, you may shower the next morning and remove the      dressing.     7- No sitz baths, tub baths or hot tubs/swimming for 24 hours.       8- If you have any pain at the injection site(s), application of an ice pack to the area should be       helpful, 20 minutes on/20 minutes off for next 48 hours.  9- Call [facility] pain management immediately at if you develop.  Fever greater than 100.4 F  Have bleeding or drainage from the puncture site  Have progressive Leg/arm numbness and or weakness  Loss of control of bowel and or bladder (wet/soil yourself)  Severe headache with inability to lift head  10-You may return to work the next day

## 2024-11-20 NOTE — INTERVAL H&P NOTE
Update History & Physical    The patient's History and Physical of November 6, 2024 was reviewed with the patient and I examined the patient. There was no change. The surgical site was confirmed by the patient and me.     Plan: The risks, benefits, expected outcome, and alternative to the recommended procedure have been discussed with the patient. Patient understands and wants to proceed with the procedure.     Electronically signed by Clay Rangel MD on 11/20/2024

## 2024-11-21 LAB
SEND OUT REPORT: NORMAL
TEST NAME: NORMAL

## 2024-12-17 ENCOUNTER — TELEPHONE (OUTPATIENT)
Dept: PAIN MANAGEMENT | Age: 84
End: 2024-12-17

## 2024-12-17 NOTE — TELEPHONE ENCOUNTER
Mando is requesting a refill of Norco. Last filled 11/18. OARRS is consistent. Last appt 11/6. Next appt 1/7. thanks

## 2024-12-18 DIAGNOSIS — M96.1 LUMBAR POSTLAMINECTOMY SYNDROME: ICD-10-CM

## 2024-12-18 RX ORDER — HYDROCODONE BITARTRATE AND ACETAMINOPHEN 5; 325 MG/1; MG/1
0.5 TABLET ORAL 2 TIMES DAILY PRN
Qty: 30 TABLET | Refills: 0 | Status: SHIPPED | OUTPATIENT
Start: 2024-12-18 | End: 2025-01-17

## 2025-01-07 ENCOUNTER — OFFICE VISIT (OUTPATIENT)
Dept: PAIN MANAGEMENT | Age: 85
End: 2025-01-07
Payer: MEDICARE

## 2025-01-07 VITALS
BODY MASS INDEX: 22.19 KG/M2 | HEART RATE: 92 BPM | SYSTOLIC BLOOD PRESSURE: 133 MMHG | HEIGHT: 70 IN | WEIGHT: 155 LBS | RESPIRATION RATE: 18 BRPM | TEMPERATURE: 96 F | DIASTOLIC BLOOD PRESSURE: 76 MMHG | OXYGEN SATURATION: 97 %

## 2025-01-07 DIAGNOSIS — M53.3 SACROILIAC DYSFUNCTION: ICD-10-CM

## 2025-01-07 DIAGNOSIS — M96.1 LUMBAR POSTLAMINECTOMY SYNDROME: ICD-10-CM

## 2025-01-07 DIAGNOSIS — M51.369 DEGENERATION OF INTERVERTEBRAL DISC OF LUMBAR REGION, UNSPECIFIED WHETHER PAIN PRESENT: ICD-10-CM

## 2025-01-07 DIAGNOSIS — M47.817 LUMBOSACRAL SPONDYLOSIS WITHOUT MYELOPATHY: ICD-10-CM

## 2025-01-07 DIAGNOSIS — M48.061 SPINAL STENOSIS OF LUMBAR REGION, UNSPECIFIED WHETHER NEUROGENIC CLAUDICATION PRESENT: ICD-10-CM

## 2025-01-07 DIAGNOSIS — F11.90 CHRONIC, CONTINUOUS USE OF OPIOIDS: ICD-10-CM

## 2025-01-07 DIAGNOSIS — M96.1 POSTLAMINECTOMY SYNDROME, LUMBAR: Primary | ICD-10-CM

## 2025-01-07 PROCEDURE — 1123F ACP DISCUSS/DSCN MKR DOCD: CPT | Performed by: ANESTHESIOLOGY

## 2025-01-07 PROCEDURE — 1159F MED LIST DOCD IN RCRD: CPT | Performed by: ANESTHESIOLOGY

## 2025-01-07 PROCEDURE — G8427 DOCREV CUR MEDS BY ELIG CLIN: HCPCS | Performed by: ANESTHESIOLOGY

## 2025-01-07 PROCEDURE — M1308 PR FLU IMMUNIZE NO ADMIN: HCPCS | Performed by: ANESTHESIOLOGY

## 2025-01-07 PROCEDURE — 99213 OFFICE O/P EST LOW 20 MIN: CPT | Performed by: ANESTHESIOLOGY

## 2025-01-07 PROCEDURE — 4004F PT TOBACCO SCREEN RCVD TLK: CPT | Performed by: ANESTHESIOLOGY

## 2025-01-07 PROCEDURE — G8420 CALC BMI NORM PARAMETERS: HCPCS | Performed by: ANESTHESIOLOGY

## 2025-01-07 RX ORDER — HYDROCODONE BITARTRATE AND ACETAMINOPHEN 5; 325 MG/1; MG/1
0.5 TABLET ORAL 2 TIMES DAILY PRN
Qty: 30 TABLET | Refills: 0 | Status: SHIPPED | OUTPATIENT
Start: 2025-01-18 | End: 2025-02-17

## 2025-01-07 RX ORDER — FAMOTIDINE 20 MG/1
TABLET, FILM COATED ORAL
COMMUNITY
Start: 2025-01-06

## 2025-01-07 RX ORDER — OMEPRAZOLE 40 MG/1
CAPSULE, DELAYED RELEASE ORAL DAILY
COMMUNITY
Start: 2024-12-11
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
## 2025-01-07 NOTE — PROGRESS NOTES
Rachid Forde presents to the [facility] on 1/7/2025. Rachid is complaining of pain in his lower back that radiates to LLE. The pain is constant. The pain is described as aching, throbbing, and pins/needles. Pain is rated on his best day at a 6, on his worst day at a 10, and on average at a 6 on the VAS scale. He took his last dose of Norco and Tylenol 1 week ago.      Any procedures since your last visit: Yes, with 75 % relief,    He is not on NSAIDS and  is  on anticoagulation medications to include ASA and is managed by Arnaud Arreola DO       Pacemaker or defibrillator: No     Medication Contract and Consent for Opioid Use Documents Filed       Patient Documents       Type of Document Status Date Received Received By Description    Medication Contract Received 3/15/2019  1:18 PM AALIYAH HOPE PAIN MANAGEMENT PT AGREEMENT 3/15/2019    Medication Contract Received 2/12/2020  3:12 PM LAURA RIBERA pain management patient agreement 02/12/2020    Medication Contract Received 9/1/2021 10:54 AM ZOË ROBERSON PAIN AGREEMENT SEPTEMBER 2021    Medication Contract Received 12/12/2023  2:44 PM ASYA FERNANDO Pain Mgmt Patient Agreement 12.12.23    Consent Opioid Use Received 11/6/2024 12:17 PM AMANDA HARRIS patient agreement 11/6/24                       Resp 18   Ht 1.778 m (5' 10\")   Wt 70.3 kg (155 lb)   BMI 22.24 kg/m²      No LMP for male patient.

## 2025-02-25 ENCOUNTER — TELEPHONE (OUTPATIENT)
Dept: PAIN MANAGEMENT | Age: 85
End: 2025-02-25

## 2025-02-25 DIAGNOSIS — M96.1 LUMBAR POSTLAMINECTOMY SYNDROME: ICD-10-CM

## 2025-02-25 RX ORDER — HYDROCODONE BITARTRATE AND ACETAMINOPHEN 5; 325 MG/1; MG/1
0.5 TABLET ORAL 2 TIMES DAILY PRN
Qty: 30 TABLET | Refills: 0 | Status: SHIPPED | OUTPATIENT
Start: 2025-02-25 | End: 2025-03-27

## 2025-02-25 NOTE — TELEPHONE ENCOUNTER
Rachid is requesting a refill of Norco. Last filled 1/21/25. OARRS is consistent. Last office visit was 1/7/25, next appt is 4/14/25. Thank you.

## 2025-04-04 ENCOUNTER — OFFICE VISIT (OUTPATIENT)
Dept: PAIN MANAGEMENT | Age: 85
End: 2025-04-04
Payer: MEDICARE

## 2025-04-04 VITALS
OXYGEN SATURATION: 97 % | SYSTOLIC BLOOD PRESSURE: 120 MMHG | HEIGHT: 70 IN | BODY MASS INDEX: 22.19 KG/M2 | TEMPERATURE: 97.1 F | RESPIRATION RATE: 18 BRPM | DIASTOLIC BLOOD PRESSURE: 60 MMHG | HEART RATE: 57 BPM | WEIGHT: 155 LBS

## 2025-04-04 DIAGNOSIS — M54.16 LUMBAR RADICULAR PAIN: ICD-10-CM

## 2025-04-04 DIAGNOSIS — M96.1 POSTLAMINECTOMY SYNDROME, LUMBAR: Primary | ICD-10-CM

## 2025-04-04 DIAGNOSIS — F11.90 CHRONIC, CONTINUOUS USE OF OPIOIDS: ICD-10-CM

## 2025-04-04 DIAGNOSIS — M47.817 LUMBOSACRAL SPONDYLOSIS WITHOUT MYELOPATHY: ICD-10-CM

## 2025-04-04 DIAGNOSIS — M48.061 SPINAL STENOSIS OF LUMBAR REGION, UNSPECIFIED WHETHER NEUROGENIC CLAUDICATION PRESENT: ICD-10-CM

## 2025-04-04 PROCEDURE — 1159F MED LIST DOCD IN RCRD: CPT | Performed by: ANESTHESIOLOGY

## 2025-04-04 PROCEDURE — 4004F PT TOBACCO SCREEN RCVD TLK: CPT | Performed by: ANESTHESIOLOGY

## 2025-04-04 PROCEDURE — 1123F ACP DISCUSS/DSCN MKR DOCD: CPT | Performed by: ANESTHESIOLOGY

## 2025-04-04 PROCEDURE — G8420 CALC BMI NORM PARAMETERS: HCPCS | Performed by: ANESTHESIOLOGY

## 2025-04-04 PROCEDURE — G8427 DOCREV CUR MEDS BY ELIG CLIN: HCPCS | Performed by: ANESTHESIOLOGY

## 2025-04-04 PROCEDURE — 99214 OFFICE O/P EST MOD 30 MIN: CPT | Performed by: ANESTHESIOLOGY

## 2025-04-04 RX ORDER — FAMOTIDINE 40 MG/1
40 TABLET, FILM COATED ORAL
COMMUNITY
Start: 2025-03-06

## 2025-04-04 RX ORDER — HYDROCODONE BITARTRATE AND ACETAMINOPHEN 5; 325 MG/1; MG/1
0.5 TABLET ORAL 2 TIMES DAILY PRN
Qty: 30 TABLET | Refills: 0 | Status: SHIPPED | OUTPATIENT
Start: 2025-04-04 | End: 2025-05-04

## 2025-04-04 RX ORDER — FESOTERODINE FUMARATE 8 MG/1
1 TABLET, FILM COATED, EXTENDED RELEASE ORAL DAILY
COMMUNITY

## 2025-04-04 NOTE — PROGRESS NOTES
Rachid Forde presents to the [facility] on 4/4/2025. Rachid is complaining of pain in his lower back that radiates to LLE. The pain is constant. The pain is described as aching, throbbing, and pins/needles. Pain is rated on his best day at a 4, on his worst day at a 8, and on average at a 6 on the VAS scale. He took his last dose of Norco and Tylenol .      Any procedures since your last visit: No    He is not on NSAIDS and  is  on anticoagulation medications to include ASA and is managed by Arnaud Arreola DO       Pacemaker or defibrillator: No     Medication Contract and Consent for Opioid Use Documents Filed       Patient Documents       Type of Document Status Date Received Received By Description    Medication Contract Received 3/15/2019  1:18 PM AALIYAH HOPE PAIN MANAGEMENT PT AGREEMENT 3/15/2019    Medication Contract Received 2/12/2020  3:12 PM LAURA RIBERA pain management patient agreement 02/12/2020    Medication Contract Received 9/1/2021 10:54 AM ZOË ROBERSON PAIN AGREEMENT SEPTEMBER 2021    Medication Contract Received 12/12/2023  2:44 PM ASYA FERNANDO Pain Mgmt Patient Agreement 12.12.23    Consent Opioid Use Received 11/6/2024 12:17 PM AMANDA HARRIS patient agreement 11/6/24                       Resp 18   Ht 1.778 m (5' 10\")   Wt 70.3 kg (155 lb)   BMI 22.24 kg/m²      No LMP for male patient.  
10/23/2018    LEFT CLAVICLE OPEN REDUCTION INTERNAL FIXATION performed by HAZEL Arroyo DO at [facility]    UT UNLISTED PROCEDURE LUNGS & PLEURA Left 8/6/2018    LEFT SIDED VIDEO ASSISTED THORACOSCOPY WITH RIB PLATING performed by Grace Brenner MD at [facility] [place] d/t a fall    RADIOFREQUENCY ABLATION NERVES Bilateral 10/29/2019    BILATERAL LUMBAR FACET L3 L4 MEDIAL BRANCH  L5 DORSAL RAMI  RADIOFREQUENCY ABLATION SEDATION (CPT 55333) performed by Clay Rangel MD at [facility] OR    ROTATOR CUFF REPAIR Bilateral     SHOULDER ARTHROSCOPY Right 12/08/2016    repair rotator cuff, bursectomy, debridement glenohumerol     SKIN BIOPSY         Prior to Admission medications    Medication Sig Start Date End Date Taking? Authorizing Provider   famotidine (PEPCID) 40 MG tablet Take 1 tablet by mouth nightly 3/6/25  Yes Provider, MD Dariel   HYDROcodone-acetaminophen (NORCO) 5-325 MG per tablet Take 0.5 tablets by mouth 2 times daily as needed for Pain for up to 30 days. Take lowest dose possible to manage pain Max Daily Amount: 1 tablet 4/4/25 5/4/25 Yes Clay Rangel MD   omeprazole (PRILOSEC) 40 MG delayed release capsule Take by mouth daily 12/11/24  Yes Provider, MD Dariel   venlafaxine (EFFEXOR XR) 150 MG extended release capsule Take by mouth daily 10/28/24  Yes Provider, Historical, MD   latanoprost (XALATAN) 0.005 % ophthalmic solution INSTILL 1 DROP INTO EACH EYE AT BEDTIME 9/30/24  Yes Provider, Historical, MD   magnesium oxide (MAG-OX) 400 MG tablet Take 1 tablet by mouth 2 times daily 8/19/24  Yes Provider, Historical, MD   metFORMIN (GLUCOPHAGE-XR) 500 MG extended release tablet Take 2 tablets by mouth 2 times daily   Yes Provider, Historical, MD   midodrine (PROAMATINE) 5 MG tablet Take 1 tablet by mouth 3 times daily   Yes Provider, Historical, MD   donepezil (ARICEPT) 5 MG tablet TAKE 1 TABLET BY MOUTH EVERY DAY AT NIGHT 9/3/24  Yes Arnaud Arreola DO   atorvastatin (LIPITOR) 40 MG

## 2025-04-08 ENCOUNTER — PREP FOR PROCEDURE (OUTPATIENT)
Dept: PAIN MANAGEMENT | Age: 85
End: 2025-04-08

## 2025-04-08 DIAGNOSIS — M54.16 LUMBAR RADICULOPATHY: Primary | ICD-10-CM

## 2025-04-08 RX ORDER — SODIUM CHLORIDE 0.9 % (FLUSH) 0.9 %
5-40 SYRINGE (ML) INJECTION EVERY 12 HOURS SCHEDULED
Status: CANCELLED | OUTPATIENT
Start: 2025-04-09

## 2025-04-08 RX ORDER — SODIUM CHLORIDE 0.9 % (FLUSH) 0.9 %
5-40 SYRINGE (ML) INJECTION PRN
Status: CANCELLED | OUTPATIENT
Start: 2025-04-08

## 2025-04-08 RX ORDER — SODIUM CHLORIDE 9 MG/ML
INJECTION, SOLUTION INTRAVENOUS PRN
Status: CANCELLED | OUTPATIENT
Start: 2025-04-08

## 2025-05-05 ENCOUNTER — TELEPHONE (OUTPATIENT)
Dept: PAIN MANAGEMENT | Age: 85
End: 2025-05-05

## 2025-05-05 DIAGNOSIS — M96.1 POSTLAMINECTOMY SYNDROME, LUMBAR: ICD-10-CM

## 2025-05-05 RX ORDER — HYDROCODONE BITARTRATE AND ACETAMINOPHEN 5; 325 MG/1; MG/1
0.5 TABLET ORAL 2 TIMES DAILY PRN
Qty: 30 TABLET | Refills: 0 | Status: SHIPPED | OUTPATIENT
Start: 2025-05-05 | End: 2025-06-04

## 2025-05-05 NOTE — TELEPHONE ENCOUNTER
Rachid is requesting a refill of Norco. Last filled 4/4. OARRS is consistent. Last appt 4/4. Next appt 7/11. Thanks.

## 2025-05-07 NOTE — PLAN OF CARE
Problem: Nutrition  Goal: Optimal nutrition therapy  Outcome: Ongoing  Nutrition Problem: Increased nutrient needs  Nutritional Goals: Consume >75% meals/ONS no

## 2025-05-23 ENCOUNTER — TELEPHONE (OUTPATIENT)
Age: 85
End: 2025-05-23

## 2025-05-23 NOTE — TELEPHONE ENCOUNTER
Spoke with Rachid & he states that he received 70% pain relief from his previous Lumbar Radiofrequency ablation for over 6 months. His pain prior to the RFA was a 10/10 & after the procedure his pain was a 3/10 with functional improvement & made activities of daily living easier.

## 2025-05-23 NOTE — TELEPHONE ENCOUNTER
Call to Rachid Forde that procedure was scheduled for 06/03/2025 and that [facility] should call him a few days before for the pre op call and after 3:00 PM the business day before with the arrival time. Instructed to call office back if any questions. Rachid verbalized understanding.    Electronically signed by Ella Del Castillo RN on 5/23/2025 at 12:57 PM

## 2025-05-27 RX ORDER — DONEPEZIL HYDROCHLORIDE 10 MG/1
10 TABLET, FILM COATED ORAL
COMMUNITY
Start: 2025-04-10

## 2025-06-03 ENCOUNTER — HOSPITAL ENCOUNTER (OUTPATIENT)
Age: 85
Setting detail: OUTPATIENT SURGERY
Discharge: HOME | End: 2025-06-03
Attending: ANESTHESIOLOGY | Admitting: ANESTHESIOLOGY
Payer: MEDICARE

## 2025-06-03 ENCOUNTER — HOSPITAL ENCOUNTER (OUTPATIENT)
Dept: OPERATING ROOM | Age: 85
Setting detail: OUTPATIENT SURGERY
Discharge: HOME | End: 2025-06-03
Attending: ANESTHESIOLOGY
Payer: MEDICARE

## 2025-06-03 VITALS
RESPIRATION RATE: 14 BRPM | WEIGHT: 150 LBS | HEART RATE: 87 BPM | SYSTOLIC BLOOD PRESSURE: 128 MMHG | BODY MASS INDEX: 21.47 KG/M2 | OXYGEN SATURATION: 97 % | HEIGHT: 70 IN | TEMPERATURE: 97 F | DIASTOLIC BLOOD PRESSURE: 57 MMHG

## 2025-06-03 DIAGNOSIS — M47.896 OTHER OSTEOARTHRITIS OF SPINE, LUMBAR REGION: ICD-10-CM

## 2025-06-03 PROCEDURE — 3600000005 HC SURGERY LEVEL 5 BASE: Performed by: ANESTHESIOLOGY

## 2025-06-03 PROCEDURE — 2709999900 HC NON-CHARGEABLE SUPPLY: Performed by: ANESTHESIOLOGY

## 2025-06-03 PROCEDURE — 3600000015 HC SURGERY LEVEL 5 ADDTL 15MIN: Performed by: ANESTHESIOLOGY

## 2025-06-03 PROCEDURE — 6360000002 HC RX W HCPCS: Performed by: ANESTHESIOLOGY

## 2025-06-03 PROCEDURE — 7100000011 HC PHASE II RECOVERY - ADDTL 15 MIN: Performed by: ANESTHESIOLOGY

## 2025-06-03 PROCEDURE — 7100000010 HC PHASE II RECOVERY - FIRST 15 MIN: Performed by: ANESTHESIOLOGY

## 2025-06-03 PROCEDURE — 64635 DESTROY LUMB/SAC FACET JNT: CPT | Performed by: ANESTHESIOLOGY

## 2025-06-03 RX ORDER — LIDOCAINE HYDROCHLORIDE 5 MG/ML
INJECTION, SOLUTION INFILTRATION; INTRAVENOUS PRN
Status: DISCONTINUED | OUTPATIENT
Start: 2025-06-03 | End: 2025-06-03 | Stop reason: ALTCHOICE

## 2025-06-03 RX ORDER — METHYLPREDNISOLONE ACETATE 40 MG/ML
INJECTION, SUSPENSION INTRA-ARTICULAR; INTRALESIONAL; INTRAMUSCULAR; SOFT TISSUE PRN
Status: DISCONTINUED | OUTPATIENT
Start: 2025-06-03 | End: 2025-06-03 | Stop reason: ALTCHOICE

## 2025-06-03 RX ORDER — SODIUM CHLORIDE 0.9 % (FLUSH) 0.9 %
5-40 SYRINGE (ML) INJECTION PRN
Status: DISCONTINUED | OUTPATIENT
Start: 2025-06-03 | End: 2025-06-03 | Stop reason: HOSPADM

## 2025-06-03 RX ORDER — BUPIVACAINE HYDROCHLORIDE 2.5 MG/ML
INJECTION, SOLUTION EPIDURAL; INFILTRATION; INTRACAUDAL; PERINEURAL PRN
Status: DISCONTINUED | OUTPATIENT
Start: 2025-06-03 | End: 2025-06-03 | Stop reason: ALTCHOICE

## 2025-06-03 RX ORDER — LIDOCAINE HYDROCHLORIDE 10 MG/ML
INJECTION, SOLUTION EPIDURAL; INFILTRATION; INTRACAUDAL; PERINEURAL PRN
Status: DISCONTINUED | OUTPATIENT
Start: 2025-06-03 | End: 2025-06-03 | Stop reason: ALTCHOICE

## 2025-06-03 RX ORDER — SODIUM CHLORIDE 0.9 % (FLUSH) 0.9 %
5-40 SYRINGE (ML) INJECTION EVERY 12 HOURS SCHEDULED
Status: DISCONTINUED | OUTPATIENT
Start: 2025-06-03 | End: 2025-06-03 | Stop reason: HOSPADM

## 2025-06-03 RX ORDER — SODIUM CHLORIDE 9 MG/ML
INJECTION, SOLUTION INTRAVENOUS PRN
Status: DISCONTINUED | OUTPATIENT
Start: 2025-06-03 | End: 2025-06-03 | Stop reason: HOSPADM

## 2025-06-03 ASSESSMENT — PAIN - FUNCTIONAL ASSESSMENT
PAIN_FUNCTIONAL_ASSESSMENT: NONE - DENIES PAIN
PAIN_FUNCTIONAL_ASSESSMENT: WONG-BAKER FACES
PAIN_FUNCTIONAL_ASSESSMENT: 0-10

## 2025-06-03 NOTE — DISCHARGE INSTRUCTIONS
[facility]  [phone]   Post-Pain Block/ Radiofrequency Home Going Instructions    1-Go home, rest for the remainder of the day  2-Please do not lift over 20 pounds the day of the injection  3-If you received sedation No: alcohol, driving, operating lawn mowers, plows, tractors or other dangerous equipment until next morning. Do not make important decisions or sign legal documents for 24 hours. You may experience light headedness, dizziness, nausea or sleepiness after sedation. Do not stay alone. A responsible adult must be with you for 24 hours. You could be nauseated from the medications you have received. Your IV site may be sore and bruised.    4-No dietary restrictions     5-Resume all medications the same day, blood thinners to be resumed 24 hours after injection    6-Keep the surgical site clean and dry, you may shower the next morning and remove the      dressing.     7- No sitz baths, tub baths or hot tubs/swimming for 24 hours.       8- If you have any pain at the injection site(s), application of an ice pack to the area should be       helpful, 20 minutes on/20 minutes off for next 48 hours.  9- Call [facility] pain management immediately at if you develop.  Fever greater than 100.4 F  Have bleeding or drainage from the puncture site  Have progressive Leg/arm numbness and or weakness  Loss of control of bowel and or bladder (wet/soil yourself)  Severe headache with inability to lift head  10-You may return to work the next day

## 2025-06-03 NOTE — OP NOTE
Operative Note      Patient: Rachid Forde  YOB: 1940  MRN: [id number]    Date of Procedure: 6/3/2025    Pre-Op Diagnosis Codes:      * Lumbar spondylosis [M47.816]    Post-Op Diagnosis: Same       Procedure(s):  BILATERAL LUMBAR RADIOFREQUENCY ABLATION AT LUMBAR 5 and SACRAL 1 UNDER FLUOROSCOPY    Surgeon(s):  Clay Rangel MD    Assistant:   * No surgical staff found *    Anesthesia: Local    Estimated Blood Loss (mL): Minimal    Complications: None    Specimens:   * No specimens in log *    Implants:  * No implants in log *      Drains: * No LDAs found *    Findings:  Infection Present At Time Of Surgery (PATOS) (choose all levels that have infection present):  No infection present  Other Findings: good needle placement    Detailed Description of Procedure:   6/3/2025    Patient: Rachid Forde  :  1940  Age:  84 y.o.  Sex:  male     PRE-OPERATIVE DIAGNOSIS:  Lumbar spondylosis, lumbar facet arthropathy.    POST-OPERATIVE DIAGNOSIS: Same.    PROCEDURE:  Fluoroscopic-guided Bilateral  Lumbar facet medial branch radiofrequency ablation at levels L5-S1.    SURGEON:  Clay Rangel MD    ANESTHESIA: Local    ESTIMATED BLOOD LOSS: None.  ______________________________________________________________________  HISTORY & PHYSICAL: Rachid Forde presents today for fluoroscopic-guided Bilateral lumbar facet medial branch radiofrequency ablation at levels  L5-S1. The potential complications of the procedure were explained to Rachid again today and he has elected to undergo the aforementioned procedure.    Rachid’s complete History & Physical examination were reviewed in depth, a copy of which is in the chart.      DESCRIPTION OF PROCEDURE:    After confirming written and informed consent, a time-out was performed and Rachid’s name and date of birth, the procedure to be performed as well as the plan for the location of the needle insertion were confirmed.    The patient was
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
## 2025-06-26 ENCOUNTER — TELEPHONE (OUTPATIENT)
Age: 85
End: 2025-06-26

## 2025-06-26 DIAGNOSIS — M96.1 POSTLAMINECTOMY SYNDROME, LUMBAR: ICD-10-CM

## 2025-06-26 RX ORDER — HYDROCODONE BITARTRATE AND ACETAMINOPHEN 5; 325 MG/1; MG/1
0.5 TABLET ORAL 2 TIMES DAILY PRN
Qty: 30 TABLET | Refills: 0 | Status: SHIPPED | OUTPATIENT
Start: 2025-06-26 | End: 2025-07-26

## 2025-08-29 ENCOUNTER — OFFICE VISIT (OUTPATIENT)
Age: 85
End: 2025-08-29
Payer: MEDICARE

## 2025-08-29 VITALS
SYSTOLIC BLOOD PRESSURE: 132 MMHG | BODY MASS INDEX: 21.47 KG/M2 | WEIGHT: 150 LBS | HEART RATE: 81 BPM | OXYGEN SATURATION: 97 % | HEIGHT: 70 IN | DIASTOLIC BLOOD PRESSURE: 80 MMHG | RESPIRATION RATE: 16 BRPM

## 2025-08-29 DIAGNOSIS — M51.369 DEGENERATION OF INTERVERTEBRAL DISC OF LUMBAR REGION, UNSPECIFIED WHETHER PAIN PRESENT: ICD-10-CM

## 2025-08-29 DIAGNOSIS — M48.061 SPINAL STENOSIS OF LUMBAR REGION, UNSPECIFIED WHETHER NEUROGENIC CLAUDICATION PRESENT: ICD-10-CM

## 2025-08-29 DIAGNOSIS — M47.812 CERVICAL SPONDYLOSIS: ICD-10-CM

## 2025-08-29 DIAGNOSIS — M47.817 LUMBOSACRAL SPONDYLOSIS WITHOUT MYELOPATHY: ICD-10-CM

## 2025-08-29 DIAGNOSIS — M96.1 POSTLAMINECTOMY SYNDROME, LUMBAR: Primary | ICD-10-CM

## 2025-08-29 DIAGNOSIS — F11.90 CHRONIC, CONTINUOUS USE OF OPIOIDS: ICD-10-CM

## 2025-08-29 DIAGNOSIS — M50.30 DDD (DEGENERATIVE DISC DISEASE), CERVICAL: ICD-10-CM

## 2025-08-29 PROCEDURE — G8427 DOCREV CUR MEDS BY ELIG CLIN: HCPCS | Performed by: ANESTHESIOLOGY

## 2025-08-29 PROCEDURE — 99213 OFFICE O/P EST LOW 20 MIN: CPT | Performed by: ANESTHESIOLOGY

## 2025-08-29 PROCEDURE — 1159F MED LIST DOCD IN RCRD: CPT | Performed by: ANESTHESIOLOGY

## 2025-08-29 PROCEDURE — 1123F ACP DISCUSS/DSCN MKR DOCD: CPT | Performed by: ANESTHESIOLOGY

## 2025-08-29 PROCEDURE — 99214 OFFICE O/P EST MOD 30 MIN: CPT | Performed by: ANESTHESIOLOGY

## 2025-08-29 PROCEDURE — G8420 CALC BMI NORM PARAMETERS: HCPCS | Performed by: ANESTHESIOLOGY

## 2025-08-29 PROCEDURE — 4004F PT TOBACCO SCREEN RCVD TLK: CPT | Performed by: ANESTHESIOLOGY

## 2025-08-29 RX ORDER — MIRTAZAPINE 7.5 MG/1
7.5 TABLET, FILM COATED ORAL NIGHTLY
COMMUNITY
Start: 2025-06-10

## 2025-08-29 RX ORDER — HYDROCODONE BITARTRATE AND ACETAMINOPHEN 5; 325 MG/1; MG/1
0.5 TABLET ORAL 2 TIMES DAILY PRN
Qty: 30 TABLET | Refills: 0 | Status: SHIPPED | OUTPATIENT
Start: 2025-08-29 | End: 2025-09-28

## 2025-08-29 RX ORDER — HYDROCODONE BITARTRATE AND ACETAMINOPHEN 5; 325 MG/1; MG/1
0.5 TABLET ORAL 2 TIMES DAILY PRN
Qty: 30 TABLET | Refills: 0 | Status: SHIPPED | OUTPATIENT
Start: 2025-09-28 | End: 2025-10-28

## 2025-08-29 RX ORDER — FESOTERODINE FUMARATE 8 MG/1
1 TABLET, FILM COATED, EXTENDED RELEASE ORAL DAILY
COMMUNITY
Start: 2025-06-30

## (undated) DEVICE — CVD CANNULA

## (undated) DEVICE — NEEDLE HYPO 25GA L1.5IN BLU POLYPR HUB S STL REG BVL STR

## (undated) DEVICE — Z DISCONTINUED APPLICATOR SURG PREP 0.35OZ 2% CHG 70% ISO ALC W/ HI LT

## (undated) DEVICE — TOWEL OR BLUEE 16X26IN ST 8 PACK ORB08 16X26ORTWL

## (undated) DEVICE — 6 ML SYRINGE LUER-LOCK TIP: Brand: MONOJECT

## (undated) DEVICE — 12 ML SYRINGE,LUER-LOCK TIP: Brand: MONOJECT

## (undated) DEVICE — SUTURE ETHLN SZ 2-0 L18IN NONABSORBABLE BLK L26MM PS 3/8 585H

## (undated) DEVICE — MARKER,SKIN,WI/RULER AND LABELS: Brand: MEDLINE

## (undated) DEVICE — GOWN,SIRUS,FABRNF,XL,20/CS: Brand: MEDLINE

## (undated) DEVICE — NEEDLE HYPO 18GA L1.5IN PNK POLYPR HUB S STL THN WALL FILL

## (undated) DEVICE — 3 ML SYRINGE LUER-LOCK TIP: Brand: MONOJECT

## (undated) DEVICE — DRESSING GZ W1XL8IN COT XRFRM N ADH OVERWRAP CURAD

## (undated) DEVICE — APPLICATOR MEDICATED 10.5 CC SOLUTION HI LT ORNG CHLORAPREP

## (undated) DEVICE — 3M™ STERI-DRAPE™ INCISE DRAPE 1050 (60CM X 45CM): Brand: STERI-DRAPE™

## (undated) DEVICE — GAUZE,SPONGE,4"X4",16PLY,XRAY,STRL,LF: Brand: MEDLINE

## (undated) DEVICE — GLOVE ORANGE PI 7   MSG9070

## (undated) DEVICE — DRAIN SURG L3/8-1/2IN DIA3/16IN SIL CARD CONN 1:1 BLAK

## (undated) DEVICE — STOCKINETTE: Brand: CONVERTORS

## (undated) DEVICE — PLATE BONE UNIV L75MM RIB NONCOMPRESSION RIBLOC
Type: IMPLANTABLE DEVICE | Site: CHEST  WALL | Status: FUNCTIONAL
Removed: 2018-08-06

## (undated) DEVICE — Z INACTIVE USE 2660664 SOLUTION IRRIG 3000ML 0.9% SOD CHL USP UROMATIC PLAS CONT

## (undated) DEVICE — GAUZE,SPONGE,4"X4",12PLY,STERILE,LF,2'S: Brand: MEDLINE

## (undated) DEVICE — GLOVE ORANGE PI 7 1/2   MSG9075

## (undated) DEVICE — BANDAGE ADH W1XL3IN NAT FAB WVN FLX DURABLE N ADH PD SEAL

## (undated) DEVICE — Z DISCONTINUED NO SUB IDED GLOVE SURG BEAD CUF 8 STD PF WHT STRL TRIUMPH LT LTX

## (undated) DEVICE — PENCIL ES L3M BTTN SWCH HOLSTER W/ BLDE ELECTRD EDGE

## (undated) DEVICE — TRAY EPI SGL DOSE 18GA NDL CUST AULTMAN HOSP

## (undated) DEVICE — Z DISCONTINUED USE 2132709 NEEDLE HYPO 18GA L1.5IN PNK POLYPR HUB S STL THN WALL FILL

## (undated) DEVICE — RETRACTOR ART

## (undated) DEVICE — ELECTRODE SURG MPLR NEUT SELF ADH PT PLT MULTIGEN

## (undated) DEVICE — 22GX5" WHITACRE SPINAL NEEDLE: Brand: MEDLINE

## (undated) DEVICE — Z DISCONTINUED USE 2275686 GLOVE SURG SZ 8 L12IN FNGR THK13MIL WHT ISOLEX POLYISOPRENE

## (undated) DEVICE — LAPAROSCOPIC SCISSORS: Brand: EPIX LAPAROSCOPIC SCISSORS

## (undated) DEVICE — CHLORAPREP 26ML ORANGE

## (undated) DEVICE — NEEDLE HYPO 18GA L1.5IN PNK POLYPR HUB S STL REG BVL STR

## (undated) DEVICE — TIBURON GENERAL ENDOSCOPY DRAPE: Brand: CONVERTORS

## (undated) DEVICE — GAUZE,SPONGE,4"X4",16PLY,STRL,LF,10/TRAY: Brand: MEDLINE

## (undated) DEVICE — CAMERA STRYKER 1488 HD GEN

## (undated) DEVICE — BIT DRL L110MM DIA2.5MM ST G QUIK CPL NONRADIOPAQUE W/O STP

## (undated) DEVICE — MEDI-VAC YANKAUER SUCTION HANDLE: Brand: CARDINAL HEALTH

## (undated) DEVICE — 3M™ RED DOT™ MONITORING ELECTRODE WITH FOAM TAPE AND STICKY GEL 2560, 50/BAG, 20/CASE, 72/PLT: Brand: RED DOT™

## (undated) DEVICE — MAGNETIC INSTR DRAPE 20X16: Brand: MEDLINE INDUSTRIES, INC.

## (undated) DEVICE — INTENDED FOR TISSUE SEPARATION, AND OTHER PROCEDURES THAT REQUIRE A SHARP SURGICAL BLADE TO PUNCTURE OR CUT.: Brand: BARD-PARKER ® STAINLESS STEEL BLADES

## (undated) DEVICE — DOUBLE BASIN SET: Brand: MEDLINE INDUSTRIES, INC.

## (undated) DEVICE — Device

## (undated) DEVICE — PUMP SUC IRR TBNG L10FT W/ HNDPC ASSEMB STRYKEFLOW 2

## (undated) DEVICE — GOWN,SIRUS,POLYRNF,BRTHSLV,XLN/XL,20/CS: Brand: MEDLINE

## (undated) DEVICE — BLADE CLIPPER GEN PURP NS

## (undated) DEVICE — SET ENDO INSTR LAPAROSCOPIC STGENLAP

## (undated) DEVICE — COUNTER NDL 10 COUNT HLD 20 FOAM BLK SGL MAG

## (undated) DEVICE — INSUFFLATION TUBING SET WITH FILTER, FUNNEL CONNECTOR AND LUER LOCK: Brand: JOSNOE MEDICAL INC

## (undated) DEVICE — NDL, WHITACRE SPINAL, 22GX3.5": Brand: MEDLINE

## (undated) DEVICE — STOCKINETTE ORTH W9XL36IN COT 2 PLY HLLW FOR HANDLING LMB

## (undated) DEVICE — TOWEL,OR,DSP,ST,BLUE,STD,6/PK,12PK/CS: Brand: MEDLINE

## (undated) DEVICE — TROCAR ENDOSCP L100MM DIA5MM BLDELSS STBL SL OBT RADLUC

## (undated) DEVICE — IMMOBILIZER SHLDR SLNG AND SWATH UNIV

## (undated) DEVICE — TUBING, SUCTION, 1/4" X 10', STRAIGHT: Brand: MEDLINE

## (undated) DEVICE — NEEDLE, QUINCKE, 22GX5": Brand: MEDLINE

## (undated) DEVICE — Z INACTIVE USE 2735373 APPLICATOR FBR LAIN COT WOOD TIP ECONOMICAL

## (undated) DEVICE — SYRINGE IRRIG 60ML SFT PLIABLE BLB EZ TO GRP 1 HND USE W/

## (undated) DEVICE — PAD,ABDOMINAL,5"X9",ST,LF,25/BX: Brand: MEDLINE INDUSTRIES, INC.

## (undated) DEVICE — PATIENT RETURN ELECTRODE, SINGLE-USE, CONTACT QUALITY MONITORING, ADULT, WITH 9FT CORD, FOR PATIENTS WEIGING OVER 33LBS. (15KG): Brand: MEGADYNE

## (undated) DEVICE — TROCAR ENDOSCP L100MM DIA12MM UNIV SL OBT RADLUC STBL SL

## (undated) DEVICE — ELECTRODE PT RET AD L9FT HI MOIST COND ADH HYDRGEL CORDED

## (undated) DEVICE — TROCAR ENDOSCP L100MM DIA12MM BLDELSS OBT RADLUC STBL SL

## (undated) DEVICE — GOWN,SIRUS,FABRNF,L,20/CS: Brand: MEDLINE

## (undated) DEVICE — COVER,TABLE,60X90,STERILE: Brand: MEDLINE

## (undated) DEVICE — BANDAGE,SELF ADHRNT,COFLEX,4"X5YD,STRL: Brand: COLABEL

## (undated) DEVICE — NON-DEHP CATHETER EXTENSION SET, MALE LUER LOCK ADAPTER

## (undated) DEVICE — DRAPE 64X41IN RADIOLOGY C ARM EQUIP STER

## (undated) DEVICE — BIT DRL L180MM DIA2.5MM QUIK CPL NONRADIOPAQUE W/O STP

## (undated) DEVICE — NEEDLE SPNL 22GA L7IN BLK HUB S STL W/ QNCKE PNT W/OUT

## (undated) DEVICE — DEVICE SUT SHFT L34CM DIA 10MM 2 JAW LD UNIT ENDOSTCH

## (undated) DEVICE — 3M™ COBAN™ STERILE SELF-ADHERENT WRAP, 1584S, 4 IN X 5 YD (10 CM X 4,5 M), 18 ROLLS/CASE: Brand: 3M™ COBAN™

## (undated) DEVICE — BANDAGE ELASTIC COMPRSS ESMRK 4.0INX3.0YD

## (undated) DEVICE — SET THORACIC I

## (undated) DEVICE — 20 ML SYRINGE LUER-LOCK TIP: Brand: MONOJECT

## (undated) DEVICE — SET INSTRUMENT LAP I

## (undated) DEVICE — NEEDLE HYPO 21GA L1.5IN INTRAMUSCULAR S STL LATCH BVL UP

## (undated) DEVICE — DRILL SYSTEM 7

## (undated) DEVICE — SHEET DRAPE FULL 70X100

## (undated) DEVICE — STRIP SKIN CLSR 1/4INX1.5IN REINF WND NYL CURAD

## (undated) DEVICE — SOLUTION IV IRRIG WATER 1000ML POUR BRL 2F7114

## (undated) DEVICE — NEEDLE SPNL 22GA L3.5IN BLK HUB S STL REG WALL FIT STYL W/

## (undated) DEVICE — SET MAJOR INSTR ORTHO

## (undated) DEVICE — READY WET SKIN SCRUB TRAY-LF: Brand: MEDLINE INDUSTRIES, INC.

## (undated) DEVICE — JP CHANNEL DRAIN, 24FR HUBLESS: Brand: CARDINAL HEALTH

## (undated) DEVICE — GLOVE SURG SZ 65 THK91MIL LTX FREE SYN POLYISOPRENE

## (undated) DEVICE — DRAIN SURG SGL COLL PT TB FOR ATS BG OASIS

## (undated) DEVICE — SURGICAL PROCEDURE PACK BASIC

## (undated) DEVICE — 3M™ IOBAN™ 2 ANTIMICROBIAL INCISE DRAPE 6650EZ: Brand: IOBAN™ 2

## (undated) DEVICE — WARMER SCP LAP

## (undated) DEVICE — ANTI-FOG SOLUTION WITH FOAM PAD: Brand: DEVON

## (undated) DEVICE — DBD-PACK,SHOULDER III: Brand: MEDLINE

## (undated) DEVICE — SOLUTION IV IRRIG 250ML ST LF 0.9% SODIUM 2F7122

## (undated) DEVICE — SPONGE,LAP,12"X12",XR,ST,5/PK,40PK/CS: Brand: MEDLINE